# Patient Record
Sex: FEMALE | Race: WHITE | NOT HISPANIC OR LATINO | Employment: OTHER | URBAN - METROPOLITAN AREA
[De-identification: names, ages, dates, MRNs, and addresses within clinical notes are randomized per-mention and may not be internally consistent; named-entity substitution may affect disease eponyms.]

---

## 2018-03-05 DIAGNOSIS — Z12.39 BREAST CANCER SCREENING: Primary | ICD-10-CM

## 2018-03-09 ENCOUNTER — HOSPITAL ENCOUNTER (OUTPATIENT)
Dept: RADIOLOGY | Facility: HOSPITAL | Age: 68
Discharge: HOME | End: 2018-03-09
Attending: FAMILY MEDICINE
Payer: COMMERCIAL

## 2018-03-09 DIAGNOSIS — Z12.39 BREAST CANCER SCREENING: ICD-10-CM

## 2018-03-09 PROCEDURE — 77067 SCR MAMMO BI INCL CAD: CPT | Mod: 26,,, | Performed by: RADIOLOGY

## 2018-03-09 PROCEDURE — 77067 SCR MAMMO BI INCL CAD: CPT | Mod: TC

## 2018-03-09 PROCEDURE — 77063 BREAST TOMOSYNTHESIS BI: CPT | Mod: 26,,, | Performed by: RADIOLOGY

## 2019-04-25 ENCOUNTER — OFFICE VISIT (OUTPATIENT)
Dept: ORTHOPEDICS | Facility: CLINIC | Age: 69
End: 2019-04-25
Payer: MEDICARE

## 2019-04-25 VITALS
SYSTOLIC BLOOD PRESSURE: 132 MMHG | BODY MASS INDEX: 33.34 KG/M2 | WEIGHT: 220 LBS | DIASTOLIC BLOOD PRESSURE: 74 MMHG | HEART RATE: 87 BPM | HEIGHT: 68 IN

## 2019-04-25 DIAGNOSIS — S39.012A LUMBAR STRAIN, INITIAL ENCOUNTER: ICD-10-CM

## 2019-04-25 DIAGNOSIS — M25.562 PATELLOFEMORAL ARTHRALGIA OF LEFT KNEE: Primary | ICD-10-CM

## 2019-04-25 PROCEDURE — 73562 X-RAY EXAM OF KNEE 3: CPT | Mod: LT,,, | Performed by: ORTHOPAEDIC SURGERY

## 2019-04-25 PROCEDURE — 73562 PR  X-RAY KNEE 3 VIEW: ICD-10-PCS | Mod: LT,,, | Performed by: ORTHOPAEDIC SURGERY

## 2019-04-25 PROCEDURE — 99203 OFFICE O/P NEW LOW 30 MIN: CPT | Mod: 25,,, | Performed by: ORTHOPAEDIC SURGERY

## 2019-04-25 PROCEDURE — 20610 LARGE JOINT ASPIRATION/INJECTION: L KNEE: ICD-10-PCS | Mod: LT,,, | Performed by: ORTHOPAEDIC SURGERY

## 2019-04-25 PROCEDURE — 20610 DRAIN/INJ JOINT/BURSA W/O US: CPT | Mod: LT,,, | Performed by: ORTHOPAEDIC SURGERY

## 2019-04-25 PROCEDURE — 99203 PR OFFICE/OUTPT VISIT, NEW, LEVL III, 30-44 MIN: ICD-10-PCS | Mod: 25,,, | Performed by: ORTHOPAEDIC SURGERY

## 2019-04-25 RX ORDER — GLIMEPIRIDE 4 MG/1
4 TABLET ORAL
COMMUNITY
Start: 2019-04-04 | End: 2020-04-03

## 2019-04-25 RX ORDER — METHYLPREDNISOLONE ACETATE 40 MG/ML
40 INJECTION, SUSPENSION INTRA-ARTICULAR; INTRALESIONAL; INTRAMUSCULAR; SOFT TISSUE
Status: DISCONTINUED | OUTPATIENT
Start: 2019-04-25 | End: 2019-04-25 | Stop reason: HOSPADM

## 2019-04-25 RX ORDER — HYDROCHLOROTHIAZIDE 25 MG/1
25 TABLET ORAL EVERY OTHER DAY
COMMUNITY
Start: 2019-04-23 | End: 2023-12-18 | Stop reason: CLARIF

## 2019-04-25 RX ADMIN — METHYLPREDNISOLONE ACETATE 40 MG: 40 INJECTION, SUSPENSION INTRA-ARTICULAR; INTRALESIONAL; INTRAMUSCULAR; SOFT TISSUE at 12:04

## 2019-04-25 NOTE — PROGRESS NOTES
[facility]    Subjective:     Chief Complaint:   Chief Complaint   Patient presents with    Left Knee - Pain     Left knee pain x 3 months. States that he leg has been swollen  For about 3 months and states that is when the pain started. States that the her pain is constant and popping and states that her knee is weak as well.        Past Medical History:   Diagnosis Date    Abnormal Pap smear     s/p cryo at 22 y/o    GERD (gastroesophageal reflux disease)     Hyperlipidemia     Migraine     Migraines        Past Surgical History:   Procedure Laterality Date    CHOLECYSTECTOMY  10/2014    CHOLECYSTECTOMY-LAPAROSCOPIC N/A 10/14/2014    Performed by Yuriy Moreland MD at [facility] OR       Current Outpatient Medications   Medication Sig    glimepiride (AMARYL) 4 MG tablet Take 4 mg by mouth.    hydroCHLOROthiazide (HYDRODIURIL) 25 MG tablet Take 25 mg by mouth.     No current facility-administered medications for this visit.        Review of patient's allergies indicates:   Allergen Reactions    Statins-hmg-coa reductase inhibitors Swelling    Metformin Other (See Comments)     metallic taste , burn of the esophagus       Family History   Problem Relation Age of Onset    Breast cancer Paternal Grandmother 70    Colon cancer Mother             Cancer Daughter         crohns    Ovarian cancer Daughter     Cancer Daughter 20        uterine cancer with mets s/p chemo/rad/surgery; ?genetic mutation; now with hip tumors    Breast cancer Maternal Grandmother        Social History     Socioeconomic History    Marital status:      Spouse name: Not on file    Number of children: Not on file    Years of education: Not on file    Highest education level: Not on file   Occupational History    Not on file   Social Needs    Financial resource strain: Not on file    Food insecurity:     Worry: Not on file     Inability: Not on file    Transportation needs:     Medical: Not on file      Non-medical: Not on file   Tobacco Use    Smoking status: Former Smoker    Smokeless tobacco: Never Used   Substance and Sexual Activity    Alcohol use: No    Drug use: No    Sexual activity: Never   Lifestyle    Physical activity:     Days per week: Not on file     Minutes per session: Not on file    Stress: Not on file   Relationships    Social connections:     Talks on phone: Not on file     Gets together: Not on file     Attends Anabaptism service: Not on file     Active member of club or organization: Not on file     Attends meetings of clubs or organizations: Not on file     Relationship status: Not on file   Other Topics Concern    Not on file   Social History Narrative    Not on file       History of present illness: Patient comes in today for the left knee. She's having achy pain in the left knee. She's also having swelling in the left knee. This is been going on for several months. She denies any trauma.      Review of Systems:    Constitution: Negative for chills, fever, and sweats.  Negative for unexplained weight loss.    HENT:  Negative for headaches and blurry vision.    Cardiovascular:Negative for chest pain or irregular heart beat. Negative for hypertension.    Respiratory:  Negative for cough and shortness of breath.    Gastrointestinal: Negative for abdominal pain, heartburn, melena, nausea, and vomitting.    Genitourinary:  Negative bladder incontinence and dysuria.    Musculoskeletal:  See HPI for details.     Neurological: Negative for numbness.    Psychiatric/Behavioral: Negative for depression.  The patient is not nervous/anxious.      Endocrine: Negative for polyuria    Hematologic/Lymphatic: Negative for bleeding problem.  Does not bruise/bleed easily.    Skin: Negative for poor would healing and rash    Objective:      Physical Examination:    Vital Signs:    Vitals:    04/25/19 1146   BP: 132/74   Pulse: 87       Body mass index is 33.45 kg/m².    This a well-developed, well  nourished patient in no acute distress.  They are alert and oriented and cooperative to examination.        Patient has full range of motion of the left knee. Full range of motion of the right knee. She has anterior crepitus bilaterally. She has a 1+ effusion on the left side. She has medial joint line tenderness.  Pertinent New Results:    XRAY Report / Interpretation:   AP lateral sunrise views of the left knee demonstrate patellofemoral arthrosis no fractures or subluxations    Assessment/Plan:      Patellofemoral arthrosis. I injected the left knee with Depo-Medrol and lidocaine. I started her on physical therapy. Follow-up in one month      This note was created using Dragon voice recognition software that occasionally misinterpreted phrases or words.

## 2019-04-25 NOTE — PROCEDURES
Large Joint Aspiration/Injection: L knee  Date/Time: 4/25/2019 12:56 PM  Performed by: Manjit Pierre MD  Authorized by: Manjit Pierre MD     Consent Done?:  Yes (Verbal)  Procedure site marked: Yes    Timeout: Prior to procedure the correct patient, procedure, and site was verified      Location:  Knee  Site:  L knee  Prep: Patient was prepped and draped in usual sterile fashion    Needle size:  25 G  Medications:  40 mg methylPREDNISolone acetate 40 mg/mL; 40 mg methylPREDNISolone acetate 40 mg/mL  Patient tolerance:  Patient tolerated the procedure well with no immediate complications

## 2019-09-24 DIAGNOSIS — R06.89 HYPOVENTILATION, IDIOPATHIC: Primary | ICD-10-CM

## 2019-09-24 DIAGNOSIS — I10 ESSENTIAL HYPERTENSION, MALIGNANT: ICD-10-CM

## 2019-09-24 DIAGNOSIS — R07.9 CHEST PAIN, UNSPECIFIED: ICD-10-CM

## 2019-09-24 DIAGNOSIS — E11.9 DIABETES MELLITUS WITHOUT COMPLICATION: ICD-10-CM

## 2019-10-10 ENCOUNTER — HOSPITAL ENCOUNTER (OUTPATIENT)
Dept: PULMONOLOGY | Facility: HOSPITAL | Age: 69
Discharge: HOME | End: 2019-10-10
Attending: SPECIALIST
Payer: MEDICARE

## 2019-10-10 ENCOUNTER — HOSPITAL ENCOUNTER (OUTPATIENT)
Dept: RADIOLOGY | Facility: HOSPITAL | Age: 69
Discharge: HOME | End: 2019-10-10
Attending: SPECIALIST
Payer: MEDICARE

## 2019-10-10 DIAGNOSIS — R06.89 HYPOVENTILATION, IDIOPATHIC: ICD-10-CM

## 2019-10-10 DIAGNOSIS — E11.9 DIABETES MELLITUS WITHOUT COMPLICATION: ICD-10-CM

## 2019-10-10 DIAGNOSIS — R07.9 CHEST PAIN, UNSPECIFIED: Primary | ICD-10-CM

## 2019-10-10 DIAGNOSIS — I10 ESSENTIAL HYPERTENSION, MALIGNANT: ICD-10-CM

## 2019-10-10 DIAGNOSIS — R07.9 CHEST PAIN, UNSPECIFIED: ICD-10-CM

## 2019-10-10 PROCEDURE — 94010 BREATHING CAPACITY TEST: CPT

## 2019-10-10 PROCEDURE — 94060 EVALUATION OF WHEEZING: CPT

## 2019-10-10 PROCEDURE — 94375 RESPIRATORY FLOW VOLUME LOOP: CPT

## 2019-10-10 PROCEDURE — 71046 X-RAY EXAM CHEST 2 VIEWS: CPT | Mod: TC

## 2021-05-20 ENCOUNTER — OFFICE VISIT (OUTPATIENT)
Dept: ORTHOPEDICS | Facility: CLINIC | Age: 71
End: 2021-05-20
Payer: MEDICARE

## 2021-05-20 VITALS
DIASTOLIC BLOOD PRESSURE: 76 MMHG | OXYGEN SATURATION: 98 % | WEIGHT: 220 LBS | SYSTOLIC BLOOD PRESSURE: 114 MMHG | HEART RATE: 71 BPM | BODY MASS INDEX: 33.34 KG/M2 | HEIGHT: 68 IN

## 2021-05-20 DIAGNOSIS — M17.12 PRIMARY OSTEOARTHRITIS OF LEFT KNEE: Primary | ICD-10-CM

## 2021-05-20 DIAGNOSIS — M62.830 LUMBAR PARASPINAL MUSCLE SPASM: ICD-10-CM

## 2021-05-20 PROCEDURE — 99213 OFFICE O/P EST LOW 20 MIN: CPT | Mod: S$GLB,,, | Performed by: ORTHOPAEDIC SURGERY

## 2021-05-20 PROCEDURE — 99213 PR OFFICE/OUTPT VISIT, EST, LEVL III, 20-29 MIN: ICD-10-PCS | Mod: S$GLB,,, | Performed by: ORTHOPAEDIC SURGERY

## 2021-05-20 RX ORDER — METHYLPREDNISOLONE 4 MG/1
TABLET ORAL
Qty: 1 PACKAGE | Refills: 0 | Status: SHIPPED | OUTPATIENT
Start: 2021-05-20 | End: 2023-11-16

## 2021-05-20 RX ORDER — NATEGLINIDE 120 MG/1
120 TABLET ORAL
COMMUNITY
Start: 2020-11-09 | End: 2022-12-19

## 2021-05-20 RX ORDER — DULAGLUTIDE 0.75 MG/.5ML
1.5 INJECTION, SOLUTION SUBCUTANEOUS
COMMUNITY
Start: 2020-09-09

## 2021-09-14 ENCOUNTER — HOSPITAL ENCOUNTER (OUTPATIENT)
Dept: RADIOLOGY | Facility: HOSPITAL | Age: 71
Discharge: HOME | End: 2021-09-14
Attending: OBSTETRICS & GYNECOLOGY
Payer: MEDICARE

## 2021-09-14 DIAGNOSIS — R92.1 CALCIFICATION OF LEFT BREAST ON MAMMOGRAPHY: ICD-10-CM

## 2021-09-14 DIAGNOSIS — R92.8 ABNORMAL MAMMOGRAM OF LEFT BREAST: ICD-10-CM

## 2021-09-14 PROCEDURE — 19081 BX BREAST 1ST LESION STRTCTC: CPT | Mod: LT

## 2021-09-14 PROCEDURE — 88305 TISSUE EXAM BY PATHOLOGIST: CPT | Performed by: PATHOLOGY

## 2021-09-14 PROCEDURE — 88305 TISSUE EXAM BY PATHOLOGIST: CPT | Mod: 26,,, | Performed by: PATHOLOGY

## 2021-09-14 PROCEDURE — 19081 BX BREAST 1ST LESION STRTCTC: CPT | Mod: LT,,, | Performed by: RADIOLOGY

## 2021-09-14 PROCEDURE — 25000003 PHARM REV CODE 250: Performed by: RADIOLOGY

## 2021-09-14 PROCEDURE — 76098 X-RAY EXAM SURGICAL SPECIMEN: CPT | Mod: TC

## 2021-09-14 PROCEDURE — 76098 MAMMO BREAST SPECIMEN: ICD-10-PCS | Mod: 26,59,, | Performed by: RADIOLOGY

## 2021-09-14 PROCEDURE — 88305 TISSUE EXAM BY PATHOLOGIST: ICD-10-PCS | Mod: 26,,, | Performed by: PATHOLOGY

## 2021-09-14 PROCEDURE — 76098 X-RAY EXAM SURGICAL SPECIMEN: CPT | Mod: 26,59,, | Performed by: RADIOLOGY

## 2021-09-14 PROCEDURE — 19081 MAMMO BREAST STEREOTACTIC BREAST BIOPSY LEFT: ICD-10-PCS | Mod: LT,,, | Performed by: RADIOLOGY

## 2021-09-14 RX ADMIN — LIDOCAINE HYDROCHLORIDE 20 ML: 10; .005 INJECTION, SOLUTION EPIDURAL; INFILTRATION; INTRACAUDAL; PERINEURAL at 11:09

## 2021-09-15 ENCOUNTER — TELEPHONE (OUTPATIENT)
Dept: RADIOLOGY | Facility: HOSPITAL | Age: 71
End: 2021-09-15

## 2021-09-15 LAB
FINAL PATHOLOGIC DIAGNOSIS: NORMAL
GROSS: NORMAL
Lab: NORMAL

## 2021-09-16 ENCOUNTER — TELEPHONE (OUTPATIENT)
Dept: RADIOLOGY | Facility: HOSPITAL | Age: 71
End: 2021-09-16

## 2022-02-28 ENCOUNTER — TELEPHONE (OUTPATIENT)
Dept: RADIOLOGY | Facility: HOSPITAL | Age: 72
End: 2022-02-28
Payer: COMMERCIAL

## 2022-02-28 NOTE — TELEPHONE ENCOUNTER
Called patient to schedule 6 month follow up of breast. No answer, left voicemail to call back at [phone].

## 2022-03-03 ENCOUNTER — HOSPITAL ENCOUNTER (OUTPATIENT)
Dept: RADIOLOGY | Facility: HOSPITAL | Age: 72
Discharge: HOME | End: 2022-03-03
Attending: OBSTETRICS & GYNECOLOGY
Payer: MEDICARE

## 2022-03-03 DIAGNOSIS — R92.8 ABNORMAL MAMMOGRAM OF LEFT BREAST: ICD-10-CM

## 2022-03-03 PROCEDURE — 77061 BREAST TOMOSYNTHESIS UNI: CPT | Mod: 26,LT,, | Performed by: RADIOLOGY

## 2022-03-03 PROCEDURE — 77065 MAMMO DIGITAL DIAGNOSTIC LEFT WITH TOMO: ICD-10-PCS | Mod: 26,LT,, | Performed by: RADIOLOGY

## 2022-03-03 PROCEDURE — 77061 MAMMO DIGITAL DIAGNOSTIC LEFT WITH TOMO: ICD-10-PCS | Mod: 26,LT,, | Performed by: RADIOLOGY

## 2022-03-03 PROCEDURE — 77065 DX MAMMO INCL CAD UNI: CPT | Mod: 26,LT,, | Performed by: RADIOLOGY

## 2022-03-03 PROCEDURE — 76642 ULTRASOUND BREAST LIMITED: CPT | Mod: 26,LT,, | Performed by: RADIOLOGY

## 2022-03-03 PROCEDURE — 76642 ULTRASOUND BREAST LIMITED: CPT | Mod: TC,LT

## 2022-03-03 PROCEDURE — 77065 DX MAMMO INCL CAD UNI: CPT | Mod: TC,LT

## 2022-03-03 PROCEDURE — 76642 US BREAST LEFT LIMITED: ICD-10-PCS | Mod: 26,LT,, | Performed by: RADIOLOGY

## 2022-06-13 DIAGNOSIS — R92.8 ABNORMAL MAMMOGRAM: Primary | ICD-10-CM

## 2022-07-29 ENCOUNTER — OFFICE VISIT (OUTPATIENT)
Dept: HEMATOLOGY/ONCOLOGY | Facility: CLINIC | Age: 72
End: 2022-07-29
Payer: MEDICARE

## 2022-07-29 VITALS
TEMPERATURE: 98 F | RESPIRATION RATE: 18 BRPM | WEIGHT: 213.19 LBS | BODY MASS INDEX: 32.42 KG/M2 | DIASTOLIC BLOOD PRESSURE: 81 MMHG | SYSTOLIC BLOOD PRESSURE: 131 MMHG | HEART RATE: 87 BPM

## 2022-07-29 DIAGNOSIS — D69.6 THROMBOCYTOPENIA, UNSPECIFIED: ICD-10-CM

## 2022-07-29 DIAGNOSIS — I82.91 RECURRENT THROMBUS: ICD-10-CM

## 2022-07-29 DIAGNOSIS — I80.9 RECURRENT PHLEBITIS OF SUPERFICIAL VEIN: ICD-10-CM

## 2022-07-29 DIAGNOSIS — D68.59 HYPERCOAGULABLE STATE: Primary | ICD-10-CM

## 2022-07-29 DIAGNOSIS — Z82.49 FAMILY HISTORY OF BLOOD CLOTS: ICD-10-CM

## 2022-07-29 PROCEDURE — 99204 PR OFFICE/OUTPT VISIT, NEW, LEVL IV, 45-59 MIN: ICD-10-PCS | Mod: S$GLB,,, | Performed by: INTERNAL MEDICINE

## 2022-07-29 PROCEDURE — 99204 OFFICE O/P NEW MOD 45 MIN: CPT | Mod: S$GLB,,, | Performed by: INTERNAL MEDICINE

## 2022-07-31 ENCOUNTER — TELEPHONE (OUTPATIENT)
Dept: HEMATOLOGY/ONCOLOGY | Facility: CLINIC | Age: 72
End: 2022-07-31

## 2022-07-31 DIAGNOSIS — I82.91 RECURRENT THROMBUS: Primary | ICD-10-CM

## 2022-07-31 DIAGNOSIS — Z82.49 FAMILY HISTORY OF BLOOD CLOTS: ICD-10-CM
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
## 2022-07-31 NOTE — TELEPHONE ENCOUNTER
Can she have lab done at .?    I placed orders for hypercoag eval in [facility] to be done at .    Get orders to , and notify pt of plan.    RTC 1 month see me.
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
## 2022-08-10 LAB
APCR PPP: 4.2 RATIO
APTT PPP: 29 SEC (ref 23–32)
AT III ACT/NOR PPP CHRO: 88 % NORMAL (ref 80–135)
AT III AG PPP IA-MCNC: NORMAL MG/DL
B2 GLYCOPROT1 IGA SER-ACNC: 3.2 U/ML
B2 GLYCOPROT1 IGG SER-ACNC: 19.7 U/ML
B2 GLYCOPROT1 IGM SER-ACNC: <2 U/ML
BASOPHILS # BLD AUTO: 53 CELLS/UL (ref 0–200)
BASOPHILS NFR BLD AUTO: 0.6 %
CARDIOLIPIN IGA SER IA-ACNC: 2.1 APL-U/ML
CARDIOLIPIN IGG SER IA-ACNC: 25.1 GPL-U/ML
CARDIOLIPIN IGM SER IA-ACNC: 2.1 MPL-U/ML
EOSINOPHIL # BLD AUTO: 169 CELLS/UL (ref 15–500)
EOSINOPHIL NFR BLD AUTO: 1.9 %
ERYTHROCYTE [DISTWIDTH] IN BLOOD BY AUTOMATED COUNT: 12.6 % (ref 11–15)
F2 C.20210G>A GENO BLD/T: NEGATIVE
F2 GENE MUT ANL BLD/T: NORMAL
F5 GENE MUT ANL BLD/T: NORMAL
F5 P.R506Q BLD/T QL: NEGATIVE
FACT VIII ACT/NOR PPP: 221 % NORMAL (ref 50–180)
FACT XII ACT/NOR PPP: 124 % NORMAL (ref 50–150)
FIBRINOGEN PPP-MCNC: 426 MG/DL (ref 175–425)
HCT VFR BLD AUTO: 39.5 % (ref 35–45)
HCYS SERPL-SCNC: 12.3 UMOL/L
HGB BLD-MCNC: 13.5 G/DL (ref 11.7–15.5)
INR PPP: 1
INTERPRETATION: ABNORMAL
LA 2 SCREEN W REFLEX-IMP: NORMAL
LYMPHOCYTES # BLD AUTO: 2919 CELLS/UL (ref 850–3900)
LYMPHOCYTES NFR BLD AUTO: 32.8 %
MCH RBC QN AUTO: 31.3 PG (ref 27–33)
MCHC RBC AUTO-ENTMCNC: 34.2 G/DL (ref 32–36)
MCV RBC AUTO: 91.6 FL (ref 80–100)
MONOCYTES # BLD AUTO: 596 CELLS/UL (ref 200–950)
MONOCYTES NFR BLD AUTO: 6.7 %
MTHFR GENE MUT ANL BLD/T: NORMAL
MTHFR GENE MUT ANL BLD/T: POSITIVE
NEUTROPHILS # BLD AUTO: 5162 CELLS/UL (ref 1500–7800)
NEUTROPHILS NFR BLD AUTO: 58 %
PLATELET # BLD AUTO: 292 THOUSAND/UL (ref 140–400)
PMV BLD REES-ECKER: 11.4 FL (ref 7.5–12.5)
PROT C ACT/NOR PPP: >200 % NORMAL (ref 70–180)
PROT C AG ACT/NOR PPP IA: ABNORMAL % NORMAL
PROT S ACT/NOR PPP: 95 % NORMAL (ref 60–140)
PROT S AG ACT/NOR PPP IA: ABNORMAL % NORMAL
PROTHROMBIN TIME: 10.3 SEC (ref 9–11.5)
PS IGA SER-ACNC: <20 U/ML
PS IGG SER-ACNC: <10 U/ML
PS IGM SER-ACNC: <25 U/ML
RBC # BLD AUTO: 4.31 MILLION/UL (ref 3.8–5.1)
SCREEN APTT: 35 SECONDS
SCREEN DRVVT: 38 SECONDS
SERVICE CMNT-IMP: NORMAL
WBC # BLD AUTO: 8.9 THOUSAND/UL (ref 3.8–10.8)

## 2022-08-16 ENCOUNTER — TELEPHONE (OUTPATIENT)
Dept: HEMATOLOGY/ONCOLOGY | Facility: CLINIC | Age: 72
End: 2022-08-16

## 2022-08-16 NOTE — TELEPHONE ENCOUNTER
Spoke to pt that Dr houston is evaluating her for bleeding and clotting and did not know about the surgery. Advised not to get surgery until after appt with Dr Houston to see if he cleared her for surgery. Verbalized understanding.

## 2022-08-16 NOTE — TELEPHONE ENCOUNTER
----- Message from Sayra Yee sent at 8/16/2022 10:13 AM CDT -----  PLEASE CALL PT. HAS SURGERY ON 8/29/22 NO HAS CONTACTED HER ABOUT HER ULTRASOUND CONCERNED ABOUT THE BLOOD CLOT BEFORE SURGERY.    CB#[phone]

## 2022-08-25 ENCOUNTER — HOSPITAL ENCOUNTER (OUTPATIENT)
Dept: RADIOLOGY | Facility: HOSPITAL | Age: 72
Discharge: HOME | End: 2022-08-25
Attending: OBSTETRICS & GYNECOLOGY
Payer: MEDICARE

## 2022-08-25 DIAGNOSIS — R92.8 ABNORMAL MAMMOGRAM: ICD-10-CM

## 2022-08-25 PROCEDURE — 77061 BREAST TOMOSYNTHESIS UNI: CPT | Mod: 26,LT,, | Performed by: RADIOLOGY

## 2022-08-25 PROCEDURE — 77065 DX MAMMO INCL CAD UNI: CPT | Mod: TC,LT

## 2022-08-25 PROCEDURE — 77065 MAMMO DIGITAL DIAGNOSTIC LEFT WITH TOMO: ICD-10-PCS | Mod: 26,LT,, | Performed by: RADIOLOGY

## 2022-08-25 PROCEDURE — 77061 MAMMO DIGITAL DIAGNOSTIC LEFT WITH TOMO: ICD-10-PCS | Mod: 26,LT,, | Performed by: RADIOLOGY

## 2022-08-25 PROCEDURE — 77065 DX MAMMO INCL CAD UNI: CPT | Mod: 26,LT,, | Performed by: RADIOLOGY

## 2022-09-01 ENCOUNTER — OFFICE VISIT (OUTPATIENT)
Dept: HEMATOLOGY/ONCOLOGY | Facility: CLINIC | Age: 72
End: 2022-09-01
Payer: MEDICARE

## 2022-09-01 VITALS
HEIGHT: 68 IN | SYSTOLIC BLOOD PRESSURE: 128 MMHG | HEART RATE: 76 BPM | RESPIRATION RATE: 18 BRPM | TEMPERATURE: 98 F | WEIGHT: 214.5 LBS | BODY MASS INDEX: 32.51 KG/M2 | DIASTOLIC BLOOD PRESSURE: 82 MMHG

## 2022-09-01 DIAGNOSIS — R79.89 HIGH PLASMA HOMOCYSTINE: Primary | ICD-10-CM

## 2022-09-01 DIAGNOSIS — Z15.89 HETEROZYGOUS MTHFR MUTATION A1298C: ICD-10-CM

## 2022-09-01 PROCEDURE — 99213 PR OFFICE/OUTPT VISIT, EST, LEVL III, 20-29 MIN: ICD-10-PCS | Mod: S$GLB,,, | Performed by: INTERNAL MEDICINE

## 2022-09-01 PROCEDURE — 99213 OFFICE O/P EST LOW 20 MIN: CPT | Mod: S$GLB,,, | Performed by: INTERNAL MEDICINE

## 2022-09-01 RX ORDER — PANTOPRAZOLE SODIUM 40 MG/1
40 TABLET, DELAYED RELEASE ORAL EVERY MORNING
COMMUNITY
Start: 2022-08-24

## 2022-09-01 NOTE — LETTER
September 4, 2022        Sam Garcia IV, MD  [address]   [place] [address]             [facility] - Hematology Oncology  [address] [address]  Phone: [phone]  Fax: [phone]   Patient: Jen Torres   MR Number: [id number]   YOB: 1950   Date of Visit: 9/1/2022       Dear Dr. Garcia:    Thank you for referring Jen Melissa to me for evaluation. Below are the relevant portions of my assessment and plan of care.            If you have questions, please do not hesitate to call me. I look forward to following Jen along with you.    Sincerely,      PABLO Moreland MD           CC    No Recipients
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
## 2022-09-06 ENCOUNTER — TELEPHONE (OUTPATIENT)
Dept: HEMATOLOGY/ONCOLOGY | Facility: CLINIC | Age: 72
End: 2022-09-06

## 2022-09-06 ENCOUNTER — DOCUMENTATION ONLY (OUTPATIENT)
Dept: HEMATOLOGY/ONCOLOGY | Facility: CLINIC | Age: 72
End: 2022-09-06

## 2022-09-06 DIAGNOSIS — I80.02 THROMBOPHLEBITIS OF SUPERFICIAL VEINS OF LEFT LOWER EXTREMITY: Primary | ICD-10-CM

## 2022-09-07 DIAGNOSIS — I80.02 THROMBOPHLEBITIS OF SUPERFICIAL VEINS OF LEFT LOWER EXTREMITY: Primary | ICD-10-CM

## 2022-09-07 DIAGNOSIS — Z15.89 HETEROZYGOUS MTHFR MUTATION A1298C: ICD-10-CM

## 2022-09-07 DIAGNOSIS — R79.89 HIGH PLASMA HOMOCYSTINE: ICD-10-CM

## 2022-09-07 NOTE — TELEPHONE ENCOUNTER
Denial for clearance faxed to Karli, notified her that per Dr Moreland, not cleared to move forward with surgery. Verbalized understanding.

## 2022-09-07 NOTE — TELEPHONE ENCOUNTER
----- Message from Ranjana Mendoza sent at 9/6/2022  3:18 PM CDT -----  Karli Villarreal from [facility] in [place] Called and said the patient is having a meniscus repair done and she need to speak with the nurse today about the patient's blood thinners. # [phone]

## 2022-09-07 NOTE — TELEPHONE ENCOUNTER
Surgery put on hold.    I placed order for U/S venous of L leg HH.    Try to get this scheduled with in 1 week.    RTC see me 1-2 days after the U/S is done.

## 2022-09-07 NOTE — PROGRESS NOTES
She has torn maniscus of L knee.  Hx of superficial thrombophlebitis of L leg.    She has multiple + findings on Hypercoag. Eval.    I received today a request for clearance for surgery tomorrow on L knee.  I have not cleared her for surgery.    I have requested Venous Dopplar study of L leg., to check status of clot?    RTC 1 week to review status and recommendations if surgery is done.

## 2022-09-19 ENCOUNTER — OFFICE VISIT (OUTPATIENT)
Dept: HEMATOLOGY/ONCOLOGY | Facility: CLINIC | Age: 72
End: 2022-09-19
Payer: MEDICARE

## 2022-09-19 VITALS
BODY MASS INDEX: 32.64 KG/M2 | HEART RATE: 78 BPM | RESPIRATION RATE: 16 BRPM | HEIGHT: 68 IN | DIASTOLIC BLOOD PRESSURE: 87 MMHG | TEMPERATURE: 98 F | SYSTOLIC BLOOD PRESSURE: 163 MMHG | WEIGHT: 215.38 LBS

## 2022-09-19 DIAGNOSIS — I80.02 THROMBOPHLEBITIS OF SUPERFICIAL VEINS OF LEFT LOWER EXTREMITY: Primary | ICD-10-CM

## 2022-09-19 DIAGNOSIS — I82.91 RECURRENT THROMBUS: ICD-10-CM

## 2022-09-19 DIAGNOSIS — R79.89 HIGH PLASMA HOMOCYSTINE: ICD-10-CM

## 2022-09-19 DIAGNOSIS — D68.59 HYPERCOAGULABLE STATE: ICD-10-CM

## 2022-09-19 DIAGNOSIS — Z82.49 FAMILY HISTORY OF BLOOD CLOTS: ICD-10-CM

## 2022-09-19 DIAGNOSIS — Z15.89 HETEROZYGOUS MTHFR MUTATION A1298C: ICD-10-CM

## 2022-09-19 PROCEDURE — 99213 PR OFFICE/OUTPT VISIT, EST, LEVL III, 20-29 MIN: ICD-10-PCS | Mod: S$GLB,,, | Performed by: INTERNAL MEDICINE

## 2022-09-19 PROCEDURE — 99213 OFFICE O/P EST LOW 20 MIN: CPT | Mod: S$GLB,,, | Performed by: INTERNAL MEDICINE

## 2022-09-19 RX ORDER — GLIMEPIRIDE 2 MG/1
2 TABLET ORAL EVERY MORNING
COMMUNITY
Start: 2022-06-27

## 2022-09-19 RX ORDER — DULAGLUTIDE 0.75 MG/.5ML
0.75 INJECTION, SOLUTION SUBCUTANEOUS
COMMUNITY
Start: 2022-08-15 | End: 2022-12-19 | Stop reason: SDUPTHER

## 2022-09-19 RX ORDER — EZETIMIBE 10 MG/1
10 TABLET ORAL DAILY
COMMUNITY
Start: 2022-08-29
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
## 2022-09-19 NOTE — LETTER
September 19, 2022        Sam Garcia IV, MD  [address]   [place] [address]             [facility] - Hematology Oncology  [address] [facility] [address]  Phone: [phone]  Fax: [phone]   Patient: Jen Torres   MR Number: [id number]   YOB: 1950   Date of Visit: 9/19/2022       Dear Dr. Garcia:    Thank you for referring Jen Melissa to me for evaluation. Below are the relevant portions of my assessment and plan of care.            If you have questions, please do not hesitate to call me. I look forward to following Jen along with you.    Sincerely,      PABLO Moreland MD           CC    No Recipients

## 2022-11-01 ENCOUNTER — TELEPHONE (OUTPATIENT)
Dept: HEMATOLOGY/ONCOLOGY | Facility: CLINIC | Age: 72
End: 2022-11-01

## 2022-11-01 ENCOUNTER — OFFICE VISIT (OUTPATIENT)
Dept: HEMATOLOGY/ONCOLOGY | Facility: CLINIC | Age: 72
End: 2022-11-01
Payer: MEDICARE

## 2022-11-01 VITALS
HEIGHT: 68 IN | TEMPERATURE: 98 F | RESPIRATION RATE: 18 BRPM | BODY MASS INDEX: 33.01 KG/M2 | HEART RATE: 84 BPM | DIASTOLIC BLOOD PRESSURE: 80 MMHG | WEIGHT: 217.81 LBS | SYSTOLIC BLOOD PRESSURE: 119 MMHG

## 2022-11-01 DIAGNOSIS — D68.59 HYPERCOAGULABLE STATE: Primary | ICD-10-CM

## 2022-11-01 PROCEDURE — 99213 OFFICE O/P EST LOW 20 MIN: CPT | Mod: S$GLB,,, | Performed by: INTERNAL MEDICINE

## 2022-11-01 PROCEDURE — 99213 PR OFFICE/OUTPT VISIT, EST, LEVL III, 20-29 MIN: ICD-10-PCS | Mod: S$GLB,,, | Performed by: INTERNAL MEDICINE
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
## 2022-11-01 NOTE — LETTER
November 1, 2022        Sam Garcia IV, MD  [address]   [place] [address]             [facility] - Hematology Oncology  [address] [facility] [address]  Phone: [phone]  Fax: [phone]   Patient: Jen Torres   MR Number: [id number]   YOB: 1950   Date of Visit: 11/1/2022       Dear Dr. Garcia:    Thank you for referring Jen Melissa to me for evaluation. Below are the relevant portions of my assessment and plan of care.            If you have questions, please do not hesitate to call me. I look forward to following Jen along with you.    Sincerely,      PABLO Moreland MD           CC    No Recipients

## 2022-11-05 LAB
BASOPHILS # BLD AUTO: 49 CELLS/UL (ref 0–200)
BASOPHILS NFR BLD AUTO: 0.5 %
EOSINOPHIL # BLD AUTO: 223 CELLS/UL (ref 15–500)
EOSINOPHIL NFR BLD AUTO: 2.3 %
ERYTHROCYTE [DISTWIDTH] IN BLOOD BY AUTOMATED COUNT: 12.3 % (ref 11–15)
FACT VIII ACT/NOR PPP: 192 % NORMAL (ref 50–180)
FIBRINOGEN PPP-MCNC: 432 MG/DL (ref 175–425)
HCT VFR BLD AUTO: 43.5 % (ref 35–45)
HCYS SERPL-SCNC: 9.5 UMOL/L
HGB BLD-MCNC: 14.1 G/DL (ref 11.7–15.5)
LYMPHOCYTES # BLD AUTO: 3104 CELLS/UL (ref 850–3900)
LYMPHOCYTES NFR BLD AUTO: 32 %
MCH RBC QN AUTO: 30.5 PG (ref 27–33)
MCHC RBC AUTO-ENTMCNC: 32.4 G/DL (ref 32–36)
MCV RBC AUTO: 94.2 FL (ref 80–100)
MONOCYTES # BLD AUTO: 611 CELLS/UL (ref 200–950)
MONOCYTES NFR BLD AUTO: 6.3 %
NEUTROPHILS # BLD AUTO: 5713 CELLS/UL (ref 1500–7800)
NEUTROPHILS NFR BLD AUTO: 58.9 %
PLATELET # BLD AUTO: 330 THOUSAND/UL (ref 140–400)
PMV BLD REES-ECKER: 11 FL (ref 7.5–12.5)
RBC # BLD AUTO: 4.62 MILLION/UL (ref 3.8–5.1)
WBC # BLD AUTO: 9.7 THOUSAND/UL (ref 3.8–10.8)

## 2022-11-28 ENCOUNTER — OFFICE VISIT (OUTPATIENT)
Dept: HEMATOLOGY/ONCOLOGY | Facility: CLINIC | Age: 72
End: 2022-11-28
Payer: MEDICARE

## 2022-11-28 VITALS
HEIGHT: 68 IN | SYSTOLIC BLOOD PRESSURE: 145 MMHG | BODY MASS INDEX: 33.34 KG/M2 | HEART RATE: 71 BPM | TEMPERATURE: 98 F | DIASTOLIC BLOOD PRESSURE: 82 MMHG | WEIGHT: 220 LBS | RESPIRATION RATE: 16 BRPM

## 2022-11-28 DIAGNOSIS — I82.592 CHRONIC EMBOLISM AND THROMBOSIS OF OTHER SPECIFIED DEEP VEIN OF LEFT LOWER EXTREMITY: ICD-10-CM

## 2022-11-28 DIAGNOSIS — I82.402 ACUTE DEEP VEIN THROMBOSIS (DVT) OF LEFT LOWER EXTREMITY, UNSPECIFIED VEIN: Primary | ICD-10-CM

## 2022-11-28 PROCEDURE — 99214 OFFICE O/P EST MOD 30 MIN: CPT | Mod: S$GLB,,, | Performed by: INTERNAL MEDICINE

## 2022-11-28 PROCEDURE — 99214 PR OFFICE/OUTPT VISIT, EST, LEVL IV, 30-39 MIN: ICD-10-PCS | Mod: S$GLB,,, | Performed by: INTERNAL MEDICINE

## 2022-11-28 RX ORDER — ENOXAPARIN SODIUM 100 MG/ML
40 INJECTION SUBCUTANEOUS DAILY
Qty: 2 EACH | Refills: 0 | Status: SHIPPED | OUTPATIENT
Start: 2022-11-28 | End: 2022-11-30

## 2022-12-05 ENCOUNTER — HOSPITAL ENCOUNTER (OUTPATIENT)
Dept: RADIOLOGY | Facility: HOSPITAL | Age: 72
Discharge: HOME | End: 2022-12-05
Attending: INTERNAL MEDICINE
Payer: MEDICARE

## 2022-12-05 DIAGNOSIS — I82.592 CHRONIC EMBOLISM AND THROMBOSIS OF OTHER SPECIFIED DEEP VEIN OF LEFT LOWER EXTREMITY: ICD-10-CM

## 2022-12-05 DIAGNOSIS — I82.402 ACUTE DEEP VEIN THROMBOSIS (DVT) OF LEFT LOWER EXTREMITY, UNSPECIFIED VEIN: ICD-10-CM

## 2022-12-05 PROCEDURE — 93971 EXTREMITY STUDY: CPT | Mod: TC,LT

## 2022-12-19 ENCOUNTER — OFFICE VISIT (OUTPATIENT)
Dept: CARDIOLOGY | Facility: CLINIC | Age: 72
End: 2022-12-19
Payer: MEDICARE

## 2022-12-19 VITALS
DIASTOLIC BLOOD PRESSURE: 72 MMHG | HEIGHT: 68 IN | OXYGEN SATURATION: 98 % | SYSTOLIC BLOOD PRESSURE: 122 MMHG | BODY MASS INDEX: 32.74 KG/M2 | HEART RATE: 83 BPM | WEIGHT: 216.06 LBS

## 2022-12-19 DIAGNOSIS — E78.00 HYPERCHOLESTEREMIA: ICD-10-CM

## 2022-12-19 DIAGNOSIS — E08.59 DIABETES MELLITUS DUE TO UNDERLYING CONDITION WITH OTHER CIRCULATORY COMPLICATION, WITHOUT LONG-TERM CURRENT USE OF INSULIN: ICD-10-CM

## 2022-12-19 DIAGNOSIS — Z00.00 ROUTINE CHECK-UP: Primary | ICD-10-CM

## 2022-12-19 DIAGNOSIS — D68.59 HYPERCOAGULOPATHY: ICD-10-CM

## 2022-12-19 DIAGNOSIS — K29.70 GASTRITIS, PRESENCE OF BLEEDING UNSPECIFIED, UNSPECIFIED CHRONICITY, UNSPECIFIED GASTRITIS TYPE: ICD-10-CM
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
## 2022-12-19 PROCEDURE — 99214 OFFICE O/P EST MOD 30 MIN: CPT | Mod: S$PBB,,, | Performed by: SPECIALIST

## 2022-12-19 PROCEDURE — 99999 PR PBB SHADOW E&M-EST. PATIENT-LVL III: CPT | Mod: PBBFAC,,, | Performed by: SPECIALIST

## 2022-12-19 PROCEDURE — 99214 PR OFFICE/OUTPT VISIT, EST, LEVL IV, 30-39 MIN: ICD-10-PCS | Mod: S$PBB,,, | Performed by: SPECIALIST

## 2022-12-19 PROCEDURE — 93010 EKG 12-LEAD: ICD-10-PCS | Mod: S$PBB,,, | Performed by: SPECIALIST

## 2022-12-19 PROCEDURE — 93010 ELECTROCARDIOGRAM REPORT: CPT | Mod: S$PBB,,, | Performed by: SPECIALIST

## 2022-12-19 PROCEDURE — 99213 OFFICE O/P EST LOW 20 MIN: CPT | Mod: PBBFAC,PN | Performed by: SPECIALIST

## 2022-12-19 PROCEDURE — 99999 PR PBB SHADOW E&M-EST. PATIENT-LVL III: ICD-10-PCS | Mod: PBBFAC,,, | Performed by: SPECIALIST

## 2022-12-19 PROCEDURE — 93005 ELECTROCARDIOGRAM TRACING: CPT | Mod: PBBFAC,PN | Performed by: SPECIALIST

## 2022-12-19 RX ORDER — CYANOCOBALAMIN/FOLIC AC/VIT B6 2-2.5-25MG
1 TABLET ORAL DAILY
COMMUNITY
Start: 2022-09-08 | End: 2023-10-23

## 2022-12-19 NOTE — PROGRESS NOTES
Subjective:    Patient ID:  Jen Torres is a 72 y.o. female who presents for   Hyperlipidemia and Deep Vein Thrombosis    HPI  l knee  torn meniscus 2019  -  on eliquis  foer  hypercoag  - sees orthopeics 12/20      + dm  + hi bp    2)   fell last month-  lo back and neck pain  injury - back deck needs to be done     Review of patient's allergies indicates:   Allergen Reactions    Statins-hmg-coa reductase inhibitors Swelling    Metformin Other (See Comments)     metallic taste , burn of the esophagus       Past Medical History:   Diagnosis Date    Abnormal Pap smear     s/p cryo at 20 y/o    GERD (gastroesophageal reflux disease)     Hyperlipidemia     Migraine     Migraines      Past Surgical History:   Procedure Laterality Date    CHOLECYSTECTOMY  10/2014     Social History     Tobacco Use    Smoking status: Former    Smokeless tobacco: Never   Substance Use Topics    Alcohol use: No    Drug use: No        Review of Systems     ROS        Objective:        Vitals:    12/19/22 1645   BP: 122/72   Pulse: 83       Lab Results   Component Value Date    WBC 9.7 11/01/2022    HGB 14.1 11/01/2022    HCT 43.5 11/01/2022     11/01/2022    CHOL 234 (H) 10/10/2019    TRIG 136 10/10/2019    HDL 43 10/10/2019    TSH 1.640 10/10/2019    INR 1.0 08/01/2022        ECHOCARDIOGRAM RESULTS  No results found for this or any previous visit.      CURRENT/PREVIOUS VISIT EKG    No results found for this or any previous visit.    No results found for this or any previous visit.      PHYSICAL EXAM    Physical Exam 120/80   Tender right supraclavicular area  Lungs are clear  Cardiac regular  Abdomen no swelling    Medication List with Changes/Refills   Current Medications    APIXABAN (ELIQUIS) 5 MG TAB    Take 1 tablet (5 mg total) by mouth 2 (two) times daily.    DULAGLUTIDE (TRULICITY) 0.75 MG/0.5 ML PEN INJECTOR    Inject 1 pen into the skin every 7 days.    EZETIMIBE (ZETIA) 10 MG TABLET    Take 10 mg by mouth once daily.     FOLIC ACID-VIT B6-VIT B12 2.5-25-2 MG (WESTAB MAX) 2.5-25-2 MG TAB    Take 1 tablet by mouth once daily.    GLIMEPIRIDE (AMARYL) 2 MG TABLET    Take 2 mg by mouth every morning.    HYDROCHLOROTHIAZIDE (HYDRODIURIL) 25 MG TABLET    Take 25 mg by mouth every other day.     METHYLPREDNISOLONE (MEDROL DOSEPACK) 4 MG TABLET    use as directed    PANTOPRAZOLE (PROTONIX) 40 MG TABLET    Take 40 mg by mouth every morning.    POTASSIUM CHLORIDE (MICRO-K) 10 MEQ CPSR    TAKE 1 CAPSULE BY MOUTH EVERY DAY   Discontinued Medications    DULAGLUTIDE (TRULICITY) 0.75 MG/0.5 ML PEN INJECTOR    Inject 0.75 mg into the skin.    FOLIC ACID-VIT B6-VIT B12 2.5-25-2 MG (FOLBIC OR EQUIV) 2.5-25-2 MG TAB    Take 1 tablet by mouth once daily.    NATEGLINIDE (STARLIX) 120 MG TABLET    Take 120 mg by mouth 4 (four) times daily before meals and nightly.           Assessment:     Oncholysis R little finger      Orthopedic issues     Hi bp   Status post multiple falls due to accident-  EKG today is normal  1. Routine check-up    120/80   Try Lamisil for the nail fungal infection  Stay on her current medical regimen     Plan:       Problem List Items Addressed This Visit    None  Visit Diagnoses       Routine check-up    -  Primary    Relevant Orders    IN OFFICE EKG 12-LEAD (to Dane)             No follow-ups on file.

## 2023-01-30 ENCOUNTER — OFFICE VISIT (OUTPATIENT)
Dept: HEMATOLOGY/ONCOLOGY | Facility: CLINIC | Age: 73
End: 2023-01-30
Payer: MEDICARE

## 2023-01-30 VITALS
BODY MASS INDEX: 32.43 KG/M2 | DIASTOLIC BLOOD PRESSURE: 81 MMHG | SYSTOLIC BLOOD PRESSURE: 135 MMHG | WEIGHT: 214 LBS | RESPIRATION RATE: 18 BRPM | TEMPERATURE: 98 F | HEART RATE: 86 BPM | HEIGHT: 68 IN

## 2023-01-30 DIAGNOSIS — D68.59 HYPERCOAGULABLE STATE: ICD-10-CM

## 2023-01-30 DIAGNOSIS — I82.592 CHRONIC EMBOLISM AND THROMBOSIS OF OTHER SPECIFIED DEEP VEIN OF LEFT LOWER EXTREMITY: ICD-10-CM

## 2023-01-30 DIAGNOSIS — I82.402 ACUTE DEEP VEIN THROMBOSIS (DVT) OF LEFT LOWER EXTREMITY, UNSPECIFIED VEIN: Primary | ICD-10-CM

## 2023-01-30 PROCEDURE — 99214 OFFICE O/P EST MOD 30 MIN: CPT | Mod: S$GLB,,, | Performed by: INTERNAL MEDICINE

## 2023-01-30 PROCEDURE — 99214 PR OFFICE/OUTPT VISIT, EST, LEVL IV, 30-39 MIN: ICD-10-PCS | Mod: S$GLB,,, | Performed by: INTERNAL MEDICINE

## 2023-02-05 NOTE — PROGRESS NOTES
[facility] hematology Oncology in office Subsequent encounter note    1/30/23    Subjective:      Patient ID:   Jen Torres  72 y.o. female  1950  Sam Guerrero MD      Chief Complaint:   Recurrent leg clots    HPI:  72 y.o. female with 1 or 2 episodes of superficial venous clots involving the left leg.    Patient has history of falling off a porch, she has lumbar spine disease and low back pain symptoms with ski attic a. She is to see a chiropractor.  Other history includes a twisted left knee and torn meniscus.  She hit L leg, it was black and blue.  Check U/S of L leg now 12/2022.  Seeing Dr. Raz Jimenes for L knee pain.  Sample orders for lovenox bridge written and reviewed with her.  Await a definite surgical date, to decide on lovenox bridge dates.    She has history of cancer of the cervix, a cyst on her ovary, and calcium deposits in the left breast.    She has history of asthma, type 2 diabetes, high cholesterol, hypertension, sleep apnea syndrome, GERD and dysphagia symptoms history of ulcer per Dr. Ritchie, she has a small bladder and arthritic symptoms in the fingers and knee.    Significantly around March of 2021 her medial lower knee area was swollen at the left leg and she was found to have a superficial clot.  Recently she had left leg fullness with increased edema and ultrasound showed superficial vein clot.  Will discuss with radiologist if the event of 2021 and 2022 likely represent the same clot or different clot events.    She has seen Dr. Guerrero of vascular surgery for venous evaluation.  I do not believe she has had any specific treatment or intervention as of yet.    She does not smoke, she does not drink alcohol, she is allergic to statins with swelling and metformin with metallic taste and burning of the esophagus.    She has history of abnormal Pap smear with cryo procedure at age 21.  Other history includes GERD, hyperlipidemia, migraine headaches and  [address] [place] [address] for September patient declined biliary colic.    Medications include Trulicity, Amaryl, hydrochlorothiazide, Medrol Dosepak, Starlix, and Micro-K.    Her mother had colon cancer, and had been hit in the ankle area of the left leg and later developed a DVT and pulmonary embolus.  She had venous disease and venous stripping procedure.  Her dad had Alzheimer's disease.  Her aunt had colon cancer.  A paternal grandmother had breast cancer.  Another aunt had colitis, colon cancer irritable bowel syndrome.  A brother  of suicide, a brother  in MVA and another brother is alive and well.    Her daughter had Crohn's disease and ovarian cancer.  Another daughter had uterine cancer at age 20.    She has hx of L knee injury, needed elective arthroscopy.  On Elliquis 5 mg 1 BID.  On folbic.  Re check F8, fibrinogen, homocystine at [facility].  Recent U/S residual superficial clot seen in L leg veins.  She had surgery 23, with lovenox bridge.  Dr. Hatfield.    Repeat U/S of leg no DVT seen.  22.    ROS:   GEN: normal without any fever, night sweats or weight loss  HEENT: normal with no HA's, sore throat, stiff neck, changes in vision  CV: See HPI  PULM: See HPI  GI: See HPI  : normal with no hematuria, dysuria  BREAST: normal with no mass, discharge, pain  SKIN: See HPI     Past Medical History:   Diagnosis Date    Abnormal Pap smear     s/p cryo at 20 y/o    GERD (gastroesophageal reflux disease)     Hyperlipidemia     Migraine     Migraines      Past Surgical History:   Procedure Laterality Date    CHOLECYSTECTOMY  10/2014       Review of patient's allergies indicates:   Allergen Reactions    Statins-hmg-coa reductase inhibitors Swelling    Metformin Other (See Comments)     metallic taste , burn of the esophagus     Social History     Socioeconomic History    Marital status:    Tobacco Use    Smoking status: Former    Smokeless tobacco: Never   Substance and Sexual Activity    Alcohol use: No    Drug use: No    Sexual activity: Never  "        Current Outpatient Medications:     apixaban (ELIQUIS) 5 mg Tab, Take 1 tablet (5 mg total) by mouth 2 (two) times daily., Disp: 60 tablet, Rfl: 4    dulaglutide (TRULICITY) 0.75 mg/0.5 mL pen injector, Inject 1 pen into the skin every 7 days., Disp: , Rfl:     ezetimibe (ZETIA) 10 mg tablet, Take 10 mg by mouth once daily., Disp: , Rfl:     folic acid-vit B6-vit B12 2.5-25-2 mg (WESTAB MAX) 2.5-25-2 mg Tab, Take 1 tablet by mouth once daily., Disp: , Rfl:     glimepiride (AMARYL) 2 MG tablet, Take 2 mg by mouth every morning., Disp: , Rfl:     methylPREDNISolone (MEDROL DOSEPACK) 4 mg tablet, use as directed, Disp: 1 Package, Rfl: 0    pantoprazole (PROTONIX) 40 MG tablet, Take 40 mg by mouth every morning., Disp: , Rfl:     potassium chloride (MICRO-K) 10 MEQ CpSR, TAKE 1 CAPSULE BY MOUTH EVERY DAY, Disp: 90 capsule, Rfl: 3    hydroCHLOROthiazide (HYDRODIURIL) 25 MG tablet, Take 25 mg by mouth every other day. , Disp: , Rfl:           Objective:   Vitals:  Blood pressure 135/81, pulse 86, temperature 97.5 °F (36.4 °C), resp. rate 18, height 5' 7.5" (1.715 m), weight 97.1 kg (214 lb).    Physical Examination:   GEN: no apparent distress, comfortable  HEAD: atraumatic and normocephalic  EYES: no pallor, no icterus  ENT:  no pharyngeal erythema, external ears WNL; no nasal discharge  NECK: no masses, thyroid normal, trachea midline, no LAD/LN's, supple  CV: RRR with no murmur; normal pulse; normal S1 and S2  CHEST: Normal respiratory effort; CTAB; normal breath sounds; no wheeze or crackles  ABDOM: nontender and nondistended; soft; L/S NP, no rebound/guarding  MUSC/Skeletal: ROM normal; no crepitus; joints normal  EXTREM: no clubbing, cyanosis, inflammation   SKIN: no rashes, lesions, ulcers, petechiae.  The left leg has chronic edema changes with prominent varicose veins, without palpable venous cord and is nontender.  The right leg is without edema, without prominent varicose veins and without palpable " venous cord and is nontender.  She has qusdricepts weakness at L leg, Calf NT at L or R leg.  : no CVAT  NEURO: grossly intact; motor/sensory WNL;  no tremors  PSYCH: normal mood, affect and behavior  LYMPH: normal cervical, supraclavicular, axillary and groin LN's    CBC, CMP, TSH Negative    Ultrasound July 11, 2022 shows superficial venous thrombus in the mid and proximal greater saphenous vein, DVT is not seen.  Left leg.  Ultrasound Sept. 15, 2022 shows persistent superficial venous thrombus at calf of L leg.    Ultrasound report from March 4, 2020 shows no evidence of DVT.  Thrombosed superficial lower extremity varices are noted, there is reflux from the origin of the greater saphenous vein to the knee.    Hypercoag. Eval. MTHFR mutation + -  -  -, [id number]  Homocystine 12.3.  Homocystine Now Nl. At 9.5.  Factor 8 increased 221.  Anticardiolipid AB increased IgG 25.    Mamm shows microcalcifications.  Stable since 2021.  Re check 1 year.    Assessment:    1. This individual has 1 or 2 episodes of thrombosis of the superficial veins of the left leg.      2. Hypercoag. Eval. Is with Positive results as above.    3. Will discuss with Dr. Guerrero to see if impedance plethismography  studies are available to evaluate her for venous insufficiency and to see if any procedure can be done to reduce the of varicosities at the L leg.?    4.On a trial of Folbic daily.  Continue Eliquis 5 mg BID.    RTC 3 weeks, she relates joint pain sx to the Eliquis, she does not want to change over to Xarelto.  Recheck homocystine after a trial of folbic.  Defer L knee surgery till the homocystine level is NL.    Homocystine level has now normalized.  She proceeded with knee surgery  with lovenox bridge and Eliquis management. 1/18/23.  RTC 3 months.

## 2023-05-04 ENCOUNTER — OFFICE VISIT (OUTPATIENT)
Dept: HEMATOLOGY/ONCOLOGY | Facility: CLINIC | Age: 73
End: 2023-05-04
Payer: MEDICARE

## 2023-05-04 VITALS
RESPIRATION RATE: 18 BRPM | WEIGHT: 212.19 LBS | SYSTOLIC BLOOD PRESSURE: 123 MMHG | HEIGHT: 68 IN | HEART RATE: 103 BPM | DIASTOLIC BLOOD PRESSURE: 84 MMHG | TEMPERATURE: 97 F | BODY MASS INDEX: 32.16 KG/M2

## 2023-05-04 DIAGNOSIS — R92.8 ABNORMAL MAMMOGRAPHY: ICD-10-CM

## 2023-05-04 DIAGNOSIS — D68.59 HYPERCOAGULABLE STATE: Primary | ICD-10-CM

## 2023-05-04 PROCEDURE — 99214 PR OFFICE/OUTPT VISIT, EST, LEVL IV, 30-39 MIN: ICD-10-PCS | Mod: S$GLB,,, | Performed by: INTERNAL MEDICINE

## 2023-05-04 PROCEDURE — 99214 OFFICE O/P EST MOD 30 MIN: CPT | Mod: S$GLB,,, | Performed by: INTERNAL MEDICINE

## 2023-05-04 RX ORDER — NITROFURANTOIN 25; 75 MG/1; MG/1
100 CAPSULE ORAL 2 TIMES DAILY
COMMUNITY
Start: 2023-04-20 | End: 2023-12-18 | Stop reason: CLARIF

## 2023-05-04 RX ORDER — ERGOCALCIFEROL 1.25 MG/1
50000 CAPSULE ORAL
COMMUNITY
Start: 2023-02-27

## 2023-05-04 RX ORDER — ONDANSETRON 4 MG/1
4-8 TABLET, FILM COATED ORAL EVERY 8 HOURS PRN
COMMUNITY
Start: 2023-01-18 | End: 2024-03-07

## 2023-05-04 RX ORDER — CEFUROXIME AXETIL 250 MG/1
250 TABLET ORAL 2 TIMES DAILY
COMMUNITY
Start: 2023-03-01 | End: 2023-12-18 | Stop reason: CLARIF

## 2023-05-04 RX ORDER — TIZANIDINE 4 MG/1
TABLET ORAL
COMMUNITY
Start: 2022-12-21 | End: 2023-12-18 | Stop reason: CLARIF

## 2023-05-04 RX ORDER — ASPIRIN 81 MG/1
81 TABLET ORAL
COMMUNITY
End: 2023-12-18 | Stop reason: CLARIF
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
## 2023-08-14 ENCOUNTER — HOSPITAL ENCOUNTER (OUTPATIENT)
Dept: RADIOLOGY | Facility: HOSPITAL | Age: 73
Discharge: HOME | End: 2023-08-14
Attending: INTERNAL MEDICINE
Payer: MEDICARE

## 2023-08-14 DIAGNOSIS — R92.8 ABNORMAL MAMMOGRAPHY: ICD-10-CM

## 2023-08-14 PROCEDURE — 77062 MAMMO DIGITAL DIAGNOSTIC BILAT WITH TOMO: ICD-10-PCS | Mod: 26,,, | Performed by: RADIOLOGY

## 2023-08-14 PROCEDURE — 77062 BREAST TOMOSYNTHESIS BI: CPT | Mod: 26,,, | Performed by: RADIOLOGY

## 2023-08-14 PROCEDURE — 76642 ULTRASOUND BREAST LIMITED: CPT | Mod: TC,RT

## 2023-08-14 PROCEDURE — 76642 ULTRASOUND BREAST LIMITED: CPT | Mod: 26,RT,, | Performed by: RADIOLOGY

## 2023-08-14 PROCEDURE — 77066 MAMMO DIGITAL DIAGNOSTIC BILAT WITH TOMO: ICD-10-PCS | Mod: 26,,, | Performed by: RADIOLOGY

## 2023-08-14 PROCEDURE — 76642 US BREAST RIGHT LIMITED: ICD-10-PCS | Mod: 26,RT,, | Performed by: RADIOLOGY

## 2023-08-14 PROCEDURE — 77062 BREAST TOMOSYNTHESIS BI: CPT | Mod: TC

## 2023-08-14 PROCEDURE — 77066 DX MAMMO INCL CAD BI: CPT | Mod: 26,,, | Performed by: RADIOLOGY

## 2023-08-29 ENCOUNTER — TELEPHONE (OUTPATIENT)
Dept: HEMATOLOGY/ONCOLOGY | Facility: CLINIC | Age: 73
End: 2023-08-29

## 2023-08-29 DIAGNOSIS — Z15.89 HETEROZYGOUS MTHFR MUTATION A1298C: ICD-10-CM

## 2023-08-29 DIAGNOSIS — I82.402 ACUTE DEEP VEIN THROMBOSIS (DVT) OF LEFT LOWER EXTREMITY, UNSPECIFIED VEIN: Primary | ICD-10-CM

## 2023-09-05 ENCOUNTER — TELEPHONE (OUTPATIENT)
Dept: HEMATOLOGY/ONCOLOGY | Facility: CLINIC | Age: 73
End: 2023-09-05

## 2023-09-05 NOTE — TELEPHONE ENCOUNTER
I spoke with pt and reminded her to have labs done prior to appt here on 9/11/23 pt verbalized understanding.

## 2023-09-08 ENCOUNTER — TELEPHONE (OUTPATIENT)
Dept: HEMATOLOGY/ONCOLOGY | Facility: CLINIC | Age: 73
End: 2023-09-08

## 2023-09-08 NOTE — TELEPHONE ENCOUNTER
I spoke with  lab they stated that the factor 8 was drawn for pt but still in process, it was drawn 9/6 and is at least a 3 day turn around. Pt notified.

## 2023-09-11 ENCOUNTER — OFFICE VISIT (OUTPATIENT)
Dept: HEMATOLOGY/ONCOLOGY | Facility: CLINIC | Age: 73
End: 2023-09-11
Payer: MEDICARE

## 2023-09-11 VITALS
RESPIRATION RATE: 16 BRPM | SYSTOLIC BLOOD PRESSURE: 147 MMHG | BODY MASS INDEX: 32.86 KG/M2 | HEIGHT: 68 IN | DIASTOLIC BLOOD PRESSURE: 80 MMHG | WEIGHT: 216.81 LBS | HEART RATE: 77 BPM | TEMPERATURE: 98 F

## 2023-09-11 DIAGNOSIS — D68.59 HYPERCOAGULATION SYNDROME: Primary | ICD-10-CM

## 2023-09-11 PROCEDURE — 99214 OFFICE O/P EST MOD 30 MIN: CPT | Mod: S$GLB,,, | Performed by: INTERNAL MEDICINE

## 2023-09-11 PROCEDURE — 99214 PR OFFICE/OUTPT VISIT, EST, LEVL IV, 30-39 MIN: ICD-10-PCS | Mod: S$GLB,,, | Performed by: INTERNAL MEDICINE

## 2023-09-13 NOTE — PROGRESS NOTES
[facility] hematology Oncology in office Subsequent encounter note    9/11/23    Subjective:      Patient ID:   Jen Torres  73 y.o. female  1950  Sam Guerrero MD      Chief Complaint:   Recurrent leg clots    HPI:  73 y.o. female with 1 or 2 episodes of superficial venous clots involving the left leg.    Patient has history of falling off a porch, she has lumbar spine disease and low back pain symptoms with ski attic a. She is to see a chiropractor.  Other history includes a twisted left knee and torn meniscus.  She hit L leg, it was black and blue.  Check U/S of L leg now 12/2022.  Seeing Dr. Raz Jimenes for L knee pain.  Sample orders for lovenox bridge written and reviewed with her.  Await a definite surgical date, to decide on lovenox bridge dates.  She had L knee surgery 2/2023, now on eliquis 5 mg BID.    She has history of cancer of the cervix, a cyst on her ovary, and calcium deposits in the left breast.    She has history of asthma, type 2 diabetes, high cholesterol, hypertension, sleep apnea syndrome, GERD and dysphagia symptoms history of ulcer per Dr. Ritchie, she has a small bladder and arthritic symptoms in the fingers and knee.    Significantly around March of 2021 her medial lower knee area was swollen at the left leg and she was found to have a superficial clot.  Recently she had left leg fullness with increased edema and ultrasound showed superficial vein clot.  Will discuss with radiologist if the event of 2021 and 2022 likely represent the same clot or different clot events.    She has seen Dr. Guerrero of vascular surgery for venous evaluation.  I do not believe she has had any specific treatment or intervention as of yet.    She does not smoke, she does not drink alcohol, she is allergic to statins with swelling and metformin with metallic taste and burning of the esophagus.    She has history of abnormal Pap smear with cryo procedure at age 21.  Other history  includes GERD, hyperlipidemia, migraine headaches and biliary colic.    Medications include Trulicity, Amaryl, hydrochlorothiazide, Medrol Dosepak, Starlix, and Micro-K.    Her mother had colon cancer, and had been hit in the ankle area of the left leg and later developed a DVT and pulmonary embolus.  She had venous disease and venous stripping procedure.  Her dad had Alzheimer's disease.  Her aunt had colon cancer.  A paternal grandmother had breast cancer.  Another aunt had colitis, colon cancer irritable bowel syndrome.  A brother  of suicide, a brother  in MVA and another brother is alive and well.    Her daughter had Crohn's disease and ovarian cancer.  Another daughter had uterine cancer at age 20.    She has hx of L knee injury, needed elective arthroscopy.  On Elliquis 5 mg 1 BID.  On folbic.  Re check F8, fibrinogen, homocystine at [facility].  Recent U/S residual superficial clot seen in L leg veins.  She had surgery 25, with lovenox bridge.  Dr. Hatfield.    Repeat U/S of leg no DVT seen.  22.    She had UTI, now on nitrofurantoin antibiotic.    ROS:   GEN: normal without any fever, night sweats or weight loss  HEENT: normal with no HA's, sore throat, stiff neck, changes in vision  CV: See HPI  PULM: See HPI  GI: See HPI  : normal with no hematuria, dysuria  BREAST: normal with no mass, discharge, pain  SKIN: See HPI     Past Medical History:   Diagnosis Date    Abnormal Pap smear     s/p cryo at 20 y/o    GERD (gastroesophageal reflux disease)     Hyperlipidemia     Migraine     Migraines      Past Surgical History:   Procedure Laterality Date    CHOLECYSTECTOMY  10/2014       Review of patient's allergies indicates:   Allergen Reactions    Statins-hmg-coa reductase inhibitors Swelling    Metformin Other (See Comments)     metallic taste , burn of the esophagus     Social History     Socioeconomic History    Marital status:    Tobacco Use    Smoking status: Former    Smokeless  "tobacco: Never   Substance and Sexual Activity    Alcohol use: No    Drug use: No    Sexual activity: Never         Current Outpatient Medications:     dulaglutide (TRULICITY) 0.75 mg/0.5 mL pen injector, Inject 1 pen into the skin every 7 days., Disp: , Rfl:     ELIQUIS 5 mg Tab, TAKE 1 TABLET BY MOUTH TWICE A DAY, Disp: 60 tablet, Rfl: 4    ezetimibe (ZETIA) 10 mg tablet, Take 10 mg by mouth once daily., Disp: , Rfl:     folic acid-vit B6-vit B12 2.5-25-2 mg (WESTAB MAX) 2.5-25-2 mg Tab, Take 1 tablet by mouth once daily., Disp: , Rfl:     glimepiride (AMARYL) 2 MG tablet, Take 2 mg by mouth every morning., Disp: , Rfl:     pantoprazole (PROTONIX) 40 MG tablet, Take 40 mg by mouth every morning., Disp: , Rfl:     potassium chloride (MICRO-K) 10 MEQ CpSR, TAKE 1 CAPSULE BY MOUTH EVERY DAY, Disp: 90 capsule, Rfl: 3    aspirin (ECOTRIN) 81 MG EC tablet, Take 81 mg by mouth., Disp: , Rfl:     cefUROXime (CEFTIN) 250 MG tablet, Take 250 mg by mouth 2 (two) times daily., Disp: , Rfl:     ergocalciferol (ERGOCALCIFEROL) 50,000 unit Cap, Take 50,000 Units by mouth every 7 days., Disp: , Rfl:     hydroCHLOROthiazide (HYDRODIURIL) 25 MG tablet, Take 25 mg by mouth every other day. , Disp: , Rfl:     methylPREDNISolone (MEDROL DOSEPACK) 4 mg tablet, use as directed (Patient not taking: Reported on 9/11/2023), Disp: 1 Package, Rfl: 0    nitrofurantoin, macrocrystal-monohydrate, (MACROBID) 100 MG capsule, Take 100 mg by mouth 2 (two) times daily., Disp: , Rfl:     ondansetron (ZOFRAN) 4 MG tablet, Take 4-8 mg by mouth every 8 (eight) hours as needed., Disp: , Rfl:     tiZANidine (ZANAFLEX) 4 MG tablet, , Disp: , Rfl:           Objective:   Vitals:  Blood pressure (!) 147/80, pulse 77, temperature 97.5 °F (36.4 °C), resp. rate 16, height 5' 8" (1.727 m), weight 98.3 kg (216 lb 12.8 oz).    Physical Examination:   GEN: no apparent distress, comfortable  HEAD: atraumatic and normocephalic  EYES: no pallor, no icterus  ENT:  no " pharyngeal erythema, external ears WNL; no nasal discharge  NECK: no masses, thyroid normal, trachea midline, no LAD/LN's, supple  CV: RRR with no murmur; normal pulse; normal S1 and S2  CHEST: Normal respiratory effort; CTAB; normal breath sounds; no wheeze or crackles  ABDOM: nontender and nondistended; soft; L/S NP, no rebound/guarding  MUSC/Skeletal: ROM normal; no crepitus; joints normal  EXTREM: no clubbing, cyanosis, inflammation   SKIN: no rashes, lesions, ulcers, petechiae.  The left leg has chronic edema changes with prominent varicose veins, without palpable venous cord and is nontender.  The right leg is without edema, without prominent varicose veins and without palpable venous cord and is nontender.  She has qusdricepts weakness at L leg, Calf NT at L or R leg.  : no CVAT  NEURO: grossly intact; motor/sensory WNL;  no tremors  PSYCH: normal mood, affect and behavior  LYMPH: normal cervical, supraclavicular, axillary and groin LN's    CBC, CMP, TSH Negative    Ultrasound July 11, 2022 shows superficial venous thrombus in the mid and proximal greater saphenous vein, DVT is not seen.  Left leg.  Ultrasound Sept. 15, 2022 shows persistent superficial venous thrombus at calf of L leg.    Ultrasound report from March 4, 2020 shows no evidence of DVT.  Thrombosed superficial lower extremity varices are noted, there is reflux from the origin of the greater saphenous vein to the knee.    Hypercoag. Eval. MTHFR mutation + -  -  -, [id number]  Homocystine 12.3.  Homocystine Now Nl. At 6.81.  Factor 8 increased 221.  Anticardiolipid AB increased IgG 25.    Mamm shows microcalcifications.  Stable since 2021.  Re check 1 year.    Assessment:    1. This individual has 1 or 2 episodes of thrombosis of the superficial veins of the left leg.      2. Hypercoag. Eval. Is with Positive results as above.    3. Will discuss with Dr. Guerrero to see if impedance plethismography  studies are available to evaluate her for venous  insufficiency and to see if any procedure can be done to reduce the of varicosities at the L leg.?    4.On a trial of Folbic daily.  Continue Eliquis 5 mg BID.    Homocystine level has now normalized.  She proceeded with knee surgery  with lovenox bridge and Eliquis management. 1/18/23.    CBC, homocystine, fibrinogen and RTC 6 months.

## 2023-10-23 DIAGNOSIS — R79.89 OTHER SPECIFIED ABNORMAL FINDINGS OF BLOOD CHEMISTRY: ICD-10-CM

## 2023-10-23 DIAGNOSIS — I80.02 PHLEBITIS AND THROMBOPHLEBITIS OF SUPERFICIAL VESSELS OF LEFT LOWER EXTREMITY: ICD-10-CM

## 2023-10-23 RX ORDER — CYANOCOBALAMIN/FOLIC AC/VIT B6 2-2.5-25MG
1 TABLET ORAL
Qty: 90 TABLET | Refills: 4 | Status: SHIPPED | OUTPATIENT
Start: 2023-10-23

## 2023-10-25 ENCOUNTER — TELEPHONE (OUTPATIENT)
Dept: HEMATOLOGY/ONCOLOGY | Facility: CLINIC | Age: 73
End: 2023-10-25

## 2023-11-09 DIAGNOSIS — M25.512 LEFT SHOULDER PAIN, UNSPECIFIED CHRONICITY: Primary | ICD-10-CM

## 2023-11-10 ENCOUNTER — TELEPHONE (OUTPATIENT)
Dept: ORTHOPEDICS | Facility: CLINIC | Age: 73
End: 2023-11-10
Payer: MEDICARE

## 2023-11-10 NOTE — TELEPHONE ENCOUNTER
----- Message from Micki Back sent at 11/10/2023  8:19 AM CST -----  Regarding: Appt Scheduled  Contact: Patient  Patient is scheduled to see physician on 11/16/2023 with an xray scheduled prior to appt   Patient stated she already has MRI and Xray on a disc to bring to appt wit her and does not need xray appt   Please Assist     Patient can be reached at [phone]

## 2023-11-16 ENCOUNTER — OFFICE VISIT (OUTPATIENT)
Dept: ORTHOPEDICS | Facility: CLINIC | Age: 73
End: 2023-11-16
Payer: MEDICARE

## 2023-11-16 VITALS — WEIGHT: 216 LBS | BODY MASS INDEX: 32.74 KG/M2 | RESPIRATION RATE: 18 BRPM | HEIGHT: 68 IN

## 2023-11-16 DIAGNOSIS — M75.122 COMPLETE TEAR OF LEFT ROTATOR CUFF, UNSPECIFIED WHETHER TRAUMATIC: Primary | ICD-10-CM

## 2023-11-16 PROCEDURE — 99999 PR PBB SHADOW E&M-EST. PATIENT-LVL III: ICD-10-PCS | Mod: PBBFAC,,, | Performed by: ORTHOPAEDIC SURGERY

## 2023-11-16 PROCEDURE — 99999 PR PBB SHADOW E&M-EST. PATIENT-LVL III: CPT | Mod: PBBFAC,,, | Performed by: ORTHOPAEDIC SURGERY

## 2023-11-16 PROCEDURE — 99204 OFFICE O/P NEW MOD 45 MIN: CPT | Mod: 57,S$PBB,, | Performed by: ORTHOPAEDIC SURGERY

## 2023-11-16 PROCEDURE — 99204 PR OFFICE/OUTPT VISIT, NEW, LEVL IV, 45-59 MIN: ICD-10-PCS | Mod: 57,S$PBB,, | Performed by: ORTHOPAEDIC SURGERY

## 2023-11-16 PROCEDURE — 99213 OFFICE O/P EST LOW 20 MIN: CPT | Mod: PBBFAC,PO | Performed by: ORTHOPAEDIC SURGERY

## 2023-11-16 NOTE — PROGRESS NOTES
Past Medical History:   Diagnosis Date    Abnormal Pap smear     s/p cryo at 20 y/o    GERD (gastroesophageal reflux disease)     Hyperlipidemia     Migraine     Migraines        Past Surgical History:   Procedure Laterality Date    CHOLECYSTECTOMY  10/2014       Current Outpatient Medications   Medication Sig    aspirin (ECOTRIN) 81 MG EC tablet Take 81 mg by mouth.    cefUROXime (CEFTIN) 250 MG tablet Take 250 mg by mouth 2 (two) times daily.    dulaglutide (TRULICITY) 0.75 mg/0.5 mL pen injector Inject 1 pen into the skin every 7 days.    ELIQUIS 5 mg Tab TAKE 1 TABLET BY MOUTH TWICE A DAY    ergocalciferol (ERGOCALCIFEROL) 50,000 unit Cap Take 50,000 Units by mouth every 7 days.    ezetimibe (ZETIA) 10 mg tablet Take 10 mg by mouth once daily.    glimepiride (AMARYL) 2 MG tablet Take 2 mg by mouth every morning.    hydroCHLOROthiazide (HYDRODIURIL) 25 MG tablet Take 25 mg by mouth every other day.     nitrofurantoin, macrocrystal-monohydrate, (MACROBID) 100 MG capsule Take 100 mg by mouth 2 (two) times daily.    ondansetron (ZOFRAN) 4 MG tablet Take 4-8 mg by mouth every 8 (eight) hours as needed.    pantoprazole (PROTONIX) 40 MG tablet Take 40 mg by mouth every morning.    potassium chloride (MICRO-K) 10 MEQ CpSR TAKE 1 CAPSULE BY MOUTH EVERY DAY    tiZANidine (ZANAFLEX) 4 MG tablet     WESTAB MAX 2.5-25-2 mg Tab TAKE 1 TABLET BY MOUTH EVERY DAY     No current facility-administered medications for this visit.       Review of patient's allergies indicates:   Allergen Reactions    Statins-hmg-coa reductase inhibitors Swelling    Metformin Other (See Comments)     metallic taste , burn of the esophagus       Family History   Problem Relation Age of Onset    Breast cancer Paternal Grandmother 70    Colon cancer Mother             Cancer Daughter         crohns    Ovarian cancer Daughter     Cancer Daughter 20        uterine cancer with mets s/p chemo/rad/surgery; ?genetic mutation; now with hip tumors    Breast  cancer Maternal Grandmother        Social History     Socioeconomic History    Marital status:    Tobacco Use    Smoking status: Former    Smokeless tobacco: Never   Substance and Sexual Activity    Alcohol use: No    Drug use: No    Sexual activity: Never       Chief Complaint: No chief complaint on file.      History of present illness:  73-year-old female seen for left shoulder pain.  Patient is right-hand dominant.  Patient has had left shoulder pain since February.  Remembers waking up from a knee arthroscopy with increased shoulder pain and increased difficulty raising her arm up above her head.  Patient has done a couple rounds of physical therapy as well as 3 previous injections.  Patient has an MRI which shows a rotator cuff tear.  Patient still has weakness reaching up above her head and pain that she rates an 8/10.      Review of Systems:    Constitution: Negative for chills, fever, and sweats.  Negative for unexplained weight loss.    HENT:  Negative for headaches and blurry vision.    Cardiovascular:Negative for chest pain or irregular heart beat. Negative for hypertension.    Respiratory:  Negative for cough and shortness of breath.    Gastrointestinal: Negative for abdominal pain, heartburn, melena, nausea, and vomitting.    Genitourinary:  Negative bladder incontinence and dysuria.    Musculoskeletal:  See HPI    Neurological: Negative for numbness.    Psychiatric/Behavioral: Negative for depression.  The patient is not nervous/anxious.      Endocrine: Negative for polyuria    Hematologic/Lymphatic: Negative for bleeding problem.  Does not bruise/bleed easily.    Skin: Negative for poor would healing and rash      Physical Examination:    Vital Signs:  There were no vitals filed for this visit.    There is no height or weight on file to calculate BMI.    This a well-developed, well nourished patient in no acute distress.  They are alert and oriented and cooperative to examination.  Pt. walks  without an antalgic gait.      Examination of theLeft shoulder shows no rashes or erythema. There are no masses, ecchymosis, or atrophy. The patient has full range of motion in forward flexion, external rotation, and internal rotation to the mid T-spine. The patient has positive Mancini and Neer test. - Savoy's test. - Speeds test. Nontender to palpation over a.c. joint. Normal stability anteriorly, posteriorly, and negative sulcus sign. Passive range of motion: Forward flexion of 180°, external rotation at 90° of 90°, internal rotation of 50°, and external rotation at 0° of 50°. 2+ radial pulse. Intact axillary, radial, median and ulnar sensation. 4 out of 5 resisted forward flexion, external rotation, and negative lift off test.    Heart is regular rate without obvious murmurs   Normal respiratory effort without audible wheezing  Abdomen is soft and nontender     MRI of the left shoulder is reviewed and interpreted from an outside facility:1. Focal full-thickness tear at the junction of the posterior margin of the supraspinatus and anterior margin of the infraspinatus.   2. Small tear posterior mid labrum.   3. Moderate degenerative change of the AC joint and no significant osteoarthritis of the glenohumeral joint.      Assessment::  Left rotator cuff tear    Plan:  I reviewed the findings with her today.  We talked about treatment options for the rotator cuff tear.  Patient is already tried injections and formal physical therapy.  We talked about arthroscopic rotator cuff repair.  Patient last had a steroid injection 2 weeks ago so we will need to wait at least a month before scheduling.  Risks, benefits, and alternatives to the procedure were explained to the patient including but not limited to damage to nerves, arteries, blood vessels, bones, tendons, ligaments, stiffness, instability, infection, permanent limb dysfunction, DVT, PE, as well as general anesthetic complications including seizure, stroke, heart  attack and even death. The patient understood these risks and wished to proceed and signed the informed consent.       All previous pertinent notes including ER visits, physical therapy visits, other orthopedic visits as well as other care for the same musculoskeletal problem were reviewed.  All pertinent lab values and previous imaging was reviewed pertinent to the current visit.    This note was created using M Modal voice recognition software that occasionally misinterpreted phrases or words.    Consult note is delivered via Epic messaging service.

## 2023-11-20 DIAGNOSIS — M75.122 COMPLETE ROTATOR CUFF TEAR OR RUPTURE OF LEFT SHOULDER, NOT SPECIFIED AS TRAUMATIC: ICD-10-CM

## 2023-11-20 DIAGNOSIS — M75.122 COMPLETE TEAR OF LEFT ROTATOR CUFF, UNSPECIFIED WHETHER TRAUMATIC: Primary | ICD-10-CM

## 2023-11-20 DIAGNOSIS — Z01.818 PRE-OP TESTING: ICD-10-CM

## 2023-11-20 RX ORDER — MUPIROCIN 20 MG/G
OINTMENT TOPICAL
Status: CANCELLED | OUTPATIENT
Start: 2023-11-20

## 2023-12-01 ENCOUNTER — TELEPHONE (OUTPATIENT)
Dept: HEMATOLOGY/ONCOLOGY | Facility: CLINIC | Age: 73
End: 2023-12-01

## 2023-12-01 NOTE — TELEPHONE ENCOUNTER
Called and left vmail on pts phone. Called and spoke with pts spouse and advised him that I was calling to go over bridging (Lovenox) instructions prior to pts surgery with Dr. Tony on 12/29/23. Advised I would call back.    Pts surgery is 12/29/23 12/26/23 @ 9am take last dose of Eliquis 5mg  12/27/23 @ 9am inject 40mg subq Lovenox  12/28/23 @ 9am inject 40mg subq Lovenox  12/29/23 HOLD EVERYTHING  12/30/23 Can resume Eliquis 5mg BID if okay with surgeon.    Will try to call back and go over instructions.

## 2023-12-05 ENCOUNTER — TELEPHONE (OUTPATIENT)
Dept: HEMATOLOGY/ONCOLOGY | Facility: CLINIC | Age: 73
End: 2023-12-05

## 2023-12-05 NOTE — TELEPHONE ENCOUNTER
Called patient and went over bridging instructions with patient for her surgery on 12/29/23. Called in Lovenox at [facility] in [place]    Pts surgery is 12/29/23 12/26/23 @ 9am take last dose of Eliquis 5mg  12/27/23 @ 9am inject 40mg subq Lovenox  12/28/23 @ 9am inject 40mg subq Lovenox  12/29/23 HOLD EVERYTHING  12/30/23 Can resume Eliquis 5mg BID if okay with surgeon.     Pt verbalized understanding.   Pt presents to ED with c/o right wrist pain since Wednesday.  Pt denies injury.  Pt woke up this morning with increased swelling and pain.  Pt denies numbness and tingling, radial pulse intact, cap refill <3 sec, pt is unable to make a fist or bend his wrist with full ROM.

## 2023-12-05 NOTE — TELEPHONE ENCOUNTER
Called [facility] in [place] [phone] and spoke with TIFFANY Ramsay. Ordered 2 Lovenox injections 40mg subq. Pt is to inject Lovenox 12/27 and 12/28. See previous notes for instructions. Contacted Dr. Tony's office to let them know that pt was instructed on Lovenox bridge. Sent message to Leon Camacho.

## 2023-12-18 ENCOUNTER — HOSPITAL ENCOUNTER (OUTPATIENT)
Dept: PREADMISSION TESTING | Facility: HOSPITAL | Age: 73
Discharge: HOME | End: 2023-12-18
Attending: ORTHOPAEDIC SURGERY
Payer: MEDICARE

## 2023-12-18 VITALS — BODY MASS INDEX: 32.74 KG/M2 | HEIGHT: 68 IN | WEIGHT: 216 LBS

## 2023-12-18 DIAGNOSIS — Z01.818 PRE-OP TESTING: ICD-10-CM

## 2023-12-18 DIAGNOSIS — Z01.818 PREOP TESTING: Primary | ICD-10-CM

## 2023-12-18 DIAGNOSIS — M75.122 COMPLETE TEAR OF LEFT ROTATOR CUFF, UNSPECIFIED WHETHER TRAUMATIC: ICD-10-CM

## 2023-12-18 RX ORDER — HYDROCHLOROTHIAZIDE 25 MG/1
25 TABLET ORAL
COMMUNITY

## 2023-12-18 RX ORDER — ENOXAPARIN SODIUM 150 MG/ML
INJECTION SUBCUTANEOUS
COMMUNITY
End: 2024-03-21

## 2023-12-18 NOTE — DISCHARGE INSTRUCTIONS
To confirm, Your doctor has instructed you that surgery is scheduled for: 12/29/23    Please report to [facility], Registration the morning of surgery. You must check-in and receive a wristband before going to your procedure.  [address] [place] [address] [place] [address]    Pre-Op will call the afternoon prior to surgery between 1:00 and 6:00 PM with the final arrival time.  Phone number: [phone]    PLEASE NOTE:  The surgery schedule has many variables which may affect the time of your surgery case.  Family members should be available if your surgery time changes.  Plan to be here the day of your procedure between 4-6 hours.    MEDICATIONS:  TAKE ONLY THESE MEDICATIONS WITH A SMALL SIP OF WATER THE MORNING OF YOUR PROCEDURE:     See List    DO NOT TAKE THESE MEDICATIONS 5-7 DAYS PRIOR to your procedure or per your surgeon's request:   ASPIRIN, ALEVE, ADVIL, IBUPROFEN, FISH OIL VITAMIN E, HERBALS    (May take Tylenol)                                              ELIQUIS- Last dose 12/25                                              Bridge with Lovenox    ONLY if you are prescribed any types of blood thinners such as:  Aspirin, Coumadin, Plavix, Pradaxa, Xarelto, Aggrenox, Effient, Eliquis, Savasya, Brilinta, or any other, ask your surgeon whether you should stop taking them and how long before surgery you should stop.  You may also need to verify with the prescribing physician if it is ok to stop your medication.      INSTRUCTIONS IMPORTANT!!  Do not eat or drink anything between midnight and the time of your procedure- this includes gum, mints, and candy.  Do not smoke or drink alcoholic beverages 24 hours prior to your procedure.  Shower the night before AND the morning of your procedure with a Chlorhexidine wash such as Hibiclens or Dial antibacterial soap from the neck down.  Do not get it on your face or in your eyes.  You may use your own shampoo and face wash. This helps your skin to be as  bacteria free as possible.    If you wear contact lenses, dentures, hearing aids or glasses, bring a container to put them in during surgery and give to a family member for safe keeping.  Please leave all jewelry, piercing's and valuables at home. You must remove your false eyelashes prior to surgery.    DO NOT remove hair from the surgery site.  Do not shave the incision site unless you are given specific instructions to do so.    ONLY if you have been diagnosed with sleep apnea please bring your C-PAP machine.  ONLY if you wear home oxygen please bring your portable oxygen tank the day of your procedure.  ONLY if you have a history of OPEN HEART SURGERY you will need a clearance from your Cardiologist per Anesthesia.      ONLY for patients requiring bowel prep, written instructions will be given by your doctor's office.  ONLY if you have a neuro stimulator, please bring the controller with you the morning of surgery  ONLY if a type and screen test is needed before surgery, please return:  If your doctor has scheduled you for an overnight stay, bring a small overnight bag with any personal items you need.  Make arrangements in advance for transportation home by a responsible adult.  It is not safe to drive a vehicle during the 24 hours after anesthesia.          All  facilities and properties are tobacco free.  Smoking is NOT allowed.   If you have any questions about these instructions, call Pre-Op Admit  Nursing at [phone] or the Pre-Op Day Surgery Unit at [phone].

## 2023-12-28 ENCOUNTER — ANESTHESIA EVENT (OUTPATIENT)
Dept: SURGERY | Facility: HOSPITAL | Age: 73
End: 2023-12-28
Payer: MEDICARE

## 2023-12-28 RX ORDER — OXYCODONE AND ACETAMINOPHEN 5; 325 MG/1; MG/1
1 TABLET ORAL EVERY 6 HOURS PRN
Qty: 14 TABLET | Refills: 0 | Status: ON HOLD | OUTPATIENT
Start: 2023-12-28 | End: 2024-03-08 | Stop reason: HOSPADM

## 2023-12-28 RX ORDER — CYCLOBENZAPRINE HCL 10 MG
10 TABLET ORAL 3 TIMES DAILY PRN
Qty: 30 TABLET | Refills: 0 | Status: ON HOLD | OUTPATIENT
Start: 2023-12-28 | End: 2023-12-29 | Stop reason: SDUPTHER

## 2023-12-28 RX ORDER — ACETAMINOPHEN 500 MG
1000 TABLET ORAL EVERY 8 HOURS PRN
Qty: 30 TABLET | Refills: 0 | Status: SHIPPED | OUTPATIENT
Start: 2023-12-28

## 2023-12-28 RX ORDER — ONDANSETRON 4 MG/1
4 TABLET, FILM COATED ORAL EVERY 6 HOURS PRN
Qty: 30 TABLET | Refills: 0 | Status: SHIPPED | OUTPATIENT
Start: 2023-12-28 | End: 2024-03-07

## 2023-12-29 ENCOUNTER — HOSPITAL ENCOUNTER (OUTPATIENT)
Facility: HOSPITAL | Age: 73
Discharge: HOME | End: 2023-12-29
Attending: ORTHOPAEDIC SURGERY | Admitting: ORTHOPAEDIC SURGERY
Payer: MEDICARE

## 2023-12-29 ENCOUNTER — ANESTHESIA (OUTPATIENT)
Dept: SURGERY | Facility: HOSPITAL | Age: 73
End: 2023-12-29
Payer: MEDICARE

## 2023-12-29 VITALS
RESPIRATION RATE: 17 BRPM | WEIGHT: 213 LBS | TEMPERATURE: 98 F | HEIGHT: 68 IN | DIASTOLIC BLOOD PRESSURE: 69 MMHG | BODY MASS INDEX: 32.28 KG/M2 | OXYGEN SATURATION: 95 % | HEART RATE: 72 BPM | SYSTOLIC BLOOD PRESSURE: 155 MMHG

## 2023-12-29 DIAGNOSIS — Z01.818 PRE-OP TESTING: ICD-10-CM

## 2023-12-29 DIAGNOSIS — M75.122 COMPLETE TEAR OF LEFT ROTATOR CUFF, UNSPECIFIED WHETHER TRAUMATIC: ICD-10-CM

## 2023-12-29 DIAGNOSIS — M75.122 COMPLETE ROTATOR CUFF TEAR OR RUPTURE OF LEFT SHOULDER, NOT SPECIFIED AS TRAUMATIC: ICD-10-CM

## 2023-12-29 PROCEDURE — D9220A PRA ANESTHESIA: Mod: CRNA,,, | Performed by: NURSE ANESTHETIST, CERTIFIED REGISTERED

## 2023-12-29 PROCEDURE — C1713 ANCHOR/SCREW BN/BN,TIS/BN: HCPCS | Performed by: ORTHOPAEDIC SURGERY

## 2023-12-29 PROCEDURE — 63600175 PHARM REV CODE 636 W HCPCS: Performed by: ANESTHESIOLOGY

## 2023-12-29 PROCEDURE — 71000015 HC POSTOP RECOV 1ST HR: Performed by: ORTHOPAEDIC SURGERY

## 2023-12-29 PROCEDURE — 29827 SHO ARTHRS SRG RT8TR CUF RPR: CPT | Mod: LT,,, | Performed by: ORTHOPAEDIC SURGERY

## 2023-12-29 PROCEDURE — 64415 NJX AA&/STRD BRCH PLXS IMG: CPT | Mod: 59 | Performed by: ANESTHESIOLOGY

## 2023-12-29 PROCEDURE — 25000003 PHARM REV CODE 250: Performed by: ORTHOPAEDIC SURGERY

## 2023-12-29 PROCEDURE — D9220A PRA ANESTHESIA: ICD-10-PCS | Mod: CRNA,,, | Performed by: NURSE ANESTHETIST, CERTIFIED REGISTERED

## 2023-12-29 PROCEDURE — 71000033 HC RECOVERY, INTIAL HOUR: Performed by: ORTHOPAEDIC SURGERY

## 2023-12-29 PROCEDURE — 25000003 PHARM REV CODE 250: Performed by: NURSE ANESTHETIST, CERTIFIED REGISTERED

## 2023-12-29 PROCEDURE — 71000039 HC RECOVERY, EACH ADD'L HOUR: Performed by: ORTHOPAEDIC SURGERY

## 2023-12-29 PROCEDURE — 37000009 HC ANESTHESIA EA ADD 15 MINS: Performed by: ORTHOPAEDIC SURGERY

## 2023-12-29 PROCEDURE — C9290 INJ, BUPIVACAINE LIPOSOME: HCPCS | Performed by: ANESTHESIOLOGY

## 2023-12-29 PROCEDURE — 29826 SHO ARTHRS SRG DECOMPRESSION: CPT | Mod: LT,,, | Performed by: ORTHOPAEDIC SURGERY

## 2023-12-29 PROCEDURE — 63600175 PHARM REV CODE 636 W HCPCS: Performed by: NURSE ANESTHETIST, CERTIFIED REGISTERED

## 2023-12-29 PROCEDURE — 63600175 PHARM REV CODE 636 W HCPCS: Performed by: ORTHOPAEDIC SURGERY

## 2023-12-29 PROCEDURE — 25000003 PHARM REV CODE 250: Performed by: ANESTHESIOLOGY

## 2023-12-29 PROCEDURE — 71000016 HC POSTOP RECOV ADDL HR: Performed by: ORTHOPAEDIC SURGERY

## 2023-12-29 PROCEDURE — D9220A PRA ANESTHESIA: Mod: ANES,,, | Performed by: ANESTHESIOLOGY

## 2023-12-29 PROCEDURE — 94799 UNLISTED PULMONARY SVC/PX: CPT | Mod: XB

## 2023-12-29 PROCEDURE — 37000008 HC ANESTHESIA 1ST 15 MINUTES: Performed by: ORTHOPAEDIC SURGERY

## 2023-12-29 PROCEDURE — 36000711: Performed by: ORTHOPAEDIC SURGERY

## 2023-12-29 PROCEDURE — 27201423 OPTIME MED/SURG SUP & DEVICES STERILE SUPPLY: Performed by: ORTHOPAEDIC SURGERY

## 2023-12-29 PROCEDURE — D9220A PRA ANESTHESIA: ICD-10-PCS | Mod: ANES,,, | Performed by: ANESTHESIOLOGY

## 2023-12-29 PROCEDURE — 36000710: Performed by: ORTHOPAEDIC SURGERY

## 2023-12-29 DEVICE — KIT SPEEDBRIDGE SP 4.75X5.5MM: Type: IMPLANTABLE DEVICE | Site: SHOULDER | Status: FUNCTIONAL

## 2023-12-29 RX ORDER — CYCLOBENZAPRINE HCL 10 MG
10 TABLET ORAL 3 TIMES DAILY PRN
Qty: 30 TABLET | Refills: 0 | Status: SHIPPED | OUTPATIENT
Start: 2023-12-29

## 2023-12-29 RX ORDER — BUPIVACAINE HYDROCHLORIDE 5 MG/ML
INJECTION, SOLUTION EPIDURAL; INTRACAUDAL
Status: SHIPPED | OUTPATIENT
Start: 2023-12-29 | End: 2023-12-29

## 2023-12-29 RX ORDER — HYDROCODONE BITARTRATE AND ACETAMINOPHEN 5; 325 MG/1; MG/1
1 TABLET ORAL EVERY 4 HOURS PRN
Status: DISCONTINUED | OUTPATIENT
Start: 2023-12-29 | End: 2023-12-29 | Stop reason: HOSPADM

## 2023-12-29 RX ORDER — MUPIROCIN 20 MG/G
OINTMENT TOPICAL
Status: DISCONTINUED | OUTPATIENT
Start: 2023-12-29 | End: 2023-12-29 | Stop reason: HOSPADM

## 2023-12-29 RX ORDER — ONDANSETRON 2 MG/ML
INJECTION INTRAMUSCULAR; INTRAVENOUS
Status: DISCONTINUED | OUTPATIENT
Start: 2023-12-29 | End: 2023-12-29

## 2023-12-29 RX ORDER — PROPOFOL 10 MG/ML
VIAL (ML) INTRAVENOUS
Status: DISCONTINUED | OUTPATIENT
Start: 2023-12-29 | End: 2023-12-29

## 2023-12-29 RX ORDER — PROMETHAZINE HYDROCHLORIDE 25 MG/ML
INJECTION, SOLUTION INTRAMUSCULAR; INTRAVENOUS
Status: DISCONTINUED | OUTPATIENT
Start: 2023-12-29 | End: 2023-12-29

## 2023-12-29 RX ORDER — FENTANYL CITRATE 50 UG/ML
25-200 INJECTION, SOLUTION INTRAMUSCULAR; INTRAVENOUS
Status: ACTIVE | OUTPATIENT
Start: 2023-12-29

## 2023-12-29 RX ORDER — SUCCINYLCHOLINE CHLORIDE 20 MG/ML
INJECTION INTRAMUSCULAR; INTRAVENOUS
Status: DISCONTINUED | OUTPATIENT
Start: 2023-12-29 | End: 2023-12-29

## 2023-12-29 RX ORDER — OXYCODONE HYDROCHLORIDE 5 MG/1
5 TABLET ORAL ONCE AS NEEDED
Status: ACTIVE | OUTPATIENT
Start: 2023-12-29 | End: 2035-05-27

## 2023-12-29 RX ORDER — CEFAZOLIN SODIUM 2 G/50ML
2 SOLUTION INTRAVENOUS
Status: COMPLETED | OUTPATIENT
Start: 2023-12-29 | End: 2023-12-29

## 2023-12-29 RX ORDER — DEXAMETHASONE SODIUM PHOSPHATE 4 MG/ML
INJECTION, SOLUTION INTRA-ARTICULAR; INTRALESIONAL; INTRAMUSCULAR; INTRAVENOUS; SOFT TISSUE
Status: DISCONTINUED | OUTPATIENT
Start: 2023-12-29 | End: 2023-12-29

## 2023-12-29 RX ORDER — FENTANYL CITRATE 50 UG/ML
25 INJECTION, SOLUTION INTRAMUSCULAR; INTRAVENOUS EVERY 5 MIN PRN
Status: ACTIVE | OUTPATIENT
Start: 2023-12-29

## 2023-12-29 RX ORDER — ROCURONIUM BROMIDE 10 MG/ML
INJECTION, SOLUTION INTRAVENOUS
Status: DISCONTINUED | OUTPATIENT
Start: 2023-12-29 | End: 2023-12-29

## 2023-12-29 RX ORDER — MIDAZOLAM HYDROCHLORIDE 1 MG/ML
2 INJECTION INTRAMUSCULAR; INTRAVENOUS
Status: ACTIVE | OUTPATIENT
Start: 2023-12-29

## 2023-12-29 RX ORDER — PHENYLEPHRINE HYDROCHLORIDE 10 MG/ML
INJECTION INTRAVENOUS
Status: DISCONTINUED | OUTPATIENT
Start: 2023-12-29 | End: 2023-12-29

## 2023-12-29 RX ORDER — ACETAMINOPHEN 10 MG/ML
INJECTION, SOLUTION INTRAVENOUS
Status: DISCONTINUED | OUTPATIENT
Start: 2023-12-29 | End: 2023-12-29

## 2023-12-29 RX ORDER — ONDANSETRON 2 MG/ML
4 INJECTION INTRAMUSCULAR; INTRAVENOUS ONCE AS NEEDED
Status: ACTIVE | OUTPATIENT
Start: 2023-12-29 | End: 2035-05-27

## 2023-12-29 RX ORDER — LIDOCAINE HYDROCHLORIDE 10 MG/ML
1 INJECTION, SOLUTION EPIDURAL; INFILTRATION; INTRACAUDAL; PERINEURAL ONCE
Status: ACTIVE | OUTPATIENT
Start: 2023-12-29

## 2023-12-29 RX ORDER — EPINEPHRINE 1 MG/ML
INJECTION, SOLUTION, CONCENTRATE INTRAVENOUS
Status: DISCONTINUED | OUTPATIENT
Start: 2023-12-29 | End: 2023-12-29 | Stop reason: HOSPADM

## 2023-12-29 RX ORDER — LIDOCAINE HYDROCHLORIDE 20 MG/ML
INJECTION INTRAVENOUS
Status: DISCONTINUED | OUTPATIENT
Start: 2023-12-29 | End: 2023-12-29

## 2023-12-29 RX ADMIN — PROPOFOL 170 MG: 10 INJECTION, EMULSION INTRAVENOUS at 10:12

## 2023-12-29 RX ADMIN — BUPIVACAINE HYDROCHLORIDE 5 ML: 5 INJECTION, SOLUTION EPIDURAL; INTRACAUDAL; PERINEURAL at 09:12

## 2023-12-29 RX ADMIN — DEXAMETHASONE SODIUM PHOSPHATE 8 MG: 4 INJECTION, SOLUTION INTRA-ARTICULAR; INTRALESIONAL; INTRAMUSCULAR; INTRAVENOUS; SOFT TISSUE at 10:12

## 2023-12-29 RX ADMIN — PHENYLEPHRINE HYDROCHLORIDE 100 MCG: 10 INJECTION INTRAVENOUS at 11:12

## 2023-12-29 RX ADMIN — ACETAMINOPHEN 1000 MG: 10 INJECTION, SOLUTION INTRAVENOUS at 11:12

## 2023-12-29 RX ADMIN — ROCURONIUM BROMIDE 10 MG: 10 INJECTION, SOLUTION INTRAVENOUS at 10:12

## 2023-12-29 RX ADMIN — CEFAZOLIN SODIUM 2 G: 2 SOLUTION INTRAVENOUS at 10:12

## 2023-12-29 RX ADMIN — SUCCINYLCHOLINE CHLORIDE 160 MG: 20 INJECTION, SOLUTION INTRAMUSCULAR; INTRAVENOUS at 10:12

## 2023-12-29 RX ADMIN — PHENYLEPHRINE HYDROCHLORIDE 200 MCG: 10 INJECTION INTRAVENOUS at 11:12

## 2023-12-29 RX ADMIN — ONDANSETRON 8 MG: 2 INJECTION INTRAMUSCULAR; INTRAVENOUS at 10:12

## 2023-12-29 RX ADMIN — MIDAZOLAM HYDROCHLORIDE 2 MG: 1 INJECTION INTRAMUSCULAR; INTRAVENOUS at 09:12

## 2023-12-29 RX ADMIN — FENTANYL CITRATE 50 MCG: 50 INJECTION, SOLUTION INTRAMUSCULAR; INTRAVENOUS at 09:12

## 2023-12-29 RX ADMIN — PROMETHAZINE HYDROCHLORIDE 6.25 MG: 25 INJECTION INTRAMUSCULAR; INTRAVENOUS at 10:12

## 2023-12-29 RX ADMIN — BUPIVACAINE 10 ML: 13.3 INJECTION, SUSPENSION, LIPOSOMAL INFILTRATION at 09:12

## 2023-12-29 RX ADMIN — MUPIROCIN: 20 OINTMENT TOPICAL at 09:12

## 2023-12-29 RX ADMIN — LIDOCAINE HYDROCHLORIDE 100 MG: 20 INJECTION, SOLUTION INTRAVENOUS at 10:12

## 2023-12-29 RX ADMIN — FENTANYL CITRATE 50 MCG: 50 INJECTION, SOLUTION INTRAMUSCULAR; INTRAVENOUS at 10:12

## 2023-12-29 RX ADMIN — SODIUM CHLORIDE, SODIUM GLUCONATE, SODIUM ACETATE, POTASSIUM CHLORIDE AND MAGNESIUM CHLORIDE: 526; 502; 368; 37; 30 INJECTION, SOLUTION INTRAVENOUS at 09:12

## 2023-12-29 NOTE — ANESTHESIA PREPROCEDURE EVALUATION
12/29/2023  Jen Torres is a 73 y.o., female.      Pre-op Assessment    I have reviewed the Patient Summary Reports.     I have reviewed the Nursing Notes. I have reviewed the NPO Status.   I have reviewed the Medications.     Review of Systems  Anesthesia Hx:             Denies Family Hx of Anesthesia complications.    Denies Personal Hx of Anesthesia complications.                    Cardiovascular:                hyperlipidemia   ECG has been reviewed.                          Pulmonary:        Sleep Apnea, CPAP           Education provided regarding risk of obstructive sleep apnea            Hepatic/GI:     GERD   GLP-1 injected 7 days ago          Neurological:      Headaches                                 Endocrine:        Obesity / BMI > 30      Physical Exam  General: Cooperative, Alert and Oriented    Airway:  Mallampati: III / II  Mouth Opening: Normal  TM Distance: Normal  Tongue: Normal  Neck ROM: Normal ROM    Dental:  Intact        Anesthesia Plan  Type of Anesthesia, risks & benefits discussed:    Anesthesia Type: Gen ETT  Intra-op Monitoring Plan: Standard ASA Monitors  Post Op Pain Control Plan: multimodal analgesia and peripheral nerve block  Induction:  IV  Airway Plan: Video  Informed Consent: Informed consent signed with the Patient and all parties understand the risks and agree with anesthesia plan.  All questions answered.   ASA Score: 2    Ready For Surgery From Anesthesia Perspective.     .

## 2023-12-29 NOTE — OP NOTE
[facility]  Orthopedic Surgery  Operative Note    SUMMARY     Date of Procedure: 12/29/2023     Procedure: Procedure(s) (LRB):  REPAIR, ROTATOR CUFF, ARTHROSCOPIC (Left)  ARTHROSCOPY, SHOULDER, WITH SUBACROMIAL SPACE DECOMPRESSION (Left)       Surgeon(s) and Role:     * Gary Tony MD - Primary    Assistant: Leon ODONNELL    Pre-Operative Diagnosis: Complete tear of left rotator cuff, unspecified whether traumatic [M75.122]  Pre-op testing [Z01.818]    Post-Operative Diagnosis: Post-Op Diagnosis Codes:     * Complete tear of left rotator cuff, unspecified whether traumatic [M75.122]     * Pre-op testing [Z01.818]    Anesthesia: Choice      Description of the Findings of the Procedure: Diagnostic arthroscopy findings on the patient's Left shoulder showed some degenerative superior labrum, intact biceps tendon and subscapularis. The patient had normal  articular appearance of the rotator cuff. Articular surface was normal. In the subacromial space, the patient had bursitis in the subacromial space with a type 2 acromion. Ac joint showed minimal arthritic changes. Bursal surface of the rotator cuff did show a near full-thickness tear of the posterior portion of the supraspinatus that measured about 2 centimeters in width with minimal retraction.      Complications: No    Estimated Blood Loss (EBL): * No values recorded between 12/29/2023 11:03 AM and 12/29/2023 11:53 AM *           Implants:   Implant Name Type Inv. Item Serial No.  Lot No. LRB No. Used Action   KIT SPEEDBRIDGE SP [id number] - [id number]  KIT SPEEDBRIDGE SP 4.75X5.5MM  [employer] [id number] Left 1 Implanted       Specimens:   Specimen (24h ago, onward)      None                    Condition: Good    Disposition: PACU - hemodynamically stable.    Attestation: I was present and scrubbed for the entire procedure.      Indications for the procedure:A 73 year old female with a history ofLeft shoulder  pain that failed to resolve with physical therapy, activity modification, injections, and rest.  Patient desired the procedure listed above after MRI was obtained.    PROCEDURE IN DETAIL: Risks, benefits and alternatives of the procedure were   explained to the patient including, but not limited to damage to nerves,   arteries and blood vessels. Also explained risk of re-rupture, nonhealing, infection, DVT,   PE as well as anesthetic complications including seizure, stroke, heart attack   and death. They understood this, signed informed consent. The patient's Left  shoulder was marked prior to coming to the Operating Room. Once there a formal   timeout was done in which correct patient, procedure and operative site were all   correctly identified and confirmed by the entire operating team.  Appropriate preoperative antibiotics was   given prior to surgical incision. General anesthesia was induced. The   patient was placed in lateral decubitus position on a bean bag with his Left upper extremity   hanging in balanced suspension.The arm was suspended with 10 lbs of weight for balanced traction. The extremity was prepped and draped in normal   sterile fashion.A standard posterior portal was then made. A spinal   needle was used to localize an anterior portal within the rotator interval and   this was made as well. We then performed a diagnostic arthroscopy of the   glenohumeral joint through both the anterior and posterior viewing portals,with the findings listed above.  Shaver was used to perform debridement within the joint some of the frayed labrum    After this was done, we then proceeded up in the subacromial space   where a spinal needle was used to localize a lateral portal and then this was   made as well. A 4.0 shaver was used to perform a subtotal bursectomy. We then   used the ablator to remove the soft tissue off the undersurface of the acromion.We then thoroughly inspected the cuff on the rotator cuff  side. We shaved away any additional adhesions in the anterior, lateral on posterior gutters.  A near Full-thickness tear was identified.  We further debrided the tendon.  Took a 4-0 bur and performed an acromioplasty to prevent any external impingement.  We turned a type 2 acromion into a type 1 as well as resected some of the undersurface.Passport cannulas were placed in the lateral portal and an accessory lateral portal was created right off the acromial edge. Cannula placed here also. Plastic shuttling cannula placed anteriorly. Footprint was prepared using the shaver and shira. 2 x 4.75 Biocomposite Swivellock anchors loaded with Fibertape were punched and placed for the medial row right off the articular margin. The tapes were passed through the cuff tissue using a Fast Pass Scorpion. The tapes were then secured in the lateral row to 2 x 4.75 Biocomposite Swivellock anchors in a SpeedBridge pattern with one blue and one white in each anchor.      All excess fluid was removed from the shoulder. The portals were closed using nylon. Sterile dressing applied.  They were then placed in a cryotherapy cuff with   UltraSling, extubated, awakened and transferred from the Operating Room to   Recovery Room in stable condition.     Postoperative rehab course will be for speed bridge rotator cuff repair

## 2023-12-29 NOTE — OR NURSING
Post op medications delivered to patient. Notified granddaughter john to please come upstairs to be given the medications

## 2023-12-29 NOTE — DISCHARGE SUMMARY
[facility]  Discharge Note  Short Stay    Procedure(s) (LRB):  REPAIR, ROTATOR CUFF, ARTHROSCOPIC (Left)  ARTHROSCOPY, SHOULDER, WITH SUBACROMIAL SPACE DECOMPRESSION (Left)      OUTCOME: Patient tolerated treatment/procedure well without complication and is now ready for discharge.    DISPOSITION: Home or Self Care    FINAL DIAGNOSIS:  <principal problem not specified>    FOLLOWUP: In clinic    DISCHARGE INSTRUCTIONS:    Discharge Procedure Orders   Diet general     Ice to affected area     Lifting restrictions     No driving, operating heavy equipment or signing legal documents while taking pain medication     Remove dressing in 48 hours     Change dressing (specify)   Order Comments: Dressing change: 1 times per day using waterproof bandaids.     Call MD for:  temperature >100.4     Call MD for:  persistent nausea and vomiting     Call MD for:  severe uncontrolled pain     Call MD for:  difficulty breathing, headache or visual disturbances     Call MD for:  redness, tenderness, or signs of infection (pain, swelling, redness, odor or green/yellow discharge around incision site)     Call MD for:  hives     Call MD for:  persistent dizziness or light-headedness     Call MD for:  extreme fatigue        TIME SPENT ON DISCHARGE: 5 minutes

## 2023-12-29 NOTE — ANESTHESIA POSTPROCEDURE EVALUATION
Anesthesia Post Evaluation    Patient: Jen Torres    Procedure(s) Performed: Procedure(s) (LRB):  REPAIR, ROTATOR CUFF, ARTHROSCOPIC (Left)  ARTHROSCOPY, SHOULDER, WITH SUBACROMIAL SPACE DECOMPRESSION (Left)    Final Anesthesia Type: general      Patient location during evaluation: PACU  Patient participation: Yes- Able to Participate  Level of consciousness: awake and alert  Post-procedure vital signs: reviewed and stable  Pain management: adequate  Airway patency: patent    PONV status at discharge: No PONV  Anesthetic complications: no      Cardiovascular status: blood pressure returned to baseline  Respiratory status: unassisted  Hydration status: euvolemic  Follow-up not needed.              Vitals Value Taken Time   /69 12/29/23 1314   Temp 36.5 °C (97.7 °F) 12/29/23 1255   Pulse 72 12/29/23 1314   Resp 17 12/29/23 1314   SpO2 95 % 12/29/23 1314         Event Time   Out of Recovery 13:08:00         Pain/Judith Score: Judith Score: 10 (12/29/2023 12:45 PM)  Modified Judith Score: 20 (12/29/2023  1:09 PM)

## 2023-12-29 NOTE — DISCHARGE INSTRUCTIONS
1.Diet: Ice chips, clear liquids, and then diet as tolerated. Drink plenty of liquids.  2.Ice the area at least three times a day (20 minutes per session).  3.Elevate the extremity above the level of the heart to help reduce swelling.  4.Pain medication can be taken every four to six hours as needed. It is helpful to take pain medication prior to physical therapy.  Use Tylenol or anti-inflammatories for pain as much as possible.  Pain medication is for pain not responding to over-the-counter medications only.  5.Any activity that requires precise thinking or accuracy should be avoided for a minimum of 72 hours after surgery and while on narcotic pain medication. This includes operating machinery and/or driving a vehicle.  6.All sutures/staples will be removed approximately 14 days from the time of surgery. Leave steri-strips (skin tapes) in place until sutures are removed.  7. If skin glue is used instead of stitches, do not apply ointments or solutions to the incision. Keep the incision dry. The skin glue will peel off in 3-4 weeks.  8. Change dressing on the 2nd post-op day. Use gauze for the first 3 days, then start using Band-Aids over the incision sites.   9. All casts, splints, braces, slings, crutches, abduction pillows, etc... Are to be worn as instructed. Use sling at all times except for showering and exercises.  10. Keep the incision dry for 10-14 days. A waterproof dressing (purchase at [employer], [employer], etc) can be used to shower. No bath, pool, hot tub until instructed.  11. Start PT in 14 days. Call office to help with scheduling.  12. Call [phone] with any questions or concerns.      **************    Discharge Instructions: After Your Surgery/Procedure  Youve just had surgery. During surgery you were given medicine called anesthesia to keep you relaxed and free of pain. After surgery you may have some pain or nausea. This is common. Here are some tips for feeling better and getting well after  "surgery.     Stay on schedule with your medication.   Going home  Your doctor or nurse will show you how to take care of yourself when you go home. He or she will also answer your questions. Have an adult family member or friend drive you home.      For your safety we recommend these precaution for the first 24 hours after your procedure:  Do not drive or use heavy equipment.  Do not make important decisions or sign legal papers.  Do not drink alcohol.  Have someone stay with you, if needed. He or she can watch for problems and help keep you safe.  Your concentration, balance, coordination, and judgement may be impaired for many hours after anesthesia.  Use caution when ambulating or standing up.     You may feel weak and "washed out" after anesthesia and surgery.      Subtle residual effects of general anesthesia or sedation with regional / local anesthesia can last more than 24 hours.  Rest for the remainder of the day or longer if your Doctor/Surgeon has advised you to do so.  Although you may feel normal within the first 24 hours, your reflexes and mental ability may be impaired without you realizing it.  You may feel dizzy, lightheaded or sleepy for 24 hours or longer.      Be sure to go to all follow-up visits with your doctor. And rest after your surgery for as long as your doctor tells you to.  Coping with pain  If you have pain after surgery, pain medicine will help you feel better. Take it as told, before pain becomes severe. Also, ask your doctor or pharmacist about other ways to control pain. This might be with heat, ice, or relaxation. And follow any other instructions your surgeon or nurse gives you.  Tips for taking pain medicine  To get the best relief possible, remember these points:  Pain medicines can upset your stomach. Taking them with a little food may help.  Most pain relievers taken by mouth need at least 20 to 30 minutes to start to work.  Taking medicine on a schedule can help you remember " to take it. Try to time your medicine so that you can take it before starting an activity. This might be before you get dressed, go for a walk, or sit down for dinner.  Constipation is a common side effect of pain medicines. Call your doctor before taking any medicines such as laxatives or stool softeners to help ease constipation. Also ask if you should skip any foods. Drinking lots of fluids and eating foods such as fruits and vegetables that are high in fiber can also help. Remember, do not take laxatives unless your surgeon has prescribed them.  Drinking alcohol and taking pain medicine can cause dizziness and slow your breathing. It can even be deadly. Do not drink alcohol while taking pain medicine.  Pain medicine can make you react more slowly to things. Do not drive or run machinery while taking pain medicine.  Your health care provider may tell you to take acetaminophen to help ease your pain. Ask him or her how much you are supposed to take each day. Acetaminophen or other pain relievers may interact with your prescription medicines or other over-the-counter (OTC) drugs. Some prescription medicines have acetaminophen and other ingredients. Using both prescription and OTC acetaminophen for pain can cause you to overdose. Read the labels on your OTC medicines with care. This will help you to clearly know the list of ingredients, how much to take, and any warnings. It may also help you not take too much acetaminophen. If you have questions or do not understand the information, ask your pharmacist or health care provider to explain it to you before you take the OTC medicine.  Managing nausea  Some people have an upset stomach after surgery. This is often because of anesthesia, pain, or pain medicine, or the stress of surgery. These tips will help you handle nausea and eat healthy foods as you get better. If you were on a special food plan before surgery, ask your doctor if you should follow it while you get  better. These tips may help:  Do not push yourself to eat. Your body will tell you when to eat and how much.  Start off with clear liquids and soup. They are easier to digest.  Next try semi-solid foods, such as mashed potatoes, applesauce, and gelatin, as you feel ready.  Slowly move to solid foods. Dont eat fatty, rich, or spicy foods at first.  Do not force yourself to have 3 large meals a day. Instead eat smaller amounts more often.  Take pain medicines with a small amount of solid food, such as crackers or toast, to avoid nausea.     Call your surgeon if  You still have pain an hour after taking medicine. The medicine may not be strong enough.  You feel too sleepy, dizzy, or groggy. The medicine may be too strong.  You have side effects like nausea, vomiting, or skin changes, such as rash, itching, or hives.       If you have obstructive sleep apnea  You were given anesthesia medicine during surgery to keep you comfortable and free of pain. After surgery, you may have more apnea spells because of this medicine and other medicines you were given. The spells may last longer than usual.   At home:  Keep using the continuous positive airway pressure (CPAP) device when you sleep. Unless your health care provider tells you not to, use it when you sleep, day or night. CPAP is a common device used to treat obstructive sleep apnea.  Talk with your provider before taking any pain medicine, muscle relaxants, or sedatives. Your provider will tell you about the possible dangers of taking these medicines.  © 0394-0626 [employer], [employer]. [address] [place] [address], [place] [address]. All rights reserved. This information is not intended as a substitute for professional medical care. Always follow your healthcare professional's instructions.      Post op instructions for prevention of DVT  What is deep vein thrombosis?  Deep vein thrombosis (DVT) is the medical term for blood clots in the deep veins of the leg.  These  blood clots can be dangerous.  A DVT can block a blood vessel and keep blood from getting where it needs to go.  Another problem is that the clot can travel to other parts of the body such as the lungs.  A clot that travels to the lungs is called a pulmonary embolus (PE) and can cause serious problems with breathing which can lead to death.  Am I at risk for DVT/PE?  If you are not very active, you are at risk of DVT.  Anyone confined to bed, sitting for long periods of time, recovering from surgery, etc. increases the risk of DVT.  Other risk factors are cancer diagnosis, certain medications, estrogen replacement in any form,older age, obesity, pregnancy, smoking, history of clotting disorders, and dehydration.  How will I know if I have a DVT?  Swelling in the lower leg  Pain  Warmth, redness, hardness or bulging of the vein  If you have any of these symptoms, call your doctors office right away.  Some people will not have any symptoms until the clot moves to the lungs.  What are the symptoms of a PE?  Panting, shortness of breath, or trouble breathing  Sharp, knife-like chest pain when you breathe  Coughing or coughing up blood  Rapid heartbeat  If you have any of these symptoms or get worse quickly, call 911 for emergency treatment.  How can I prevent a DVT?  Avoid long periods of inactivity and dont cross your legs--get up and walk around every hour or so.  Stay active--walking after surgery is highly encouraged.  This means you should get out of the house and walk in the neighborhood.  Going up and down stairs will not impair healing (unless advised against such activity by your doctor).    Drink plenty of noncaffeinated, nonalcoholic fluids each day to prevent dehydration.  Wear special support stockings, if they have been advised by your doctor.  If you travel, stop at least once an hour and walk around.  Avoid smoking (assistance with stopping is available through your healthcare provider)  Always notify  your doctor if you are not able to follow the post operative instructions that are given to you at the time of discharge.  It may be necessary to prescribe one of the medications available to prevent DVT.    Using an Incentive Spirometer    An incentive spirometer is a device that helps you do deep breathing exercises. These exercises expand your lungs, aid in circulation, and help prevent pneumonia. Deep breathing exercises also help you breathe better and improve the function of your lungs by:  Keeping your lungs clear  Strengthening your breathing muscles  Helping prevent respiratory complications or problems  The incentive spirometer gives you a way to take an active part in recover. A nurse or therapist will teach you breathing exercises. To do these exercises, you will breathe in through your mouth and not your nose. The incentive spirometer only works correctly if you breathe in through your mouth.  Steps to clear lungs  Step 1. Exhale normally. Then, inhale normally.  Relax and breathe out.  Step 2. Place your lips tightly around the mouthpiece.  Make sure the device is upright and not tilted.  Step 3. Inhale as much air as you can through the mouthpiece (don't breath through your nose).  Inhale slowly and deeply.  Hold your breath long enough to keep the balls or disk raised for at least 3 to 5 seconds, or as instructed by your healthcare provider.  Some spirometers have an indicator to let you know that you are breathing in too fast. If the indicator goes off, breathe in more slowly.  Step 4. Repeat the exercise regularly.  Do this exercise every hour while you're awake, or as instructed by your healthcare provider.  If you were taught deep breathing and coughing exercises, do them regularly as instructed by your healthcare provider.     We hope your stay was comfortable as you heal now, mend and rest.    For we have enjoyed taking care of you by giving your our best.    And as you get better, by regaining  your health and strength;   We count it as a privilege to have served you and hope your time at [facility] was well spent.      Thank  You!!!

## 2023-12-29 NOTE — ANESTHESIA PROCEDURE NOTES
Peripheral Block    Patient location during procedure: pre-op   Block not for primary anesthetic.  Reason for block: at surgeon's request and post-op pain management   Post-op Pain Location: shoulder left   Start time: 12/29/2023 9:55 AM  Timeout: 12/29/2023 9:55 AM   End time: 12/29/2023 9:59 AM    Staffing  Authorizing Provider: Yuriy Stark MD  Performing Provider: Yuriy Stark MD    Staffing  Performed by: Yuriy Stark MD  Authorized by: Yuriy Stark MD    Preanesthetic Checklist  Completed: patient identified, IV checked, site marked, risks and benefits discussed, surgical consent, monitors and equipment checked, pre-op evaluation and timeout performed  Peripheral Block  Patient position: sitting  Prep: ChloraPrep  Patient monitoring: heart rate, cardiac monitor, continuous pulse ox, continuous capnometry and frequent blood pressure checks  Block type: interscalene  Laterality: left  Injection technique: single shot  Needle  Needle type: Stimuplex   Needle gauge: 22 G  Needle length: 2 in  Needle localization: anatomical landmarks and ultrasound guidance   -ultrasound image captured on disc.  Assessment  Injection assessment: negative aspiration, negative parasthesia and local visualized surrounding nerve  Paresthesia pain: immediately resolved  Heart rate change: no  Slow fractionated injection: yes  Pain Tolerance: comfortable throughout block and no complaints  Medications:    Medications: bupivacaine (pf) (MARCAINE) injection 0.5% - Perineural   5 mL - 12/29/2023 9:58:00 AM  BUPivacaine liposome (PF) 1.3 % (13.3 mg/mL) suspension - Injection   10 mL - 12/29/2023 9:58:00 AM    Additional Notes  VSS.  [facility] RN monitoring vitals throughout procedure.  Patient tolerated procedure well.

## 2023-12-29 NOTE — ANESTHESIA PROCEDURE NOTES
Intubation    Date/Time: 12/29/2023 10:45 AM    Performed by: Criselda Leija CRNA  Authorized by: Yuriy Stark MD    Intubation:     Induction:  Intravenous    Intubated:  Postinduction    Mask Ventilation:  Easy mask    Attempts:  1    Attempted By:  CRNA    Method of Intubation:  Video laryngoscopy    Blade:  Maurice 3    Laryngeal View Grade: Grade I - full view of cords      Difficult Airway Encountered?: No      Complications:  None    Airway Device:  Oral endotracheal tube    Airway Device Size:  7.5    Style/Cuff Inflation:  Cuffed (inflated to minimal occlusive pressure)    Inflation Amount (mL):  5    Tube secured:  21    Secured at:  The lips    Placement Verified By:  Capnometry and Revisualization with laryngoscopy    Complicating Factors:  Obesity    Findings Post-Intubation:  BS equal bilateral and atraumatic/condition of teeth unchanged

## 2023-12-29 NOTE — H&P
Past Medical History:   Diagnosis Date    Abnormal Pap smear       s/p cryo at 20 y/o    GERD (gastroesophageal reflux disease)      Hyperlipidemia      Migraine      Migraines                 Past Surgical History:   Procedure Laterality Date    CHOLECYSTECTOMY   10/2014              Current Outpatient Medications   Medication Sig    aspirin (ECOTRIN) 81 MG EC tablet Take 81 mg by mouth.    cefUROXime (CEFTIN) 250 MG tablet Take 250 mg by mouth 2 (two) times daily.    dulaglutide (TRULICITY) 0.75 mg/0.5 mL pen injector Inject 1 pen into the skin every 7 days.    ELIQUIS 5 mg Tab TAKE 1 TABLET BY MOUTH TWICE A DAY    ergocalciferol (ERGOCALCIFEROL) 50,000 unit Cap Take 50,000 Units by mouth every 7 days.    ezetimibe (ZETIA) 10 mg tablet Take 10 mg by mouth once daily.    glimepiride (AMARYL) 2 MG tablet Take 2 mg by mouth every morning.    hydroCHLOROthiazide (HYDRODIURIL) 25 MG tablet Take 25 mg by mouth every other day.     nitrofurantoin, macrocrystal-monohydrate, (MACROBID) 100 MG capsule Take 100 mg by mouth 2 (two) times daily.    ondansetron (ZOFRAN) 4 MG tablet Take 4-8 mg by mouth every 8 (eight) hours as needed.    pantoprazole (PROTONIX) 40 MG tablet Take 40 mg by mouth every morning.    potassium chloride (MICRO-K) 10 MEQ CpSR TAKE 1 CAPSULE BY MOUTH EVERY DAY    tiZANidine (ZANAFLEX) 4 MG tablet      WESTAB MAX 2.5-25-2 mg Tab TAKE 1 TABLET BY MOUTH EVERY DAY      No current facility-administered medications for this visit.               Review of patient's allergies indicates:   Allergen Reactions    Statins-hmg-coa reductase inhibitors Swelling    Metformin Other (See Comments)       metallic taste , burn of the esophagus               Family History   Problem Relation Age of Onset    Breast cancer Paternal Grandmother 70    Colon cancer Mother               Cancer Daughter           crohns    Ovarian cancer Daughter      Cancer Daughter 20         uterine cancer with mets s/p chemo/rad/surgery;  ?genetic mutation; now with hip tumors    Breast cancer Maternal Grandmother           Social History              Socioeconomic History    Marital status:    Tobacco Use    Smoking status: Former    Smokeless tobacco: Never   Substance and Sexual Activity    Alcohol use: No    Drug use: No    Sexual activity: Never            Chief Complaint: No chief complaint on file.        History of present illness:  73-year-old female seen for left shoulder pain.  Patient is right-hand dominant.  Patient has had left shoulder pain since February.  Remembers waking up from a knee arthroscopy with increased shoulder pain and increased difficulty raising her arm up above her head.  Patient has done a couple rounds of physical therapy as well as 3 previous injections.  Patient has an MRI which shows a rotator cuff tear.  Patient still has weakness reaching up above her head and pain that she rates an 8/10.        Review of Systems:     Constitution: Negative for chills, fever, and sweats.  Negative for unexplained weight loss.     HENT:  Negative for headaches and blurry vision.     Cardiovascular:Negative for chest pain or irregular heart beat. Negative for hypertension.     Respiratory:  Negative for cough and shortness of breath.     Gastrointestinal: Negative for abdominal pain, heartburn, melena, nausea, and vomitting.     Genitourinary:  Negative bladder incontinence and dysuria.     Musculoskeletal:  See HPI     Neurological: Negative for numbness.     Psychiatric/Behavioral: Negative for depression.  The patient is not nervous/anxious.       Endocrine: Negative for polyuria     Hematologic/Lymphatic: Negative for bleeding problem.  Does not bruise/bleed easily.     Skin: Negative for poor would healing and rash        Physical Examination:     Vital Signs:  There were no vitals filed for this visit.     There is no height or weight on file to calculate BMI.     This a well-developed, well nourished patient in no  acute distress.  They are alert and oriented and cooperative to examination.  Pt. walks without an antalgic gait.       Examination of theLeft shoulder shows no rashes or erythema. There are no masses, ecchymosis, or atrophy. The patient has full range of motion in forward flexion, external rotation, and internal rotation to the mid T-spine. The patient has positive Mancini and Neer test. - Ware's test. - Speeds test. Nontender to palpation over a.c. joint. Normal stability anteriorly, posteriorly, and negative sulcus sign. Passive range of motion: Forward flexion of 180°, external rotation at 90° of 90°, internal rotation of 50°, and external rotation at 0° of 50°. 2+ radial pulse. Intact axillary, radial, median and ulnar sensation. 4 out of 5 resisted forward flexion, external rotation, and negative lift off test.     Heart is regular rate without obvious murmurs   Normal respiratory effort without audible wheezing  Abdomen is soft and nontender      MRI of the left shoulder is reviewed and interpreted from an outside facility:1. Focal full-thickness tear at the junction of the posterior margin of the supraspinatus and anterior margin of the infraspinatus.   2. Small tear posterior mid labrum.   3. Moderate degenerative change of the AC joint and no significant osteoarthritis of the glenohumeral joint.      Assessment::  Left rotator cuff tear     Plan:  I reviewed the findings with her today.  We talked about treatment options for the rotator cuff tear.  Patient is already tried injections and formal physical therapy.  We talked about arthroscopic rotator cuff repair.  Patient last had a steroid injection 2 weeks ago so we will need to wait at least a month before scheduling.  Risks, benefits, and alternatives to the procedure were explained to the patient including but not limited to damage to nerves, arteries, blood vessels, bones, tendons, ligaments, stiffness, instability, infection, permanent limb  dysfunction, DVT, PE, as well as general anesthetic complications including seizure, stroke, heart attack and even death. The patient understood these risks and wished to proceed and signed the informed consent.         All previous pertinent notes including ER visits, physical therapy visits, other orthopedic visits as well as other care for the same musculoskeletal problem were reviewed.  All pertinent lab values and previous imaging was reviewed pertinent to the current visit.     This note was created using M Modal voice recognition software that occasionally misinterpreted phrases or words.     Consult note is delivered via Epic messaging service.

## 2023-12-29 NOTE — PLAN OF CARE
Pt POC explained to pt and grand daughter, v/u. Discharge instructions reviewed, v/u. All questions answered. 3 prescriptions given via MTB, 1 via e-script. Pt discharged via wheelchair with PCT to private vehicle with family member in stable condition.

## 2023-12-29 NOTE — TRANSFER OF CARE
"Anesthesia Transfer of Care Note    Patient: Jen Torres    Procedure(s) Performed: Procedure(s) (LRB):  REPAIR, ROTATOR CUFF, ARTHROSCOPIC (Left)  ARTHROSCOPY, SHOULDER, WITH SUBACROMIAL SPACE DECOMPRESSION (Left)    Patient location: PACU    Anesthesia Type: general    Transport from OR: Transported from OR on 2-3 L/min O2 by NC with adequate spontaneous ventilation    Post pain: adequate analgesia    Post assessment: no apparent anesthetic complications    Post vital signs: stable    Level of consciousness: sedated    Nausea/Vomiting: no nausea/vomiting    Complications: none    Transfer of care protocol was followed      Last vitals: Visit Vitals  /70 (BP Location: Right arm, Patient Position: Lying)   Pulse 74   Temp 36.3 °C (97.3 °F) (Temporal)   Resp 18   Ht 5' 8" (1.727 m)   Wt 96.6 kg (213 lb)   SpO2 97%   BMI 32.39 kg/m²     "

## 2024-01-11 ENCOUNTER — OFFICE VISIT (OUTPATIENT)
Dept: ORTHOPEDICS | Facility: CLINIC | Age: 74
End: 2024-01-11
Payer: MEDICARE

## 2024-01-11 DIAGNOSIS — M75.122 COMPLETE TEAR OF LEFT ROTATOR CUFF, UNSPECIFIED WHETHER TRAUMATIC: Primary | ICD-10-CM

## 2024-01-11 PROCEDURE — 99999 PR PBB SHADOW E&M-EST. PATIENT-LVL I: CPT | Mod: PBBFAC,,, | Performed by: ORTHOPAEDIC SURGERY

## 2024-01-11 PROCEDURE — 99211 OFF/OP EST MAY X REQ PHY/QHP: CPT | Mod: PBBFAC,PO | Performed by: ORTHOPAEDIC SURGERY

## 2024-01-11 PROCEDURE — 99024 POSTOP FOLLOW-UP VISIT: CPT | Mod: POP,,, | Performed by: ORTHOPAEDIC SURGERY

## 2024-01-11 NOTE — PROGRESS NOTES
Past Medical History:   Diagnosis Date    Abnormal Pap smear     s/p cryo at 20 y/o    Anticoagulant long-term use     GERD (gastroesophageal reflux disease)     Hyperlipidemia     Migraine     Migraines     Sleep apnea     Can not use c-pap       Past Surgical History:   Procedure Laterality Date    ARTHROSCOPIC REPAIR OF ROTATOR CUFF OF SHOULDER Left 12/29/2023    Procedure: REPAIR, ROTATOR CUFF, ARTHROSCOPIC;  Surgeon: Gary Tony MD;  Location: [facility];  Service: Orthopedics;  Laterality: Left;  arthrex    ARTHROSCOPY OF SHOULDER WITH DECOMPRESSION OF SUBACROMIAL SPACE Left 12/29/2023    Procedure: ARTHROSCOPY, SHOULDER, WITH SUBACROMIAL SPACE DECOMPRESSION;  Surgeon: Gary Tony MD;  Location: [facility];  Service: Orthopedics;  Laterality: Left;    CATARACT EXTRACTION, BILATERAL      CHOLECYSTECTOMY  10/01/2014    EYE SURGERY      KNEE ARTHROSCOPY Left     TONSILLECTOMY         Current Outpatient Medications   Medication Sig    acetaminophen (TYLENOL) 500 MG tablet Take 2 tablets (1,000 mg total) by mouth every 8 (eight) hours as needed for Pain.    cyclobenzaprine (FLEXERIL) 10 MG tablet Take 1 tablet (10 mg total) by mouth 3 (three) times daily as needed for Muscle spasms.    dulaglutide (TRULICITY) 0.75 mg/0.5 mL pen injector Inject 1 pen into the skin every 7 days.    ELIQUIS 5 mg Tab TAKE 1 TABLET BY MOUTH TWICE A DAY    enoxaparin (LOVENOX) 150 mg/mL Syrg Inject into the skin. Prior to surgery    ergocalciferol (ERGOCALCIFEROL) 50,000 unit Cap Take 50,000 Units by mouth every 7 days.    ezetimibe (ZETIA) 10 mg tablet Take 10 mg by mouth once daily.    glimepiride (AMARYL) 2 MG tablet Take 2 mg by mouth every morning.    hydroCHLOROthiazide (HYDRODIURIL) 25 MG tablet Take 25 mg by mouth once daily.    ondansetron (ZOFRAN) 4 MG tablet Take 4-8 mg by mouth every 8 (eight) hours as needed.    ondansetron (ZOFRAN) 4 MG tablet Take 1 tablet (4 mg total) by mouth every 6 (six) hours as  needed for Nausea.    oxyCODONE-acetaminophen (PERCOCET) 5-325 mg per tablet Take 1 tablet by mouth every 6 (six) hours as needed for Pain.    pantoprazole (PROTONIX) 40 MG tablet Take 40 mg by mouth every morning.    potassium chloride (MICRO-K) 10 MEQ CpSR TAKE 1 CAPSULE BY MOUTH EVERY DAY    WESTAB MAX 2.5-25-2 mg Tab TAKE 1 TABLET BY MOUTH EVERY DAY     No current facility-administered medications for this visit.     Facility-Administered Medications Ordered in Other Visits   Medication    electrolyte-S (ISOLYTE)    electrolyte-S (ISOLYTE)    fentaNYL 50 mcg/mL injection 25 mcg    fentaNYL 50 mcg/mL injection  mcg    LIDOcaine (PF) 10 mg/ml (1%) injection 10 mg    midazolam (VERSED) 1 mg/mL injection 2 mg    ondansetron injection 4 mg    oxyCODONE immediate release tablet 5 mg       Review of patient's allergies indicates:   Allergen Reactions    Statins-hmg-coa reductase inhibitors Swelling    Metformin Other (See Comments)     metallic taste , burn of the esophagus       Family History   Problem Relation Age of Onset    Breast cancer Paternal Grandmother 70    Colon cancer Mother             Cancer Daughter         crohns    Ovarian cancer Daughter     Cancer Daughter 20        uterine cancer with mets s/p chemo/rad/surgery; ?genetic mutation; now with hip tumors    Breast cancer Maternal Grandmother        Social History     Socioeconomic History    Marital status:    Tobacco Use    Smoking status: Former    Smokeless tobacco: Never   Substance and Sexual Activity    Alcohol use: No    Drug use: No    Sexual activity: Never       Chief Complaint: No chief complaint on file.      Date of surgery:  2023    History of present illness:  73-year-old female underwent left arthroscopic speed bridge rotator cuff repair for a high-grade bursal tear.  Patient also underwent a subacromial decompression with acromioplasty.  Patient is doing well.  She is compliant with the sling.  Pain is a  3/10.  No wound issues.      Review of Systems:    Musculoskeletal:  See HPI        Physical Examination:    Vital Signs:  There were no vitals filed for this visit.    There is no height or weight on file to calculate BMI.    This a well-developed, well nourished patient in no acute distress.  They are alert and oriented and cooperative to examination.  Pt. walks without an antalgic gait.      Examination left shoulder shows well-healing surgical incisions.  No erythema drainage.  Patient is neurovascularly intact.  Compliant with the sling.    X-rays:  None     Assessment::  Status post SpeedBridge rotator cuff repair and subacromial decompression for high-grade bursal surface tear    Plan:  I reviewed the arthroscopic photos with her today.  We took out the stitches.  Gave her a pendulum and table slide handout to start.  Want her to continue the sling.  Follow-up in a month.    This note was created using M Modal voice recognition software that occasionally misinterpreted phrases or words.

## 2024-01-18 ENCOUNTER — OFFICE VISIT (OUTPATIENT)
Dept: CARDIOLOGY | Facility: CLINIC | Age: 74
End: 2024-01-18
Payer: MEDICARE

## 2024-01-18 VITALS
HEIGHT: 68 IN | WEIGHT: 214 LBS | HEART RATE: 83 BPM | BODY MASS INDEX: 32.43 KG/M2 | SYSTOLIC BLOOD PRESSURE: 122 MMHG | OXYGEN SATURATION: 100 % | RESPIRATION RATE: 16 BRPM | DIASTOLIC BLOOD PRESSURE: 68 MMHG

## 2024-01-18 DIAGNOSIS — I82.402 DEEP VEIN THROMBOSIS (DVT) OF LEFT LOWER EXTREMITY, UNSPECIFIED CHRONICITY, UNSPECIFIED VEIN: ICD-10-CM

## 2024-01-18 DIAGNOSIS — Z78.9 STATIN INTOLERANCE: ICD-10-CM

## 2024-01-18 DIAGNOSIS — I10 ESSENTIAL HYPERTENSION: ICD-10-CM

## 2024-01-18 DIAGNOSIS — E11.9 TYPE 2 DIABETES MELLITUS WITHOUT COMPLICATION, WITHOUT LONG-TERM CURRENT USE OF INSULIN: ICD-10-CM

## 2024-01-18 DIAGNOSIS — E78.5 DYSLIPIDEMIA: ICD-10-CM

## 2024-01-18 DIAGNOSIS — I82.592 CHRONIC EMBOLISM AND THROMBOSIS OF OTHER SPECIFIED DEEP VEIN OF LEFT LOWER EXTREMITY: Primary | ICD-10-CM

## 2024-01-18 DIAGNOSIS — Z13.6 ENCOUNTER FOR SCREENING FOR CARDIOVASCULAR DISORDERS: ICD-10-CM

## 2024-01-18 PROCEDURE — 99999 PR PBB SHADOW E&M-EST. PATIENT-LVL IV: CPT | Mod: PBBFAC,,, | Performed by: INTERNAL MEDICINE

## 2024-01-18 PROCEDURE — 99214 OFFICE O/P EST MOD 30 MIN: CPT | Mod: PBBFAC,PN | Performed by: INTERNAL MEDICINE

## 2024-01-18 PROCEDURE — 99215 OFFICE O/P EST HI 40 MIN: CPT | Mod: S$PBB,,, | Performed by: INTERNAL MEDICINE

## 2024-01-18 NOTE — PROGRESS NOTES
Subjective:    Patient ID:  Jen Torres is a 73 y.o. female patient here for evaluation Follow-up      History of Present Illness:     Patient is a 73-year-old with history of sleep apnea dyslipidemia had rotator cuff tear and arthroscopic surgery done on December 29, 2023.  She would follow-up evaluation and removal of the sutures.  She would left arm is in a brace at this time.  She has some cough and congestion because since surgery she and bruit she is using she has some muscle soreness in her chest wall.  No nausea dyspepsia noted.  Persistent cough with scant sputum production no fevers or chills noted no nausea vomiting and no swelling in the lower extremities.    She is on chronic oral anticoagulation therapy because of venous thrombosis she has some chronic swelling in the left lower extremity        Review of patient's allergies indicates:   Allergen Reactions    Statins-hmg-coa reductase inhibitors Swelling    Metformin Other (See Comments)     metallic taste , burn of the esophagus       Past Medical History:   Diagnosis Date    Abnormal Pap smear     s/p cryo at 20 y/o    Anticoagulant long-term use     GERD (gastroesophageal reflux disease)     Hyperlipidemia     Migraine     Migraines     Sleep apnea     Can not use c-pap     Past Surgical History:   Procedure Laterality Date    ARTHROSCOPIC REPAIR OF ROTATOR CUFF OF SHOULDER Left 12/29/2023    Procedure: REPAIR, ROTATOR CUFF, ARTHROSCOPIC;  Surgeon: Gary Tony MD;  Location: [facility] OR;  Service: Orthopedics;  Laterality: Left;  arthrex    ARTHROSCOPY OF SHOULDER WITH DECOMPRESSION OF SUBACROMIAL SPACE Left 12/29/2023    Procedure: ARTHROSCOPY, SHOULDER, WITH SUBACROMIAL SPACE DECOMPRESSION;  Surgeon: Gary Tony MD;  Location: [facility] OR;  Service: Orthopedics;  Laterality: Left;    CATARACT EXTRACTION, BILATERAL      CHOLECYSTECTOMY  10/01/2014    EYE SURGERY      KNEE ARTHROSCOPY Left     TONSILLECTOMY       Social History      Tobacco Use    Smoking status: Former    Smokeless tobacco: Never   Substance Use Topics    Alcohol use: No    Drug use: No      Review of Systems     Constitutional: Negative for chills, fatigue and fever.   Eyes: No double vision, No blurred vision  Neuro: No headaches, No dizziness  Respiratory:  Persistent cough and congestion for the past few weeks.  He followed by primary    Cardiovascular: Negative for chest pain. Negative for palpitations and leg swelling.   Gastrointestinal: Negative for abdominal pain, history of peptic ulcer disease history of melanotic stools periodically   Genitourinary: Negative for dysuria and frequency, Negative for hematuria  Skin: Negative for bruising, Negative for edema or discoloration noted.   Musculoskeletal she does have left shoulder pain and post surgical intervention she had also left knee surgery and some residual swelling in the left lower extremity  Patient has history of severe statin intolerance to multiple drugs       Objective        Vitals:    01/18/24 0850   BP: 122/68   Pulse: 83   Resp: 16       LIPIDS - LAST 2   Lab Results   Component Value Date    CHOL 234 (H) 10/10/2019    HDL 43 10/10/2019    LDLCALC 163.8 (H) 10/10/2019    TRIG 136 10/10/2019    CHOLHDL 18.4 (L) 10/10/2019       CBC - LAST 2  Lab Results   Component Value Date    WBC 9.21 12/18/2023    WBC 9.7 11/01/2022    RBC 3.95 (L) 12/18/2023    RBC 4.62 11/01/2022    HGB 11.5 (L) 12/18/2023    HGB 14.1 11/01/2022    HCT 35.5 (L) 12/18/2023    HCT 43.5 11/01/2022    MCV 90 12/18/2023    MCV 94.2 11/01/2022    MCH 29.1 12/18/2023    MCH 30.5 11/01/2022    MCHC 32.4 12/18/2023    MCHC 32.4 11/01/2022    RDW 12.5 12/18/2023    RDW 12.3 11/01/2022     12/18/2023     11/01/2022    MPV 10.2 12/18/2023    MPV 11.0 11/01/2022    GRAN 5.5 12/18/2023    GRAN 59.3 12/18/2023    LYMPH 3.1 12/18/2023    LYMPH 33.1 12/18/2023    MONO 0.5 12/18/2023    MONO 5.6 12/18/2023    BASO 0.03 12/18/2023     BASO 49 11/01/2022    NRBC 0 12/18/2023       CHEMISTRY & LIVER FUNCTION - LAST 2  Lab Results   Component Value Date     12/18/2023    K 3.7 12/18/2023     12/18/2023    CO2 23 12/18/2023    ANIONGAP 11 12/18/2023    BUN 12 12/18/2023    CREATININE 1.1 12/18/2023     (H) 12/18/2023    CALCIUM 9.3 12/18/2023        CARDIAC PROFILE - LAST 2  Lab Results   Component Value Date    BNP 18 10/10/2019        COAGULATION - LAST 2  Lab Results   Component Value Date    INR 1.0 12/18/2023    INR 1.0 08/01/2022    APTT 31.6 12/18/2023    APTT 29 08/01/2022       ENDOCRINE & PSA - LAST 2  Lab Results   Component Value Date    TSH 1.640 10/10/2019        ECHOCARDIOGRAM RESULTS  No results found for this or any previous visit.      CURRENT/PREVIOUS VISIT EKG  Results for orders placed or performed in visit on 12/18/23   EKG 12-lead    Collection Time: 12/18/23  9:49 AM    Narrative    Test Reason : Z01.101    Vent. Rate : 077 BPM     Atrial Rate : 077 BPM     P-R Int : 124 ms          QRS Dur : 086 ms      QT Int : 388 ms       P-R-T Axes : 063 057 078 degrees     QTc Int : 439 ms    Normal sinus rhythm  Nonspecific T wave abnormality  Abnormal ECG  When compared with ECG of 19-DEC-2022 16:55,  Nonspecific T wave abnormality, worse in Anterior-lateral leads  Confirmed by Wes RAYMOND, Shakeel SWENSON ([id number]) on 12/23/2023 2:08:53 PM    Referred By:  DR. MCBRIDE           Confirmed By:Shakeel Harvey MD     No valid procedures specified.   No results found for this or any previous visit.    No valid procedures specified.          PREVIOUS STRESS TEST              PREVIOUS ANGIOGRAM      Physical examination:      Constitutional:  Well-built well-nourished in no apparent distress, alert and oriented    Neck:  Left carotid bruit no JVD, no masses    Lungs: diminished breath sounds bibasilar, rhonchi bilaterally  Chest Wall: no tenderness  CARDIAC:  regular rate and rhythm, S1, S2 normal, precordial murmur noted.     Abdomen: soft, non-tender; bowel sounds normal; no masses,  no organomegaly, no guarding or rebound noted  Extremities: Extremities normal, atraumatic, no cyanosis, clubbing, mild edema in left lower extremity nonpitting noted.  Left shoulder is in a brace   Skin: Skin color, texture, turgor normal. No rashes or lesions  Neuro: Alert and responsive.  Moving all extremities      I HAVE REVIEWED :    The vital signs, nurses notes, and all the pertinent radiology and labs.        Current Outpatient Medications   Medication Instructions    cyclobenzaprine (FLEXERIL) 10 mg, Oral, 3 times daily PRN    dulaglutide (TRULICITY) 0.75 mg/0.5 mL pen injector 1 pen , Subcutaneous, Every 7 days    ELIQUIS 5 mg Tab TAKE 1 TABLET BY MOUTH TWICE A DAY    enoxaparin (LOVENOX) 150 mg/mL Syrg Subcutaneous, Prior to surgery    ergocalciferol (ERGOCALCIFEROL) 50,000 Units, Oral, Every 7 days    ezetimibe (ZETIA) 10 mg, Oral, Daily    glimepiride (AMARYL) 2 mg, Oral, Every morning    hydroCHLOROthiazide (HYDRODIURIL) 25 mg, Oral, Daily    ondansetron (ZOFRAN) 4-8 mg, Oral, Every 8 hours PRN    ondansetron (ZOFRAN) 4 mg, Oral, Every 6 hours PRN    oxyCODONE-acetaminophen (PERCOCET) 5-325 mg per tablet 1 tablet, Oral, Every 6 hours PRN    PAIN RELIEVER ES(ACETAMINOPHN) 1,000 mg, Oral, Every 8 hours PRN    pantoprazole (PROTONIX) 40 mg, Oral, Every morning    potassium chloride (MICRO-K) 10 MEQ CpSR TAKE 1 CAPSULE BY MOUTH EVERY DAY    WESTAB MAX 2.5-25-2 mg Tab 1 tablet, Oral          Assessment & Plan     Deep vein thrombosis (DVT) of left lower extremity, unspecified chronicity, unspecified vein  History of chronic deep vein thrombosis.  She is currently on his 5 mg p.o. b.i.d. and she has he would hematologist.  Continue the same she does have some periodic episodes of melanotic stools.  Her last hemoglobin however stable last hemoglobin is 11.5.    Essential hypertension  Blood pressure has been reasonably well controlled currently on  hydrochlorothiazide 25 mg daily along with potassium supplements.  Maintain the same pressure today is 122/68 mm Hg    Dyslipidemia  Severe dyslipidemia with LDL cholesterol of 168 however patient has severe statin intolerance.  She is presently on Zetia barely modify in the lipid profile at this time.  Encouraged her to maintain low-fat low-cholesterol diet    Statin intolerance  She is severe statin intolerance with several drugs on Crestor Lipitor and other drugs she was tried.  Because of diabetes aggressive lipid management need to be entertained she may need an injectable lipid-lowering agents.    Type 2 diabetes mellitus without complication, without long-term current use of insulin  Currently being managed by Dr. Smalls encouraged her to continue on same including Amaryl 2 mg daily and Trulicity every 7 days.  Maintain on calorie restricted diet          Follow up in about 6 months (around 7/18/2024).

## 2024-01-18 NOTE — ASSESSMENT & PLAN NOTE
She is severe statin intolerance with several drugs on Crestor Lipitor and other drugs she was tried.  Because of diabetes aggressive lipid management need to be entertained she may need an injectable lipid-lowering agents.

## 2024-01-18 NOTE — ASSESSMENT & PLAN NOTE
Severe dyslipidemia with LDL cholesterol of 168 however patient has severe statin intolerance.  She is presently on Zetia barely modify in the lipid profile at this time.  Encouraged her to maintain low-fat low-cholesterol diet

## 2024-01-18 NOTE — ASSESSMENT & PLAN NOTE
Currently being managed by Dr. Smalls encouraged her to continue on same including Amaryl 2 mg daily and Trulicity every 7 days.  Maintain on calorie restricted diet

## 2024-01-18 NOTE — ASSESSMENT & PLAN NOTE
Blood pressure has been reasonably well controlled currently on hydrochlorothiazide 25 mg daily along with potassium supplements.  Maintain the same pressure today is 122/68 mm Hg

## 2024-01-18 NOTE — ASSESSMENT & PLAN NOTE
History of chronic deep vein thrombosis.  She is currently on his 5 mg p.o. b.i.d. and she has he would hematologist.  Continue the same she does have some periodic episodes of melanotic stools.  Her last hemoglobin however stable last hemoglobin is 11.5.

## 2024-01-22 ENCOUNTER — HOSPITAL ENCOUNTER (OUTPATIENT)
Dept: RADIOLOGY | Facility: HOSPITAL | Age: 74
Discharge: HOME | End: 2024-01-22
Attending: INTERNAL MEDICINE
Payer: MEDICARE

## 2024-01-22 DIAGNOSIS — Z13.6 ENCOUNTER FOR SCREENING FOR CARDIOVASCULAR DISORDERS: ICD-10-CM

## 2024-02-08 ENCOUNTER — OFFICE VISIT (OUTPATIENT)
Dept: ORTHOPEDICS | Facility: CLINIC | Age: 74
End: 2024-02-08
Payer: MEDICARE

## 2024-02-08 VITALS — RESPIRATION RATE: 18 BRPM | WEIGHT: 214 LBS | BODY MASS INDEX: 32.43 KG/M2 | HEIGHT: 68 IN

## 2024-02-08 DIAGNOSIS — M75.122 COMPLETE TEAR OF LEFT ROTATOR CUFF, UNSPECIFIED WHETHER TRAUMATIC: Primary | ICD-10-CM

## 2024-02-08 PROCEDURE — 99024 POSTOP FOLLOW-UP VISIT: CPT | Mod: S$PBB,,, | Performed by: ORTHOPAEDIC SURGERY

## 2024-02-08 PROCEDURE — 99213 OFFICE O/P EST LOW 20 MIN: CPT | Mod: PBBFAC,PO | Performed by: ORTHOPAEDIC SURGERY

## 2024-02-08 PROCEDURE — 99999 PR PBB SHADOW E&M-EST. PATIENT-LVL III: CPT | Mod: PBBFAC,,, | Performed by: ORTHOPAEDIC SURGERY

## 2024-02-08 NOTE — PROGRESS NOTES
Past Medical History:   Diagnosis Date    Abnormal Pap smear     s/p cryo at 20 y/o    Anticoagulant long-term use     GERD (gastroesophageal reflux disease)     Hyperlipidemia     Migraine     Migraines     Sleep apnea     Can not use c-pap       Past Surgical History:   Procedure Laterality Date    ARTHROSCOPIC REPAIR OF ROTATOR CUFF OF SHOULDER Left 12/29/2023    Procedure: REPAIR, ROTATOR CUFF, ARTHROSCOPIC;  Surgeon: Gary Tony MD;  Location: [facility];  Service: Orthopedics;  Laterality: Left;  arthrex    ARTHROSCOPY OF SHOULDER WITH DECOMPRESSION OF SUBACROMIAL SPACE Left 12/29/2023    Procedure: ARTHROSCOPY, SHOULDER, WITH SUBACROMIAL SPACE DECOMPRESSION;  Surgeon: Gary Tony MD;  Location: [facility];  Service: Orthopedics;  Laterality: Left;    CATARACT EXTRACTION, BILATERAL      CHOLECYSTECTOMY  10/01/2014    EYE SURGERY      KNEE ARTHROSCOPY Left     TONSILLECTOMY         Current Outpatient Medications   Medication Sig    dulaglutide (TRULICITY) 0.75 mg/0.5 mL pen injector Inject 1 pen into the skin every 7 days.    ELIQUIS 5 mg Tab TAKE 1 TABLET BY MOUTH TWICE A DAY    enoxaparin (LOVENOX) 150 mg/mL Syrg Inject into the skin. Prior to surgery    ergocalciferol (ERGOCALCIFEROL) 50,000 unit Cap Take 50,000 Units by mouth every 7 days.    ezetimibe (ZETIA) 10 mg tablet Take 10 mg by mouth once daily.    glimepiride (AMARYL) 2 MG tablet Take 2 mg by mouth every morning.    hydroCHLOROthiazide (HYDRODIURIL) 25 MG tablet Take 25 mg by mouth once daily.    pantoprazole (PROTONIX) 40 MG tablet Take 40 mg by mouth every morning.    potassium chloride (MICRO-K) 10 MEQ CpSR TAKE 1 CAPSULE BY MOUTH EVERY DAY    WESTAB MAX 2.5-25-2 mg Tab TAKE 1 TABLET BY MOUTH EVERY DAY    acetaminophen (TYLENOL) 500 MG tablet Take 2 tablets (1,000 mg total) by mouth every 8 (eight) hours as needed for Pain. (Patient not taking: Reported on 2/8/2024)    cyclobenzaprine (FLEXERIL) 10 MG tablet Take 1 tablet  (10 mg total) by mouth 3 (three) times daily as needed for Muscle spasms. (Patient not taking: Reported on 2024)    ondansetron (ZOFRAN) 4 MG tablet Take 4-8 mg by mouth every 8 (eight) hours as needed.    ondansetron (ZOFRAN) 4 MG tablet Take 1 tablet (4 mg total) by mouth every 6 (six) hours as needed for Nausea. (Patient not taking: Reported on 2024)    oxyCODONE-acetaminophen (PERCOCET) 5-325 mg per tablet Take 1 tablet by mouth every 6 (six) hours as needed for Pain. (Patient not taking: Reported on 2024)     No current facility-administered medications for this visit.     Facility-Administered Medications Ordered in Other Visits   Medication    electrolyte-S (ISOLYTE)    electrolyte-S (ISOLYTE)    fentaNYL 50 mcg/mL injection 25 mcg    fentaNYL 50 mcg/mL injection  mcg    LIDOcaine (PF) 10 mg/ml (1%) injection 10 mg    midazolam (VERSED) 1 mg/mL injection 2 mg    ondansetron injection 4 mg    oxyCODONE immediate release tablet 5 mg       Review of patient's allergies indicates:   Allergen Reactions    Statins-hmg-coa reductase inhibitors Swelling    Metformin Other (See Comments)     metallic taste , burn of the esophagus       Family History   Problem Relation Age of Onset    Breast cancer Paternal Grandmother 70    Colon cancer Mother             Cancer Daughter         crohns    Ovarian cancer Daughter     Cancer Daughter 20        uterine cancer with mets s/p chemo/rad/surgery; ?genetic mutation; now with hip tumors    Breast cancer Maternal Grandmother        Social History     Socioeconomic History    Marital status:    Tobacco Use    Smoking status: Former    Smokeless tobacco: Never   Substance and Sexual Activity    Alcohol use: No    Drug use: No    Sexual activity: Never       Chief Complaint:   Chief Complaint   Patient presents with    Follow-up     S/p left shoulder RCR, SAD. Dos 23       Date of surgery:  2023    History of present illness:   73-year-old female underwent left arthroscopic speed bridge rotator cuff repair for a high-grade bursal tear.  Patient also underwent a subacromial decompression with acromioplasty.  Patient is doing well.  She is compliant with the sling.  Pain is a 3/10.  No wound issues.  Some pain up into her neck.      Review of Systems:    Musculoskeletal:  See HPI        Physical Examination:    Vital Signs:    Vitals:    02/08/24 0956   Resp: 18       Body mass index is 32.54 kg/m².    This a well-developed, well nourished patient in no acute distress.  They are alert and oriented and cooperative to examination.  Pt. walks without an antalgic gait.      Examination left shoulder shows well-healed surgical incisions.  No erythema drainage.  Patient is neurovascularly intact.  Compliant with the sling.  Patient has decent passive range of motion with just a little tightness.    X-rays:  None     Assessment::  Status post SpeedBridge rotator cuff repair and subacromial decompression for high-grade bursal surface tear    Plan:  We will stop the sling.  Okay to start formal physical therapy.  I will see her back in 6 weeks.  We will also get her in with back and spine.    This note was created using [employer] voice recognition software that occasionally misinterpreted phrases or words.

## 2024-02-14 ENCOUNTER — OFFICE VISIT (OUTPATIENT)
Dept: SPINE | Facility: CLINIC | Age: 74
End: 2024-02-14
Payer: MEDICARE

## 2024-02-14 VITALS — BODY MASS INDEX: 32.44 KG/M2 | HEIGHT: 68 IN | WEIGHT: 214.06 LBS

## 2024-02-14 DIAGNOSIS — M54.12 CERVICAL RADICULITIS: ICD-10-CM

## 2024-02-14 DIAGNOSIS — M54.2 CERVICALGIA: Primary | ICD-10-CM

## 2024-02-14 PROCEDURE — 99204 OFFICE O/P NEW MOD 45 MIN: CPT | Mod: S$PBB,,, | Performed by: PHYSICAL MEDICINE & REHABILITATION

## 2024-02-14 PROCEDURE — 99999 PR PBB SHADOW E&M-EST. PATIENT-LVL IV: CPT | Mod: PBBFAC,,, | Performed by: PHYSICAL MEDICINE & REHABILITATION

## 2024-02-14 PROCEDURE — 99214 OFFICE O/P EST MOD 30 MIN: CPT | Mod: PBBFAC,PN | Performed by: PHYSICAL MEDICINE & REHABILITATION

## 2024-02-14 NOTE — PROGRESS NOTES
SUBJECTIVE:    Patient ID: Jen Torres is a 73 y.o. female.    Chief Complaint: Neck Pain    This is a 73-year-old woman who sees Dr. Garcia for her primary care.  History of diabetes hypertension and hypercoagulable state followed by Hematology.  She is on Eliquis.  Remote history of cervical cancer.  Presents with approximately 1 year history left-sided neck discomfort radiating down the left arm primarily to digits 1 and 2 of the left hand.  No difficulty writing or walking.  No bowel or bladder dysfunction fever chills sweats or unexpected weight loss.  Pain also radiates into the left periscapular region and occasionally is associated with posterior headaches.  I note that she is underwent arthroscopic left shoulder surgery with Dr. Tony on 12/29/2023.  She is coming along just fine from that standpoint.  Current pain level is 4/10 but at times as high as 10/10 and interferes with her quality of life in terms of activities of daily living recreation and social activities.  I have no imaging to review          Past Medical History:   Diagnosis Date    Abnormal Pap smear     s/p cryo at 20 y/o    Anticoagulant long-term use     GERD (gastroesophageal reflux disease)     Hyperlipidemia     Migraine     Migraines     Sleep apnea     Can not use c-pap     Social History     Socioeconomic History    Marital status:    Tobacco Use    Smoking status: Former    Smokeless tobacco: Never   Substance and Sexual Activity    Alcohol use: No    Drug use: No    Sexual activity: Never     Past Surgical History:   Procedure Laterality Date    ARTHROSCOPIC REPAIR OF ROTATOR CUFF OF SHOULDER Left 12/29/2023    Procedure: REPAIR, ROTATOR CUFF, ARTHROSCOPIC;  Surgeon: Gary Tony MD;  Location: [facility];  Service: Orthopedics;  Laterality: Left;  arthrex    ARTHROSCOPY OF SHOULDER WITH DECOMPRESSION OF SUBACROMIAL SPACE Left 12/29/2023    Procedure: ARTHROSCOPY, SHOULDER, WITH SUBACROMIAL SPACE  "DECOMPRESSION;  Surgeon: Gary Tony MD;  Location: [facility];  Service: Orthopedics;  Laterality: Left;    CATARACT EXTRACTION, BILATERAL      CHOLECYSTECTOMY  10/01/2014    EYE SURGERY      KNEE ARTHROSCOPY Left     TONSILLECTOMY       Family History   Problem Relation Age of Onset    Breast cancer Paternal Grandmother 70    Colon cancer Mother             Cancer Daughter         crohns    Ovarian cancer Daughter     Cancer Daughter 20        uterine cancer with mets s/p chemo/rad/surgery; ?genetic mutation; now with hip tumors    Breast cancer Maternal Grandmother      Vitals:    24 0947   Weight: 97.1 kg (214 lb 1.1 oz)   Height: 5' 8" (1.727 m)       Review of Systems   Constitutional:  Negative for chills, diaphoresis, fatigue, fever and unexpected weight change.   HENT:  Negative for trouble swallowing.    Eyes:  Negative for visual disturbance.   Respiratory:  Negative for shortness of breath.    Cardiovascular:  Negative for chest pain.   Gastrointestinal:  Negative for abdominal pain, constipation, nausea and vomiting.   Genitourinary:  Negative for difficulty urinating.   Musculoskeletal:  Negative for arthralgias, back pain, gait problem, joint swelling, myalgias, neck pain and neck stiffness.   Neurological:  Negative for dizziness, speech difficulty, weakness, light-headedness, numbness and headaches.          Objective:      Physical Exam  Neurological:      Mental Status: She is alert and oriented to person, place, and time.      Comments: She is awake and in no acute distress  Mild tenderness palpation posterior cervical paraspinous musculature on left with no external lesions or palpable masses  Cervical range of motion is within normal limits albeit with some discomfort at the endpoints of her range in all planes  Reflexes- +1-+2 reflexes at the following:   C5-Biceps   C6-Brachioradialis   C7-Triceps   L3/4-Patellar   S1-Achilles   Candi sign is negative " bilaterally  Strength testing- 5/5 strength in the following muscle groups:  C5-Elbow flexion  C6-Wrist extension  C7-Elbow extension  C8-Finger flexion  T1-Finger abduction  L2-Hip flexion  L3-Knee extension  L4-Ankle dorsiflexion  L5-Great toe extension  S1-Ankle plantar flexion                    Assessment:       1. Cervicalgia    2. Cervical radiculitis           Plan:     She has a nonfocal neurological examination and no historical red flags.  I suspect she has neck pain on basis of cervical degenerative disc disease and facet arthropathy.  She has symptoms of left C6/C7 radiculitis with no evidence of nerve root dysfunction.  We talked about treatment options which include living with her ongoing pain versus physical therapy versus epidural steroid injections or surgery.  She says she wants to find out what is going on with her neck.  She would like to have an MRI we will order.  She can follow up with me afterwards.      Cervicalgia  -     MRI Cervical Spine Without Contrast; Future; Expected date: 02/14/2024    Cervical radiculitis  -     MRI Cervical Spine Without Contrast; Future; Expected date: 02/14/2024

## 2024-02-21 ENCOUNTER — HOSPITAL ENCOUNTER (OUTPATIENT)
Dept: RADIOLOGY | Facility: HOSPITAL | Age: 74
Discharge: HOME | End: 2024-02-21
Attending: PHYSICAL MEDICINE & REHABILITATION
Payer: MEDICARE

## 2024-02-21 DIAGNOSIS — M54.2 CERVICALGIA: ICD-10-CM

## 2024-02-21 DIAGNOSIS — M54.12 CERVICAL RADICULITIS: ICD-10-CM

## 2024-02-21 PROCEDURE — 72141 MRI NECK SPINE W/O DYE: CPT | Mod: TC

## 2024-02-21 PROCEDURE — 72141 MRI NECK SPINE W/O DYE: CPT | Mod: 26,,, | Performed by: RADIOLOGY

## 2024-02-26 ENCOUNTER — OFFICE VISIT (OUTPATIENT)
Dept: SPINE | Facility: CLINIC | Age: 74
End: 2024-02-26
Payer: MEDICARE

## 2024-02-26 VITALS — BODY MASS INDEX: 32.44 KG/M2 | WEIGHT: 214.06 LBS | HEIGHT: 68 IN

## 2024-02-26 DIAGNOSIS — M54.12 CERVICAL RADICULITIS: ICD-10-CM

## 2024-02-26 DIAGNOSIS — M54.2 CERVICALGIA: Primary | ICD-10-CM

## 2024-02-26 PROCEDURE — 99999 PR PBB SHADOW E&M-EST. PATIENT-LVL III: CPT | Mod: PBBFAC,,, | Performed by: PHYSICAL MEDICINE & REHABILITATION

## 2024-02-26 PROCEDURE — 99213 OFFICE O/P EST LOW 20 MIN: CPT | Mod: PBBFAC,PN | Performed by: PHYSICAL MEDICINE & REHABILITATION

## 2024-02-26 PROCEDURE — 99213 OFFICE O/P EST LOW 20 MIN: CPT | Mod: S$PBB,,, | Performed by: PHYSICAL MEDICINE & REHABILITATION

## 2024-02-26 NOTE — PROGRESS NOTES
SUBJECTIVE:    Patient ID: Jen Torres is a 73 y.o. female.    Chief Complaint: Follow-up    She is here to review her cervical MRI done 02/21/2024 to evaluate her complaint of  left-sided neck discomfort radiating down the left arm primarily to digits 1 and 2 of the left hand.      The MRI is summarized below:      FINDINGS:  Alignment: Straightening of the cervical lordosis without significant spondylolisthesis.     Vertebral Column: Vertebral body heights are maintained.  No evidence of an acute fracture or aggressive marrow replacement process. Mild multilevel disc degeneration with disc desiccation, intervertebral disc space narrowing and posterior disc osteophyte complexes.     Cord: Minimal flattening of the ventral cord at C3-4 and C4-5.  No definite focal cord signal abnormality.  Remainder of the cord demonstrates normal caliber and signal.     Skull base and craniocervical junction: Normal.     Degenerative findings:     C2-C3: Minimal posterior disc osteophyte complex.  Mild bilateral facet arthropathy.  There is no neural foraminal stenosis.  There is no spinal canal stenosis.     C3-C4: Right asymmetric posterior disc osteophyte complex.  Mild bilateral facet arthropathy.  Mild bilateral uncovertebral joint spurring.  Mild bilateral neural foraminal stenosis.  Ligamentum flavum thickening.  Mild spinal canal stenosis.     C4-C5: Mild right asymmetric posterior disc osteophyte complex with small right central disc protrusion through an annular fissure.  Moderate left and mild right facet arthropathy.  Mild bilateral uncovertebral joint spurring.  Mild bilateral neural foraminal stenosis.  Ligamentum flavum thickening.  Mild spinal canal stenosis.     C5-C6: Mild posterior disc osteophyte complex.  Moderate left and mild right neural foraminal stenosis.  Moderate right and mild left uncovertebral joint spurring.  Moderate right and mild left neural foraminal stenosis.  Ligamentum flavum  thickening.  Mild spinal canal stenosis.     C6-C7: Mild posterior disc osteophyte complex.  Moderate left and mild right facet arthropathy.  Moderate right and mild left uncovertebral joint spurring.  Moderate right and mild left neural foraminal stenosis.  Ligamentum flavum thickening.  Mild spinal canal stenosis.     C7-T1: Minimal posterior disc osteophyte complex.  Mild bilateral facet arthropathy.  Ligamentum flavum thickening.  Mild bilateral uncovertebral joint spurring.  Mild right neural foraminal stenosis.  Right perineural nerve root sleeve cyst incidentally noted.    There is no spinal canal stenosis.     Paraspinal muscles & soft tissues: No significant abnormality.     Normal signal voids are present in the vertebral arteries.     Impression:     Multilevel degenerative changes of the cervical spine, as detailed above, resulting in mild-to-moderate neural foraminal stenosis and mild spinal canal stenosis at C3-4 through C6-7.      Clinically she is about the same.  Her symptoms are described above.  Current pain level is 5/10 but at times as high as 10/10 interferes with quality of life in terms of activities of daily living recreation and social activities            Past Medical History:   Diagnosis Date    Abnormal Pap smear     s/p cryo at 22 y/o    Anticoagulant long-term use     GERD (gastroesophageal reflux disease)     Hyperlipidemia     Migraine     Migraines     Sleep apnea     Can not use c-pap     Social History     Socioeconomic History    Marital status:    Tobacco Use    Smoking status: Former    Smokeless tobacco: Never   Substance and Sexual Activity    Alcohol use: No    Drug use: No    Sexual activity: Never     Past Surgical History:   Procedure Laterality Date    ARTHROSCOPIC REPAIR OF ROTATOR CUFF OF SHOULDER Left 12/29/2023    Procedure: REPAIR, ROTATOR CUFF, ARTHROSCOPIC;  Surgeon: Gary Tony MD;  Location: [facility];  Service: Orthopedics;  Laterality: Left;   "arthrex    ARTHROSCOPY OF SHOULDER WITH DECOMPRESSION OF SUBACROMIAL SPACE Left 2023    Procedure: ARTHROSCOPY, SHOULDER, WITH SUBACROMIAL SPACE DECOMPRESSION;  Surgeon: Gary Tony MD;  Location: [facility];  Service: Orthopedics;  Laterality: Left;    CATARACT EXTRACTION, BILATERAL      CHOLECYSTECTOMY  10/01/2014    EYE SURGERY      KNEE ARTHROSCOPY Left     TONSILLECTOMY       Family History   Problem Relation Age of Onset    Breast cancer Paternal Grandmother 70    Colon cancer Mother             Cancer Daughter         crohns    Ovarian cancer Daughter     Cancer Daughter 20        uterine cancer with mets s/p chemo/rad/surgery; ?genetic mutation; now with hip tumors    Breast cancer Maternal Grandmother      Vitals:    24 1141   Weight: 97.1 kg (214 lb 1.1 oz)   Height: 5' 8" (1.727 m)       Review of Systems   Constitutional:  Negative for chills, diaphoresis, fatigue, fever and unexpected weight change.   HENT:  Negative for trouble swallowing.    Eyes:  Negative for visual disturbance.   Respiratory:  Negative for shortness of breath.    Cardiovascular:  Negative for chest pain.   Gastrointestinal:  Negative for abdominal pain, constipation, nausea and vomiting.   Genitourinary:  Negative for difficulty urinating.   Musculoskeletal:  Negative for arthralgias, back pain, gait problem, joint swelling, myalgias, neck pain and neck stiffness.   Neurological:  Negative for dizziness, speech difficulty, weakness, light-headedness, numbness and headaches.          Objective:      Physical Exam  Neurological:      Mental Status: She is alert and oriented to person, place, and time.             Assessment:       1. Cervicalgia    2. Cervical radiculitis           Plan:     I reassured her she has no worrisome findings on her MRI.  We talked about treatment options which include living with her ongoing pain versus additional therapy versus epidural steroid injections or surgical " intervention.  She opts to continue with therapy.  At her discretion we could consider interlaminar injections at C7-T1.  She can follow up here as needed      Cervicalgia    Cervical radiculitis

## 2024-03-07 ENCOUNTER — HOSPITAL ENCOUNTER (OUTPATIENT)
Facility: HOSPITAL | Age: 74
Discharge: HOME | End: 2024-03-08
Attending: EMERGENCY MEDICINE | Admitting: INTERNAL MEDICINE
Payer: MEDICARE

## 2024-03-07 DIAGNOSIS — R07.9 CHEST PAIN: ICD-10-CM

## 2024-03-07 DIAGNOSIS — G45.9 TIA (TRANSIENT ISCHEMIC ATTACK): Primary | ICD-10-CM

## 2024-03-07 DIAGNOSIS — R06.02 SOB (SHORTNESS OF BREATH): ICD-10-CM

## 2024-03-07 DIAGNOSIS — R06.09 DYSPNEA ON EXERTION: ICD-10-CM

## 2024-03-07 DIAGNOSIS — R55 SYNCOPE: ICD-10-CM

## 2024-03-07 DIAGNOSIS — R29.818 ACUTE FOCAL NEUROLOGICAL DEFICIT: ICD-10-CM

## 2024-03-07 PROBLEM — R42 VERTIGO: Status: ACTIVE | Noted: 2024-03-07

## 2024-03-07 PROBLEM — D68.51 FACTOR 5 LEIDEN MUTATION, HETEROZYGOUS: Status: ACTIVE | Noted: 2024-03-07

## 2024-03-07 LAB
ALBUMIN SERPL BCP-MCNC: 4.1 G/DL (ref 3.5–5.2)
ALP SERPL-CCNC: 123 U/L (ref 55–135)
ALT SERPL W/O P-5'-P-CCNC: 46 U/L (ref 10–44)
ANION GAP SERPL CALC-SCNC: 10 MMOL/L (ref 8–16)
AST SERPL-CCNC: 54 U/L (ref 10–40)
BASOPHILS # BLD AUTO: 0.04 K/UL (ref 0–0.2)
BASOPHILS NFR BLD: 0.4 % (ref 0–1.9)
BILIRUB SERPL-MCNC: 0.3 MG/DL (ref 0.1–1)
BNP SERPL-MCNC: 16 PG/ML (ref 0–99)
BUN SERPL-MCNC: 19 MG/DL (ref 8–23)
CALCIUM SERPL-MCNC: 9.2 MG/DL (ref 8.7–10.5)
CHLORIDE SERPL-SCNC: 101 MMOL/L (ref 95–110)
CHOLEST SERPL-MCNC: 199 MG/DL (ref 120–199)
CHOLEST/HDLC SERPL: 4.5 {RATIO} (ref 2–5)
CO2 SERPL-SCNC: 24 MMOL/L (ref 23–29)
CREAT SERPL-MCNC: 1.2 MG/DL (ref 0.5–1.4)
CREAT SERPL-MCNC: 1.2 MG/DL (ref 0.5–1.4)
DIFFERENTIAL METHOD BLD: ABNORMAL
EOSINOPHIL # BLD AUTO: 0.2 K/UL (ref 0–0.5)
EOSINOPHIL NFR BLD: 1.5 % (ref 0–8)
ERYTHROCYTE [DISTWIDTH] IN BLOOD BY AUTOMATED COUNT: 13.5 % (ref 11.5–14.5)
EST. GFR  (NO RACE VARIABLE): 48.4 ML/MIN/1.73 M^2
GLUCOSE SERPL-MCNC: 258 MG/DL (ref 70–110)
GLUCOSE SERPL-MCNC: 272 MG/DL (ref 70–110)
HCT VFR BLD AUTO: 33.8 % (ref 37–48.5)
HDLC SERPL-MCNC: 44 MG/DL (ref 40–75)
HDLC SERPL: 22.1 % (ref 20–50)
HGB BLD-MCNC: 10.6 G/DL (ref 12–16)
IMM GRANULOCYTES # BLD AUTO: 0.03 K/UL (ref 0–0.04)
IMM GRANULOCYTES NFR BLD AUTO: 0.3 % (ref 0–0.5)
INR PPP: 0.9 (ref 0.8–1.2)
LDLC SERPL CALC-MCNC: 99 MG/DL (ref 63–159)
LYMPHOCYTES # BLD AUTO: 3.5 K/UL (ref 1–4.8)
LYMPHOCYTES NFR BLD: 33.1 % (ref 18–48)
MCH RBC QN AUTO: 25.7 PG (ref 27–31)
MCHC RBC AUTO-ENTMCNC: 31.4 G/DL (ref 32–36)
MCV RBC AUTO: 82 FL (ref 82–98)
MONOCYTES # BLD AUTO: 0.7 K/UL (ref 0.3–1)
MONOCYTES NFR BLD: 6.6 % (ref 4–15)
NEUTROPHILS # BLD AUTO: 6.2 K/UL (ref 1.8–7.7)
NEUTROPHILS NFR BLD: 58.1 % (ref 38–73)
NONHDLC SERPL-MCNC: 155 MG/DL
NRBC BLD-RTO: 0 /100 WBC
PLATELET # BLD AUTO: 486 K/UL (ref 150–450)
PMV BLD AUTO: 11 FL (ref 9.2–12.9)
POTASSIUM SERPL-SCNC: 3.8 MMOL/L (ref 3.5–5.1)
PROT SERPL-MCNC: 7.6 G/DL (ref 6–8.4)
PROTHROMBIN TIME: 10.5 SEC (ref 9–12.5)
RBC # BLD AUTO: 4.13 M/UL (ref 4–5.4)
SAMPLE: NORMAL
SODIUM SERPL-SCNC: 135 MMOL/L (ref 136–145)
TRIGL SERPL-MCNC: 280 MG/DL (ref 30–150)
TROPONIN I SERPL HS-MCNC: 4.6 PG/ML (ref 0–14.9)
TSH SERPL DL<=0.005 MIU/L-ACNC: 2.75 UIU/ML (ref 0.34–5.6)
WBC # BLD AUTO: 10.61 K/UL (ref 3.9–12.7)

## 2024-03-07 PROCEDURE — 87040 BLOOD CULTURE FOR BACTERIA: CPT | Performed by: INTERNAL MEDICINE

## 2024-03-07 PROCEDURE — 80053 COMPREHEN METABOLIC PANEL: CPT

## 2024-03-07 PROCEDURE — G0378 HOSPITAL OBSERVATION PER HR: HCPCS

## 2024-03-07 PROCEDURE — 36415 COLL VENOUS BLD VENIPUNCTURE: CPT | Performed by: INTERNAL MEDICINE

## 2024-03-07 PROCEDURE — 93005 ELECTROCARDIOGRAM TRACING: CPT | Performed by: INTERNAL MEDICINE

## 2024-03-07 PROCEDURE — 93010 ELECTROCARDIOGRAM REPORT: CPT | Mod: ,,, | Performed by: INTERNAL MEDICINE

## 2024-03-07 PROCEDURE — G0425 INPT/ED TELECONSULT30: HCPCS | Mod: 95,,, | Performed by: STUDENT IN AN ORGANIZED HEALTH CARE EDUCATION/TRAINING PROGRAM

## 2024-03-07 PROCEDURE — 25500020 PHARM REV CODE 255

## 2024-03-07 PROCEDURE — 84484 ASSAY OF TROPONIN QUANT: CPT | Performed by: EMERGENCY MEDICINE

## 2024-03-07 PROCEDURE — 99285 EMERGENCY DEPT VISIT HI MDM: CPT | Mod: 25

## 2024-03-07 PROCEDURE — 85025 COMPLETE CBC W/AUTO DIFF WBC: CPT

## 2024-03-07 PROCEDURE — 80061 LIPID PANEL: CPT

## 2024-03-07 PROCEDURE — 84443 ASSAY THYROID STIM HORMONE: CPT

## 2024-03-07 PROCEDURE — 82565 ASSAY OF CREATININE: CPT

## 2024-03-07 PROCEDURE — 83880 ASSAY OF NATRIURETIC PEPTIDE: CPT | Performed by: EMERGENCY MEDICINE

## 2024-03-07 PROCEDURE — 85610 PROTHROMBIN TIME: CPT

## 2024-03-07 PROCEDURE — 25000003 PHARM REV CODE 250: Performed by: INTERNAL MEDICINE

## 2024-03-07 PROCEDURE — 82962 GLUCOSE BLOOD TEST: CPT

## 2024-03-07 RX ORDER — INSULIN ASPART 100 [IU]/ML
0-10 INJECTION, SOLUTION INTRAVENOUS; SUBCUTANEOUS
Status: DISCONTINUED | OUTPATIENT
Start: 2024-03-07 | End: 2024-03-08 | Stop reason: HOSPADM

## 2024-03-07 RX ORDER — IBUPROFEN 200 MG
24 TABLET ORAL
Status: DISCONTINUED | OUTPATIENT
Start: 2024-03-07 | End: 2024-03-08 | Stop reason: HOSPADM

## 2024-03-07 RX ORDER — ONDANSETRON HYDROCHLORIDE 2 MG/ML
4 INJECTION, SOLUTION INTRAVENOUS EVERY 6 HOURS PRN
Status: DISCONTINUED | OUTPATIENT
Start: 2024-03-07 | End: 2024-03-08 | Stop reason: HOSPADM

## 2024-03-07 RX ORDER — PANTOPRAZOLE SODIUM 40 MG/1
40 TABLET, DELAYED RELEASE ORAL EVERY MORNING
Status: DISCONTINUED | OUTPATIENT
Start: 2024-03-08 | End: 2024-03-08 | Stop reason: HOSPADM

## 2024-03-07 RX ORDER — OXYCODONE AND ACETAMINOPHEN 5; 325 MG/1; MG/1
1 TABLET ORAL EVERY 6 HOURS PRN
Status: DISCONTINUED | OUTPATIENT
Start: 2024-03-07 | End: 2024-03-08 | Stop reason: HOSPADM

## 2024-03-07 RX ORDER — EZETIMIBE 10 MG/1
10 TABLET ORAL DAILY
Status: DISCONTINUED | OUTPATIENT
Start: 2024-03-08 | End: 2024-03-08 | Stop reason: HOSPADM

## 2024-03-07 RX ORDER — LANOLIN ALCOHOL/MO/W.PET/CERES
800 CREAM (GRAM) TOPICAL
Status: DISCONTINUED | OUTPATIENT
Start: 2024-03-07 | End: 2024-03-08 | Stop reason: HOSPADM

## 2024-03-07 RX ORDER — ACETAMINOPHEN 325 MG/1
650 TABLET ORAL EVERY 6 HOURS PRN
Status: DISCONTINUED | OUTPATIENT
Start: 2024-03-07 | End: 2024-03-08 | Stop reason: HOSPADM

## 2024-03-07 RX ORDER — SODIUM,POTASSIUM PHOSPHATES 280-250MG
2 POWDER IN PACKET (EA) ORAL
Status: DISCONTINUED | OUTPATIENT
Start: 2024-03-07 | End: 2024-03-08 | Stop reason: HOSPADM

## 2024-03-07 RX ORDER — IBUPROFEN 200 MG
16 TABLET ORAL
Status: DISCONTINUED | OUTPATIENT
Start: 2024-03-07 | End: 2024-03-08 | Stop reason: HOSPADM

## 2024-03-07 RX ORDER — HYDROCHLOROTHIAZIDE 12.5 MG/1
25 TABLET ORAL DAILY
Status: DISCONTINUED | OUTPATIENT
Start: 2024-03-08 | End: 2024-03-08 | Stop reason: HOSPADM

## 2024-03-07 RX ORDER — GLUCAGON 1 MG
1 KIT INJECTION
Status: DISCONTINUED | OUTPATIENT
Start: 2024-03-07 | End: 2024-03-08 | Stop reason: HOSPADM

## 2024-03-07 RX ADMIN — APIXABAN 5 MG: 5 TABLET, FILM COATED ORAL at 10:03

## 2024-03-07 RX ADMIN — ACETAMINOPHEN 650 MG: 325 TABLET ORAL at 10:03

## 2024-03-07 RX ADMIN — IOHEXOL 100 ML: 350 INJECTION, SOLUTION INTRAVENOUS at 02:03

## 2024-03-07 NOTE — TELEMEDICINE CONSULT
[facility]  Vascular Neurology  Comprehensive Stroke Center  TeleVascular Neurology Acute Consultation Note        Consult Information  Consult to Telemedicine - Acute Stroke  Consult performed by: June Martel MD  Consult ordered by: Sade Wayne NP          Consulting Provider: SADE WAYNE   Current Providers  No providers found    Patient Location:  [facility] EMERGENCY DEPARTMENT Emergency Department    Spoke hospital nurse at bedside with patient assisting consultant.  Patient information was obtained from patient and past medical records.       Stroke Documentation  Acute Stroke Times   Last Known Normal Date: 03/07/24  Last Known Normal Time: 1000  Symptom Onset Date: 03/07/24  Symptom Onset Time: 1000  Stroke Team Called Date: 03/07/24  Stroke Team Called Time: 1401  Stroke Team Arrival Date: 03/07/24  CT Interpretation Time: 1415  Thrombolytic Therapy Recommended: No  CTA Interpretation Time: 1415  Thrombectomy Recommended: No    NIH Scale:  1a. Level of Consciousness: 0-->Alert, keenly responsive  1b. LOC Questions: 0-->Answers both questions correctly  1c. LOC Commands: 0-->Performs both tasks correctly  2. Best Gaze: 0-->Normal  3. Visual: 0-->No visual loss  4. Facial Palsy: 0-->Normal symmetrical movements  5a. Motor Arm, Left: 0-->No drift, limb holds 90 (or 45) degrees for full 10 secs  5b. Motor Arm, Right: 0-->No drift, limb holds 90 (or 45) degrees for full 10 secs  6a. Motor Leg, Left: 1-->Drift, leg falls by the end of the 5-sec period but does not hit bed  6b. Motor Leg, Right: 1-->Drift, leg falls by the end of the 5-sec period but does not hit bed  7. Limb Ataxia: 0-->Absent  8. Sensory: 1-->Mild-to-moderate sensory loss, patient feels pinprick is less sharp or is dull on the affected side, or there is a loss of superficial pain with pinprick, but patient is aware of being touched  9. Best Language: 0-->No aphasia, normal  10. Dysarthria: 0-->Normal  11.  "Extinction and Inattention (formerly Neglect): 0-->No abnormality  Total (NIH Stroke Scale): 3      Modified [place]:    Shira Coma Scale:     ABCD2 Score:    [id number] Score:    HAS -BLED Score:    ICH Score:    Hunt & Arroyo Classification:      Blood pressure (!) 168/84, pulse 90, temperature 97.3 °F (36.3 °C), temperature source Oral, resp. rate 20, height 5' 8" (1.727 m), weight 97.1 kg (214 lb 1.1 oz), SpO2 98 %.    Van Negative    Medical Decision Making  HPI:  73 y.o. female w/ PMHx of LUE DVT, HTN, HLD, DM II, L rotator cuff repair who noted lightheadedness and L face numbness and near-syncope while at PT  LKW 10:00  Reports that she is feeling better but still has chronic SOB and some L facial numbness.   Reports neck pain and upper shoulder pain.        Images personally reviewed and interpreted:  Study: Head CT CTA H/N  Study Interpretation: No acute intracranial abnormalities. No LVO     Assessment and plan:  73 y.o. female w/ PMHx of LUE DVT, HTN, HLD, DM II, L rotator cuff repair who noted lightheadedness and L face numbness and near-syncope while at PT  LKW 10:00  NIHSS 3 for L facial numbness and b/l LE drift.   Not a TNK candidate due to mild non-disabling symptoms.   Patient w/ multiple stroke RF and it would be reasonable to obtain an MRI Brain w/o contrast to r/o an AIS   - Allow for permissive HTNsion up to 220/110 until AIS is r/o.     Lytics recommendation: Thrombolytic therapy not recommended due to Suspected stroke mimic  and Mild Non-Disabling Symptoms  Thrombectomy recommendation: No; at this time symptoms not suggestive of large vessel occlusion  Placement recommendation: pending further studies           L facial sensory deficit   LLE and RLE drift     Past Medical History:   Diagnosis Date    Abnormal Pap smear     s/p cryo at 22 y/o    Anticoagulant long-term use     GERD (gastroesophageal reflux disease)     Hyperlipidemia     Migraine     Migraines     Sleep apnea     Can not use " c-pap     Past Surgical History:   Procedure Laterality Date    ARTHROSCOPIC REPAIR OF ROTATOR CUFF OF SHOULDER Left 2023    Procedure: REPAIR, ROTATOR CUFF, ARTHROSCOPIC;  Surgeon: Gary Tony MD;  Location: [facility];  Service: Orthopedics;  Laterality: Left;  arthrex    ARTHROSCOPY OF SHOULDER WITH DECOMPRESSION OF SUBACROMIAL SPACE Left 2023    Procedure: ARTHROSCOPY, SHOULDER, WITH SUBACROMIAL SPACE DECOMPRESSION;  Surgeon: Gary Tony MD;  Location: [facility];  Service: Orthopedics;  Laterality: Left;    CATARACT EXTRACTION, BILATERAL      CHOLECYSTECTOMY  10/01/2014    EYE SURGERY      KNEE ARTHROSCOPY Left     TONSILLECTOMY       Family History   Problem Relation Age of Onset    Breast cancer Paternal Grandmother 70    Colon cancer Mother             Cancer Daughter         crohns    Ovarian cancer Daughter     Cancer Daughter 20        uterine cancer with mets s/p chemo/rad/surgery; ?genetic mutation; now with hip tumors    Breast cancer Maternal Grandmother        Diagnoses  No problems updated.    June Martel MD      Emergent/Acute neurological consultation requested by spoke provider due to critical concerns for possible cerebrovascular event that could result in permanent loss of neurologic/bodily function, severe disability or death of this patient.  Immediate/timely evaluation by a highly prepared expert is paramount for optimal outcomes  High risk for neurological deterioration if not properly diagnosed  High risk for neurological deterioration if not treated promplty/as soon as possible  Complex diagnostic evaluation may be required (advanced imaging)  High risk treatment options (thrombolytics and/or thrombectomy)    Patient care was coordinated with spoke provider, including but not limted to    Discussing likely diagnosis/etiology of symptoms  Making recommendations for further diagnostic studies  Making recommendations for intravenous thrombolytics or other  advanced therapies  Making recommendations for disposition (admission/transfer for higher level of care)

## 2024-03-07 NOTE — SUBJECTIVE & OBJECTIVE
HPI:  73 y.o. female w/ PMHx of LUE DVT, HTN, HLD, DM II, L rotator cuff repair who noted lightheadedness and L face numbness and near-syncope while at PT  LKW 10:00  Reports that she is feeling better but still has chronic SOB and some L facial numbness.   Reports neck pain and upper shoulder pain.        Images personally reviewed and interpreted:  Study: Head CT CTA H/N  Study Interpretation: No acute intracranial abnormalities. No LVO     Assessment and plan:  73 y.o. female w/ PMHx of LUE DVT, HTN, HLD, DM II, L rotator cuff repair who noted lightheadedness and L face numbness and near-syncope while at PT  LKW 10:00  NIHSS 3 for L facial numbness and b/l LE drift.   Not a TNK candidate due to mild non-disabling symptoms.   Patient w/ multiple stroke RF and it would be reasonable to obtain an MRI Brain w/o contrast to r/o an AIS   - Allow for permissive HTNsion up to 220/110 until AIS is r/o.     Lytics recommendation: Thrombolytic therapy not recommended due to Suspected stroke mimic  and Mild Non-Disabling Symptoms  Thrombectomy recommendation: No; at this time symptoms not suggestive of large vessel occlusion  Placement recommendation: pending further studies

## 2024-03-07 NOTE — ED PROVIDER NOTES
Encounter Date: 3/7/2024       History     Chief Complaint   Patient presents with    Extremity Weakness     Left arm weakness, L side facial numbness      Patient with PMH HTN, HLD, DM,  left rotator cuff repair 12/29/23, LLE DVT on Eliquis presents to ED with left-sided facial numbness that began acutely while at PT session today.  She states she deals with chronic paresthesias of both arms, primarily left-sided, but the facial numbness was new.  She felt that her left eyelid felt droopy and heavy although no one else reported seeing anything abnormal.  She reports normal speech.  She denies chest pain.  She did feel lightheaded and short of breath.  She states she took all of her home medications this morning as prescribed.  She reports chronic pain to the left shoulder and arm that is unchanged from baseline.  On further questioning, she does endorse having dyspnea on exertion that has been ongoing for several weeks to months.  She states she becomes very fatigued with minimal walking and has to stop to catch her breath.  Denies orthopnea or any new lower extremity swelling.      Review of patient's allergies indicates:   Allergen Reactions    Statins-hmg-coa reductase inhibitors Swelling    Metformin Other (See Comments)     metallic taste , burn of the esophagus     Past Medical History:   Diagnosis Date    Abnormal Pap smear     s/p cryo at 22 y/o    Anticoagulant long-term use     GERD (gastroesophageal reflux disease)     Hyperlipidemia     Migraine     Migraines     Sleep apnea     Can not use c-pap     Past Surgical History:   Procedure Laterality Date    ARTHROSCOPIC REPAIR OF ROTATOR CUFF OF SHOULDER Left 12/29/2023    Procedure: REPAIR, ROTATOR CUFF, ARTHROSCOPIC;  Surgeon: Gary Tony MD;  Location: [facility];  Service: Orthopedics;  Laterality: Left;  arthrex    ARTHROSCOPY OF SHOULDER WITH DECOMPRESSION OF SUBACROMIAL SPACE Left 12/29/2023    Procedure: ARTHROSCOPY, SHOULDER, WITH  SUBACROMIAL SPACE DECOMPRESSION;  Surgeon: Gary Tony MD;  Location: [facility];  Service: Orthopedics;  Laterality: Left;    CATARACT EXTRACTION, BILATERAL      CHOLECYSTECTOMY  10/01/2014    EYE SURGERY      KNEE ARTHROSCOPY Left     TONSILLECTOMY       Family History   Problem Relation Age of Onset    Breast cancer Paternal Grandmother 70    Colon cancer Mother             Cancer Daughter         crohns    Ovarian cancer Daughter     Cancer Daughter 20        uterine cancer with mets s/p chemo/rad/surgery; ?genetic mutation; now with hip tumors    Breast cancer Maternal Grandmother      Social History     Tobacco Use    Smoking status: Former    Smokeless tobacco: Never   Substance Use Topics    Alcohol use: No    Drug use: No     Review of Systems   Constitutional:  Negative for fever.   HENT:  Negative for sore throat.    Respiratory:  Positive for shortness of breath.    Cardiovascular:  Negative for chest pain.   Gastrointestinal:  Negative for nausea.   Genitourinary:  Negative for dysuria.   Musculoskeletal:  Negative for back pain.   Skin:  Negative for rash.   Neurological:  Positive for weakness, light-headedness and numbness.   Hematological:  Does not bruise/bleed easily.       Physical Exam     Initial Vitals [24 1321]   BP Pulse Resp Temp SpO2   (!) 168/84 90 20 97.3 °F (36.3 °C) 98 %      MAP       --         Physical Exam    Nursing note and vitals reviewed.  Constitutional: She appears well-developed and well-nourished. No distress.   HENT:   Head: Normocephalic and atraumatic.   Mouth/Throat: Oropharynx is clear and moist.   Eyes: EOM are normal. Pupils are equal, round, and reactive to light.   Neck: Neck supple.   Normal range of motion.  Cardiovascular:  Normal rate and regular rhythm.           Pulmonary/Chest: Breath sounds normal. No respiratory distress.   Abdominal: Abdomen is soft. There is no abdominal tenderness. There is no rebound and no guarding.    Musculoskeletal:         General: No tenderness or edema. Normal range of motion.      Cervical back: Normal range of motion and neck supple.     Neurological: She is alert and oriented to person, place, and time. She has normal strength. A sensory deficit (reports sensation deficit to light touch of left face) is present. No cranial nerve deficit. GCS score is 15. GCS eye subscore is 4. GCS verbal subscore is 5. GCS motor subscore is 6.   Skin: Skin is warm and dry. Capillary refill takes less than 2 seconds. No rash noted.   Psychiatric: She has a normal mood and affect. Thought content normal.         ED Course   Procedures  Labs Reviewed   CBC W/ AUTO DIFFERENTIAL - Abnormal; Notable for the following components:       Result Value    Hemoglobin 10.6 (*)     Hematocrit 33.8 (*)     MCH 25.7 (*)     MCHC 31.4 (*)     Platelets 486 (*)     All other components within normal limits   COMPREHENSIVE METABOLIC PANEL - Abnormal; Notable for the following components:    Sodium 135 (*)     Glucose 272 (*)     AST 54 (*)     ALT 46 (*)     eGFR 48.4 (*)     All other components within normal limits   LIPID PANEL - Abnormal; Notable for the following components:    Triglycerides 280 (*)     All other components within normal limits   POCT GLUCOSE - Abnormal; Notable for the following components:    POC Glucose 258 (*)     All other components within normal limits   PROTIME-INR   TSH   TROPONIN I HIGH SENSITIVITY   B-TYPE NATRIURETIC PEPTIDE   ISTAT CREATININE     EKG Readings: (Independently Interpreted)   NSR, HR 81, PACs present, no STEMI.     ECG Results              ECG 12 lead (In process)        Collection Time Result Time QRS Duration OHS QTC Calculation    03/07/24 13:46:00 03/07/24 14:23:08 88 464                     In process by Interface, Lab In [facility] (03/07/24 14:23:16)                   Narrative:    Test Reason : R29.818,    Vent. Rate : 081 BPM     Atrial Rate : 081 BPM     P-R Int : 160 ms          QRS  Dur : 088 ms      QT Int : 400 ms       P-R-T Axes : 073 074 075 degrees     QTc Int : 464 ms    Sinus rhythm with Premature atrial complexes  Otherwise normal ECG  When compared with ECG of 18-DEC-2023 09:49,  Premature atrial complexes are now Present  Nonspecific T wave abnormality no longer evident in Anterior leads    Referred By:             Confirmed By:                                   Imaging Results              X-Ray Chest 1 View (Final result)  Result time 03/07/24 18:06:17      Final result by Mahin Anna Jr., MD (03/07/24 18:06:17)                   Narrative:    CLINICAL HISTORY:  73 years (1950) Female SOB (shortness of breath)    TECHNIQUE:  XR CHEST 1 VIEW. 1 images obtained.    COMPARISON:  10/10/2019    FINDINGS:    The cardiomediastinal silhouette appears normal in size. Lungs are well-expanded and free of active disease. No significant hilar mediastinal infarction. Pulmonary vascularity appears normal. Chronic degenerative spondylosis changes of the thoracic spine. Presumed postoperative changes in the right upper quadrant related to prior cholecystectomy. No soft tissue abnormality.    IMPRESSION: No evidence of acute cardiopulmonary process.    Electronically signed by:  Mahin Anna MD  03/07/2024 06:06 PM [facility] Workstation: [id number]                                     MRI Brain Without Contrast (Final result)  Result time 03/07/24 15:50:23      Final result by Torsten Cazares MD (03/07/24 15:50:23)                   Narrative:    Reason: Neuro deficit, acute, stroke suspected Left sided facial numbness/ weakness and dizziness. Suspected stroke.    TECHNIQUE: MRI brain without IV contrast    COMPARISON: CT 3/7/2024    FINDINGS:  Diffusion-weighted imaging shows no diffusion restriction to suggest acute ischemia.    Few punctate foci of T2 hyperintensity in right frontal lobe and left parietal lobe white matter suggest gliosis. Gray and white matter are otherwise normal. No  intracranial mass effect or midline shift. Ventricles and cisterns are maintained. Major intracranial flow voids are unremarkable.    Calvarial bone marrow signal is normal. Trace left sphenoid sinus mucosal thickening is evident.    IMPRESSION:    1. No acute intracranial abnormality. Negative for acute infarct.  2. Minimal punctate foci of T2 hyperintensity in cerebral hemispheric white matter suggesting gliosis from minor chronic small vessel ischemic changes.    Electronically signed by:  Torsten Cazares MD  03/07/2024 03:50 PM [facility] Workstation: [id number]                                     CTA Head and Neck (xpd) (Final result)  Result time 03/07/24 14:21:10      Final result by Artemio Ray MD (03/07/24 14:21:10)                   Narrative:    CMS MANDATED QUALITY DATA-CT RADIATION DOSE-436, CAROTID-195  All CT scans at this facility use dose modulation, iterative reconstruction, and or weight based dosing when appropriate, to reduce radiation dose to as low as reasonably achievable. NASCET criteria were utilized for evaluation of carotid arterial stenosis.    HISTORY: Neuro deficit, acute, stroke suspected    FINDINGS: Thin axial imaging through the head and neck was performed with 100 mL Omnipaque 350 IV contrast, with sagittal and coronal reformatted images and MIP reconstructions performed, and images stored in the patient's permanent electronic medical record. No prior studies for comparison.    CTA HEAD: The distal cervical, petrous, cavernous and supraclinoid segments of the internal carotid arteries are widely patent, with mild calcified plaque of the carotid siphons. The intracranial segment of diminutive distal right vertebral artery is not well visualized and probably terminates in PICA, with dominant left vertebral artery and basilar artery widely patent.    The bilateral anterior, middle and posterior cerebral arteries are widely patent and taper appropriately, with no intracranial aneurysm or  vascular malformation. The visualized dural venous sinuses and cortical veins enhance normally.    CTA NECK: The aortic arch and arch vessels are widely patent, with mild calcified plaque. There is incidental bovine arch variant anatomy, with the visualized subclavian arteries widely patent. The bilateral common carotid arteries are widely patent.    There is calcified and soft plaque of the carotid bulbs and ICA origins, with the internal carotid arteries widely patent. The external carotid arteries and branches are patent. The vertebral arteries arise normally from the subclavian arteries, and are widely patent, with the left vertebral artery dominant and the right vertebral artery severely developed mentally diminutive. There is no arterial dissection or aneurysm.    The cervical soft tissues enhance normally. There are multiple thyroid nodules, including 16 mm heterogeneously enhancing nodule arising from the left thyroid lobe. The superior mediastinum enhances normally, with the visualized upper lungs clear. No acute fractures or destructive osseous lesions.    IMPRESSION:  1. No significant abnormality of the intracranial arterial vasculature.  2. Atheromatous plaque of the carotid bulbs and ICA origins, with no carotid arterial stenosis or occlusion.  3. Widely patent dominant left vertebral artery, with severely developmentally diminutive right vertebral artery.  4. Multiple thyroid nodules, the largest in the left thyroid lobe 16 mm. Further evaluation with nonemergent outpatient thyroid ultrasound is recommended.    Electronically signed by:  Artemio Ray MD  03/07/2024 02:21 PM [facility]ST Workstation: [id number]                                     CT HEAD FOR STROKE (Final result)  Result time 03/07/24 13:57:47      Final result by Angelito Allen MD (03/07/24 13:57:47)                   Narrative:    CT HEAD WITHOUT CONTRAST    CMS MANDATED QUALITY DATA - CT RADIATION  436    All CT scans at this facility  utilize dose modulation, iterative reconstruction, and/or weight based dosing when appropriate to reduce radiation dose to as low as reasonably achievable    Clinical data: Neurologic deficit, stroke suspected. Left arm weakness.    FINDINGS: Noninfusion images were obtained from the skull base to the vertex. There is no intracranial mass, hemorrhage, or midline shift. Ventricles and sulci are normal. There are no pathologic extra-axial fluid collections. There is no evidence of ischemic change or edema. Cerebellum and brainstem are normal.    Mild atherosclerotic calcification of the intracranial internal carotid arteries is noted.    The calvarium is intact.  There is mild mucoperiosteal thickening in the left sphenoid sinus.    IMPRESSION:    1. No acute intracranial abnormalities.    Findings were called to Dr. Buenrostro at 1:57 PM on March 7, 2024.    Electronically signed by:  Angelito Allen MD  03/07/2024 01:57 PM [facility] Workstation: [id number]                                     Medications   apixaban tablet 5 mg (has no administration in time range)   ezetimibe tablet 10 mg (has no administration in time range)   hydroCHLOROthiazide tablet 25 mg (has no administration in time range)   oxyCODONE-acetaminophen 5-325 mg per tablet 1 tablet (has no administration in time range)   pantoprazole EC tablet 40 mg (has no administration in time range)   ondansetron injection 4 mg (has no administration in time range)   potassium bicarbonate disintegrating tablet 50 mEq (has no administration in time range)   potassium bicarbonate disintegrating tablet 35 mEq (has no administration in time range)   potassium bicarbonate disintegrating tablet 60 mEq (has no administration in time range)   magnesium oxide tablet 800 mg (has no administration in time range)   magnesium oxide tablet 800 mg (has no administration in time range)   potassium, sodium phosphates 280-160-250 mg packet 2 packet (has no administration in time  range)   potassium, sodium phosphates 280-160-250 mg packet 2 packet (has no administration in time range)   potassium, sodium phosphates 280-160-250 mg packet 2 packet (has no administration in time range)   glucose chewable tablet 16 g (has no administration in time range)   glucose chewable tablet 24 g (has no administration in time range)   dextrose 50% injection 12.5 g (has no administration in time range)   dextrose 50% injection 25 g (has no administration in time range)   glucagon (human recombinant) injection 1 mg (has no administration in time range)   insulin aspart U-100 pen 0-10 Units (has no administration in time range)   acetaminophen tablet 650 mg (has no administration in time range)   iohexoL (OMNIPAQUE 350) injection 100 mL (100 mLs Intravenous Given 3/7/24 1403)     Medical Decision Making  Patient presents to ED as above.  She is hypertensive with otherwise stable vitals.  Differential includes but not limited to acute stroke, TIA, arrhythmia, ACS, PE, CHF, metabolic derangement, anxiety.  CT angio head/neck shows no LVO or hemorrhage.  Discussed case with tele neurology.  They do not believe that she requires thrombolytic therapy at this time given her minimal symptoms are rapidly improving.  Do recommend MRI brain which was obtained and is negative for acute ischemic stroke.  All labs reviewed by me and significant for hyperglycemia without evidence of DKA.  She is mildly anemic.  Will add on high sensitivity troponin and BNP given patient's reported dyspnea on exertion.  She has no significant EKG changes.  Chest x-ray shows no acute cardiopulmonary process per my read.  Will admit to hospitalist for dyspnea, near syncope/TIA workup.  No recent echocardiogram on file.    Amount and/or Complexity of Data Reviewed  Labs: ordered. Decision-making details documented in ED Course.     Details: CBC, CMP, high sensitivity troponin, BNP, lipid panel, PT/INR, TSH  Radiology: ordered and independent  interpretation performed. Decision-making details documented in ED Course.  ECG/medicine tests: ordered and independent interpretation performed. Decision-making details documented in ED Course.    Risk  Decision regarding hospitalization.    Critical Care  Total time providing critical care: 45 minutes                                      Clinical Impression:  Final diagnoses:  [R29.818] Acute focal neurological deficit  [G45.9] TIA (transient ischemic attack) (Primary)  [R06.09] Dyspnea on exertion  [R06.02] SOB (shortness of breath)  [R55] Syncope          ED Disposition Condition    Observation Stable                Olesya Calvo MD  03/07/24 7321

## 2024-03-08 ENCOUNTER — CLINICAL SUPPORT (OUTPATIENT)
Dept: CARDIOLOGY | Facility: HOSPITAL | Age: 74
End: 2024-03-08
Attending: INTERNAL MEDICINE
Payer: MEDICARE

## 2024-03-08 VITALS — HEIGHT: 68 IN | WEIGHT: 211 LBS | BODY MASS INDEX: 31.98 KG/M2

## 2024-03-08 VITALS
HEART RATE: 85 BPM | DIASTOLIC BLOOD PRESSURE: 68 MMHG | WEIGHT: 211.13 LBS | SYSTOLIC BLOOD PRESSURE: 137 MMHG | TEMPERATURE: 98 F | HEIGHT: 68 IN | BODY MASS INDEX: 32 KG/M2 | RESPIRATION RATE: 16 BRPM | OXYGEN SATURATION: 95 %

## 2024-03-08 LAB
ALBUMIN SERPL BCP-MCNC: 3.7 G/DL (ref 3.5–5.2)
ALP SERPL-CCNC: 105 U/L (ref 55–135)
ALT SERPL W/O P-5'-P-CCNC: 38 U/L (ref 10–44)
ANION GAP SERPL CALC-SCNC: 10 MMOL/L (ref 8–16)
AORTIC ROOT ANNULUS: 3 CM
AORTIC VALVE CUSP SEPERATION: 2 CM
AST SERPL-CCNC: 37 U/L (ref 10–40)
AV INDEX (PROSTH): 0.83
AV MEAN GRADIENT: 4 MMHG
AV PEAK GRADIENT: 7 MMHG
AV REGURGITATION PRESSURE HALF TIME: 407 MS
AV VALVE AREA BY VELOCITY RATIO: 2.51 CM²
AV VALVE AREA: 2.6 CM²
AV VELOCITY RATIO: 0.8
BASOPHILS # BLD AUTO: 0.04 K/UL (ref 0–0.2)
BASOPHILS NFR BLD: 0.4 % (ref 0–1.9)
BILIRUB SERPL-MCNC: 0.3 MG/DL (ref 0.1–1)
BSA FOR ECHO PROCEDURE: 2.14 M2
BUN SERPL-MCNC: 19 MG/DL (ref 8–23)
CALCIUM SERPL-MCNC: 8.9 MG/DL (ref 8.7–10.5)
CHLORIDE SERPL-SCNC: 104 MMOL/L (ref 95–110)
CO2 SERPL-SCNC: 23 MMOL/L (ref 23–29)
CREAT SERPL-MCNC: 1.1 MG/DL (ref 0.5–1.4)
CV ECHO LV RWT: 0.65 CM
DIFFERENTIAL METHOD BLD: ABNORMAL
DOP CALC AO PEAK VEL: 1.3 M/S
DOP CALC AO VTI: 24.3 CM
DOP CALC LVOT AREA: 3.1 CM2
DOP CALC LVOT DIAMETER: 2 CM
DOP CALC LVOT PEAK VEL: 1.04 M/S
DOP CALC LVOT STROKE VOLUME: 63.11 CM3
DOP CALC MV VTI: 30.2 CM
DOP CALCLVOT PEAK VEL VTI: 20.1 CM
E WAVE DECELERATION TIME: 194 MSEC
E/A RATIO: 0.8
E/E' RATIO: 10.25 M/S
ECHO LV POSTERIOR WALL: 1.24 CM (ref 0.6–1.1)
EOSINOPHIL # BLD AUTO: 0.2 K/UL (ref 0–0.5)
EOSINOPHIL NFR BLD: 1.7 % (ref 0–8)
ERYTHROCYTE [DISTWIDTH] IN BLOOD BY AUTOMATED COUNT: 13.6 % (ref 11.5–14.5)
EST. GFR  (NO RACE VARIABLE): 53.7 ML/MIN/1.73 M^2
ESTIMATED AVG GLUCOSE: 212 MG/DL (ref 68–131)
FRACTIONAL SHORTENING: 36 % (ref 28–44)
GLUCOSE SERPL-MCNC: 187 MG/DL (ref 70–110)
GLUCOSE SERPL-MCNC: 200 MG/DL (ref 70–110)
GLUCOSE SERPL-MCNC: 206 MG/DL (ref 70–110)
HBA1C MFR BLD: 9 % (ref 4.5–6.2)
HCT VFR BLD AUTO: 31.3 % (ref 37–48.5)
HGB BLD-MCNC: 9.8 G/DL (ref 12–16)
IMM GRANULOCYTES # BLD AUTO: 0.03 K/UL (ref 0–0.04)
IMM GRANULOCYTES NFR BLD AUTO: 0.3 % (ref 0–0.5)
INTERVENTRICULAR SEPTUM: 1.1 CM (ref 0.6–1.1)
IVC DIAMETER: 1.28 CM
LEFT INTERNAL DIMENSION IN SYSTOLE: 2.41 CM (ref 2.1–4)
LEFT VENTRICLE DIASTOLIC VOLUME INDEX: 29.45 ML/M2
LEFT VENTRICLE DIASTOLIC VOLUME: 61.56 ML
LEFT VENTRICLE MASS INDEX: 70 G/M2
LEFT VENTRICLE SYSTOLIC VOLUME INDEX: 9.7 ML/M2
LEFT VENTRICLE SYSTOLIC VOLUME: 20.37 ML
LEFT VENTRICULAR INTERNAL DIMENSION IN DIASTOLE: 3.79 CM (ref 3.5–6)
LEFT VENTRICULAR MASS: 146.95 G
LV LATERAL E/E' RATIO: 11.71 M/S
LV SEPTAL E/E' RATIO: 9.11 M/S
LVOT MG: 2 MMHG
LVOT MV: 0.69 CM/S
LYMPHOCYTES # BLD AUTO: 3.7 K/UL (ref 1–4.8)
LYMPHOCYTES NFR BLD: 37.9 % (ref 18–48)
MAGNESIUM SERPL-MCNC: 1.7 MG/DL (ref 1.6–2.6)
MCH RBC QN AUTO: 25.8 PG (ref 27–31)
MCHC RBC AUTO-ENTMCNC: 31.3 G/DL (ref 32–36)
MCV RBC AUTO: 82 FL (ref 82–98)
MONOCYTES # BLD AUTO: 0.7 K/UL (ref 0.3–1)
MONOCYTES NFR BLD: 6.8 % (ref 4–15)
MV MEAN GRADIENT: 3 MMHG
MV PEAK A VEL: 1.02 M/S
MV PEAK E VEL: 0.82 M/S
MV PEAK GRADIENT: 7 MMHG
MV STENOSIS PRESSURE HALF TIME: 79 MS
MV VALVE AREA BY CONTINUITY EQUATION: 2.09 CM2
MV VALVE AREA P 1/2 METHOD: 2.78 CM2
NEUTROPHILS # BLD AUTO: 5.1 K/UL (ref 1.8–7.7)
NEUTROPHILS NFR BLD: 52.9 % (ref 38–73)
NRBC BLD-RTO: 0 /100 WBC
PISA AR MAX VEL: 2.5 M/S
PISA MRMAX VEL: 4.37 M/S
PISA TR MAX VEL: 2.09 M/S
PLATELET # BLD AUTO: 419 K/UL (ref 150–450)
PMV BLD AUTO: 10.6 FL (ref 9.2–12.9)
POTASSIUM SERPL-SCNC: 4 MMOL/L (ref 3.5–5.1)
PROT SERPL-MCNC: 6.9 G/DL (ref 6–8.4)
PV MV: 0.5 M/S
PV PEAK GRADIENT: 2 MMHG
PV PEAK VELOCITY: 0.73 M/S
RA PRESSURE ESTIMATED: 3 MMHG
RBC # BLD AUTO: 3.8 M/UL (ref 4–5.4)
RV TB RVSP: 5 MMHG
RV TISSUE DOPPLER FREE WALL SYSTOLIC VELOCITY 1 (APICAL 4 CHAMBER VIEW): 16.4 CM/S
SODIUM SERPL-SCNC: 137 MMOL/L (ref 136–145)
TDI LATERAL: 0.07 M/S
TDI SEPTAL: 0.09 M/S
TDI: 0.08 M/S
TR MAX PG: 17 MMHG
TRICUSPID ANNULAR PLANE SYSTOLIC EXCURSION: 2.61 CM
TV REST PULMONARY ARTERY PRESSURE: 20 MMHG
WBC # BLD AUTO: 9.64 K/UL (ref 3.9–12.7)
Z-SCORE OF LEFT VENTRICULAR DIMENSION IN END DIASTOLE: -5.31
Z-SCORE OF LEFT VENTRICULAR DIMENSION IN END SYSTOLE: -3.89

## 2024-03-08 PROCEDURE — 85025 COMPLETE CBC W/AUTO DIFF WBC: CPT | Performed by: INTERNAL MEDICINE

## 2024-03-08 PROCEDURE — 83735 ASSAY OF MAGNESIUM: CPT | Performed by: INTERNAL MEDICINE

## 2024-03-08 PROCEDURE — 97161 PT EVAL LOW COMPLEX 20 MIN: CPT

## 2024-03-08 PROCEDURE — 93306 TTE W/DOPPLER COMPLETE: CPT | Mod: 26,,, | Performed by: INTERNAL MEDICINE

## 2024-03-08 PROCEDURE — 97165 OT EVAL LOW COMPLEX 30 MIN: CPT

## 2024-03-08 PROCEDURE — G0378 HOSPITAL OBSERVATION PER HR: HCPCS

## 2024-03-08 PROCEDURE — 25000003 PHARM REV CODE 250: Performed by: INTERNAL MEDICINE

## 2024-03-08 PROCEDURE — 93306 TTE W/DOPPLER COMPLETE: CPT

## 2024-03-08 PROCEDURE — 80053 COMPREHEN METABOLIC PANEL: CPT | Performed by: INTERNAL MEDICINE

## 2024-03-08 PROCEDURE — 36415 COLL VENOUS BLD VENIPUNCTURE: CPT | Performed by: INTERNAL MEDICINE

## 2024-03-08 PROCEDURE — 83036 HEMOGLOBIN GLYCOSYLATED A1C: CPT | Performed by: INTERNAL MEDICINE

## 2024-03-08 RX ADMIN — HYDROCHLOROTHIAZIDE 25 MG: 12.5 TABLET ORAL at 10:03

## 2024-03-08 RX ADMIN — PANTOPRAZOLE SODIUM 40 MG: 40 TABLET, DELAYED RELEASE ORAL at 06:03

## 2024-03-08 RX ADMIN — APIXABAN 5 MG: 5 TABLET, FILM COATED ORAL at 10:03

## 2024-03-08 RX ADMIN — EZETIMIBE 10 MG: 10 TABLET ORAL at 10:03

## 2024-03-08 NOTE — ASSESSMENT & PLAN NOTE
Chronic issue and pt was never been worked up before   Check ECHO  Pt already received contrast today and if there is strong suspicion pt may need CTA vs CT W contrast Chest before discharge

## 2024-03-08 NOTE — PLAN OF CARE
[facility]  Initial Discharge Assessment       Primary Care Provider: Sam Garcia IV, MD    Admission Diagnosis: Syncope [R55]    Admission Date: 3/7/2024  Expected Discharge Date: 3/8/2024     completed discharge assessment with pt at bedside. Pt AAOx4s. Pt lives at home with her  and her brother in law. Demographics, PCP, and insurance verified. No home health. No dialysis. Pt completes ADLs without assistance. Pt to discharge home via family transport. Pt has no other needs to be addressed at this time.      Transition of Care Barriers: None    Payor: MEDICARE / Plan: MEDICARE PART A & B / Product Type: Government /     Extended Emergency Contact Information  Primary Emergency Contact: Javi Torres  Address: [address] [place] [address] [facility]  Home Phone: [phone]  Mobile Phone: [phone]  Relation: Spouse    Discharge Plan A: Home with family  Discharge Plan B: Home with family      [id number] - [place], [place] - [address] AT [facility]  [address]  [place] [address]  Phone: [phone] Fax: [phone]      Initial Assessment (most recent)       Adult Discharge Assessment - 03/08/24 0858          Discharge Assessment    Assessment Type Discharge Planning Assessment     Confirmed/corrected address, phone number and insurance Yes     Confirmed Demographics Correct on Facesheet     Source of Information patient     When was your last doctors appointment? --   1 month ago    Does patient/caregiver understand observation status Yes     Reason For Admission Syncope     People in Home spouse;other relative(s)   Brother in law (intellectual disability)    Facility Arrived From: --   Home    Do you expect to return to your current living situation? Yes     Do you have help at home or someone to help you manage your care at home? Yes   Javi Torres () [phone]    Who are your  caregiver(s) and their phone number(s)? --   Javi Torres () [phone]    Prior to hospitilization cognitive status: Alert/Oriented     Current cognitive status: Alert/Oriented     Walking or Climbing Stairs Difficulty no     Dressing/Bathing Difficulty no     Home Accessibility wheelchair accessible;stairs to enter home     Number of Stairs, Main Entrance four     Home Layout Able to live on 1st floor     Equipment Currently Used at Home none     Readmission within 30 days? No     Patient currently being followed by outpatient case management? No     Do you currently have service(s) that help you manage your care at home? No     Do you take prescription medications? Yes     Do you have prescription coverage? Yes     Coverage MEDICARE - MEDICARE PART A & B -     Do you have any problems affording any of your prescribed medications? No     Is the patient taking medications as prescribed? yes     Who is going to help you get home at discharge? Javi Torres () [phone]     How do you get to doctors appointments? car, drives self;family or friend will provide   Javi Torres () [phone]    Are you on dialysis? No     Do you take coumadin? No   Eliquis    Discharge Plan A Home with family     Discharge Plan B Home with family     DME Needed Upon Discharge  none     Discharge Plan discussed with: Patient     Transition of Care Barriers None        OTHER    Name(s) of People in Home --   Javi Torres () [phone] / Brother in law (intellectual disability)

## 2024-03-08 NOTE — PT/OT/SLP EVAL
Physical Therapy Evaluation and Discharge Note    Patient Name:  Jen Torres   MRN:  [id number]    Recommendations:     Discharge Recommendations: Low Intensity Therapy (Resume OP PT)  Discharge Equipment Recommendations: none   Barriers to discharge: None    Assessment:     Jen Torres is a 73 y.o. female admitted with a medical diagnosis of Syncope. .  At this time, patient is functioning at their prior level of function and does not require further acute PT services. PT feels pt should resume OP PT for post surgical shoulder rehab.     Recent Surgery: * No surgery found *      Plan:     During this hospitalization, patient does not require further acute PT services.  Please re-consult if situation changes.      Subjective     Chief Complaint: L shoulder and neck pain  Patient/Family Comments/goals: home upon discharge.  Pain/Comfort:  Pain Rating 1:  (not rated)  Location - Side 1: Left  Location 1: shoulder (& neck)    Patients cultural, spiritual, Christian conflicts given the current situation:      Living Environment:  Pt lives with her  and a TONO w/ intellectual disability.  Prior to admission, patients level of function was independent with mobility and drives.  Equipment used at home: none.  DME owned (not currently used): none.  Upon discharge, patient will have assistance from her .    Objective:     Communicated with DAVID Sandhu prior to and after session.  Patient found HOB elevated with peripheral IV upon PT entry to room.    General Precautions: Standard, fall    Orthopedic Precautions:N/A   Braces: N/A  Respiratory Status: Room air    Exams:  Cognitive Exam:  Patient is oriented to Person, Place, Time, and Situation  RLE ROM: WFL  RLE Strength: WFL  LLE ROM: WFL  LLE Strength: WFL    Functional Mobility:  Bed Mobility:     Scooting: independence  Supine to Sit: independence  Sit to Supine: independence  Transfers:     Sit to Stand:  independence with no AD  Gait: x 210' with no  AD with Supervision progressing to Jay with stable gait pattern noted.    AM-PAC 6 CLICK MOBILITY  Total Score:24       Treatment and Education:  Pt was educated on the following: call light use, importance of OOB activity and functional mobility to negate the negative effects of prolonged bed rest during this hospitalization, safe transfers/ambulation and discharge planning recommendations/options.      AM-PAC 6 CLICK MOBILITY  Total Score:24     Patient left HOB elevated with all lines intact, call button in reach, and RN notified.    GOALS:   Multidisciplinary Problems       Physical Therapy Goals       Not on file                    History:     Past Medical History:   Diagnosis Date    Abnormal Pap smear     s/p cryo at 20 y/o    Anticoagulant long-term use     GERD (gastroesophageal reflux disease)     Hyperlipidemia     Migraine     Migraines     Sleep apnea     Can not use c-pap       Past Surgical History:   Procedure Laterality Date    ARTHROSCOPIC REPAIR OF ROTATOR CUFF OF SHOULDER Left 12/29/2023    Procedure: REPAIR, ROTATOR CUFF, ARTHROSCOPIC;  Surgeon: Gary Tony MD;  Location: [facility];  Service: Orthopedics;  Laterality: Left;  arthrex    ARTHROSCOPY OF SHOULDER WITH DECOMPRESSION OF SUBACROMIAL SPACE Left 12/29/2023    Procedure: ARTHROSCOPY, SHOULDER, WITH SUBACROMIAL SPACE DECOMPRESSION;  Surgeon: Gary Tony MD;  Location: [facility];  Service: Orthopedics;  Laterality: Left;    CATARACT EXTRACTION, BILATERAL      CHOLECYSTECTOMY  10/01/2014    EYE SURGERY      KNEE ARTHROSCOPY Left     TONSILLECTOMY         Time Tracking:     PT Received On: 03/08/24  PT Start Time: 1054     PT Stop Time: 1103  PT Total Time (min): 9 min     Billable Minutes: Evaluation 9      03/08/2024

## 2024-03-08 NOTE — HOSPITAL COURSE
Patient is a 73 year old female who was at outpatient rehab and was brought to ED after she felt dizzy and facial numbness on the left side. Patient states she has on going SOB on exertion for years. Also vertigo, which sounds like BPPV. But both these are chronic issues. On admission stroke code was called and work up was negative. Echo pending and will follow the report. However patient is euvolemic currently. Patient worked with PT, OT and we recommended to follow up with outpatient therapy for possible eplys maneuver.

## 2024-03-08 NOTE — PLAN OF CARE
Discharge orders and chart reviewed. No other discharge needs noted at this time. Pt is clear for discharge from case management, after echo. Pt is discharging to home.    No pcp apts available - clinic sent message to Dr. Garcia's nurse - clinic to contact patient with hospital follow up apt    OP therapy order placed for patient to resume therapy at therapy clinic - patient to follow up as regularly scheduled     03/08/24 1104   Final Note   Assessment Type Final Discharge Note   Anticipated Discharge Disposition Home   What phone number can be called within the next 1-3 days to see how you are doing after discharge? [phone]   Hospital Resources/Appts/Education Provided Appointment suggestion unavailable   Post-Acute Status   Discharge Delays (!) Personal Transportation

## 2024-03-08 NOTE — PHARMACY MED REC
"Admission Medication History     The home medication history was taken by Ender Vaughan.    You may go to "Admission" then "Reconcile Home Medications" tabs to review and/or act upon these items.     The home medication list has been updated by the Pharmacy department.   Please read ALL comments highlighted in yellow.   Please address this information as you see fit.    Feel free to contact us if you have any questions or require assistance.      The medications listed below were removed from the home medication list. Please reorder if appropriate:  Patient reports no longer taking the following medication(s):  Ondansetron 4 mg    Medications listed below were obtained from: Patient/family and Analytic software- [employer]  Current Facility-Administered Medications on File Prior to Encounter   Medication Dose Route Frequency Provider Last Rate Last Admin    electrolyte-S (ISOLYTE)   Intravenous Continuous Blade Packer MD        electrolyte-S (ISOLYTE)   Intravenous Continuous Blade Packer  mL/hr at 12/29/23 1159 Rate Change at 12/29/23 1159    LIDOcaine (PF) 10 mg/ml (1%) injection 10 mg  1 mL Intradermal Once Blade Packer MD        midazolam (VERSED) 1 mg/mL injection 2 mg  2 mg Intravenous PRN Blade Packer MD   2 mg at 12/29/23 0955    [DISCONTINUED] fentaNYL 50 mcg/mL injection 25 mcg  25 mcg Intravenous Q5 Min PRN Blade Packer MD        [DISCONTINUED] fentaNYL 50 mcg/mL injection  mcg   mcg Intravenous PRN Blade Packer MD   50 mcg at 12/29/23 1043    [DISCONTINUED] ondansetron injection 4 mg  4 mg Intravenous Once PRN Blade Packer MD        [DISCONTINUED] oxyCODONE immediate release tablet 5 mg  5 mg Oral Once PRN Blade Packer MD         Current Outpatient Medications on File Prior to Encounter   Medication Sig Dispense Refill    acetaminophen (TYLENOL) 500 MG tablet Take 2 tablets (1,000 mg total) by mouth every 8 (eight) hours as needed for Pain. " (Patient taking differently: Take 500 mg by mouth 2 (two) times daily as needed for Pain.) 30 tablet 0    dulaglutide (TRULICITY) 0.75 mg/0.5 mL pen injector Inject 1 pen  into the skin every 7 days. Fridays      ELIQUIS 5 mg Tab TAKE 1 TABLET BY MOUTH TWICE A DAY (Patient taking differently: Take 5 mg by mouth 2 (two) times daily.) 60 tablet 4    ergocalciferol (ERGOCALCIFEROL) 50,000 unit Cap Take 50,000 Units by mouth every 7 days. Fridays      ezetimibe (ZETIA) 10 mg tablet Take 10 mg by mouth once daily.      glimepiride (AMARYL) 2 MG tablet Take 2 mg by mouth every morning.      hydroCHLOROthiazide (HYDRODIURIL) 25 MG tablet Take 25 mg by mouth every Mon, Wed, Fri.      pantoprazole (PROTONIX) 40 MG tablet Take 40 mg by mouth every morning.      potassium chloride (MICRO-K) 10 MEQ CpSR TAKE 1 CAPSULE BY MOUTH EVERY DAY (Patient taking differently: Take 10 mEq by mouth once daily.) 90 capsule 3    WESTAB MAX 2.5-25-2 mg Tab TAKE 1 TABLET BY MOUTH EVERY DAY (Patient taking differently: Take 1 tablet by mouth once daily.) 90 tablet 4    cyclobenzaprine (FLEXERIL) 10 MG tablet Take 1 tablet (10 mg total) by mouth 3 (three) times daily as needed for Muscle spasms. (Patient not taking: Reported on 3/7/2024) 30 tablet 0    enoxaparin (LOVENOX) 150 mg/mL Syrg Inject into the skin. Prior to surgery      oxyCODONE-acetaminophen (PERCOCET) 5-325 mg per tablet Take 1 tablet by mouth every 6 (six) hours as needed for Pain. (Patient not taking: Reported on 3/7/2024) 14 tablet 0    [DISCONTINUED] ondansetron (ZOFRAN) 4 MG tablet Take 4-8 mg by mouth every 8 (eight) hours as needed.      [DISCONTINUED] ondansetron (ZOFRAN) 4 MG tablet Take 1 tablet (4 mg total) by mouth every 6 (six) hours as needed for Nausea. 30 tablet 0           Ender Vaughan  EXT 1924                .

## 2024-03-08 NOTE — PT/OT/SLP EVAL
Occupational Therapy   Evaluation and Discharge Note    Name: Jen Torres  MRN: [id number]  Admitting Diagnosis: Syncope  Recent Surgery: * No surgery found *      Recommendations:     Discharge Recommendations: No Therapy Indicated; Resume OP therapy  Discharge Equipment Recommendations: none  Barriers to discharge:  None    Assessment:     Jen Torres is a 73 y.o. female with a medical diagnosis of Syncope. At this time, patient is functioning at their prior level of function and does not require further acute OT services.     Plan:     During this hospitalization, patient does not require further acute OT services.  Please re-consult if situation changes.    Plan of Care Reviewed with: patient    Subjective     Chief Complaint: L shldr pain  Patient/Family Comments/goals: none stated    Occupational Profile:  Living Environment: Pt lives with spouse and brother-in-law in a mobile home with 4STE with BHR. Pt has a walk-in shower  Previous level of function: Independent with ADLs, IADLs, and mobility  Roles and Routines: primary homemaker; wife  Equipment Used at home: none  Assistance upon Discharge: yes, from family    Pain/Comfort:  Pain Rating 1:  (not rated)  Location - Side 1: Left  Location 1: shoulder  Pain Addressed 1: Reposition, Cessation of Activity, Distraction    Patients cultural, spiritual, Sabianism conflicts given the current situation:      Objective:     Communicated with: nursing prior to session.  Patient found HOB elevated with peripheral IV upon OT entry to room.    General Precautions: Standard, fall  Orthopedic Precautions: N/A  Braces: N/A  Respiratory Status: Room air     Occupational Performance:    Bed Mobility:    Patient completed Supine to Sit with independence  Patient completed Sit to Supine with independence    Functional Mobility/Transfers:  Patient completed Sit <> Stand Transfer with independence  with  no assistive device   Functional Mobility: pt amb in room with no  AD, no LOB, no SOB    Activities of Daily Living:  Feeding:  independence    Lower Body Dressing: independence seated EOB to don shoes  Toileting: independence per pt    Cognitive/Visual Perceptual:  Cognitive/Psychosocial Skills:     -       Oriented to: Person, Place, Time, and Situation   -       Follows Commands/attention:Follows two-step commands  -       Communication: clear/fluent  -       Memory: No Deficits noted  -       Safety awareness/insight to disability: intact   -       Mood/Affect/Coping skills/emotional control: Appropriate to situation, Cooperative, and Pleasant    Physical Exam:  Balance:    -       Good seated/standing balance  Upper Extremity Range of Motion:     -       Right Upper Extremity: WFL  -       Left Upper Extremity: ~90 degrees shldr flex; WFL distally; is working on shldr ROM at OP therapy  Upper Extremity Strength:    -       Left Upper Extremity: WFL   Strength:    -       Right Upper Extremity: WFL  -       Left Upper Extremity: WFL  Fine Motor Coordination:    -       Intact  Gross motor coordination:   WFL    AMPAC 6 Click ADL:  AMPAC Total Score: 24    Treatment & Education:  Pt educated on role of OT/POC, importance of OOB/EOB activity, use of call bell, and safety during ADLs, transfers, and functional mobility.    Patient left HOB elevated with all lines intact and call button in reach    GOALS:   Multidisciplinary Problems       Occupational Therapy Goals       Not on file                    History:     Past Medical History:   Diagnosis Date    Abnormal Pap smear     s/p cryo at 20 y/o    Anticoagulant long-term use     GERD (gastroesophageal reflux disease)     Hyperlipidemia     Migraine     Migraines     Sleep apnea     Can not use c-pap         Past Surgical History:   Procedure Laterality Date    ARTHROSCOPIC REPAIR OF ROTATOR CUFF OF SHOULDER Left 12/29/2023    Procedure: REPAIR, ROTATOR CUFF, ARTHROSCOPIC;  Surgeon: Gary Tony MD;  Location: [facility]  OR;  Service: Orthopedics;  Laterality: Left;  arthrex    ARTHROSCOPY OF SHOULDER WITH DECOMPRESSION OF SUBACROMIAL SPACE Left 12/29/2023    Procedure: ARTHROSCOPY, SHOULDER, WITH SUBACROMIAL SPACE DECOMPRESSION;  Surgeon: Gary Tony MD;  Location: [facility] OR;  Service: Orthopedics;  Laterality: Left;    CATARACT EXTRACTION, BILATERAL      CHOLECYSTECTOMY  10/01/2014    EYE SURGERY      KNEE ARTHROSCOPY Left     TONSILLECTOMY         Time Tracking:     OT Date of Treatment: 03/08/24  OT Start Time: 0916  OT Stop Time: 0932  OT Total Time (min): 16 min    Billable Minutes:Evaluation 16    3/8/2024

## 2024-03-08 NOTE — H&P
[facility] - Emergency Dept  [facility] Medicine  History & Physical    Patient Name: Jen Torres  MRN: [id number]  Patient Class: OP- Observation  Admission Date: 3/7/2024  Attending Physician: Tim Rdz MD   Primary Care Provider: Sam Garcia IV, MD         Patient information was obtained from patient, past medical records, and ER records.     Subjective:     Principal Problem:Syncope    Chief Complaint:   Chief Complaint   Patient presents with    Extremity Weakness     Left arm weakness, L side facial numbness         HPI: 73 year old pt getting admitted with syncope  Pt on chronic basis suffers from shortness of breath and vertigo  She recently had L shoulder surgery  As per Pt since she had nerve block on neck before the Shoulder surgery she has not been feeling good  Today in Rehab center when pt was about to start rehab sessions she felt L sided weakness  She was told by bystanders she looks pale  Also pt was diaphoretics and was very fatigued   Ambulance was called and pt was escorted to ER and Code stroked was initiated     Past Medical History:   Diagnosis Date    Abnormal Pap smear     s/p cryo at 22 y/o    Anticoagulant long-term use     GERD (gastroesophageal reflux disease)     Hyperlipidemia     Migraine     Migraines     Sleep apnea     Can not use c-pap       Past Surgical History:   Procedure Laterality Date    ARTHROSCOPIC REPAIR OF ROTATOR CUFF OF SHOULDER Left 12/29/2023    Procedure: REPAIR, ROTATOR CUFF, ARTHROSCOPIC;  Surgeon: Gary Tony MD;  Location: [facility] OR;  Service: Orthopedics;  Laterality: Left;  arthrex    ARTHROSCOPY OF SHOULDER WITH DECOMPRESSION OF SUBACROMIAL SPACE Left 12/29/2023    Procedure: ARTHROSCOPY, SHOULDER, WITH SUBACROMIAL SPACE DECOMPRESSION;  Surgeon: Gary Tony MD;  Location: [facility] OR;  Service: Orthopedics;  Laterality: Left;    CATARACT EXTRACTION, BILATERAL      CHOLECYSTECTOMY  10/01/2014    EYE SURGERY      KNEE  ARTHROSCOPY Left     TONSILLECTOMY         Review of patient's allergies indicates:   Allergen Reactions    Statins-hmg-coa reductase inhibitors Swelling    Metformin Other (See Comments)     metallic taste , burn of the esophagus       Current Facility-Administered Medications on File Prior to Encounter   Medication    electrolyte-S (ISOLYTE)    electrolyte-S (ISOLYTE)    LIDOcaine (PF) 10 mg/ml (1%) injection 10 mg    midazolam (VERSED) 1 mg/mL injection 2 mg    [DISCONTINUED] fentaNYL 50 mcg/mL injection 25 mcg    [DISCONTINUED] fentaNYL 50 mcg/mL injection  mcg    [DISCONTINUED] ondansetron injection 4 mg    [DISCONTINUED] oxyCODONE immediate release tablet 5 mg     Current Outpatient Medications on File Prior to Encounter   Medication Sig    acetaminophen (TYLENOL) 500 MG tablet Take 2 tablets (1,000 mg total) by mouth every 8 (eight) hours as needed for Pain. (Patient taking differently: Take 500 mg by mouth 2 (two) times daily as needed for Pain.)    dulaglutide (TRULICITY) 0.75 mg/0.5 mL pen injector Inject 1 pen  into the skin every 7 days. Fridays    ELIQUIS 5 mg Tab TAKE 1 TABLET BY MOUTH TWICE A DAY (Patient taking differently: Take 5 mg by mouth 2 (two) times daily.)    ergocalciferol (ERGOCALCIFEROL) 50,000 unit Cap Take 50,000 Units by mouth every 7 days. Fridays    ezetimibe (ZETIA) 10 mg tablet Take 10 mg by mouth once daily.    glimepiride (AMARYL) 2 MG tablet Take 2 mg by mouth every morning.    hydroCHLOROthiazide (HYDRODIURIL) 25 MG tablet Take 25 mg by mouth every Mon, Wed, Fri.    pantoprazole (PROTONIX) 40 MG tablet Take 40 mg by mouth every morning.    potassium chloride (MICRO-K) 10 MEQ CpSR TAKE 1 CAPSULE BY MOUTH EVERY DAY (Patient taking differently: Take 10 mEq by mouth once daily.)    WESTAB MAX 2.5-25-2 mg Tab TAKE 1 TABLET BY MOUTH EVERY DAY (Patient taking differently: Take 1 tablet by mouth once daily.)    cyclobenzaprine (FLEXERIL) 10 MG tablet Take 1 tablet (10 mg total) by  mouth 3 (three) times daily as needed for Muscle spasms. (Patient not taking: Reported on 3/7/2024)    enoxaparin (LOVENOX) 150 mg/mL Syrg Inject into the skin. Prior to surgery    oxyCODONE-acetaminophen (PERCOCET) 5-325 mg per tablet Take 1 tablet by mouth every 6 (six) hours as needed for Pain. (Patient not taking: Reported on 3/7/2024)    [DISCONTINUED] ondansetron (ZOFRAN) 4 MG tablet Take 4-8 mg by mouth every 8 (eight) hours as needed.    [DISCONTINUED] ondansetron (ZOFRAN) 4 MG tablet Take 1 tablet (4 mg total) by mouth every 6 (six) hours as needed for Nausea.     Family History       Problem Relation (Age of Onset)    Breast cancer Paternal Grandmother (70), Maternal Grandmother    Cancer Daughter, Daughter (20)    Colon cancer Mother    Ovarian cancer Daughter          Tobacco Use    Smoking status: Former    Smokeless tobacco: Never   Substance and Sexual Activity    Alcohol use: No    Drug use: No    Sexual activity: Never     Review of Systems   Constitutional:  Positive for diaphoresis and fatigue. Negative for activity change and appetite change.   HENT:  Negative for congestion and dental problem.    Eyes:  Negative for discharge and itching.   Respiratory:  Negative for shortness of breath.    Cardiovascular:  Negative for chest pain.   Gastrointestinal:  Negative for abdominal distention and abdominal pain.   Endocrine: Negative for cold intolerance.   Genitourinary:  Negative for difficulty urinating and dysuria.   Musculoskeletal:  Negative for arthralgias and back pain.   Skin:  Negative for color change.   Neurological:  Positive for weakness. Negative for dizziness and facial asymmetry.   Hematological:  Negative for adenopathy.   Psychiatric/Behavioral:  Negative for agitation and behavioral problems.      Objective:     Vital Signs (Most Recent):  Temp: 97.3 °F (36.3 °C) (03/07/24 1321)  Pulse: 84 (03/07/24 1902)  Resp: 16 (03/07/24 1902)  BP: (!) 189/72 (03/07/24 1902)  SpO2: 100 %  (03/07/24 1902) Vital Signs (24h Range):  Temp:  [97.3 °F (36.3 °C)] 97.3 °F (36.3 °C)  Pulse:  [76-90] 84  Resp:  [16-20] 16  SpO2:  [94 %-100 %] 100 %  BP: (138-189)/(68-84) 189/72     Weight: 97.1 kg (214 lb 1.1 oz)  Body mass index is 32.55 kg/m².     Physical Exam  Vitals and nursing note reviewed.   Constitutional:       General: She is not in acute distress.  HENT:      Head: Atraumatic.      Right Ear: External ear normal.      Left Ear: External ear normal.      Nose: Nose normal.      Mouth/Throat:      Mouth: Mucous membranes are moist.   Eyes:      Extraocular Movements: Extraocular movements intact.   Cardiovascular:      Rate and Rhythm: Normal rate.   Pulmonary:      Effort: Pulmonary effort is normal.   Musculoskeletal:         General: Normal range of motion.      Cervical back: Normal range of motion.   Skin:     General: Skin is warm.   Neurological:      Mental Status: She is alert and oriented to person, place, and time.   Psychiatric:         Behavior: Behavior normal.                Significant Labs: All pertinent labs within the past 24 hours have been reviewed.  CBC:   Recent Labs   Lab 03/07/24  1355   WBC 10.61   HGB 10.6*   HCT 33.8*   *     CMP:   Recent Labs   Lab 03/07/24  1355   *   K 3.8      CO2 24   *   BUN 19   CREATININE 1.2   CALCIUM 9.2   PROT 7.6   ALBUMIN 4.1   BILITOT 0.3   ALKPHOS 123   AST 54*   ALT 46*   ANIONGAP 10       Significant Imaging: I have reviewed all pertinent imaging results/findings within the past 24 hours.    Assessment/Plan:     * Syncope  No evidence of CVA  Check Orthostatics and ECHO      Shortness of breath  Chronic issue and pt was never been worked up before   Check ECHO  Pt already received contrast today and if there is strong suspicion pt may need CTA vs CT W contrast Chest before discharge       Factor 5 Leiden mutation, heterozygous  Maintain NOAC       Vertigo  Chronic issue       Type 2 diabetes mellitus without  complication, without long-term current use of insulin  Maintain SSI  Check HbA1C    Dyslipidemia  Statins      Chronic embolism and thrombosis of other specified deep vein of left lower extremity  Aware  Maintain NOAC        VTE Risk Mitigation (From admission, onward)           Ordered     apixaban tablet 5 mg  2 times daily         03/07/24 1815     IP VTE HIGH RISK PATIENT  Once         03/07/24 1815     Place sequential compression device  Until discontinued         03/07/24 1815                       On 03/07/2024, patient should be placed in hospital observation services under my care.             Tim Rdz MD  Department of Hospital Medicine  [facility] - Emergency Dept

## 2024-03-08 NOTE — HPI
73 year old pt getting admitted with syncope  Pt on chronic basis suffers from shortness of breath and vertigo  She recently had L shoulder surgery  As per Pt since she had nerve block on neck before the Shoulder surgery she has not been feeling good  Today in Rehab center when pt was about to start rehab sessions she felt L sided weakness  She was told by bystanders she looks pale  Also pt was diaphoretics and was very fatigued   Ambulance was called and pt was escorted to ER and Code stroked was initiated

## 2024-03-08 NOTE — SUBJECTIVE & OBJECTIVE
Past Medical History:   Diagnosis Date    Abnormal Pap smear     s/p cryo at 22 y/o    Anticoagulant long-term use     GERD (gastroesophageal reflux disease)     Hyperlipidemia     Migraine     Migraines     Sleep apnea     Can not use c-pap       Past Surgical History:   Procedure Laterality Date    ARTHROSCOPIC REPAIR OF ROTATOR CUFF OF SHOULDER Left 12/29/2023    Procedure: REPAIR, ROTATOR CUFF, ARTHROSCOPIC;  Surgeon: Gary Tony MD;  Location: [facility];  Service: Orthopedics;  Laterality: Left;  arthrex    ARTHROSCOPY OF SHOULDER WITH DECOMPRESSION OF SUBACROMIAL SPACE Left 12/29/2023    Procedure: ARTHROSCOPY, SHOULDER, WITH SUBACROMIAL SPACE DECOMPRESSION;  Surgeon: Gary Tony MD;  Location: [facility];  Service: Orthopedics;  Laterality: Left;    CATARACT EXTRACTION, BILATERAL      CHOLECYSTECTOMY  10/01/2014    EYE SURGERY      KNEE ARTHROSCOPY Left     TONSILLECTOMY         Review of patient's allergies indicates:   Allergen Reactions    Statins-hmg-coa reductase inhibitors Swelling    Metformin Other (See Comments)     metallic taste , burn of the esophagus       Current Facility-Administered Medications on File Prior to Encounter   Medication    electrolyte-S (ISOLYTE)    electrolyte-S (ISOLYTE)    LIDOcaine (PF) 10 mg/ml (1%) injection 10 mg    midazolam (VERSED) 1 mg/mL injection 2 mg    [DISCONTINUED] fentaNYL 50 mcg/mL injection 25 mcg    [DISCONTINUED] fentaNYL 50 mcg/mL injection  mcg    [DISCONTINUED] ondansetron injection 4 mg    [DISCONTINUED] oxyCODONE immediate release tablet 5 mg     Current Outpatient Medications on File Prior to Encounter   Medication Sig    acetaminophen (TYLENOL) 500 MG tablet Take 2 tablets (1,000 mg total) by mouth every 8 (eight) hours as needed for Pain. (Patient taking differently: Take 500 mg by mouth 2 (two) times daily as needed for Pain.)    dulaglutide (TRULICITY) 0.75 mg/0.5 mL pen injector Inject 1 pen  into the skin every 7 days.  Fridays    ELIQUIS 5 mg Tab TAKE 1 TABLET BY MOUTH TWICE A DAY (Patient taking differently: Take 5 mg by mouth 2 (two) times daily.)    ergocalciferol (ERGOCALCIFEROL) 50,000 unit Cap Take 50,000 Units by mouth every 7 days. Fridays    ezetimibe (ZETIA) 10 mg tablet Take 10 mg by mouth once daily.    glimepiride (AMARYL) 2 MG tablet Take 2 mg by mouth every morning.    hydroCHLOROthiazide (HYDRODIURIL) 25 MG tablet Take 25 mg by mouth every Mon, Wed, Fri.    pantoprazole (PROTONIX) 40 MG tablet Take 40 mg by mouth every morning.    potassium chloride (MICRO-K) 10 MEQ CpSR TAKE 1 CAPSULE BY MOUTH EVERY DAY (Patient taking differently: Take 10 mEq by mouth once daily.)    WESTAB MAX 2.5-25-2 mg Tab TAKE 1 TABLET BY MOUTH EVERY DAY (Patient taking differently: Take 1 tablet by mouth once daily.)    cyclobenzaprine (FLEXERIL) 10 MG tablet Take 1 tablet (10 mg total) by mouth 3 (three) times daily as needed for Muscle spasms. (Patient not taking: Reported on 3/7/2024)    enoxaparin (LOVENOX) 150 mg/mL Syrg Inject into the skin. Prior to surgery    oxyCODONE-acetaminophen (PERCOCET) 5-325 mg per tablet Take 1 tablet by mouth every 6 (six) hours as needed for Pain. (Patient not taking: Reported on 3/7/2024)    [DISCONTINUED] ondansetron (ZOFRAN) 4 MG tablet Take 4-8 mg by mouth every 8 (eight) hours as needed.    [DISCONTINUED] ondansetron (ZOFRAN) 4 MG tablet Take 1 tablet (4 mg total) by mouth every 6 (six) hours as needed for Nausea.     Family History       Problem Relation (Age of Onset)    Breast cancer Paternal Grandmother (70), Maternal Grandmother    Cancer Daughter, Daughter (20)    Colon cancer Mother    Ovarian cancer Daughter          Tobacco Use    Smoking status: Former    Smokeless tobacco: Never   Substance and Sexual Activity    Alcohol use: No    Drug use: No    Sexual activity: Never     Review of Systems   Constitutional:  Positive for diaphoresis and fatigue. Negative for activity change and appetite  change.   HENT:  Negative for congestion and dental problem.    Eyes:  Negative for discharge and itching.   Respiratory:  Negative for shortness of breath.    Cardiovascular:  Negative for chest pain.   Gastrointestinal:  Negative for abdominal distention and abdominal pain.   Endocrine: Negative for cold intolerance.   Genitourinary:  Negative for difficulty urinating and dysuria.   Musculoskeletal:  Negative for arthralgias and back pain.   Skin:  Negative for color change.   Neurological:  Positive for weakness. Negative for dizziness and facial asymmetry.   Hematological:  Negative for adenopathy.   Psychiatric/Behavioral:  Negative for agitation and behavioral problems.      Objective:     Vital Signs (Most Recent):  Temp: 97.3 °F (36.3 °C) (03/07/24 1321)  Pulse: 84 (03/07/24 1902)  Resp: 16 (03/07/24 1902)  BP: (!) 189/72 (03/07/24 1902)  SpO2: 100 % (03/07/24 1902) Vital Signs (24h Range):  Temp:  [97.3 °F (36.3 °C)] 97.3 °F (36.3 °C)  Pulse:  [76-90] 84  Resp:  [16-20] 16  SpO2:  [94 %-100 %] 100 %  BP: (138-189)/(68-84) 189/72     Weight: 97.1 kg (214 lb 1.1 oz)  Body mass index is 32.55 kg/m².     Physical Exam  Vitals and nursing note reviewed.   Constitutional:       General: She is not in acute distress.  HENT:      Head: Atraumatic.      Right Ear: External ear normal.      Left Ear: External ear normal.      Nose: Nose normal.      Mouth/Throat:      Mouth: Mucous membranes are moist.   Eyes:      Extraocular Movements: Extraocular movements intact.   Cardiovascular:      Rate and Rhythm: Normal rate.   Pulmonary:      Effort: Pulmonary effort is normal.   Musculoskeletal:         General: Normal range of motion.      Cervical back: Normal range of motion.   Skin:     General: Skin is warm.   Neurological:      Mental Status: She is alert and oriented to person, place, and time.   Psychiatric:         Behavior: Behavior normal.                Significant Labs: All pertinent labs within the past 24  hours have been reviewed.  CBC:   Recent Labs   Lab 03/07/24  1355   WBC 10.61   HGB 10.6*   HCT 33.8*   *     CMP:   Recent Labs   Lab 03/07/24  1355   *   K 3.8      CO2 24   *   BUN 19   CREATININE 1.2   CALCIUM 9.2   PROT 7.6   ALBUMIN 4.1   BILITOT 0.3   ALKPHOS 123   AST 54*   ALT 46*   ANIONGAP 10       Significant Imaging: I have reviewed all pertinent imaging results/findings within the past 24 hours.

## 2024-03-08 NOTE — NURSING
Nurses Note -- 4 Eyes      3/8/2024   5:22 AM      Skin assessed during: Admit      [] No Altered Skin Integrity Present    []Prevention Measures Documented      [] Yes- Altered Skin Integrity Present or Discovered   [] LDA Added if Not in Epic (Describe Wound)   [] New Altered Skin Integrity was Present on Admit and Documented in LDA   [] Wound Image Taken    Wound Care Consulted? No    Attending Nurse:  Tammy Mckeon RN/Staff Member:  cesario hobbs

## 2024-03-08 NOTE — DISCHARGE SUMMARY
[facility] Medicine  Discharge Summary      Patient Name: Jen Torres  MRN: [id number]  VALENCIA: [id number]  Patient Class: OP- Observation  Admission Date: 3/7/2024  Hospital Length of Stay: 0 days  Discharge Date and Time:  03/08/2024 9:37 AM  Attending Physician: Tania Myrick MD   Discharging Provider: Tania Myrick MD  Primary Care Provider: Sam Garcia IV, MD    Primary Care Team: Networked reference to record PCT     HPI:   73 year old pt getting admitted with syncope  Pt on chronic basis suffers from shortness of breath and vertigo  She recently had L shoulder surgery  As per Pt since she had nerve block on neck before the Shoulder surgery she has not been feeling good  Today in Rehab center when pt was about to start rehab sessions she felt L sided weakness  She was told by bystanders she looks pale  Also pt was diaphoretics and was very fatigued   Ambulance was called and pt was escorted to ER and Code stroked was initiated     * No surgery found *      Hospital Course:   Patient is a 73 year old female who was at outpatient rehab and was brought to ED after she felt dizzy and facial numbness on the left side. Patient states she has on going SOB on exertion for years. Also vertigo, which sounds like BPPV. But both these are chronic issues. On admission stroke code was called and work up was negative. Echo pending and will follow the report. However patient is euvolemic currently. Patient worked with PT, OT and we recommended to follow up with outpatient therapy for possible eplys maneuver.      Goals of Care Treatment Preferences:  Code Status: Full Code    Physical Exam  Vitals and nursing note reviewed.   Constitutional:       General: She is not in acute distress.  HENT:      Head: Atraumatic.      Right Ear: External ear normal.      Left Ear: External ear normal.      Nose: Nose normal.      Mouth/Throat:      Mouth: Mucous membranes are moist.   Eyes:       Extraocular Movements: Extraocular movements intact.   Cardiovascular:      Rate and Rhythm: Normal rate.   Pulmonary:      Effort: Pulmonary effort is normal.   Musculoskeletal:         General: Normal range of motion.      Cervical back: Normal range of motion.   Skin:     General: Skin is warm.   Neurological:      Mental Status: She is alert and oriented to person, place, and time.   Psychiatric:         Behavior: Behavior normal.     Consults:   Consults (From admission, onward)          Status Ordering Provider     Consult to Telemedicine - Acute Stroke  Once        Provider:  (Not yet assigned)    MARK Cabrera            Pulmonary  Shortness of breath  Follow echo  Outpatient follow up with cardiology, already scheduled       Cardiac/Vascular  Dyslipidemia  Statins      Endocrine  Type 2 diabetes mellitus without complication, without long-term current use of insulin  Resume home meds     Other  * Syncope  Orthostatic negative   Stroke work up negative       Vertigo  Chronic issue   Cont outpatient therapy       Chronic embolism and thrombosis of other specified deep vein of left lower extremity  Aware  Maintain NOAC        Final Active Diagnoses:    Diagnosis Date Noted POA    PRINCIPAL PROBLEM:  Syncope [R55] 03/07/2024 Yes    Vertigo [R42] 03/07/2024 Yes    Factor 5 Leiden mutation, heterozygous [D68.51] 03/07/2024 Yes    Shortness of breath [R06.02] 03/07/2024 Yes    Dyslipidemia [E78.5] 01/18/2024 Yes    Type 2 diabetes mellitus without complication, without long-term current use of insulin [E11.9] 01/18/2024 Yes    Chronic embolism and thrombosis of other specified deep vein of left lower extremity [I82.592] 01/18/2024 Yes      Problems Resolved During this Admission:       Discharged Condition: good    Disposition: Home or Self Care    Follow Up:   Follow-up Information       Sam Garcia IV, MD Follow up in 1 week(s).    Specialty: Family Medicine  Contact information:  [address]   [place] [address]  [phone]                           Patient Instructions:   No discharge procedures on file.    Significant Diagnostic Studies: Labs: CMP   Recent Labs   Lab 03/07/24  1355 03/08/24 0735   * 137   K 3.8 4.0    104   CO2 24 23   * 187*   BUN 19 19   CREATININE 1.2 1.1   CALCIUM 9.2 8.9   PROT 7.6 6.9   ALBUMIN 4.1 3.7   BILITOT 0.3 0.3   ALKPHOS 123 105   AST 54* 37   ALT 46* 38   ANIONGAP 10 10    and CBC   Recent Labs   Lab 03/07/24  1355 03/08/24 0735   WBC 10.61 9.64   HGB 10.6* 9.8*   HCT 33.8* 31.3*   * 419       Pending Diagnostic Studies:       Procedure Component Value Units Date/Time    Echo [[id number]]     Order Status: Sent Lab Status: No result     Hemoglobin A1c [[id number]] Collected: 03/08/24 0735    Order Status: Sent Lab Status: In process Updated: 03/08/24 0749    Specimen: Blood            Medications:  Reconciled Home Medications:      Medication List        CHANGE how you take these medications      ELIQUIS 5 mg Tab  Generic drug: apixaban  TAKE 1 TABLET BY MOUTH TWICE A DAY  What changed: how much to take     PAIN RELIEVER ES(ACETAMINOPHN) 500 MG tablet  Generic drug: acetaminophen  Take 2 tablets (1,000 mg total) by mouth every 8 (eight) hours as needed for Pain.  What changed:   how much to take  when to take this     potassium chloride 10 MEQ Cpsr  Commonly known as: MICRO-K  TAKE 1 CAPSULE BY MOUTH EVERY DAY  What changed: when to take this     WESTAB MAX 2.5-25-2 mg Tab  Generic drug: folic acid-vit B6-vit B12 2.5-25-2 mg  TAKE 1 TABLET BY MOUTH EVERY DAY  What changed: when to take this            CONTINUE taking these medications      enoxaparin 150 mg/mL Syrg  Commonly known as: LOVENOX  Inject into the skin. Prior to surgery     ergocalciferol 50,000 unit Cap  Commonly known as: ERGOCALCIFEROL  Take 50,000 Units by mouth every 7 days. Fridays     ezetimibe 10 mg tablet  Commonly known as: ZETIA  Take 10 mg by mouth once  daily.     glimepiride 2 MG tablet  Commonly known as: AMARYL  Take 2 mg by mouth every morning.     hydroCHLOROthiazide 25 MG tablet  Commonly known as: HYDRODIURIL  Take 25 mg by mouth every Mon, Wed, Fri.     pantoprazole 40 MG tablet  Commonly known as: PROTONIX  Take 40 mg by mouth every morning.     TRULICITY 0.75 mg/0.5 mL pen injector  Generic drug: dulaglutide  Inject 1 pen  into the skin every 7 days. Fridays            STOP taking these medications      cyclobenzaprine 10 MG tablet  Commonly known as: FLEXERIL     oxyCODONE-acetaminophen 5-325 mg per tablet  Commonly known as: PERCOCET              Indwelling Lines/Drains at time of discharge:   Lines/Drains/Airways       None                   Time spent on the discharge of patient: 40 minutes         Tania Myrick MD  Department of Hospital Medicine  [facility]

## 2024-03-12 LAB — BACTERIA BLD CULT: NORMAL

## 2024-03-13 ENCOUNTER — TELEPHONE (OUTPATIENT)
Dept: CARDIOLOGY | Facility: CLINIC | Age: 74
End: 2024-03-13
Payer: MEDICARE

## 2024-03-13 NOTE — TELEPHONE ENCOUNTER
Scheduled follow up for her and  3/27 10:15 and 10:30    Sending to Dilcia to call with date and time

## 2024-03-13 NOTE — TELEPHONE ENCOUNTER
----- Message from Steve Goncalves sent at 3/13/2024 11:46 AM CDT -----  Contact: Self  Type:  Sooner Appointment Request    Caller is requesting a sooner appointment.  Caller declined first available appointment listed below.  Caller will not accept being placed on the waitlist and is requesting a message be sent to doctor.    Name of Caller:  Patient  When is the first available appointment?  5/28  Symptoms:  Echo results.   Would the patient rather a call back or a response via [facility]? Call  Best Call Back Number:  [phone]   Additional Information:   Called to speak with office to get herself and spouse seen within the month.Spouse Rao MRN [id number]

## 2024-03-14 ENCOUNTER — LAB VISIT (OUTPATIENT)
Dept: LAB | Facility: HOSPITAL | Age: 74
End: 2024-03-14
Attending: INTERNAL MEDICINE
Payer: MEDICARE

## 2024-03-14 ENCOUNTER — OFFICE VISIT (OUTPATIENT)
Dept: HEMATOLOGY/ONCOLOGY | Facility: CLINIC | Age: 74
End: 2024-03-14
Payer: MEDICARE

## 2024-03-14 VITALS
SYSTOLIC BLOOD PRESSURE: 123 MMHG | HEART RATE: 88 BPM | RESPIRATION RATE: 18 BRPM | DIASTOLIC BLOOD PRESSURE: 77 MMHG | TEMPERATURE: 97 F | BODY MASS INDEX: 32.56 KG/M2 | HEIGHT: 68 IN | WEIGHT: 214.81 LBS

## 2024-03-14 DIAGNOSIS — N18.30 ANEMIA ASSOCIATED WITH STAGE 3 CHRONIC RENAL FAILURE: ICD-10-CM

## 2024-03-14 DIAGNOSIS — E07.9 THYROID DYSFUNCTION: Primary | ICD-10-CM

## 2024-03-14 DIAGNOSIS — D53.9 NUTRITIONAL ANEMIA: ICD-10-CM

## 2024-03-14 DIAGNOSIS — I82.5Z2 CHRONIC DEEP VEIN THROMBOSIS (DVT) OF DISTAL VEIN OF LEFT LOWER EXTREMITY: ICD-10-CM

## 2024-03-14 DIAGNOSIS — E07.9 THYROID DYSFUNCTION: ICD-10-CM

## 2024-03-14 DIAGNOSIS — D63.1 ANEMIA ASSOCIATED WITH STAGE 3 CHRONIC RENAL FAILURE: ICD-10-CM

## 2024-03-14 DIAGNOSIS — R92.8 ABNORMAL MAMMOGRAM OF BOTH BREASTS: ICD-10-CM

## 2024-03-14 LAB
ALBUMIN SERPL BCP-MCNC: 4 G/DL (ref 3.5–5.2)
ALP SERPL-CCNC: 102 U/L (ref 55–135)
ALT SERPL W/O P-5'-P-CCNC: 42 U/L (ref 10–44)
ANION GAP SERPL CALC-SCNC: 11 MMOL/L (ref 8–16)
AST SERPL-CCNC: 48 U/L (ref 10–40)
BASOPHILS # BLD AUTO: 0.03 K/UL (ref 0–0.2)
BASOPHILS NFR BLD: 0.3 % (ref 0–1.9)
BILIRUB SERPL-MCNC: 0.3 MG/DL (ref 0.1–1)
BUN SERPL-MCNC: 17 MG/DL (ref 8–23)
CALCIUM SERPL-MCNC: 9.3 MG/DL (ref 8.7–10.5)
CHLORIDE SERPL-SCNC: 103 MMOL/L (ref 95–110)
CO2 SERPL-SCNC: 24 MMOL/L (ref 23–29)
CREAT SERPL-MCNC: 1.3 MG/DL (ref 0.5–1.4)
DIFFERENTIAL METHOD BLD: ABNORMAL
EOSINOPHIL # BLD AUTO: 0.1 K/UL (ref 0–0.5)
EOSINOPHIL NFR BLD: 1.4 % (ref 0–8)
ERYTHROCYTE [DISTWIDTH] IN BLOOD BY AUTOMATED COUNT: 13.6 % (ref 11.5–14.5)
EST. GFR  (NO RACE VARIABLE): 44 ML/MIN/1.73 M^2
FERRITIN SERPL-MCNC: 9.4 NG/ML (ref 20–300)
FIBRINOGEN PPP-MCNC: 448 MG/DL (ref 182–400)
FOLATE SERPL-MCNC: >22.3 NG/ML (ref 4–24)
GLUCOSE SERPL-MCNC: 244 MG/DL (ref 70–110)
HCT VFR BLD AUTO: 31.2 % (ref 37–48.5)
HGB BLD-MCNC: 9.9 G/DL (ref 12–16)
IMM GRANULOCYTES # BLD AUTO: 0.03 K/UL (ref 0–0.04)
IMM GRANULOCYTES NFR BLD AUTO: 0.3 % (ref 0–0.5)
LYMPHOCYTES # BLD AUTO: 2.7 K/UL (ref 1–4.8)
LYMPHOCYTES NFR BLD: 30.8 % (ref 18–48)
MCH RBC QN AUTO: 25.5 PG (ref 27–31)
MCHC RBC AUTO-ENTMCNC: 31.7 G/DL (ref 32–36)
MCV RBC AUTO: 80 FL (ref 82–98)
MONOCYTES # BLD AUTO: 0.5 K/UL (ref 0.3–1)
MONOCYTES NFR BLD: 6 % (ref 4–15)
NEUTROPHILS # BLD AUTO: 5.3 K/UL (ref 1.8–7.7)
NEUTROPHILS NFR BLD: 61.2 % (ref 38–73)
NRBC BLD-RTO: 0 /100 WBC
PLATELET # BLD AUTO: 449 K/UL (ref 150–450)
PMV BLD AUTO: 9.7 FL (ref 9.2–12.9)
POTASSIUM SERPL-SCNC: 3.8 MMOL/L (ref 3.5–5.1)
PROT SERPL-MCNC: 7.3 G/DL (ref 6–8.4)
RBC # BLD AUTO: 3.88 M/UL (ref 4–5.4)
RETICS/RBC NFR AUTO: 1.4 % (ref 0.5–2.5)
SODIUM SERPL-SCNC: 138 MMOL/L (ref 136–145)
TSH SERPL DL<=0.005 MIU/L-ACNC: 3.16 UIU/ML (ref 0.34–5.6)
VIT B12 SERPL-MCNC: >1500 PG/ML (ref 210–950)
WBC # BLD AUTO: 8.7 K/UL (ref 3.9–12.7)

## 2024-03-14 PROCEDURE — 85384 FIBRINOGEN ACTIVITY: CPT | Performed by: INTERNAL MEDICINE

## 2024-03-14 PROCEDURE — 85025 COMPLETE CBC W/AUTO DIFF WBC: CPT | Performed by: INTERNAL MEDICINE

## 2024-03-14 PROCEDURE — 99214 OFFICE O/P EST MOD 30 MIN: CPT | Mod: S$GLB,,, | Performed by: INTERNAL MEDICINE

## 2024-03-14 PROCEDURE — 84443 ASSAY THYROID STIM HORMONE: CPT | Performed by: INTERNAL MEDICINE

## 2024-03-14 PROCEDURE — 82668 ASSAY OF ERYTHROPOIETIN: CPT | Performed by: INTERNAL MEDICINE

## 2024-03-14 PROCEDURE — 80053 COMPREHEN METABOLIC PANEL: CPT | Performed by: INTERNAL MEDICINE

## 2024-03-14 PROCEDURE — 85240 CLOT FACTOR VIII AHG 1 STAGE: CPT | Performed by: INTERNAL MEDICINE

## 2024-03-14 PROCEDURE — 36415 COLL VENOUS BLD VENIPUNCTURE: CPT | Performed by: INTERNAL MEDICINE

## 2024-03-14 PROCEDURE — 85045 AUTOMATED RETICULOCYTE COUNT: CPT | Performed by: INTERNAL MEDICINE

## 2024-03-14 PROCEDURE — 82607 VITAMIN B-12: CPT | Performed by: INTERNAL MEDICINE

## 2024-03-14 PROCEDURE — 82728 ASSAY OF FERRITIN: CPT | Performed by: INTERNAL MEDICINE

## 2024-03-14 PROCEDURE — 82746 ASSAY OF FOLIC ACID SERUM: CPT | Performed by: INTERNAL MEDICINE

## 2024-03-14 NOTE — PROGRESS NOTES
[facility] hematology Oncology in office Subsequent encounter note    3/14/24    Subjective:      Patient ID:   Jen Torres  73 y.o. female  1950  Sam Guerrero MD      Chief Complaint:   Recurrent leg clots    HPI:  73 y.o. female with 1 or 2 episodes of superficial venous clots involving the left leg.    Patient has history of falling off a porch, she has lumbar spine disease and low back pain symptoms with ski attic a. She is to see a chiropractor.  Other history includes a twisted left knee and torn meniscus.  She hit L leg, it was black and blue.  Check U/S of L leg now 12/2022.  Seeing Dr. Raz Jimenes for L knee pain.  Sample orders for lovenox bridge written and reviewed with her.  Await a definite surgical date, to decide on lovenox bridge dates.  She had L knee surgery 2/2023, now on eliquis 5 mg BID.    L rotator cuff surgery 12/29/23.  3/7/24 TIA.  Had Vertigo sx, SOB, L facial numbness and justa white in appearance, increased BP.  Now on Eliquis 5 mg 1 BID and ASA 81 mg 1 daily.  Expressive aphasia x's 2 months.    She has history of cancer of the cervix, a cyst on her ovary, and calcium deposits in the left breast.    She has history of asthma, type 2 diabetes, high cholesterol, hypertension, sleep apnea syndrome, GERD and dysphagia symptoms history of ulcer per Dr. Ritchie, she has a small bladder and arthritic symptoms in the fingers and knee.    Significantly around March of 2021 her medial lower knee area was swollen at the left leg and she was found to have a superficial clot.  Recently she had left leg fullness with increased edema and ultrasound showed superficial vein clot.  Will discuss with radiologist if the event of 2021 and 2022 likely represent the same clot or different clot events.    She has seen Dr. Guerrero of vascular surgery for venous evaluation.  I do not believe she has had any specific treatment or intervention as of yet.    She does not smoke, she  does not drink alcohol, she is allergic to statins with swelling and metformin with metallic taste and burning of the esophagus.    She has history of abnormal Pap smear with cryo procedure at age 21.  Other history includes GERD, hyperlipidemia, migraine headaches and biliary colic.    Medications include Trulicity, Amaryl, hydrochlorothiazide, Medrol Dosepak, Starlix, and Micro-K.    Her mother had colon cancer, and had been hit in the ankle area of the left leg and later developed a DVT and pulmonary embolus.  She had venous disease and venous stripping procedure.  Her dad had Alzheimer's disease.  Her aunt had colon cancer.  A paternal grandmother had breast cancer.  Another aunt had colitis, colon cancer irritable bowel syndrome.  A brother  of suicide, a brother  in MVA and another brother is alive and well.    Her daughter had Crohn's disease and ovarian cancer.  Another daughter had uterine cancer at age 20.    She has hx of L knee injury, needed elective arthroscopy.  On Elliquis 5 mg 1 BID.  On folbic.  Re check F8, fibrinogen, homocystine at [facility].  Recent U/S residual superficial clot seen in L leg veins.  She had surgery 25, with lovenox bridge.  Dr. Hatfield.    Repeat U/S of leg no DVT seen.  22.    She had UTI, now on nitrofurantoin antibiotic.    Hgb 14.1 to 11.5 to 10.6 to 9,8.  Hx of PUD, and black BM 1 month ago.  Eats 2 meals daily.  Check mamm,  check lab for anemia eval.    ROS:   GEN: normal without any fever, night sweats or weight loss  HEENT: normal with no HA's, sore throat, stiff neck, changes in vision  CV: See HPI  PULM: See HPI  GI: See HPI  : normal with no hematuria, dysuria  BREAST: normal with no mass, discharge, pain  SKIN: See HPI     Past Medical History:   Diagnosis Date    Abnormal Pap smear     s/p cryo at 20 y/o    Anticoagulant long-term use     GERD (gastroesophageal reflux disease)     Hyperlipidemia     Migraine     Migraines     Sleep apnea      Can not use c-pap     Past Surgical History:   Procedure Laterality Date    ARTHROSCOPIC REPAIR OF ROTATOR CUFF OF SHOULDER Left 12/29/2023    Procedure: REPAIR, ROTATOR CUFF, ARTHROSCOPIC;  Surgeon: Gary Tony MD;  Location: [facility] OR;  Service: Orthopedics;  Laterality: Left;  arthrex    ARTHROSCOPY OF SHOULDER WITH DECOMPRESSION OF SUBACROMIAL SPACE Left 12/29/2023    Procedure: ARTHROSCOPY, SHOULDER, WITH SUBACROMIAL SPACE DECOMPRESSION;  Surgeon: Gary Tony MD;  Location: [facility] OR;  Service: Orthopedics;  Laterality: Left;    CATARACT EXTRACTION, BILATERAL      CHOLECYSTECTOMY  10/01/2014    EYE SURGERY      KNEE ARTHROSCOPY Left     TONSILLECTOMY         Review of patient's allergies indicates:   Allergen Reactions    Statins-hmg-coa reductase inhibitors Swelling    Metformin Other (See Comments)     metallic taste , burn of the esophagus     Social History     Socioeconomic History    Marital status:    Tobacco Use    Smoking status: Former    Smokeless tobacco: Never   Substance and Sexual Activity    Alcohol use: No    Drug use: No    Sexual activity: Never         Current Outpatient Medications:     acetaminophen (TYLENOL) 500 MG tablet, Take 2 tablets (1,000 mg total) by mouth every 8 (eight) hours as needed for Pain., Disp: 30 tablet, Rfl: 0    dulaglutide (TRULICITY) 0.75 mg/0.5 mL pen injector, Inject 1.5 pens into the skin every 7 days. Fridays, Disp: , Rfl:     ELIQUIS 5 mg Tab, TAKE 1 TABLET BY MOUTH TWICE A DAY (Patient taking differently: Take 5 mg by mouth 2 (two) times daily.), Disp: 60 tablet, Rfl: 4    enoxaparin (LOVENOX) 150 mg/mL Syrg, Inject into the skin. Prior to surgery, Disp: , Rfl:     ergocalciferol (ERGOCALCIFEROL) 50,000 unit Cap, Take 50,000 Units by mouth every 7 days. Fridays, Disp: , Rfl:     ezetimibe (ZETIA) 10 mg tablet, Take 10 mg by mouth once daily., Disp: , Rfl:     glimepiride (AMARYL) 2 MG tablet, Take 2 mg by mouth every morning., Disp: ,  "Rfl:     hydroCHLOROthiazide (HYDRODIURIL) 25 MG tablet, Take 25 mg by mouth every Mon, Wed, Fri., Disp: , Rfl:     pantoprazole (PROTONIX) 40 MG tablet, Take 40 mg by mouth every morning., Disp: , Rfl:     potassium chloride (MICRO-K) 10 MEQ CpSR, TAKE 1 CAPSULE BY MOUTH EVERY DAY (Patient taking differently: Take 10 mEq by mouth once daily.), Disp: 90 capsule, Rfl: 3    WESTAB MAX 2.5-25-2 mg Tab, TAKE 1 TABLET BY MOUTH EVERY DAY, Disp: 90 tablet, Rfl: 4  No current facility-administered medications for this visit.    Facility-Administered Medications Ordered in Other Visits:     electrolyte-S (ISOLYTE), , Intravenous, Continuous, Blade Packer MD    electrolyte-S (ISOLYTE), , Intravenous, Continuous, Blade Packer MD, Last Rate: 200 mL/hr at 12/29/23 1159, Rate Change at 12/29/23 1159    LIDOcaine (PF) 10 mg/ml (1%) injection 10 mg, 1 mL, Intradermal, Once, Blade Packer MD    midazolam (VERSED) 1 mg/mL injection 2 mg, 2 mg, Intravenous, PRN, Blade Packer MD, 2 mg at 12/29/23 0955          Objective:   Vitals:  Blood pressure 123/77, pulse 88, temperature 97.2 °F (36.2 °C), resp. rate 18, height 5' 8" (1.727 m), weight 97.4 kg (214 lb 12.8 oz).    Physical Examination:   GEN: no apparent distress, comfortable  HEAD: atraumatic and normocephalic  EYES: no pallor, no icterus  ENT:  no pharyngeal erythema, external ears WNL; no nasal discharge  NECK: no masses, thyroid normal, trachea midline, no LAD/LN's, supple  CV: RRR with no murmur; normal pulse; normal S1 and S2  CHEST: Normal respiratory effort; CTAB; normal breath sounds; no wheeze or crackles  ABDOM: nontender and nondistended; soft; L/S NP, no rebound/guarding  MUSC/Skeletal: ROM normal; no crepitus; joints normal  EXTREM: no clubbing, cyanosis, inflammation   SKIN: no rashes, lesions, ulcers, petechiae.  The left leg has chronic edema changes with prominent varicose veins, without palpable venous cord and is nontender.  The right leg " is without edema, without prominent varicose veins and without palpable venous cord and is nontender.  She has qusdricepts weakness at L leg, Calf NT at L or R leg.  : no CVAT  NEURO: grossly intact; motor/sensory WNL;  no tremors  PSYCH: normal mood, affect and behavior  LYMPH: normal cervical, supraclavicular, axillary and groin LN's    CBC, CMP, TSH Negative    Ultrasound July 11, 2022 shows superficial venous thrombus in the mid and proximal greater saphenous vein, DVT is not seen.  Left leg.  Ultrasound Sept. 15, 2022 shows persistent superficial venous thrombus at calf of L leg.    Ultrasound report from March 4, 2020 shows no evidence of DVT.  Thrombosed superficial lower extremity varices are noted, there is reflux from the origin of the greater saphenous vein to the knee.    Hypercoag. Eval. MTHFR mutation + -  -  -, [id number]  Homocystine 12.3.  Homocystine Now Nl. At 6.81.  Factor 8 increased 221.  Anticardiolipid AB increased IgG 25.    Mamm shows microcalcifications.  Stable since 2021.  Re check 1 year.    Assessment:    1. This individual has 1 or 2 episodes of thrombosis of the superficial veins of the left leg.      2. Hypercoag. Eval. Is with Positive results as above.    3. Will discuss with Dr. Guerrero to see if impedance plethismography  studies are available to evaluate her for venous insufficiency and to see if any procedure can be done to reduce the of varicosities at the L leg.?    4.On a trial of Folbic daily.  Continue Eliquis 5 mg BID.  Had TIA, now on added ASA 81 mg daily.    Homocystine level has now normalized.  She proceeded with knee surgery  with lovenox bridge and Eliquis management. 1/18/23.    Anemia eval.  PUD hx, black BM 1 month ago.  Await lab eval.    CBC, homocystine, fibrinogen,  other lab and RTC 3 weeks.

## 2024-03-14 NOTE — LETTER
March 16, 2024        Sam Garcia IV, MD  [address]   [place] [address]             [facility] - Hematology Oncology  [address] [facility] [address]  Phone: [phone]  Fax: [phone]   Patient: Jen Torres   MR Number: [id number]   YOB: 1950   Date of Visit: 3/14/2024       Dear Dr. Garcia:    Thank you for referring Jen Melissa to me for evaluation. Below are the relevant portions of my assessment and plan of care.            If you have questions, please do not hesitate to call me. I look forward to following Jen along with you.    Sincerely,      PABLO Moreland MD           CC    No Recipients

## 2024-03-15 ENCOUNTER — TELEPHONE (OUTPATIENT)
Dept: HEMATOLOGY/ONCOLOGY | Facility: CLINIC | Age: 74
End: 2024-03-15

## 2024-03-15 DIAGNOSIS — R92.8 ABNORMAL MAMMOGRAM OF BOTH BREASTS: Primary | ICD-10-CM

## 2024-03-16 LAB
EPO SERPL-ACNC: 56.6 MIU/ML (ref 2.6–18.5)
FACT VIII ACT/NOR PPP: 267 % (ref 56–140)

## 2024-03-21 ENCOUNTER — OFFICE VISIT (OUTPATIENT)
Dept: ORTHOPEDICS | Facility: CLINIC | Age: 74
End: 2024-03-21
Payer: MEDICARE

## 2024-03-21 VITALS — RESPIRATION RATE: 18 BRPM | BODY MASS INDEX: 32.55 KG/M2 | WEIGHT: 214.75 LBS | HEIGHT: 68 IN

## 2024-03-21 DIAGNOSIS — M75.122 COMPLETE TEAR OF LEFT ROTATOR CUFF, UNSPECIFIED WHETHER TRAUMATIC: Primary | ICD-10-CM

## 2024-03-21 DIAGNOSIS — R42 VERTIGO: ICD-10-CM

## 2024-03-21 DIAGNOSIS — R26.89 BALANCE DISORDER: ICD-10-CM

## 2024-03-21 PROCEDURE — 99024 POSTOP FOLLOW-UP VISIT: CPT | Mod: S$PBB,,, | Performed by: ORTHOPAEDIC SURGERY

## 2024-03-21 PROCEDURE — 99213 OFFICE O/P EST LOW 20 MIN: CPT | Mod: PBBFAC,PO | Performed by: ORTHOPAEDIC SURGERY

## 2024-03-21 PROCEDURE — 99999 PR PBB SHADOW E&M-EST. PATIENT-LVL III: CPT | Mod: PBBFAC,,, | Performed by: ORTHOPAEDIC SURGERY

## 2024-03-21 NOTE — PROGRESS NOTES
Past Medical History:   Diagnosis Date    Abnormal Pap smear     s/p cryo at 22 y/o    Anticoagulant long-term use     GERD (gastroesophageal reflux disease)     Hyperlipidemia     Migraine     Migraines     Sleep apnea     Can not use c-pap       Past Surgical History:   Procedure Laterality Date    ARTHROSCOPIC REPAIR OF ROTATOR CUFF OF SHOULDER Left 12/29/2023    Procedure: REPAIR, ROTATOR CUFF, ARTHROSCOPIC;  Surgeon: Gary Tony MD;  Location: [facility];  Service: Orthopedics;  Laterality: Left;  arthrex    ARTHROSCOPY OF SHOULDER WITH DECOMPRESSION OF SUBACROMIAL SPACE Left 12/29/2023    Procedure: ARTHROSCOPY, SHOULDER, WITH SUBACROMIAL SPACE DECOMPRESSION;  Surgeon: Gary Tony MD;  Location: [facility];  Service: Orthopedics;  Laterality: Left;    CATARACT EXTRACTION, BILATERAL      CHOLECYSTECTOMY  10/01/2014    EYE SURGERY      KNEE ARTHROSCOPY Left     TONSILLECTOMY         Current Outpatient Medications   Medication Sig    acetaminophen (TYLENOL) 500 MG tablet Take 2 tablets (1,000 mg total) by mouth every 8 (eight) hours as needed for Pain.    dulaglutide (TRULICITY) 0.75 mg/0.5 mL pen injector Inject 1.5 pens into the skin every 7 days. Fridays    ELIQUIS 5 mg Tab TAKE 1 TABLET BY MOUTH TWICE A DAY (Patient taking differently: Take 5 mg by mouth 2 (two) times daily.)    ergocalciferol (ERGOCALCIFEROL) 50,000 unit Cap Take 50,000 Units by mouth every 7 days. Fridays    ezetimibe (ZETIA) 10 mg tablet Take 10 mg by mouth once daily.    glimepiride (AMARYL) 2 MG tablet Take 2 mg by mouth every morning.    hydroCHLOROthiazide (HYDRODIURIL) 25 MG tablet Take 25 mg by mouth every Mon, Wed, Fri.    pantoprazole (PROTONIX) 40 MG tablet Take 40 mg by mouth every morning.    potassium chloride (MICRO-K) 10 MEQ CpSR TAKE 1 CAPSULE BY MOUTH EVERY DAY (Patient taking differently: Take 10 mEq by mouth once daily.)    WESTAB MAX 2.5-25-2 mg Tab TAKE 1 TABLET BY MOUTH EVERY DAY     No current  facility-administered medications for this visit.     Facility-Administered Medications Ordered in Other Visits   Medication    electrolyte-S (ISOLYTE)    electrolyte-S (ISOLYTE)    LIDOcaine (PF) 10 mg/ml (1%) injection 10 mg    midazolam (VERSED) 1 mg/mL injection 2 mg       Review of patient's allergies indicates:   Allergen Reactions    Statins-hmg-coa reductase inhibitors Swelling    Metformin Other (See Comments)     metallic taste , burn of the esophagus       Family History   Problem Relation Age of Onset    Breast cancer Paternal Grandmother 70    Colon cancer Mother             Cancer Daughter         crohns    Ovarian cancer Daughter     Cancer Daughter 20        uterine cancer with mets s/p chemo/rad/surgery; ?genetic mutation; now with hip tumors    Breast cancer Maternal Grandmother        Social History     Socioeconomic History    Marital status:    Tobacco Use    Smoking status: Former    Smokeless tobacco: Never   Substance and Sexual Activity    Alcohol use: No    Drug use: No    Sexual activity: Never       Chief Complaint:   Chief Complaint   Patient presents with    Follow-up     Follow up left shoulder post RCR, dos 24       Date of surgery:  2023    History of present illness:  73-year-old female underwent left arthroscopic speed bridge rotator cuff repair for a high-grade bursal tear.  Patient also underwent a subacromial decompression with acromioplasty.  Patient is doing pretty well.  Patient had to put off some physical therapy because she had a severe bout of vertigo and was in the hospital for few days.  Pain is a 2/10.  Still a little on the tighter side.      Review of Systems:    Musculoskeletal:  See HPI        Physical Examination:    Vital Signs:    Vitals:    24 1017   Resp: 18       Body mass index is 32.65 kg/m².    This a well-developed, well nourished patient in no acute distress.  They are alert and oriented and cooperative to examination.   Pt. walks without an antalgic gait.      Examination left shoulder shows well-healed surgical incisions.  No erythema drainage.  Patient is neurovascularly intact.   Patient has decent passive range of motion with just a little tightness still.    X-rays:  None     Assessment::  Status post SpeedBridge rotator cuff repair and subacromial decompression for high-grade bursal surface tear    Plan:  Continue the formal physical therapy.  I will see her back in 8 weeks.  We will also put in an order for some OT for her vertigo.    This note was created using [employer] voice recognition software that occasionally misinterpreted phrases or words.

## 2024-03-25 LAB
OHS QRS DURATION: 88 MS
OHS QTC CALCULATION: 464 MS

## 2024-03-27 ENCOUNTER — OFFICE VISIT (OUTPATIENT)
Dept: CARDIOLOGY | Facility: CLINIC | Age: 74
End: 2024-03-27
Payer: MEDICARE

## 2024-03-27 VITALS
SYSTOLIC BLOOD PRESSURE: 144 MMHG | HEART RATE: 61 BPM | RESPIRATION RATE: 16 BRPM | OXYGEN SATURATION: 99 % | WEIGHT: 213 LBS | BODY MASS INDEX: 32.28 KG/M2 | DIASTOLIC BLOOD PRESSURE: 70 MMHG | HEIGHT: 68 IN

## 2024-03-27 DIAGNOSIS — R06.02 SHORTNESS OF BREATH: Primary | ICD-10-CM

## 2024-03-27 DIAGNOSIS — E78.5 DYSLIPIDEMIA: ICD-10-CM

## 2024-03-27 DIAGNOSIS — E11.9 TYPE 2 DIABETES MELLITUS WITHOUT COMPLICATION, WITHOUT LONG-TERM CURRENT USE OF INSULIN: ICD-10-CM

## 2024-03-27 DIAGNOSIS — I82.402 DEEP VEIN THROMBOSIS (DVT) OF LEFT LOWER EXTREMITY, UNSPECIFIED CHRONICITY, UNSPECIFIED VEIN: ICD-10-CM

## 2024-03-27 DIAGNOSIS — I10 ESSENTIAL HYPERTENSION: ICD-10-CM

## 2024-03-27 PROCEDURE — 93005 ELECTROCARDIOGRAM TRACING: CPT | Mod: PBBFAC,PN | Performed by: INTERNAL MEDICINE

## 2024-03-27 PROCEDURE — 93010 ELECTROCARDIOGRAM REPORT: CPT | Mod: S$PBB,,, | Performed by: INTERNAL MEDICINE

## 2024-03-27 PROCEDURE — 99999 PR PBB SHADOW E&M-EST. PATIENT-LVL III: CPT | Mod: PBBFAC,,, | Performed by: INTERNAL MEDICINE

## 2024-03-27 PROCEDURE — 99213 OFFICE O/P EST LOW 20 MIN: CPT | Mod: PBBFAC,PN | Performed by: INTERNAL MEDICINE

## 2024-03-27 PROCEDURE — 99215 OFFICE O/P EST HI 40 MIN: CPT | Mod: S$PBB,,, | Performed by: INTERNAL MEDICINE

## 2024-03-27 RX ORDER — DILTIAZEM HYDROCHLORIDE 120 MG/1
120 CAPSULE, COATED, EXTENDED RELEASE ORAL DAILY
Qty: 30 CAPSULE | Refills: 11 | Status: SHIPPED | OUTPATIENT
Start: 2024-03-27 | End: 2025-03-27

## 2024-03-27 RX ORDER — NAPROXEN SODIUM 220 MG/1
81 TABLET, FILM COATED ORAL DAILY
COMMUNITY

## 2024-03-27 NOTE — ASSESSMENT & PLAN NOTE
History of DVT presently on Eliquis 5 mg p.o. b.i.d. encouraged her to continue the same and follow-up with Dr. Felder

## 2024-03-27 NOTE — ASSESSMENT & PLAN NOTE
Shortness of breath appears to be unclear etiology.    Her echocardiogram results are as follows    Summary         Left Ventricle: The left ventricle is normal in size. Mildly increased wall thickness. There is mild concentric hypertrophy. Normal wall motion. There is normal systolic function with a visually estimated ejection fraction of 65 - 70%. There is normal diastolic function.    Right Ventricle: Normal right ventricular cavity size. Wall thickness is normal. Right ventricle wall motion  is normal. Systolic function is normal.    Mitral Valve: There is mild regurgitation with a centrally directed jet.    IVC/SVC: Normal venous pressure at 3 mmHg.     Recommend to obtain a Lexiscan Myoview to complete cardiac workup.  In the meanwhile encouraged to seek evaluation with hematologist she was severe iron deficiency and she may benefit from iron infusions this will help the shortness of breath

## 2024-03-27 NOTE — ASSESSMENT & PLAN NOTE
Encouraged to follow-up with primary care.  Presently on Trulicity I suspect this may be causing some worsening abdominal symptoms she was also on Amaryl

## 2024-03-27 NOTE — ASSESSMENT & PLAN NOTE
Blood pressure is reasonably controlled at 144/80 mm Hg.  Her baseline heart rate is 61 beats per minute may consider adding Cardizem CD at 120 mg a day to regular rhythm and also to optimize blood pressure control she is on hydrochlorothiazide 25 mg every other day maintain the same regimen.

## 2024-03-27 NOTE — PROGRESS NOTES
Subjective:    Patient ID:  Jen Torres is a 73 y.o. female patient here for evaluation Follow-up (She had been having sob, elevated bp and heart rate) and Transient Ischemic Attack (About 2 weeks ago)      History of Present Illness:   Patient is a 73-year-old with history of complex medical problems including history of chronic thromboembolic events and deep vein thrombosis as well as factor 5 Leiden deficiency and type 2 diabetes had developed progressive shortness of breath.  Early March she had an episode of transient  Weakness and dizziness while she was doing physical therapy  And reportedly her heart was racing and blood pressure felt very weak and she was brought to the emergency room for evaluation.  She feels very fatigued following that she was some diaphoresis and speech impediment was noted she was brought by ambulance.  She was some numbness in the left side of the face and left side.  Her blood pressure is 168/84 mm Hg neuro workup was initiated she was admitted to the hospital for further evaluation and neurology consult was obtained.  These symptoms have resolved.  She was some varying neurologic symptoms periodically she was history of severe migraine headaches in the past.  She reports shortness of breath with activity and so the house abdominal bloating and discomfort is noted and she reports periodic GI bleeding as well she is now actively evaluated by Dr. Feldre for hematologic problems  And she sees Dr. Ritchie for GI symptoms.  Encouraged her to make a follow-up visit with him        Discharge Summary        Patient Name: Jen Torres  MRN: [id number]  VALENCIA: [id number]  Patient Class: OP- Observation  Admission Date: 3/7/2024  Hospital Length of Stay: 0 days  Discharge Date and Time:  03/08/2024 9:37 AM  Attending Physician: Tania Myrick MD   Discharging Provider: Tania Myrick MD  Primary Care Provider: Sam Garcia IV, MD     Primary Care Team: Networked  reference to record PCT      HPI:   73 year old pt getting admitted with syncope  Pt on chronic basis suffers from shortness of breath and vertigo  She recently had L shoulder surgery  As per Pt since she had nerve block on neck before the Shoulder surgery she has not been feeling good  Today in Rehab center when pt was about to start rehab sessions she felt L sided weakness  She was told by bystanders she looks pale  Also pt was diaphoretics and was very fatigued   Ambulance was called and pt was escorted to ER and Code stroked was initiated      * No surgery found *       Hospital Course:   Patient is a 73 year old female who was at outpatient rehab and was brought to ED after she felt dizzy and facial numbness on the left side. Patient states she has on going SOB on exertion for years. Also vertigo, which sounds like BPPV. But both these are chronic issues. On admission stroke code was called and work up was negative. Echo pending and will follow the report. However patient is euvolemic currently. Patient worked with PT, OT and we recommended to follow up with outpatient therapy for possible eplys maneuver.         Review of patient's allergies indicates:   Allergen Reactions    Statins-hmg-coa reductase inhibitors Swelling    Metformin Other (See Comments)     metallic taste , burn of the esophagus       Past Medical History:   Diagnosis Date    Abnormal Pap smear     s/p cryo at 22 y/o    Anticoagulant long-term use     GERD (gastroesophageal reflux disease)     Hyperlipidemia     Migraine     Migraines     Sleep apnea     Can not use c-pap     Past Surgical History:   Procedure Laterality Date    ARTHROSCOPIC REPAIR OF ROTATOR CUFF OF SHOULDER Left 12/29/2023    Procedure: REPAIR, ROTATOR CUFF, ARTHROSCOPIC;  Surgeon: Gary Tony MD;  Location: [facility];  Service: Orthopedics;  Laterality: Left;  arthrex    ARTHROSCOPY OF SHOULDER WITH DECOMPRESSION OF SUBACROMIAL SPACE Left 12/29/2023    Procedure:  ARTHROSCOPY, SHOULDER, WITH SUBACROMIAL SPACE DECOMPRESSION;  Surgeon: Gary Tony MD;  Location: [facility];  Service: Orthopedics;  Laterality: Left;    CATARACT EXTRACTION, BILATERAL      CHOLECYSTECTOMY  10/01/2014    EYE SURGERY      KNEE ARTHROSCOPY Left     TONSILLECTOMY       Social History     Tobacco Use    Smoking status: Former    Smokeless tobacco: Never   Substance Use Topics    Alcohol use: No    Drug use: No        Review of Systems:    As noted in HPI in addition      REVIEW OF SYSTEMS  CARDIOVASCULAR: No recent chest pain, palpitations, arm, neck, or jaw pain  RESPIRATORY: No recent fever, cough chills, SOB or congestion  : No blood in the urine  GI: No Nausea, vomiting, constipation, diarrhea, blood, or reflux.  MUSCULOSKELETAL: No myalgias  NEURO: No lightheadedness or dizziness  EYES: No Double vision, blurry, vision or headache     , recording of her blood pressure has been reasonable.  And she does have occasional elevations however resting heart rate         Objective        Vitals:    03/27/24 1038   BP: (!) 144/70   Pulse: 61   Resp: 16       LIPIDS - LAST 2   Lab Results   Component Value Date    CHOL 199 03/07/2024    CHOL 234 (H) 10/10/2019    HDL 44 03/07/2024    HDL 43 10/10/2019    LDLCALC 99.0 03/07/2024    LDLCALC 163.8 (H) 10/10/2019    TRIG 280 (H) 03/07/2024    TRIG 136 10/10/2019    CHOLHDL 22.1 03/07/2024    CHOLHDL 18.4 (L) 10/10/2019       CBC - LAST 2  Lab Results   Component Value Date    WBC 8.70 03/14/2024    WBC 9.64 03/08/2024    RBC 3.88 (L) 03/14/2024    RBC 3.80 (L) 03/08/2024    HGB 9.9 (L) 03/14/2024    HGB 9.8 (L) 03/08/2024    HCT 31.2 (L) 03/14/2024    HCT 31.3 (L) 03/08/2024    MCV 80 (L) 03/14/2024    MCV 82 03/08/2024    MCH 25.5 (L) 03/14/2024    MCH 25.8 (L) 03/08/2024    MCHC 31.7 (L) 03/14/2024    MCHC 31.3 (L) 03/08/2024    RDW 13.6 03/14/2024    RDW 13.6 03/08/2024     03/14/2024     03/08/2024    MPV 9.7 03/14/2024    MPV 10.6  03/08/2024    GRAN 5.3 03/14/2024    GRAN 61.2 03/14/2024    LYMPH 2.7 03/14/2024    LYMPH 30.8 03/14/2024    MONO 0.5 03/14/2024    MONO 6.0 03/14/2024    BASO 0.03 03/14/2024    BASO 0.04 03/08/2024    NRBC 0 03/14/2024    NRBC 0 03/08/2024       CHEMISTRY & LIVER FUNCTION - LAST 2  Lab Results   Component Value Date     03/14/2024     03/08/2024    K 3.8 03/14/2024    K 4.0 03/08/2024     03/14/2024     03/08/2024    CO2 24 03/14/2024    CO2 23 03/08/2024    ANIONGAP 11 03/14/2024    ANIONGAP 10 03/08/2024    BUN 17 03/14/2024    BUN 19 03/08/2024    CREATININE 1.3 03/14/2024    CREATININE 1.1 03/08/2024     (H) 03/14/2024     (H) 03/08/2024    CALCIUM 9.3 03/14/2024    CALCIUM 8.9 03/08/2024    MG 1.7 03/08/2024    ALBUMIN 4.0 03/14/2024    ALBUMIN 3.7 03/08/2024    PROT 7.3 03/14/2024    PROT 6.9 03/08/2024    ALKPHOS 102 03/14/2024    ALKPHOS 105 03/08/2024    ALT 42 03/14/2024    ALT 38 03/08/2024    AST 48 (H) 03/14/2024    AST 37 03/08/2024    BILITOT 0.3 03/14/2024    BILITOT 0.3 03/08/2024        CARDIAC PROFILE - LAST 2  Lab Results   Component Value Date    BNP 16 03/07/2024    BNP 18 10/10/2019    TROPONINIHS 4.6 03/07/2024        COAGULATION - LAST 2  Lab Results   Component Value Date    INR 0.9 03/07/2024    INR 1.0 12/18/2023    APTT 31.6 12/18/2023    APTT 29 08/01/2022       ENDOCRINE & PSA - LAST 2  Lab Results   Component Value Date    HGBA1C 9.0 (H) 03/08/2024    TSH 3.161 03/14/2024    TSH 2.754 03/07/2024        ECHOCARDIOGRAM RESULTS  Results for orders placed during the hospital encounter of 03/07/24    Echo    Interpretation Summary    Left Ventricle: The left ventricle is normal in size. Mildly increased wall thickness. There is mild concentric hypertrophy. Normal wall motion. There is normal systolic function with a visually estimated ejection fraction of 65 - 70%. There is normal diastolic function.    Right Ventricle: Normal right ventricular  cavity size. Wall thickness is normal. Right ventricle wall motion  is normal. Systolic function is normal.    Mitral Valve: There is mild regurgitation with a centrally directed jet.    IVC/SVC: Normal venous pressure at 3 mmHg.      CURRENT/PREVIOUS VISIT EKG  Results for orders placed or performed during the hospital encounter of 03/07/24   ECG 12 lead    Collection Time: 03/07/24  2:09 PM   Result Value Ref Range    QRS Duration 88 ms    [facility] QTC Calculation 464 ms    Narrative    Test Reason : R29.818,    Vent. Rate : 081 BPM     Atrial Rate : 081 BPM     P-R Int : 160 ms          QRS Dur : 088 ms      QT Int : 400 ms       P-R-T Axes : 073 074 075 degrees     QTc Int : 464 ms    Sinus rhythm with Premature atrial complexes  Otherwise normal ECG  When compared with ECG of 18-DEC-2023 09:49,  Premature atrial complexes are now Present  Nonspecific T wave abnormality no longer evident in Anterior leads  Confirmed by Patrick Maria MD ([id number]) on 3/25/2024 12:02:44 PM    Referred By:             Confirmed By:Patrick Maria MD     No valid procedures specified.   No results found for this or any previous visit.    No valid procedures specified.    PHYSICAL EXAM  CONSTITUTIONAL: Well built, well nourished in no apparent distress  NECK: no carotid bruit, no JVD  LUNGS: CTA  CHEST WALL: no tenderness  HEART: regular rate and rhythm, S1, S2 normal, no murmur, click, rub or gallop   ABDOMEN:  Abdominal distention is noted without guarding or rebound, soft, non-tender; bowel sounds normal; no masses,  no organomegaly  EXTREMITIES: Extremities normal, no edema, no calf tenderness noted  NEURO: AAO X 3    I HAVE REVIEWED :    The vital signs, nurses notes, and all the pertinent radiology and labs.    EKG shows sinus rhythm with isolated atrial premature complex/sinus arrhythmia and tiny inferior Q-waves noted nonspecific    Current Outpatient Medications   Medication Instructions    aspirin 81 mg, Oral, Daily     dulaglutide (TRULICITY) 0.75 mg/0.5 mL pen injector 1.5 pens, Subcutaneous, Every 7 days, Fridays    ELIQUIS 5 mg Tab TAKE 1 TABLET BY MOUTH TWICE A DAY    ergocalciferol (ERGOCALCIFEROL) 50,000 Units, Oral, Every 7 days, Fridays    ezetimibe (ZETIA) 10 mg, Oral, Daily    glimepiride (AMARYL) 2 mg, Oral, Every morning    hydroCHLOROthiazide (HYDRODIURIL) 25 mg, Oral, Every Mon, Wed, Fri    PAIN RELIEVER ES(ACETAMINOPHN) 1,000 mg, Oral, Every 8 hours PRN    pantoprazole (PROTONIX) 40 mg, Oral, Every morning    potassium chloride (MICRO-K) 10 MEQ CpSR TAKE 1 CAPSULE BY MOUTH EVERY DAY    WESTAB MAX 2.5-25-2 mg Tab 1 tablet, Oral          Assessment & Plan     Shortness of breath  Shortness of breath appears to be unclear etiology.    Her echocardiogram results are as follows    Summary         Left Ventricle: The left ventricle is normal in size. Mildly increased wall thickness. There is mild concentric hypertrophy. Normal wall motion. There is normal systolic function with a visually estimated ejection fraction of 65 - 70%. There is normal diastolic function.    Right Ventricle: Normal right ventricular cavity size. Wall thickness is normal. Right ventricle wall motion  is normal. Systolic function is normal.    Mitral Valve: There is mild regurgitation with a centrally directed jet.    IVC/SVC: Normal venous pressure at 3 mmHg.     Recommend to obtain a Lexiscan Myoview to complete cardiac workup.  In the meanwhile encouraged to seek evaluation with hematologist she was severe iron deficiency and she may benefit from iron infusions this will help the shortness of breath    Essential hypertension  Blood pressure is reasonably controlled at 144/80 mm Hg.  Her baseline heart rate is 61 beats per minute may consider adding Cardizem CD at 120 mg a day to regular rhythm and also to optimize blood pressure control she is on hydrochlorothiazide 25 mg every other day maintain the same regimen.    Dyslipidemia  History of  dyslipidemia statin intolerance.  However she was able to tolerate Zetia 10 mg nightly    Deep vein thrombosis (DVT) of left lower extremity, unspecified chronicity, unspecified vein  History of DVT presently on Eliquis 5 mg p.o. b.i.d. encouraged her to continue the same and follow-up with Dr. Felder    Type 2 diabetes mellitus without complication, without long-term current use of insulin  Encouraged to follow-up with primary care.  Presently on Trulicity I suspect this may be causing some worsening abdominal symptoms she was also on Amaryl    I have encouraged her to follow-up with gastroenterologist as well as the hematologist.      Follow up in about 6 weeks (around 5/8/2024).

## 2024-04-03 ENCOUNTER — HOSPITAL ENCOUNTER (OUTPATIENT)
Dept: RADIOLOGY | Facility: HOSPITAL | Age: 74
Discharge: HOME | End: 2024-04-03
Attending: INTERNAL MEDICINE
Payer: MEDICARE

## 2024-04-03 DIAGNOSIS — R92.8 ABNORMAL MAMMOGRAM OF BOTH BREASTS: ICD-10-CM

## 2024-04-03 PROCEDURE — 77065 DX MAMMO INCL CAD UNI: CPT | Mod: TC,RT

## 2024-04-03 PROCEDURE — 77061 BREAST TOMOSYNTHESIS UNI: CPT | Mod: 26,RT,, | Performed by: RADIOLOGY

## 2024-04-03 PROCEDURE — 77065 DX MAMMO INCL CAD UNI: CPT | Mod: 26,RT,, | Performed by: RADIOLOGY

## 2024-04-03 PROCEDURE — 77061 BREAST TOMOSYNTHESIS UNI: CPT | Mod: TC,RT

## 2024-04-04 ENCOUNTER — OFFICE VISIT (OUTPATIENT)
Dept: HEMATOLOGY/ONCOLOGY | Facility: CLINIC | Age: 74
End: 2024-04-04
Payer: MEDICARE

## 2024-04-04 VITALS
TEMPERATURE: 99 F | SYSTOLIC BLOOD PRESSURE: 145 MMHG | WEIGHT: 215.5 LBS | HEIGHT: 68 IN | DIASTOLIC BLOOD PRESSURE: 84 MMHG | BODY MASS INDEX: 32.66 KG/M2 | RESPIRATION RATE: 16 BRPM | HEART RATE: 103 BPM

## 2024-04-04 DIAGNOSIS — K27.9 PUD (PEPTIC ULCER DISEASE): ICD-10-CM

## 2024-04-04 DIAGNOSIS — D50.0 IRON DEFICIENCY ANEMIA DUE TO CHRONIC BLOOD LOSS: ICD-10-CM

## 2024-04-04 DIAGNOSIS — K92.1 MELENA: Primary | ICD-10-CM

## 2024-04-04 PROCEDURE — 99214 OFFICE O/P EST MOD 30 MIN: CPT | Mod: S$GLB,,, | Performed by: INTERNAL MEDICINE

## 2024-04-04 RX ORDER — EPINEPHRINE 0.3 MG/.3ML
0.3 INJECTION SUBCUTANEOUS ONCE AS NEEDED
Status: CANCELLED | OUTPATIENT
Start: 2024-04-17

## 2024-04-04 RX ORDER — HEPARIN 100 UNIT/ML
5 SYRINGE INTRAVENOUS
Status: CANCELLED | OUTPATIENT
Start: 2024-04-17

## 2024-04-04 RX ORDER — ORAL SEMAGLUTIDE 3 MG/1
3 TABLET ORAL DAILY
Status: ON HOLD | COMMUNITY
Start: 2024-03-26 | End: 2024-04-20

## 2024-04-04 RX ORDER — SODIUM CHLORIDE 9 MG/ML
INJECTION, SOLUTION INTRAVENOUS CONTINUOUS
Status: CANCELLED | OUTPATIENT
Start: 2024-04-17

## 2024-04-04 RX ORDER — SODIUM CHLORIDE 0.9 % (FLUSH) 0.9 %
10 SYRINGE (ML) INJECTION
Status: CANCELLED | OUTPATIENT
Start: 2024-04-17

## 2024-04-04 RX ORDER — DIPHENHYDRAMINE HYDROCHLORIDE 50 MG/ML
50 INJECTION INTRAMUSCULAR; INTRAVENOUS ONCE AS NEEDED
Status: CANCELLED | OUTPATIENT
Start: 2024-04-17

## 2024-04-04 NOTE — PROGRESS NOTES
[facility] hematology Oncology in office Subsequent encounter note    4/4/24    Subjective:      Patient ID:   Jen Torres  73 y.o. female  1950  MD JESSICA Martinez MD, MD      Chief Complaint:   Recurrent leg clots    HPI:  73 y.o. female with 1 or 2 episodes of superficial venous clots involving the left leg.    Patient has history of falling off a porch, she has lumbar spine disease and low back pain symptoms with ski attic a. She is to see a chiropractor.  Other history includes a twisted left knee and torn meniscus.  She hit L leg, it was black and blue.  Check U/S of L leg now 12/2022.  Seeing Dr. Raz Jimenes for L knee pain.  Sample orders for lovenox bridge written and reviewed with her.  Await a definite surgical date, to decide on lovenox bridge dates.  She had L knee surgery 2/2023, now on eliquis 5 mg BID.    L rotator cuff surgery 12/29/23.  3/7/24 TIA.  Had Vertigo sx, SOB, L facial numbness and justa white in appearance, increased BP.  Now on Eliquis 5 mg 1 BID and ASA 81 mg 1 daily.  Expressive aphasia x's 2 months.    She has history of cancer of the cervix, a cyst on her ovary, and calcium deposits in the left breast.    She has history of asthma, type 2 diabetes, high cholesterol, hypertension, sleep apnea syndrome, GERD and dysphagia symptoms history of ulcer per Dr. Ritchie, she has a small bladder and arthritic symptoms in the fingers and knee.    Significantly around March of 2021 her medial lower knee area was swollen at the left leg and she was found to have a superficial clot.  Recently she had left leg fullness with increased edema and ultrasound showed superficial vein clot.  Will discuss with radiologist if the event of 2021 and 2022 likely represent the same clot or different clot events.    She has seen Dr. Guerrero of vascular surgery for venous evaluation.  I do not believe she has had any specific treatment or intervention as of yet.    She does  not smoke, she does not drink alcohol, she is allergic to statins with swelling and metformin with metallic taste and burning of the esophagus.    She has history of abnormal Pap smear with cryo procedure at age 21.  Other history includes GERD, hyperlipidemia, migraine headaches and biliary colic.    Medications include Trulicity, Amaryl, hydrochlorothiazide, Medrol Dosepak, Starlix, and Micro-K.    Her mother had colon cancer, and had been hit in the ankle area of the left leg and later developed a DVT and pulmonary embolus.  She had venous disease and venous stripping procedure.  Her dad had Alzheimer's disease.  Her aunt had colon cancer.  A paternal grandmother had breast cancer.  Another aunt had colitis, colon cancer irritable bowel syndrome.  A brother  of suicide, a brother  in MVA and another brother is alive and well.    Her daughter had Crohn's disease and ovarian cancer.  Another daughter had uterine cancer at age 20.    She has hx of L knee injury, needed elective arthroscopy.  On Elliquis 5 mg 1 BID.  On folbic.  Re check F8, fibrinogen, homocystine at [facility].  Recent U/S residual superficial clot seen in L leg veins.  She had surgery 25, with lovenox bridge.  Dr. Hatfield.    Repeat U/S of leg no DVT seen.  22.    She had UTI, now on nitrofurantoin antibiotic.    Hgb 14.1 to 11.5 to 10.6 to 9,8.  Hx of PUD, and black BM 1 month ago.  Eats 2 meals daily.  Check mamm,  check lab for anemia eval.  Ferritin 9.  EGD and colonoscopy 1 year ago.  Injectafer x's 2.    ROS:   GEN: normal without any fever, night sweats or weight loss  HEENT: normal with no HA's, sore throat, stiff neck, changes in vision  CV: See HPI  PULM: See HPI  GI: See HPI  : normal with no hematuria, dysuria  BREAST: normal with no mass, discharge, pain  SKIN: See HPI     Past Medical History:   Diagnosis Date    Abnormal Pap smear     s/p cryo at 20 y/o    Anticoagulant long-term use     GERD (gastroesophageal  reflux disease)     Hyperlipidemia     Migraine     Migraines     Sleep apnea     Can not use c-pap     Past Surgical History:   Procedure Laterality Date    ARTHROSCOPIC REPAIR OF ROTATOR CUFF OF SHOULDER Left 12/29/2023    Procedure: REPAIR, ROTATOR CUFF, ARTHROSCOPIC;  Surgeon: Gary Tony MD;  Location: [facility];  Service: Orthopedics;  Laterality: Left;  arthrex    ARTHROSCOPY OF SHOULDER WITH DECOMPRESSION OF SUBACROMIAL SPACE Left 12/29/2023    Procedure: ARTHROSCOPY, SHOULDER, WITH SUBACROMIAL SPACE DECOMPRESSION;  Surgeon: Gary Tony MD;  Location: [facility];  Service: Orthopedics;  Laterality: Left;    CATARACT EXTRACTION, BILATERAL      CHOLECYSTECTOMY  10/01/2014    EYE SURGERY      KNEE ARTHROSCOPY Left     TONSILLECTOMY         Review of patient's allergies indicates:   Allergen Reactions    Statins-hmg-coa reductase inhibitors Swelling    Metformin Other (See Comments)     metallic taste , burn of the esophagus     Social History     Socioeconomic History    Marital status:    Tobacco Use    Smoking status: Former    Smokeless tobacco: Never   Substance and Sexual Activity    Alcohol use: No    Drug use: No    Sexual activity: Never         Current Outpatient Medications:     acetaminophen (TYLENOL) 500 MG tablet, Take 2 tablets (1,000 mg total) by mouth every 8 (eight) hours as needed for Pain., Disp: 30 tablet, Rfl: 0    aspirin 81 MG Chew, Take 81 mg by mouth once daily., Disp: , Rfl:     diltiaZEM (CARDIZEM CD) 120 MG Cp24, Take 1 capsule (120 mg total) by mouth once daily., Disp: 30 capsule, Rfl: 11    dulaglutide (TRULICITY) 0.75 mg/0.5 mL pen injector, Inject 1.5 pens into the skin every 7 days. Fridays, Disp: , Rfl:     ELIQUIS 5 mg Tab, TAKE 1 TABLET BY MOUTH TWICE A DAY (Patient taking differently: Take 5 mg by mouth 2 (two) times daily.), Disp: 60 tablet, Rfl: 4    ergocalciferol (ERGOCALCIFEROL) 50,000 unit Cap, Take 50,000 Units by mouth every 7 days. Fridays,  "Disp: , Rfl:     ezetimibe (ZETIA) 10 mg tablet, Take 10 mg by mouth once daily., Disp: , Rfl:     glimepiride (AMARYL) 2 MG tablet, Take 2 mg by mouth every morning., Disp: , Rfl:     hydroCHLOROthiazide (HYDRODIURIL) 25 MG tablet, Take 25 mg by mouth every Mon, Wed, Fri., Disp: , Rfl:     pantoprazole (PROTONIX) 40 MG tablet, Take 40 mg by mouth every morning., Disp: , Rfl:     potassium chloride (MICRO-K) 10 MEQ CpSR, TAKE 1 CAPSULE BY MOUTH EVERY DAY (Patient taking differently: Take 10 mEq by mouth once daily.), Disp: 90 capsule, Rfl: 3    semaglutide (RYBELSUS) 3 mg tablet, Take 3 mg by mouth once daily., Disp: , Rfl:     WESTAB MAX 2.5-25-2 mg Tab, TAKE 1 TABLET BY MOUTH EVERY DAY, Disp: 90 tablet, Rfl: 4  No current facility-administered medications for this visit.    Facility-Administered Medications Ordered in Other Visits:     electrolyte-S (ISOLYTE), , Intravenous, Continuous, Blade Packer MD    electrolyte-S (ISOLYTE), , Intravenous, Continuous, Blade Packer MD, Last Rate: 200 mL/hr at 12/29/23 1159, Rate Change at 12/29/23 1159    LIDOcaine (PF) 10 mg/ml (1%) injection 10 mg, 1 mL, Intradermal, Once, Blade Packer MD    midazolam (VERSED) 1 mg/mL injection 2 mg, 2 mg, Intravenous, PRN, Blade Packer MD, 2 mg at 12/29/23 0955          Objective:   Vitals:  Blood pressure (!) 145/84, pulse 103, temperature 98.7 °F (37.1 °C), resp. rate 16, height 5' 8" (1.727 m), weight 97.8 kg (215 lb 8 oz).    Physical Examination:   GEN: no apparent distress, comfortable  HEAD: atraumatic and normocephalic  EYES: no pallor, no icterus  ENT:  no pharyngeal erythema, external ears WNL; no nasal discharge  NECK: no masses, thyroid normal, trachea midline, no LAD/LN's, supple  CV: RRR with no murmur; normal pulse; normal S1 and S2  CHEST: Normal respiratory effort; CTAB; normal breath sounds; no wheeze or crackles  ABDOM: nontender and nondistended; soft; L/S NP, no rebound/guarding  MUSC/Skeletal: " ROM normal; no crepitus; joints normal  EXTREM: no clubbing, cyanosis, inflammation   SKIN: no rashes, lesions, ulcers, petechiae.  The left leg has chronic edema changes with prominent varicose veins, without palpable venous cord and is nontender.  The right leg is without edema, without prominent varicose veins and without palpable venous cord and is nontender.  She has qusdricepts weakness at L leg, Calf NT at L or R leg.  : no CVAT  NEURO: grossly intact; motor/sensory WNL;  no tremors  PSYCH: normal mood, affect and behavior  LYMPH: normal cervical, supraclavicular, axillary and groin LN's    CBC, CMP, TSH Negative    Ultrasound July 11, 2022 shows superficial venous thrombus in the mid and proximal greater saphenous vein, DVT is not seen.  Left leg.  Ultrasound Sept. 15, 2022 shows persistent superficial venous thrombus at calf of L leg.    Ultrasound report from March 4, 2020 shows no evidence of DVT.  Thrombosed superficial lower extremity varices are noted, there is reflux from the origin of the greater saphenous vein to the knee.    Hypercoag. Eval. MTHFR mutation + -  -  -, [id number]  Homocystine 12.3.  Homocystine Now Nl. At 6.81.  Factor 8 increased 221.  Anticardiolipid AB increased IgG 25.    Mamm shows microcalcifications.  Stable since 2021.  Re check 1 year.    Assessment:    1. This individual has 1 or 2 episodes of thrombosis of the superficial veins of the left leg.      2. Hypercoag. Eval. Is with Positive results as above.    3. Will discuss with Dr. Guerrero to see if impedance plethismography  studies are available to evaluate her for venous insufficiency and to see if any procedure can be done to reduce the of varicosities at the L leg.?    4.On a trial of Folbic daily.  Continue Eliquis 5 mg BID.  Had TIA, now on added ASA 81 mg daily.    Homocystine level has now normalized.  She proceeded with knee surgery  with lovenox bridge and Eliquis management. 1/18/23.    Anemia eval.  PUD hx, black  BM 1 month ago.  DURAN,+, Fatigue+  4/10 energy.  Fe deficiency ferritin 9.  GI EVAL now?  Injectafer x's2.    RTC 3 months with CBC, Ferritin.

## 2024-04-04 NOTE — LETTER
April 4, 2024        Sam Garcia IV, MD  [address]   [place] [address]             [facility] - Hematology Oncology  [address] [facility] [address]  Phone: [phone]  Fax: [phone]   Patient: Jen Torres   MR Number: [id number]   YOB: 1950   Date of Visit: 4/4/2024       Dear Dr. Garcia:    Thank you for referring Jen Melissa to me for evaluation. Below are the relevant portions of my assessment and plan of care.            If you have questions, please do not hesitate to call me. I look forward to following Jen along with you.    Sincerely,      PABLO Moreland MD           CC    No Recipients

## 2024-04-05 ENCOUNTER — CLINICAL SUPPORT (OUTPATIENT)
Dept: REHABILITATION | Facility: HOSPITAL | Age: 74
End: 2024-04-05
Attending: ORTHOPAEDIC SURGERY
Payer: MEDICARE

## 2024-04-05 DIAGNOSIS — R42 LIGHTHEADEDNESS: ICD-10-CM

## 2024-04-05 DIAGNOSIS — R42 VERTIGO: ICD-10-CM

## 2024-04-05 DIAGNOSIS — R26.89 BALANCE DISORDER: ICD-10-CM

## 2024-04-05 DIAGNOSIS — R42 DYSEQUILIBRIUM: Primary | ICD-10-CM

## 2024-04-05 PROCEDURE — 97161 PT EVAL LOW COMPLEX 20 MIN: CPT | Mod: PO

## 2024-04-05 PROCEDURE — 97112 NEUROMUSCULAR REEDUCATION: CPT | Mod: PO

## 2024-04-05 NOTE — PLAN OF CARE
[facility]  Physical Therapy Neurological Rehabilitation Initial Evaluation    Name: Jen Torres  Clinic Number: [id number]    Therapy Diagnosis:   Encounter Diagnoses   Name Primary?    Balance disorder     Dysequilibrium Yes    Lightheadedness     Vertigo      Physician: Gary Tony,*    Physician Orders: physical therapy evaluation and treat; vestibular rehab therapy   Medical Diagnosis from Referral: balance disorder  Evaluation Date: 4/5/2024  Authorization Period Expiration: 12/31/2024  Plan of Care Expiration: 05/18/2024  Visit # / Visits authorized: 1/ 20    Time In: 0850  Time Out: 0930  Total Billable Time: 40 minutes    Precautions: Fall; history of TIA (per patient)      Subjective     Date of onset: 03/25/2024  History of Current Symptoms, Jen reports: about one year history of room-spinning vertigo while turning in bed; patient also reports generalized lightheadedness and occasional dysequilibrium during transitional movement; patient's condition is further complicated by history of anemia, HTN and migraines.     History of migraines: yes      Medical History:   Past Medical History:   Diagnosis Date    Abnormal Pap smear     s/p cryo at 22 y/o    Anticoagulant long-term use     GERD (gastroesophageal reflux disease)     Hyperlipidemia     Migraine     Migraines     Sleep apnea     Can not use c-pap     Surgical History:   Jen Torres  has a past surgical history that includes Cholecystectomy (10/01/2014); Tonsillectomy; Eye surgery; Cataract extraction, bilateral; Knee arthroscopy (Left); Arthroscopic repair of rotator cuff of shoulder (Left, 12/29/2023); and Arthroscopy of shoulder with decompression of subacromial space (Left, 12/29/2023).    Medications:   Jen has a current medication list which includes the following prescription(s): acetaminophen, aspirin, diltiazem, trulicity, eliquis, ergocalciferol, ezetimibe, glimepiride,  hydrochlorothiazide, pantoprazole, potassium chloride, rybelsus, and westab max, and the following Facility-Administered Medications: electrolyte-s (ph 7.4), electrolyte-s (ph 7.4), lidocaine (pf) 10 mg/ml (1%), and midazolam.    Allergies:   Review of patient's allergies indicates:   Allergen Reactions    Statins-hmg-coa reductase inhibitors Swelling    Metformin Other (See Comments)     metallic taste , burn of the esophagus      Imaging (MRI of brain on 03/07/2024):     1. No acute intracranial abnormality. Negative for acute infarct.  2. Minimal punctate foci of T2 hyperintensity in cerebral hemispheric white matter suggesting gliosis from minor chronic small vessel ischemic changes.    Prior Therapy: for left shoulder surgery  Social History: lives with her family in mobile home (4 steps into enter)  Falls: none   Occupation: retired  Prior Level of Function: supervision   Current Level of Function: minimal difficulty with activities of daily living     Pain:  Current 3/10, worst 4-5/10, best 0/10   Location: left neck   Description: Aching and Dull  Aggravating Factors: end range of motion   Easing Factors: rest    Pts goals: decrease symptoms      Objective     - Follows commands: 100% of time   - Speech deficits: no    Functional Mobility & ADLs:   Bed mobility: stand by assist   Supine to sit: stand by assist   Sit to supine: stand by assist   Sit to stand: stand by assist     Mental status: alert, oriented to person, place, and time, normal mood, behavior, speech, dress, motor activity, and thought processes  Appearance: Casually dressed  Behavior:  calm and cooperative  Attention Span and Concentration:  Normal    Posture Alignment in sitting:   Head: forward head     Sensation: Light Touch: tingling in left upper extremity and both hands           Proprioception: Intact, Kinesthesia Intact         Visual/Auditory:   Tracking/Smooth Pursuits:Intact  Saccades: Intact  Modified Dynamic Visual Acuity  "Test:Impaired: blurred vision with head movement   Gaze Evoked: Intact  VOR: Intact    Coordination:   - fine motor: within functional limits   - UE coordination: within functional limits    - LE coordination:  within functional limits    ROM:   CERVICAL SPINE  Flexion 35 degrees (80-90 deg)  Extension 45 degrees (70-80 deg)  L side bend 30 degrees, R side bend 30 degrees (20-45 degrees)  L rotation 45 degrees, R rotation 45 degrees (70-90 degrees)  Are concurrent symptoms present with any of these movements = none    Modified VAS (Vertebral Artery Screen), in sitting (rotation, then extension):  R: NT  L: NT    RANGE OF MOTION--LOWER EXTREMITIES  (R) LE Hip: normal   Knee: normal   Ankle: normal    (L) LE: Hip: normal   Knee: normal   Ankle: normal    Strength: manual muscle test grades below     Lower Extremity Strength  Right LE  Left LE    Hip flexion:  4-/5 Hip flexion: 4-/5   Knee extension: 4-/5 Knee extension: 4-/5   Ankle dorsiflexion:  4/5 Ankle dorsiflexion: 4/5       ROCIO SENSORY TESTING:  (P= Pass, F= Fail; note any sway; hold each position for 30")  Condition 6: (soft surface/feet together/eyes closed) = minimal to moderate sway; opened eyes x 1  Condition 7: (Fakuda step test), measure distance varied from center starting position, > 30 deg deviation to either side indicates hypofunction of biased side = 10 degree rotation to the right after 50 steps    Gait Assessment:(if indicated)  - AD used: none  - Assistance: stand by assist   - Distance: 2 x 50 feet     GAIT DEVIATIONS:  Jen displays the following deviations with ambulation: mild path deviation    Impairments contributing to deviations: dysequilibrium     Endurance Deficit: minimal complaints of fatigue       POSITIONAL CANAL TESTING  Looking for nystagmus (slow phase followed by quick phase to the affected side for BPPV)    Wicho Hallpike (posterior / CL anterior)   Right : positive   Left: negative  Horizontal Canals   Right: n/a   Left: " n/a  Treatment Performed: n/a        Intake Outcome Measure for FOTO Vestibular Survey    Therapist reviewed FOTO scores for Jen Sessions on 4/5/2024.   FOTO documents entered into [employer] - see Media section.    Intake Score: 31.5% disability         TREATMENT     Treatment Time In: 0920  Treatment Time Out: 0930  Total Treatment time separate from Evaluation: 10 minutes    Jen participated in neuromuscular re-education activities to assess: Balance and Vestibular function for 10 minutes. The following activities were included:    X 5 each seated smooth pursuits = side to side, up and down  X 5 each seated saccades = side to side, up and down  X 5 each seated VOR1 = side to side, up and down  X 20 seconds stand on AirEx = eyes closed  X 50 stepping in place = eyes closed      Home Exercises and Patient Education Provided    Education provided:   - proper therapeutic exercise technique  - treatment plan    Written Home Exercises Provided: to be provided at future appointment.      Assessment     Jen is a 73 y.o. female referred to outpatient Physical Therapy with a medical diagnosis of balance disorder. Pt presents to PT with the following impairments leading to her functional decline: vertigo/lightheadedness, dysequilibrium. Patient's condition is likely due to both benign paroxysmal positional vertigo and unilateral vestibular hypofunction.    Pt prognosis is Fair.   Pt will benefit from skilled outpatient Physical Therapy to address the deficits stated above and in the chart below, provide pt/family education, and to maximize pt's level of independence.     Plan of care discussed with patient: Yes  Pt's spiritual, cultural and educational needs considered and patient is agreeable to the plan of care and goals as stated below:     Anticipated Barriers for therapy: severity of symptoms    Medical Necessity is demonstrated by the following  History  Co-morbidities and personal factors that may impact the  plan of care [] LOW: no personal factors / co-morbidities  [x] MODERATE: 1-2 personal factors / co-morbidities  [] HIGH: 3+ personal factors / co-morbidities    Moderate / High Support Documentation: history of migraine and abdominal surgery     Examination  Body Structures and Functions, activity limitations and participation restrictions that may impact the plan of care [] LOW: addressing 1-2 elements  [x] MODERATE: 3+ elements  [] HIGH: 4+ elements (please support below)    Moderate / High Support Documentation: gait; balance; vestibular     Clinical Presentation [x] LOW: stable  [] MODERATE: Evolving  [] HIGH: Unstable     Decision Making/ Complexity Score: low       Goals:    Short Term Goals (3 Weeks):   Maintain patient's complaints of dizziness to less than 5/10 during performance of activities of daily living for independence of self care activities.  Patient to tolerate x 45 seconds full Romberg stance, eyes closed to improve upright tolerance.  Patient to begin adaptation home exercise program.     Long Term Goals (6 Weeks):   Patient to demonstrate competence with home exercise program to maintain therapeutic gains.  2.   Patient to ambulate 20 feet in less than 7 seconds to improve theresa/symmetry.  3.   Patient to perform floor ladder high stepping without loss of balance.      Plan     Plan of care Certification: 4/5/2024 to 05/18/2024.    Outpatient Physical Therapy evaluation, plus 2 times weekly for 6 weeks to include the following interventions (starting week of 04/08/24): Gait Training, Manual Therapy, Moist Heat/ Ice, Neuromuscular Re-ed, Patient Education, Self Care, Therapeutic Activities, Therapeutic Exercise, and home exercise program .     Delta Jett, PT

## 2024-04-08 ENCOUNTER — CLINICAL SUPPORT (OUTPATIENT)
Dept: REHABILITATION | Facility: HOSPITAL | Age: 74
End: 2024-04-08
Payer: MEDICARE

## 2024-04-08 DIAGNOSIS — R42 VERTIGO: Primary | ICD-10-CM

## 2024-04-08 DIAGNOSIS — R42 DYSEQUILIBRIUM: ICD-10-CM

## 2024-04-08 DIAGNOSIS — R42 LIGHTHEADEDNESS: ICD-10-CM

## 2024-04-08 PROCEDURE — 97530 THERAPEUTIC ACTIVITIES: CPT | Mod: PO

## 2024-04-08 PROCEDURE — 97110 THERAPEUTIC EXERCISES: CPT | Mod: PO

## 2024-04-08 NOTE — PROGRESS NOTES
[facility]   Physical Therapy Treatment Note      Name: Jen Torres  Clinic Number: [id number]    Therapy Diagnosis:   Encounter Diagnoses   Name Primary?    Vertigo Yes    Dysequilibrium     Lightheadedness      Physician: Gary Tony,*    Visit Date: 4/8/2024    Physician Orders: physical therapy evaluation and treat; vestibular rehab therapy   Medical Diagnosis from Referral: balance disorder  Evaluation Date: 4/5/2024  Authorization Period Expiration: 12/31/2024  Plan of Care Expiration: 05/18/2024  Visit # / Visits authorized: 2/ 20     Time In: 1302  Time Out: 1342  Total Billable Time: 40 minutes     Precautions: Fall; history of TIA (per patient)    PTA Visit #: 0/5       Subjective     Patient reports: Patient arrived with complaint of shortness of breath related to anemia. She reports that her dizziness with sitting up in bed is gone since repositioning procedure in eval.     She has not yet been provided with home exercise program.  Response to previous treatment: no complaints reported with regards to initial evaluation  Functional change: decreased dizziness with bed mobility     Pain: 0/10  Location: no pain reported    Objective      Objective Measures updated at progress report unless specified.     Oxygen saturation: 94%    POSITIONAL CANAL TESTING  Looking for nystagmus (slow phase followed by quick phase to the affected side for BPPV)     [place] Hallpike (posterior / CL anterior)              Right : negative              Left: negative  Treatment Performed: n/a        Treatment     Jen received the treatments listed below:      therapeutic exercises to develop strength and endurance for 10 minutes including:    Standing heel raise, 2 x 10 repetitions  Standing hip abduction, 2 x 10 repetitions   Seated hip adduction, ball squeeze x 10 repetitions  Seated hip abduction with orange theraband x 10 repetitions      neuromuscular re-education activities to  "improve: Balance, Proprioception, and Vestibular System Functioning for 30 minutes. The following activities were included:    X 30 seconds seated smooth pursuits, "X target" (horizontal plane)   X 30 seconds seated smooth pursuits, "X target" (vertical plane)   X 30 seconds seated saccades,  "X targets" (horizontal plane)   X 30 seconds seated saccades,  "X targets" (vertical plane)   X 30 seconds VOR x 1, "X target" (horizontal plane)   X 10 repetitions  horizontal head turns in standing, feet together  X 10 repetitions  vertical head turns in standing, feet together  X 30 seconds static balance, eyes closed, feet together*  X 10 repetitions anterior/posterior ankle sways with no upper extremity support*        Patient Education and Home Exercises       Education provided:   - Provided with verbal/demonstrative instruction for proper technique with all exercises and activities     Written Home Exercises Provided: yes. Exercises were reviewed and Jen was able to demonstrate them prior to the end of the session.  Jen demonstrated good  understanding of the education provided. See Electronic Medical Record under Patient Instructions for exercises provided during therapy sessions    Assessment     Jen provided good participation and effort during session today with focus on reassessment of BPPV, introduction of home exercise program for  and lower extremity strengthening. She demonstrated no nystagmus and reported no dizziness with repeat assessment of the [place] Hallpike today. Patient reported no dizziness or blurred vision with vestibular adaptation exercises. She reported dyspnea with prolonged standing and final exercises of session was modified to be performed in sitting. She is a good candidate for continued skilled physical therapy intervention to address goals below and reduce residual dizziness.         Jen Is progressing well towards her goals.   Patient prognosis is Good.     Patient will " continue to benefit from skilled outpatient physical therapy to address the deficits listed in the problem list box on initial evaluation, provide pt/family education and to maximize pt's level of independence in the home and community environment.     Patient's spiritual, cultural and educational needs considered and pt agreeable to plan of care and goals.     Anticipated barriers to physical therapy: comorbidities and anemia contributing to dyspnea on exertion    Goals:   Short Term Goals (3 Weeks):   Maintain patient's complaints of dizziness to less than 5/10 during performance of activities of daily living for independence of self care activities.  Patient to tolerate x 45 seconds full Romberg stance, eyes closed to improve upright tolerance.  Patient to begin adaptation home exercise program.     Long Term Goals (6 Weeks):   Patient to demonstrate competence with home exercise program to maintain therapeutic gains.  2.   Patient to ambulate 20 feet in less than 7 seconds to improve theresa/symmetry.  3.   Patient to perform floor ladder high stepping without loss of balance.    Plan     Recommendations for next treatment session: progress VRT exercises, monitoring dyspnea and activity tolerance       RAMY MENDOZA, PT

## 2024-04-10 ENCOUNTER — TELEPHONE (OUTPATIENT)
Dept: HEMATOLOGY/ONCOLOGY | Facility: CLINIC | Age: 74
End: 2024-04-10

## 2024-04-10 NOTE — TELEPHONE ENCOUNTER
Sent info to [facility] letting them know pt wants infusions at that location as opposed to [place].

## 2024-04-10 NOTE — TELEPHONE ENCOUNTER
Spoke with patient and she would like to have her iron infusions done at [facility] as oppose to [place]. Advised I would send orders to them and then should be calling her shortly to set up appts. Pt verbalized understanding.

## 2024-04-17 ENCOUNTER — INFUSION (OUTPATIENT)
Dept: INFUSION THERAPY | Facility: HOSPITAL | Age: 74
DRG: 330 | End: 2024-04-17
Attending: INTERNAL MEDICINE
Payer: MEDICARE

## 2024-04-17 VITALS
DIASTOLIC BLOOD PRESSURE: 60 MMHG | OXYGEN SATURATION: 99 % | HEIGHT: 68 IN | SYSTOLIC BLOOD PRESSURE: 127 MMHG | WEIGHT: 212.94 LBS | TEMPERATURE: 98 F | HEART RATE: 75 BPM | BODY MASS INDEX: 32.27 KG/M2 | RESPIRATION RATE: 18 BRPM

## 2024-04-17 DIAGNOSIS — D50.0 IRON DEFICIENCY ANEMIA DUE TO CHRONIC BLOOD LOSS: Primary | ICD-10-CM

## 2024-04-17 PROCEDURE — 25000003 PHARM REV CODE 250: Performed by: INTERNAL MEDICINE

## 2024-04-17 PROCEDURE — 96365 THER/PROPH/DIAG IV INF INIT: CPT

## 2024-04-17 PROCEDURE — 63600175 PHARM REV CODE 636 W HCPCS: Mod: JZ | Performed by: INTERNAL MEDICINE

## 2024-04-17 RX ORDER — HEPARIN 100 UNIT/ML
5 SYRINGE INTRAVENOUS
Status: CANCELLED | OUTPATIENT
Start: 2024-04-24

## 2024-04-17 RX ORDER — DIPHENHYDRAMINE HYDROCHLORIDE 50 MG/ML
50 INJECTION INTRAMUSCULAR; INTRAVENOUS ONCE AS NEEDED
OUTPATIENT
Start: 2024-04-24

## 2024-04-17 RX ORDER — SODIUM CHLORIDE 0.9 % (FLUSH) 0.9 %
10 SYRINGE (ML) INJECTION
Status: CANCELLED | OUTPATIENT
Start: 2024-04-24

## 2024-04-17 RX ORDER — SODIUM CHLORIDE 9 MG/ML
INJECTION, SOLUTION INTRAVENOUS CONTINUOUS
Status: CANCELLED | OUTPATIENT
Start: 2024-04-24

## 2024-04-17 RX ORDER — SODIUM CHLORIDE 0.9 % (FLUSH) 0.9 %
10 SYRINGE (ML) INJECTION
Status: DISCONTINUED | OUTPATIENT
Start: 2024-04-17 | End: 2024-04-17 | Stop reason: HOSPADM

## 2024-04-17 RX ORDER — SODIUM CHLORIDE 9 MG/ML
INJECTION, SOLUTION INTRAVENOUS CONTINUOUS
Status: DISCONTINUED | OUTPATIENT
Start: 2024-04-17 | End: 2024-04-17 | Stop reason: HOSPADM

## 2024-04-17 RX ORDER — EPINEPHRINE 0.3 MG/.3ML
0.3 INJECTION SUBCUTANEOUS ONCE AS NEEDED
OUTPATIENT
Start: 2024-04-24

## 2024-04-17 RX ADMIN — FERRIC CARBOXYMALTOSE INJECTION 750 MG: 50 INJECTION, SOLUTION INTRAVENOUS at 02:04

## 2024-04-17 RX ADMIN — SODIUM CHLORIDE: 9 INJECTION, SOLUTION INTRAVENOUS at 02:04

## 2024-04-17 NOTE — PLAN OF CARE
Problem: Adult Inpatient Plan of Care  Goal: Optimal Comfort and Wellbeing  Outcome: Ongoing, Progressing  Intervention: Provide Person-Centered Care  Flowsheets (Taken 4/17/2024 1432)  Trust Relationship/Rapport:   care explained   choices provided   emotional support provided   empathic listening provided   questions answered   questions encouraged   reassurance provided   thoughts/feelings acknowledged

## 2024-04-18 ENCOUNTER — TELEPHONE (OUTPATIENT)
Dept: INFUSION THERAPY | Facility: HOSPITAL | Age: 74
End: 2024-04-18

## 2024-04-18 NOTE — TELEPHONE ENCOUNTER
4/18/24 [facility] post transfusion phone call - very pleased with care given.  Very clean unit. Very smooth procedure, staff very kind and professional.  Dixie very good nurse.

## 2024-04-19 ENCOUNTER — HOSPITAL ENCOUNTER (INPATIENT)
Facility: HOSPITAL | Age: 74
LOS: 10 days | Discharge: NURSING, REHAB OR LONG-TERM CARE | DRG: 330 | End: 2024-04-30
Attending: EMERGENCY MEDICINE | Admitting: INTERNAL MEDICINE
Payer: MEDICARE

## 2024-04-19 DIAGNOSIS — D64.9 SYMPTOMATIC ANEMIA: Primary | ICD-10-CM

## 2024-04-19 DIAGNOSIS — K92.2 GASTROINTESTINAL HEMORRHAGE, UNSPECIFIED GASTROINTESTINAL HEMORRHAGE TYPE: ICD-10-CM

## 2024-04-19 DIAGNOSIS — D50.0 IRON DEFICIENCY ANEMIA DUE TO CHRONIC BLOOD LOSS: ICD-10-CM

## 2024-04-19 DIAGNOSIS — K63.89 COLONIC MASS: ICD-10-CM

## 2024-04-19 DIAGNOSIS — K92.2 GI BLEED: ICD-10-CM

## 2024-04-19 DIAGNOSIS — R07.9 CHEST PAIN: ICD-10-CM

## 2024-04-19 LAB
ABO + RH BLD: NORMAL
ALBUMIN SERPL BCP-MCNC: 3.7 G/DL (ref 3.5–5.2)
ALBUMIN SERPL BCP-MCNC: 3.7 G/DL (ref 3.5–5.2)
ALP SERPL-CCNC: 107 U/L (ref 55–135)
ALP SERPL-CCNC: 107 U/L (ref 55–135)
ALT SERPL W/O P-5'-P-CCNC: 38 U/L (ref 10–44)
ALT SERPL W/O P-5'-P-CCNC: 38 U/L (ref 10–44)
ANION GAP SERPL CALC-SCNC: 12 MMOL/L (ref 8–16)
ANION GAP SERPL CALC-SCNC: 12 MMOL/L (ref 8–16)
APTT PPP: 32.3 SEC (ref 21–32)
AST SERPL-CCNC: 43 U/L (ref 10–40)
AST SERPL-CCNC: 43 U/L (ref 10–40)
BASOPHILS # BLD AUTO: 0.04 K/UL (ref 0–0.2)
BASOPHILS # BLD AUTO: 0.04 K/UL (ref 0–0.2)
BASOPHILS NFR BLD: 0.3 % (ref 0–1.9)
BASOPHILS NFR BLD: 0.3 % (ref 0–1.9)
BILIRUB SERPL-MCNC: 0.2 MG/DL (ref 0.1–1)
BILIRUB SERPL-MCNC: 0.2 MG/DL (ref 0.1–1)
BILIRUB UR QL STRIP: NEGATIVE
BLD GP AB SCN CELLS X3 SERPL QL: NORMAL
BLD PROD TYP BPU: NORMAL
BLOOD UNIT EXPIRATION DATE: NORMAL
BLOOD UNIT TYPE CODE: 7300
BLOOD UNIT TYPE: NORMAL
BNP SERPL-MCNC: 37 PG/ML (ref 0–99)
BUN SERPL-MCNC: 14 MG/DL (ref 8–23)
BUN SERPL-MCNC: 14 MG/DL (ref 8–23)
CALCIUM SERPL-MCNC: 8.9 MG/DL (ref 8.7–10.5)
CALCIUM SERPL-MCNC: 8.9 MG/DL (ref 8.7–10.5)
CHLORIDE SERPL-SCNC: 103 MMOL/L (ref 95–110)
CHLORIDE SERPL-SCNC: 103 MMOL/L (ref 95–110)
CK SERPL-CCNC: 34 U/L (ref 20–180)
CLARITY UR: CLEAR
CO2 SERPL-SCNC: 22 MMOL/L (ref 23–29)
CO2 SERPL-SCNC: 22 MMOL/L (ref 23–29)
CODING SYSTEM: NORMAL
COLOR UR: YELLOW
CREAT SERPL-MCNC: 1.2 MG/DL (ref 0.5–1.4)
CREAT SERPL-MCNC: 1.2 MG/DL (ref 0.5–1.4)
CROSSMATCH INTERPRETATION: NORMAL
D DIMER PPP IA.FEU-MCNC: 0.55 MG/L FEU (ref 0–0.49)
DIFFERENTIAL METHOD BLD: ABNORMAL
DIFFERENTIAL METHOD BLD: ABNORMAL
DISPENSE STATUS: NORMAL
EOSINOPHIL # BLD AUTO: 0.2 K/UL (ref 0–0.5)
EOSINOPHIL # BLD AUTO: 0.2 K/UL (ref 0–0.5)
EOSINOPHIL NFR BLD: 1.2 % (ref 0–8)
EOSINOPHIL NFR BLD: 1.2 % (ref 0–8)
ERYTHROCYTE [DISTWIDTH] IN BLOOD BY AUTOMATED COUNT: 15.4 % (ref 11.5–14.5)
ERYTHROCYTE [DISTWIDTH] IN BLOOD BY AUTOMATED COUNT: 15.4 % (ref 11.5–14.5)
EST. GFR  (NO RACE VARIABLE): 48.4 ML/MIN/1.73 M^2
EST. GFR  (NO RACE VARIABLE): 48.4 ML/MIN/1.73 M^2
GLUCOSE SERPL-MCNC: 255 MG/DL (ref 70–110)
GLUCOSE SERPL-MCNC: 255 MG/DL (ref 70–110)
GLUCOSE UR QL STRIP: NEGATIVE
HCT VFR BLD AUTO: 24.5 % (ref 37–48.5)
HCT VFR BLD AUTO: 24.5 % (ref 37–48.5)
HGB BLD-MCNC: 7.4 G/DL (ref 12–16)
HGB BLD-MCNC: 7.4 G/DL (ref 12–16)
HGB UR QL STRIP: NEGATIVE
IMM GRANULOCYTES # BLD AUTO: 0.08 K/UL (ref 0–0.04)
IMM GRANULOCYTES # BLD AUTO: 0.08 K/UL (ref 0–0.04)
IMM GRANULOCYTES NFR BLD AUTO: 0.6 % (ref 0–0.5)
IMM GRANULOCYTES NFR BLD AUTO: 0.6 % (ref 0–0.5)
INR PPP: 1 (ref 0.8–1.2)
KETONES UR QL STRIP: NEGATIVE
LEUKOCYTE ESTERASE UR QL STRIP: NEGATIVE
LIPASE SERPL-CCNC: 59 U/L (ref 4–60)
LYMPHOCYTES # BLD AUTO: 2.8 K/UL (ref 1–4.8)
LYMPHOCYTES # BLD AUTO: 2.8 K/UL (ref 1–4.8)
LYMPHOCYTES NFR BLD: 21.7 % (ref 18–48)
LYMPHOCYTES NFR BLD: 21.7 % (ref 18–48)
MAGNESIUM SERPL-MCNC: 1.6 MG/DL (ref 1.6–2.6)
MAGNESIUM SERPL-MCNC: 1.6 MG/DL (ref 1.6–2.6)
MCH RBC QN AUTO: 22.8 PG (ref 27–31)
MCH RBC QN AUTO: 22.8 PG (ref 27–31)
MCHC RBC AUTO-ENTMCNC: 30.2 G/DL (ref 32–36)
MCHC RBC AUTO-ENTMCNC: 30.2 G/DL (ref 32–36)
MCV RBC AUTO: 75 FL (ref 82–98)
MCV RBC AUTO: 75 FL (ref 82–98)
MONOCYTES # BLD AUTO: 0.9 K/UL (ref 0.3–1)
MONOCYTES # BLD AUTO: 0.9 K/UL (ref 0.3–1)
MONOCYTES NFR BLD: 6.6 % (ref 4–15)
MONOCYTES NFR BLD: 6.6 % (ref 4–15)
NEUTROPHILS # BLD AUTO: 9 K/UL (ref 1.8–7.7)
NEUTROPHILS # BLD AUTO: 9 K/UL (ref 1.8–7.7)
NEUTROPHILS NFR BLD: 69.6 % (ref 38–73)
NEUTROPHILS NFR BLD: 69.6 % (ref 38–73)
NITRITE UR QL STRIP: NEGATIVE
NRBC BLD-RTO: 0 /100 WBC
NRBC BLD-RTO: 0 /100 WBC
NUM UNITS TRANS PACKED RBC: NORMAL
OB PNL STL: POSITIVE
PH UR STRIP: 5 [PH] (ref 5–8)
PLATELET # BLD AUTO: 574 K/UL (ref 150–450)
PLATELET # BLD AUTO: 574 K/UL (ref 150–450)
PMV BLD AUTO: 10.1 FL (ref 9.2–12.9)
PMV BLD AUTO: 10.1 FL (ref 9.2–12.9)
POTASSIUM SERPL-SCNC: 3.5 MMOL/L (ref 3.5–5.1)
POTASSIUM SERPL-SCNC: 3.5 MMOL/L (ref 3.5–5.1)
PROT SERPL-MCNC: 6.9 G/DL (ref 6–8.4)
PROT SERPL-MCNC: 6.9 G/DL (ref 6–8.4)
PROT UR QL STRIP: NEGATIVE
PROTHROMBIN TIME: 11.2 SEC (ref 9–12.5)
RBC # BLD AUTO: 3.25 M/UL (ref 4–5.4)
RBC # BLD AUTO: 3.25 M/UL (ref 4–5.4)
SODIUM SERPL-SCNC: 137 MMOL/L (ref 136–145)
SODIUM SERPL-SCNC: 137 MMOL/L (ref 136–145)
SP GR UR STRIP: 1.01 (ref 1–1.03)
SPECIMEN OUTDATE: NORMAL
TROPONIN I SERPL HS-MCNC: 5 PG/ML (ref 0–14.9)
TROPONIN I SERPL HS-MCNC: 5.1 PG/ML (ref 0–14.9)
TSH SERPL DL<=0.005 MIU/L-ACNC: 2.25 UIU/ML (ref 0.34–5.6)
URN SPEC COLLECT METH UR: NORMAL
UROBILINOGEN UR STRIP-ACNC: NEGATIVE EU/DL
WBC # BLD AUTO: 12.93 K/UL (ref 3.9–12.7)
WBC # BLD AUTO: 12.93 K/UL (ref 3.9–12.7)

## 2024-04-19 PROCEDURE — 93010 ELECTROCARDIOGRAM REPORT: CPT | Mod: ,,, | Performed by: GENERAL PRACTICE

## 2024-04-19 PROCEDURE — 25000003 PHARM REV CODE 250: Performed by: EMERGENCY MEDICINE

## 2024-04-19 PROCEDURE — 36430 TRANSFUSION BLD/BLD COMPNT: CPT

## 2024-04-19 PROCEDURE — C9113 INJ PANTOPRAZOLE SODIUM, VIA: HCPCS | Performed by: EMERGENCY MEDICINE

## 2024-04-19 PROCEDURE — 80053 COMPREHEN METABOLIC PANEL: CPT | Performed by: EMERGENCY MEDICINE

## 2024-04-19 PROCEDURE — 93005 ELECTROCARDIOGRAM TRACING: CPT | Performed by: GENERAL PRACTICE

## 2024-04-19 PROCEDURE — 82272 OCCULT BLD FECES 1-3 TESTS: CPT | Performed by: EMERGENCY MEDICINE

## 2024-04-19 PROCEDURE — P9016 RBC LEUKOCYTES REDUCED: HCPCS | Performed by: EMERGENCY MEDICINE

## 2024-04-19 PROCEDURE — 63600175 PHARM REV CODE 636 W HCPCS: Performed by: EMERGENCY MEDICINE

## 2024-04-19 PROCEDURE — 84443 ASSAY THYROID STIM HORMONE: CPT | Performed by: EMERGENCY MEDICINE

## 2024-04-19 PROCEDURE — 30233N1 TRANSFUSION OF NONAUTOLOGOUS RED BLOOD CELLS INTO PERIPHERAL VEIN, PERCUTANEOUS APPROACH: ICD-10-PCS | Performed by: HOSPITALIST

## 2024-04-19 PROCEDURE — 82550 ASSAY OF CK (CPK): CPT | Performed by: EMERGENCY MEDICINE

## 2024-04-19 PROCEDURE — 83690 ASSAY OF LIPASE: CPT | Performed by: EMERGENCY MEDICINE

## 2024-04-19 PROCEDURE — 85379 FIBRIN DEGRADATION QUANT: CPT | Performed by: EMERGENCY MEDICINE

## 2024-04-19 PROCEDURE — 96361 HYDRATE IV INFUSION ADD-ON: CPT | Mod: 59

## 2024-04-19 PROCEDURE — 99285 EMERGENCY DEPT VISIT HI MDM: CPT | Mod: 25

## 2024-04-19 PROCEDURE — 83735 ASSAY OF MAGNESIUM: CPT | Performed by: EMERGENCY MEDICINE

## 2024-04-19 PROCEDURE — 84484 ASSAY OF TROPONIN QUANT: CPT | Mod: 91 | Performed by: EMERGENCY MEDICINE

## 2024-04-19 PROCEDURE — 85730 THROMBOPLASTIN TIME PARTIAL: CPT | Performed by: EMERGENCY MEDICINE

## 2024-04-19 PROCEDURE — 86850 RBC ANTIBODY SCREEN: CPT | Performed by: EMERGENCY MEDICINE

## 2024-04-19 PROCEDURE — G0378 HOSPITAL OBSERVATION PER HR: HCPCS

## 2024-04-19 PROCEDURE — 25500020 PHARM REV CODE 255: Performed by: EMERGENCY MEDICINE

## 2024-04-19 PROCEDURE — 83880 ASSAY OF NATRIURETIC PEPTIDE: CPT | Performed by: EMERGENCY MEDICINE

## 2024-04-19 PROCEDURE — 96374 THER/PROPH/DIAG INJ IV PUSH: CPT | Mod: 59

## 2024-04-19 PROCEDURE — 81003 URINALYSIS AUTO W/O SCOPE: CPT | Performed by: EMERGENCY MEDICINE

## 2024-04-19 PROCEDURE — 36415 COLL VENOUS BLD VENIPUNCTURE: CPT | Performed by: EMERGENCY MEDICINE

## 2024-04-19 PROCEDURE — 85610 PROTHROMBIN TIME: CPT | Performed by: EMERGENCY MEDICINE

## 2024-04-19 PROCEDURE — 86920 COMPATIBILITY TEST SPIN: CPT | Performed by: EMERGENCY MEDICINE

## 2024-04-19 PROCEDURE — 85025 COMPLETE CBC W/AUTO DIFF WBC: CPT | Performed by: EMERGENCY MEDICINE

## 2024-04-19 RX ORDER — SODIUM CHLORIDE 0.9 % (FLUSH) 0.9 %
10 SYRINGE (ML) INJECTION EVERY 12 HOURS PRN
Status: DISCONTINUED | OUTPATIENT
Start: 2024-04-19 | End: 2024-04-30 | Stop reason: HOSPADM

## 2024-04-19 RX ORDER — DILTIAZEM HYDROCHLORIDE 120 MG/1
120 CAPSULE, COATED, EXTENDED RELEASE ORAL DAILY
Status: DISCONTINUED | OUTPATIENT
Start: 2024-04-20 | End: 2024-04-26

## 2024-04-19 RX ORDER — GLUCAGON 1 MG
1 KIT INJECTION
Status: DISCONTINUED | OUTPATIENT
Start: 2024-04-19 | End: 2024-04-30 | Stop reason: HOSPADM

## 2024-04-19 RX ORDER — SODIUM,POTASSIUM PHOSPHATES 280-250MG
2 POWDER IN PACKET (EA) ORAL
Status: DISCONTINUED | OUTPATIENT
Start: 2024-04-19 | End: 2024-04-26

## 2024-04-19 RX ORDER — EZETIMIBE 10 MG/1
10 TABLET ORAL DAILY
Status: DISCONTINUED | OUTPATIENT
Start: 2024-04-20 | End: 2024-04-26

## 2024-04-19 RX ORDER — HYDROCODONE BITARTRATE AND ACETAMINOPHEN 5; 325 MG/1; MG/1
1 TABLET ORAL EVERY 6 HOURS PRN
Status: DISCONTINUED | OUTPATIENT
Start: 2024-04-19 | End: 2024-04-23

## 2024-04-19 RX ORDER — PANTOPRAZOLE SODIUM 40 MG/10ML
80 INJECTION, POWDER, LYOPHILIZED, FOR SOLUTION INTRAVENOUS
Status: COMPLETED | OUTPATIENT
Start: 2024-04-19 | End: 2024-04-19

## 2024-04-19 RX ORDER — SODIUM,POTASSIUM PHOSPHATES 280-250MG
2 POWDER IN PACKET (EA) ORAL
Status: DISCONTINUED | OUTPATIENT
Start: 2024-04-19 | End: 2024-04-30 | Stop reason: HOSPADM

## 2024-04-19 RX ORDER — IBUPROFEN 200 MG
24 TABLET ORAL
Status: DISCONTINUED | OUTPATIENT
Start: 2024-04-19 | End: 2024-04-30 | Stop reason: HOSPADM

## 2024-04-19 RX ORDER — ALUMINUM HYDROXIDE, MAGNESIUM HYDROXIDE, AND SIMETHICONE 1200; 120; 1200 MG/30ML; MG/30ML; MG/30ML
30 SUSPENSION ORAL 4 TIMES DAILY PRN
Status: DISCONTINUED | OUTPATIENT
Start: 2024-04-19 | End: 2024-04-30 | Stop reason: HOSPADM

## 2024-04-19 RX ORDER — ACETAMINOPHEN 325 MG/1
650 TABLET ORAL EVERY 4 HOURS PRN
Status: DISCONTINUED | OUTPATIENT
Start: 2024-04-19 | End: 2024-04-30 | Stop reason: HOSPADM

## 2024-04-19 RX ORDER — IBUPROFEN 200 MG
16 TABLET ORAL
Status: DISCONTINUED | OUTPATIENT
Start: 2024-04-19 | End: 2024-04-30 | Stop reason: HOSPADM

## 2024-04-19 RX ORDER — PANTOPRAZOLE SODIUM 40 MG/10ML
80 INJECTION, POWDER, LYOPHILIZED, FOR SOLUTION INTRAVENOUS DAILY
Status: DISCONTINUED | OUTPATIENT
Start: 2024-04-20 | End: 2024-04-27

## 2024-04-19 RX ORDER — HYDROCODONE BITARTRATE AND ACETAMINOPHEN 500; 5 MG/1; MG/1
TABLET ORAL
Status: DISCONTINUED | OUTPATIENT
Start: 2024-04-19 | End: 2024-04-30 | Stop reason: HOSPADM

## 2024-04-19 RX ORDER — HYDROCHLOROTHIAZIDE 25 MG/1
25 TABLET ORAL
Status: DISCONTINUED | OUTPATIENT
Start: 2024-04-22 | End: 2024-04-26

## 2024-04-19 RX ORDER — LANOLIN ALCOHOL/MO/W.PET/CERES
800 CREAM (GRAM) TOPICAL
Status: DISCONTINUED | OUTPATIENT
Start: 2024-04-19 | End: 2024-04-30 | Stop reason: HOSPADM

## 2024-04-19 RX ORDER — TALC
6 POWDER (GRAM) TOPICAL NIGHTLY PRN
Status: DISCONTINUED | OUTPATIENT
Start: 2024-04-19 | End: 2024-04-30 | Stop reason: HOSPADM

## 2024-04-19 RX ORDER — AMOXICILLIN 250 MG
1 CAPSULE ORAL 2 TIMES DAILY PRN
Status: DISCONTINUED | OUTPATIENT
Start: 2024-04-19 | End: 2024-04-30 | Stop reason: HOSPADM

## 2024-04-19 RX ORDER — NALOXONE HCL 0.4 MG/ML
0.02 VIAL (ML) INJECTION
Status: DISCONTINUED | OUTPATIENT
Start: 2024-04-19 | End: 2024-04-30 | Stop reason: HOSPADM

## 2024-04-19 RX ORDER — ONDANSETRON HYDROCHLORIDE 2 MG/ML
4 INJECTION, SOLUTION INTRAVENOUS EVERY 6 HOURS PRN
Status: DISCONTINUED | OUTPATIENT
Start: 2024-04-19 | End: 2024-04-30 | Stop reason: HOSPADM

## 2024-04-19 RX ORDER — ACETAMINOPHEN 325 MG/1
650 TABLET ORAL EVERY 8 HOURS PRN
Status: DISCONTINUED | OUTPATIENT
Start: 2024-04-19 | End: 2024-04-30 | Stop reason: HOSPADM

## 2024-04-19 RX ADMIN — IOHEXOL 100 ML: 350 INJECTION, SOLUTION INTRAVENOUS at 03:04

## 2024-04-19 RX ADMIN — SODIUM CHLORIDE 1000 ML: 9 INJECTION, SOLUTION INTRAVENOUS at 01:04

## 2024-04-19 RX ADMIN — PANTOPRAZOLE SODIUM 80 MG: 40 INJECTION, POWDER, LYOPHILIZED, FOR SOLUTION INTRAVENOUS at 01:04

## 2024-04-19 NOTE — ASSESSMENT & PLAN NOTE
NPO at midnight, GI consulted   Serial CBC, monitor on telemetry   Hbg dropped from 11.5 to 7.4 in 4 months.   Transfuse one unit blood, iron studies pending, monitor for transfusion adverse effects.

## 2024-04-19 NOTE — HPI
Patient is a 73 old female medical history of anemia, GERD, hyperlipidemia, and migraines.  Patient presents to the ED today with complaints of worsening fatigue shortness for breath worse on exertion, and intermittent chest pain on exertion.  Patient was seen in GI office earlier today, thus sent into the ED for further evaluation.  Patient was found to have hemoglobin of 7.4, hematocrit 24.5.  1 unit blood ordered in the ED for transfusion.  Patient just had recent iron infusion on Wednesday.  Patient denies previous history of blood transfusions in the past.  Patient has had rectal bleeding and melena that has been present and steady for the past 2 months.  Patient reports being scheduled for outpatient stress test next week.  D-dimer elevated in the ED at 0.55, CTA negative for acute PE.  CT of the abdomen without acute abnormalities. Patient has history of fhypercoagulable state, DVT in LLE 2021. Patient does report taking Eliquis BID, last dose this morning. GI consulted.  Pt denies constipation/diarrhea, dysuria, abdominal pain, LE edema, diaphoresis, nausea, or vomiting. Patient is full code.    In the ED:  Chest x-ray negative.  Occult positive.  Troponin 5.0, TSH 2.254, UA negative.  Potassium 3.5, magnesium 1.6, INR 1.0, PTT 11.2.

## 2024-04-19 NOTE — ASSESSMENT & PLAN NOTE
Last iron infusion Wednesday, updated iron studies pending.   Symptomatic anemia with SOB, pale, fatigue  1 unit blood ordered for transfusion in ED  Serial CBCs, monitor on telemetry

## 2024-04-19 NOTE — ED NOTES
Orthostats       04/19/24 1329 04/19/24 1330 04/19/24 1331   Vital Signs   Pulse 85 90 95   SpO2 99 % 100 % 98 %   /61 133/64 (!) 132/59   MAP (mmHg) 88 92 85   Patient Position Lying Sitting Standing

## 2024-04-19 NOTE — H&P
[facility] - Emergency Dept  Hospital Medicine  History & Physical    Patient Name: Jen Torres  MRN: [id number]  Patient Class: OP- Observation  Admission Date: 4/19/2024  Attending Physician: Tim Rdz MD   Primary Care Provider: Sam Garcia IV, MD         Patient information was obtained from patient and ER records.     Subjective:     Principal Problem:GI bleed    Chief Complaint:   Chief Complaint   Patient presents with    Rectal Bleeding     For over a year that has been steady for the last 1.5 months. HnH low iron transfusion Wednesday     Shortness of Breath        HPI: Patient is a 73 old female medical history of anemia, GERD, hyperlipidemia, and migraines.  Patient presents to the ED today with complaints of worsening fatigue shortness for breath worse on exertion, and intermittent chest pain on exertion.  Patient was seen in GI office earlier today, thus sent into the ED for further evaluation.  Patient was found to have hemoglobin of 7.4, hematocrit 24.5.  1 unit blood ordered in the ED for transfusion.  Patient just had recent iron infusion on Wednesday.  Patient denies previous history of blood transfusions in the past.  Patient has had rectal bleeding and melena that has been present and steady for the past 2 months.  Patient reports being scheduled for outpatient stress test next week.  D-dimer elevated in the ED at 0.55, CTA negative for acute PE.  CT of the abdomen without acute abnormalities. Patient has history of factor 5 Leiden, DVT in LLE 2021. Patient does report taking Eliquis BID, last dose this morning. GI consulted.  Pt denies constipation/diarrhea, dysuria, abdominal pain, LE edema, diaphoresis, nausea, or vomiting. Patient is full code.    In the ED:  Chest x-ray negative.  Occult positive.  Troponin 5.0, TSH 2.254, UA negative.  Potassium 3.5, magnesium 1.6, INR 1.0, PTT 11.2.    Past Medical History:   Diagnosis Date    Abnormal Pap smear     s/p cryo at 21  y/o    Anticoagulant long-term use     GERD (gastroesophageal reflux disease)     Hyperlipidemia     Migraine     Migraines     Sleep apnea     Can not use c-pap       Past Surgical History:   Procedure Laterality Date    ARTHROSCOPIC REPAIR OF ROTATOR CUFF OF SHOULDER Left 12/29/2023    Procedure: REPAIR, ROTATOR CUFF, ARTHROSCOPIC;  Surgeon: Gary Tony MD;  Location: [facility];  Service: Orthopedics;  Laterality: Left;  arthrex    ARTHROSCOPY OF SHOULDER WITH DECOMPRESSION OF SUBACROMIAL SPACE Left 12/29/2023    Procedure: ARTHROSCOPY, SHOULDER, WITH SUBACROMIAL SPACE DECOMPRESSION;  Surgeon: Gary Tony MD;  Location: [facility];  Service: Orthopedics;  Laterality: Left;    CATARACT EXTRACTION, BILATERAL      CHOLECYSTECTOMY  10/01/2014    EYE SURGERY      KNEE ARTHROSCOPY Left     TONSILLECTOMY         Review of patient's allergies indicates:   Allergen Reactions    Statins-hmg-coa reductase inhibitors Swelling    Metformin Other (See Comments)     metallic taste , burn of the esophagus       Current Facility-Administered Medications   Medication Dose Route Frequency Provider Last Rate Last Admin    0.9%  NaCl infusion (for blood administration)   Intravenous Q24H PRN Kimmie Perera MD        acetaminophen tablet 650 mg  650 mg Oral Q8H PRN Judith Amato PA        acetaminophen tablet 650 mg  650 mg Oral Q4H PRN Judith Amato PA        aluminum-magnesium hydroxide-simethicone 200-200-20 mg/5 mL suspension 30 mL  30 mL Oral QID PRN Judith Amato PA        dextrose 50% injection 12.5 g  12.5 g Intravenous PRN Judith Amato PA        dextrose 50% injection 25 g  25 g Intravenous PRN Judith Amato PA        glucagon (human recombinant) injection 1 mg  1 mg Intramuscular PRN Judith Amato PA        glucose chewable tablet 16 g  16 g Oral PRN Judith Amato PA        glucose chewable tablet 24 g  24 g Oral PRN Judith Amato PA         HYDROcodone-acetaminophen 5-325 mg per tablet 1 tablet  1 tablet Oral Q6H PRN Judith Amato PA        magnesium oxide tablet 800 mg  800 mg Oral PRN Judith Amato PA        magnesium oxide tablet 800 mg  800 mg Oral PRN Judith Amato PA        melatonin tablet 6 mg  6 mg Oral Nightly PRN Judith Amato PA        naloxone 0.4 mg/mL injection 0.02 mg  0.02 mg Intravenous PRN Judith Amato PA        ondansetron injection 4 mg  4 mg Intravenous Q6H PRN Judith Amato PA        [START ON 4/20/2024] pantoprazole injection 80 mg  80 mg Intravenous Daily Judith Amato PA        potassium bicarbonate disintegrating tablet 35 mEq  35 mEq Oral PRN Judith Amato PA        potassium bicarbonate disintegrating tablet 50 mEq  50 mEq Oral PRN Judith Amato PA        potassium bicarbonate disintegrating tablet 60 mEq  60 mEq Oral PRN Judith Amato PA        potassium, sodium phosphates 280-160-250 mg packet 2 packet  2 packet Oral PRN Judith Amato PA        potassium, sodium phosphates 280-160-250 mg packet 2 packet  2 packet Oral PRN Judith Amato PA        potassium, sodium phosphates 280-160-250 mg packet 2 packet  2 packet Oral PRN Judith Amato PA        senna-docusate 8.6-50 mg per tablet 1 tablet  1 tablet Oral BID PRN Judith Amato PA        sodium chloride 0.9% flush 10 mL  10 mL Intravenous Q12H PRN Judith Amato PA         Current Outpatient Medications   Medication Sig Dispense Refill    acetaminophen (TYLENOL) 500 MG tablet Take 2 tablets (1,000 mg total) by mouth every 8 (eight) hours as needed for Pain. 30 tablet 0    aspirin 81 MG Chew Take 81 mg by mouth once daily.      diltiaZEM (CARDIZEM CD) 120 MG Cp24 Take 1 capsule (120 mg total) by mouth once daily. 30 capsule 11    dulaglutide (TRULICITY) 0.75 mg/0.5 mL pen injector Inject 1.5 pens into the skin every 7 days. Fridays      ELIQUIS 5 mg Tab TAKE 1 TABLET BY MOUTH TWICE A DAY (Patient  taking differently: Take 5 mg by mouth 2 (two) times daily.) 60 tablet 4    ergocalciferol (ERGOCALCIFEROL) 50,000 unit Cap Take 50,000 Units by mouth every 7 days. Fridays      ezetimibe (ZETIA) 10 mg tablet Take 10 mg by mouth once daily.      glimepiride (AMARYL) 2 MG tablet Take 2 mg by mouth every morning.      hydroCHLOROthiazide (HYDRODIURIL) 25 MG tablet Take 25 mg by mouth every Mon, Wed, Fri.      pantoprazole (PROTONIX) 40 MG tablet Take 40 mg by mouth every morning.      potassium chloride (MICRO-K) 10 MEQ CpSR TAKE 1 CAPSULE BY MOUTH EVERY DAY (Patient taking differently: Take 10 mEq by mouth once daily.) 90 capsule 3    semaglutide (RYBELSUS) 3 mg tablet Take 3 mg by mouth once daily.      WESTAB MAX 2.5-25-2 mg Tab TAKE 1 TABLET BY MOUTH EVERY DAY 90 tablet 4     Facility-Administered Medications Ordered in Other Encounters   Medication Dose Route Frequency Provider Last Rate Last Admin    electrolyte-S (ISOLYTE)   Intravenous Continuous Blade Packer MD        electrolyte-S (ISOLYTE)   Intravenous Continuous Blade Packer  mL/hr at 12/29/23 1159 Rate Change at 12/29/23 1159    LIDOcaine (PF) 10 mg/ml (1%) injection 10 mg  1 mL Intradermal Once Blade Packer MD        midazolam (VERSED) 1 mg/mL injection 2 mg  2 mg Intravenous PRN Blade Packer MD   2 mg at 12/29/23 0955     Family History       Problem Relation (Age of Onset)    Breast cancer Paternal Grandmother (70), Maternal Grandmother    Cancer Daughter, Daughter (20)    Colon cancer Mother    Ovarian cancer Daughter          Tobacco Use    Smoking status: Former    Smokeless tobacco: Never   Substance and Sexual Activity    Alcohol use: No    Drug use: No    Sexual activity: Never     Review of Systems   Constitutional:  Positive for fatigue. Negative for appetite change, chills, fever and unexpected weight change.   HENT: Negative.     Eyes: Negative.    Respiratory:  Positive for shortness of breath. Negative for  cough, wheezing and stridor.    Cardiovascular:  Positive for chest pain. Negative for palpitations and leg swelling.   Gastrointestinal:  Positive for blood in stool. Negative for abdominal distention, abdominal pain, constipation, diarrhea, nausea and vomiting.   Endocrine: Negative.    Genitourinary: Negative.  Negative for decreased urine volume, difficulty urinating, flank pain, hematuria and urgency.   Musculoskeletal: Negative.         Intermittent muscle cramps BLE    Skin:  Positive for pallor. Negative for rash.   Allergic/Immunologic: Negative.    Neurological:  Positive for weakness and headaches. Negative for dizziness, syncope and facial asymmetry.   Hematological: Negative.    Psychiatric/Behavioral: Negative.  Negative for agitation, confusion, self-injury and suicidal ideas.      Objective:     Vital Signs (Most Recent):  Temp: 98.1 °F (36.7 °C) (04/19/24 1608)  Pulse: 81 (04/19/24 1608)  Resp: 20 (04/19/24 1608)  BP: 135/60 (04/19/24 1608)  SpO2: 100 % (04/19/24 1608) Vital Signs (24h Range):  Temp:  [98.1 °F (36.7 °C)-98.2 °F (36.8 °C)] 98.1 °F (36.7 °C)  Pulse:  [77-95] 81  Resp:  [19-20] 20  SpO2:  [98 %-100 %] 100 %  BP: (119-159)/(58-95) 135/60     Weight: 95.7 kg (211 lb)  Body mass index is 32.08 kg/m².     Physical Exam  Vitals reviewed.   Constitutional:       General: She is not in acute distress.     Appearance: Normal appearance. She is normal weight. She is not toxic-appearing.   Cardiovascular:      Rate and Rhythm: Normal rate and regular rhythm.      Pulses: Normal pulses.      Heart sounds: Normal heart sounds.   Pulmonary:      Effort: Pulmonary effort is normal. No respiratory distress.      Breath sounds: Normal breath sounds.   Abdominal:      General: Abdomen is flat. Bowel sounds are normal. There is no distension.      Palpations: Abdomen is soft.      Tenderness: There is no abdominal tenderness. There is no guarding.   Musculoskeletal:         General: Normal range of  motion.      Cervical back: Normal range of motion and neck supple.      Right lower leg: No edema.      Left lower leg: No edema.   Skin:     General: Skin is warm and dry.      Coloration: Skin is pale.   Neurological:      General: No focal deficit present.      Mental Status: She is alert and oriented to person, place, and time. Mental status is at baseline.      Motor: No weakness.   Psychiatric:         Mood and Affect: Mood normal.              Significant Labs: All pertinent labs within the past 24 hours have been reviewed.  CBC:   Recent Labs   Lab 04/19/24  1313   WBC 12.93*  12.93*   HGB 7.4*  7.4*   HCT 24.5*  24.5*   *  574*     CMP:   Recent Labs   Lab 04/19/24  1313     137   K 3.5  3.5     103   CO2 22*  22*   *  255*   BUN 14  14   CREATININE 1.2  1.2   CALCIUM 8.9  8.9   PROT 6.9  6.9   ALBUMIN 3.7  3.7   BILITOT 0.2  0.2   ALKPHOS 107  107   AST 43*  43*   ALT 38  38   ANIONGAP 12  12     Lipase:   Recent Labs   Lab 04/19/24  1313   LIPASE 59     Magnesium:   Recent Labs   Lab 04/19/24  1313   MG 1.6  1.6     Troponin:   Recent Labs   Lab 04/19/24  1313 04/19/24  1516   TROPONINIHS 5.0 5.1     TSH:   Recent Labs   Lab 04/19/24  1313   TSH 2.254     Urine Studies:   Recent Labs   Lab 04/19/24  1447   COLORU Yellow   APPEARANCEUA Clear   PHUR 5.0   SPECGRAV 1.015   PROTEINUA Negative   GLUCUA Negative   KETONESU Negative   BILIRUBINUA Negative   OCCULTUA Negative   NITRITE Negative   UROBILINOGEN Negative   LEUKOCYTESUR Negative       Significant Imaging: I have reviewed all pertinent imaging results/findings within the past 24 hours.  Assessment/Plan:     * GI bleed  NPO at midnight, GI consulted   Serial CBC, monitor on telemetry   Hbg dropped from 11.5 to 7.4 in 4 months.   Transfuse one unit blood, iron studies pending, monitor for transfusion adverse effects.     Iron deficiency anemia due to chronic blood loss  Last iron infusion Wednesday, updated  iron studies pending.   Symptomatic anemia with SOB, pale, fatigue  1 unit blood ordered for transfusion in ED  Serial CBCs, monitor on telemetry     Factor 5 Leiden mutation, heterozygous  With history of LLE clot in 2021, Ddimer elevated but no PE found on CTA  Last dose of eliquis this morning, will hold for now due to active bleeding.       Type 2 diabetes mellitus without complication, without long-term current use of insulin  POCT glucose, insulin sliding scale     Essential hypertension  Chronic, controlled. Latest blood pressure and vitals reviewed-     Temp:  [98.1 °F (36.7 °C)-98.2 °F (36.8 °C)]   Pulse:  [77-95]   Resp:  [15-20]   BP: (119-159)/(58-95)   SpO2:  [98 %-100 %] .   Home meds for hypertension were reviewed and noted below.   Hypertension Medications               diltiaZEM (CARDIZEM CD) 120 MG Cp24 Take 1 capsule (120 mg total) by mouth once daily.    hydroCHLOROthiazide (HYDRODIURIL) 25 MG tablet Take 25 mg by mouth every Mon, Wed, Fri.            While in the hospital, will manage blood pressure as follows; Continue home antihypertensive regimen    Will utilize p.r.n. blood pressure medication only if patient's blood pressure greater than 180/110 and she develops symptoms such as worsening chest pain or shortness of breath.      VTE Risk Mitigation (From admission, onward)           Ordered     Reason for No Pharmacological VTE Prophylaxis  Once        Question:  Reasons:  Answer:  Active Bleeding    04/19/24 1641     IP VTE HIGH RISK PATIENT  Once         04/19/24 1641     Place sequential compression device  Until discontinued         04/19/24 1641                     On 04/19/2024, patient should be placed in hospital observation services under my care in collaboration with Dr Rdz.       KOBY Thurman  Department of Hospital Medicine  [facility] - Emergency Dept

## 2024-04-19 NOTE — SUBJECTIVE & OBJECTIVE
Past Medical History:   Diagnosis Date    Abnormal Pap smear     s/p cryo at 22 y/o    Anticoagulant long-term use     GERD (gastroesophageal reflux disease)     Hyperlipidemia     Migraine     Migraines     Sleep apnea     Can not use c-pap       Past Surgical History:   Procedure Laterality Date    ARTHROSCOPIC REPAIR OF ROTATOR CUFF OF SHOULDER Left 12/29/2023    Procedure: REPAIR, ROTATOR CUFF, ARTHROSCOPIC;  Surgeon: Gary Tony MD;  Location: [facility];  Service: Orthopedics;  Laterality: Left;  arthrex    ARTHROSCOPY OF SHOULDER WITH DECOMPRESSION OF SUBACROMIAL SPACE Left 12/29/2023    Procedure: ARTHROSCOPY, SHOULDER, WITH SUBACROMIAL SPACE DECOMPRESSION;  Surgeon: Gary Tony MD;  Location: [facility];  Service: Orthopedics;  Laterality: Left;    CATARACT EXTRACTION, BILATERAL      CHOLECYSTECTOMY  10/01/2014    EYE SURGERY      KNEE ARTHROSCOPY Left     TONSILLECTOMY         Review of patient's allergies indicates:   Allergen Reactions    Statins-hmg-coa reductase inhibitors Swelling    Metformin Other (See Comments)     metallic taste , burn of the esophagus       Current Facility-Administered Medications   Medication Dose Route Frequency Provider Last Rate Last Admin    0.9%  NaCl infusion (for blood administration)   Intravenous Q24H PRN Kimmie Perera MD        acetaminophen tablet 650 mg  650 mg Oral Q8H PRN Judith Amato PA        acetaminophen tablet 650 mg  650 mg Oral Q4H PRN Judith Amato PA        aluminum-magnesium hydroxide-simethicone 200-200-20 mg/5 mL suspension 30 mL  30 mL Oral QID PRN Judith Amato PA        dextrose 50% injection 12.5 g  12.5 g Intravenous PRN Judith Amato PA        dextrose 50% injection 25 g  25 g Intravenous PRN Judith Amato PA        glucagon (human recombinant) injection 1 mg  1 mg Intramuscular PRN Judith Amato PA        glucose chewable tablet 16 g  16 g Oral PRN Judith Amato PA        glucose chewable  tablet 24 g  24 g Oral PRN Judith Amato PA        HYDROcodone-acetaminophen 5-325 mg per tablet 1 tablet  1 tablet Oral Q6H PRN Judith Amato PA        magnesium oxide tablet 800 mg  800 mg Oral PRN Judith Amato PA        magnesium oxide tablet 800 mg  800 mg Oral PRN Judith Amato PA        melatonin tablet 6 mg  6 mg Oral Nightly PRN Judith Amato PA        naloxone 0.4 mg/mL injection 0.02 mg  0.02 mg Intravenous PRN Judith Amato PA        ondansetron injection 4 mg  4 mg Intravenous Q6H PRN Judith Amato PA        [START ON 4/20/2024] pantoprazole injection 80 mg  80 mg Intravenous Daily Judith Amato PA        potassium bicarbonate disintegrating tablet 35 mEq  35 mEq Oral PRJudith Peterson PA        potassium bicarbonate disintegrating tablet 50 mEq  50 mEq Oral PRN Judith Amato PA        potassium bicarbonate disintegrating tablet 60 mEq  60 mEq Oral PRN Judith Amato PA        potassium, sodium phosphates 280-160-250 mg packet 2 packet  2 packet Oral PRN Judith Amato PA        potassium, sodium phosphates 280-160-250 mg packet 2 packet  2 packet Oral PRN Judith Amato PA        potassium, sodium phosphates 280-160-250 mg packet 2 packet  2 packet Oral PRN Judith Amato PA        senna-docusate 8.6-50 mg per tablet 1 tablet  1 tablet Oral BID PRN Judith Amato PA        sodium chloride 0.9% flush 10 mL  10 mL Intravenous Q12H PRN Judith Amato PA         Current Outpatient Medications   Medication Sig Dispense Refill    acetaminophen (TYLENOL) 500 MG tablet Take 2 tablets (1,000 mg total) by mouth every 8 (eight) hours as needed for Pain. 30 tablet 0    aspirin 81 MG Chew Take 81 mg by mouth once daily.      diltiaZEM (CARDIZEM CD) 120 MG Cp24 Take 1 capsule (120 mg total) by mouth once daily. 30 capsule 11    dulaglutide (TRULICITY) 0.75 mg/0.5 mL pen injector Inject 1.5 pens into the skin every 7 days. Fridays      ELIQUIS  5 mg Tab TAKE 1 TABLET BY MOUTH TWICE A DAY (Patient taking differently: Take 5 mg by mouth 2 (two) times daily.) 60 tablet 4    ergocalciferol (ERGOCALCIFEROL) 50,000 unit Cap Take 50,000 Units by mouth every 7 days. Fridays      ezetimibe (ZETIA) 10 mg tablet Take 10 mg by mouth once daily.      glimepiride (AMARYL) 2 MG tablet Take 2 mg by mouth every morning.      hydroCHLOROthiazide (HYDRODIURIL) 25 MG tablet Take 25 mg by mouth every Mon, Wed, Fri.      pantoprazole (PROTONIX) 40 MG tablet Take 40 mg by mouth every morning.      potassium chloride (MICRO-K) 10 MEQ CpSR TAKE 1 CAPSULE BY MOUTH EVERY DAY (Patient taking differently: Take 10 mEq by mouth once daily.) 90 capsule 3    semaglutide (RYBELSUS) 3 mg tablet Take 3 mg by mouth once daily.      WESTAB MAX 2.5-25-2 mg Tab TAKE 1 TABLET BY MOUTH EVERY DAY 90 tablet 4     Facility-Administered Medications Ordered in Other Encounters   Medication Dose Route Frequency Provider Last Rate Last Admin    electrolyte-S (ISOLYTE)   Intravenous Continuous Blade Packer MD        electrolyte-S (ISOLYTE)   Intravenous Continuous Blade Packer  mL/hr at 12/29/23 1159 Rate Change at 12/29/23 1159    LIDOcaine (PF) 10 mg/ml (1%) injection 10 mg  1 mL Intradermal Once Blade Packer MD        midazolam (VERSED) 1 mg/mL injection 2 mg  2 mg Intravenous PRN Blade Packer MD   2 mg at 12/29/23 0962     Family History       Problem Relation (Age of Onset)    Breast cancer Paternal Grandmother (70), Maternal Grandmother    Cancer Daughter, Daughter (20)    Colon cancer Mother    Ovarian cancer Daughter          Tobacco Use    Smoking status: Former    Smokeless tobacco: Never   Substance and Sexual Activity    Alcohol use: No    Drug use: No    Sexual activity: Never     Review of Systems   Constitutional:  Positive for fatigue. Negative for appetite change, chills, fever and unexpected weight change.   HENT: Negative.     Eyes: Negative.     Respiratory:  Positive for shortness of breath. Negative for cough, wheezing and stridor.    Cardiovascular:  Positive for chest pain. Negative for palpitations and leg swelling.   Gastrointestinal:  Positive for blood in stool. Negative for abdominal distention, abdominal pain, constipation, diarrhea, nausea and vomiting.   Endocrine: Negative.    Genitourinary: Negative.  Negative for decreased urine volume, difficulty urinating, flank pain, hematuria and urgency.   Musculoskeletal: Negative.         Intermittent muscle cramps BLE    Skin:  Positive for pallor. Negative for rash.   Allergic/Immunologic: Negative.    Neurological:  Positive for weakness and headaches. Negative for dizziness, syncope and facial asymmetry.   Hematological: Negative.    Psychiatric/Behavioral: Negative.  Negative for agitation, confusion, self-injury and suicidal ideas.      Objective:     Vital Signs (Most Recent):  Temp: 98.1 °F (36.7 °C) (04/19/24 1608)  Pulse: 81 (04/19/24 1608)  Resp: 20 (04/19/24 1608)  BP: 135/60 (04/19/24 1608)  SpO2: 100 % (04/19/24 1608) Vital Signs (24h Range):  Temp:  [98.1 °F (36.7 °C)-98.2 °F (36.8 °C)] 98.1 °F (36.7 °C)  Pulse:  [77-95] 81  Resp:  [19-20] 20  SpO2:  [98 %-100 %] 100 %  BP: (119-159)/(58-95) 135/60     Weight: 95.7 kg (211 lb)  Body mass index is 32.08 kg/m².     Physical Exam  Vitals reviewed.   Constitutional:       General: She is not in acute distress.     Appearance: Normal appearance. She is normal weight. She is not toxic-appearing.   Cardiovascular:      Rate and Rhythm: Normal rate and regular rhythm.      Pulses: Normal pulses.      Heart sounds: Normal heart sounds.   Pulmonary:      Effort: Pulmonary effort is normal. No respiratory distress.      Breath sounds: Normal breath sounds.   Abdominal:      General: Abdomen is flat. Bowel sounds are normal. There is no distension.      Palpations: Abdomen is soft.      Tenderness: There is no abdominal tenderness. There is no  guarding.   Musculoskeletal:         General: Normal range of motion.      Cervical back: Normal range of motion and neck supple.      Right lower leg: No edema.      Left lower leg: No edema.   Skin:     General: Skin is warm and dry.      Coloration: Skin is pale.   Neurological:      General: No focal deficit present.      Mental Status: She is alert and oriented to person, place, and time. Mental status is at baseline.      Motor: No weakness.   Psychiatric:         Mood and Affect: Mood normal.              Significant Labs: All pertinent labs within the past 24 hours have been reviewed.  CBC:   Recent Labs   Lab 04/19/24  1313   WBC 12.93*  12.93*   HGB 7.4*  7.4*   HCT 24.5*  24.5*   *  574*     CMP:   Recent Labs   Lab 04/19/24  1313     137   K 3.5  3.5     103   CO2 22*  22*   *  255*   BUN 14  14   CREATININE 1.2  1.2   CALCIUM 8.9  8.9   PROT 6.9  6.9   ALBUMIN 3.7  3.7   BILITOT 0.2  0.2   ALKPHOS 107  107   AST 43*  43*   ALT 38  38   ANIONGAP 12  12     Lipase:   Recent Labs   Lab 04/19/24  1313   LIPASE 59     Magnesium:   Recent Labs   Lab 04/19/24  1313   MG 1.6  1.6     Troponin:   Recent Labs   Lab 04/19/24  1313 04/19/24  1516   TROPONINIHS 5.0 5.1     TSH:   Recent Labs   Lab 04/19/24  1313   TSH 2.254     Urine Studies:   Recent Labs   Lab 04/19/24  1447   COLORU Yellow   APPEARANCEUA Clear   PHUR 5.0   SPECGRAV 1.015   PROTEINUA Negative   GLUCUA Negative   KETONESU Negative   BILIRUBINUA Negative   OCCULTUA Negative   NITRITE Negative   UROBILINOGEN Negative   LEUKOCYTESUR Negative       Significant Imaging: I have reviewed all pertinent imaging results/findings within the past 24 hours.

## 2024-04-19 NOTE — ED PROVIDER NOTES
Encounter Date: 4/19/2024       History     Chief Complaint   Patient presents with    Rectal Bleeding     For over a year that has been steady for the last 1.5 months. HnH low iron transfusion Wednesday     Shortness of Breath     73 old female medical history of anemia, GERD, hyperlipidemia, and migraines who presents to the ED today with complaints of worsening fatigue shortness for breath worse on exertion, and intermittent chest pain on exertion.  Patient was seen in GI office earlier today, thus sent into the ED for further evaluation.  Patient was found to have hemoglobin of 7.4, hematocrit 24.5.   Patient just had recent iron infusion on Wednesday.  Patient denies previous history of blood transfusions in the past.  Patient has had rectal bleeding and melena that has been present and steady for the past 2 months.  Patient reports being scheduled for outpatient stress test next week.  Patient has history of factor 5 Leiden, DVT in LLE 2021. Patient does report taking Eliquis BID, last dose this morning.  She denies any current chest pain.  Denies any abdominal pain, fever, weight loss or urinary problems or changes.  Has had generalized weakness and fatigue but denies any focal weakness numbness tingling or paresthesias and denies any headache or visual changes.        Review of patient's allergies indicates:   Allergen Reactions    Statins-hmg-coa reductase inhibitors Swelling    Metformin Other (See Comments)     metallic taste , burn of the esophagus     Past Medical History:   Diagnosis Date    Abnormal Pap smear     s/p cryo at 20 y/o    Anticoagulant long-term use     GERD (gastroesophageal reflux disease)     Hyperlipidemia     Migraine     Migraines     Sleep apnea     Can not use c-pap     Past Surgical History:   Procedure Laterality Date    ARTHROSCOPIC REPAIR OF ROTATOR CUFF OF SHOULDER Left 12/29/2023    Procedure: REPAIR, ROTATOR CUFF, ARTHROSCOPIC;  Surgeon: Gary Tony MD;   Location: [facility];  Service: Orthopedics;  Laterality: Left;  arthrex    ARTHROSCOPY OF SHOULDER WITH DECOMPRESSION OF SUBACROMIAL SPACE Left 2023    Procedure: ARTHROSCOPY, SHOULDER, WITH SUBACROMIAL SPACE DECOMPRESSION;  Surgeon: Gary Tony MD;  Location: [facility];  Service: Orthopedics;  Laterality: Left;    CATARACT EXTRACTION, BILATERAL      CHOLECYSTECTOMY  10/01/2014    COLECTOMY, RIGHT  2024    Procedure: COLECTOMY, RIGHT;  Surgeon: Jaden Barrios MD;  Location: [facility];  Service: General;;    COLONOSCOPIC SURGICAL PROCEDURE  2024    Procedure: SURGICAL PROCEDURE, COLONOSCOPIC;  Surgeon: Jaden Barrios MD;  Location: [facility];  Service: General;;    COLONOSCOPY N/A 2024    Procedure: COLONOSCOPY;  Surgeon: Ceasar Arias III, MD;  Location: [facility];  Service: Endoscopy;  Laterality: N/A;    ESOPHAGOGASTRODUODENOSCOPY N/A 2024    Procedure: EGD (ESOPHAGOGASTRODUODENOSCOPY);  Surgeon: Ceasar Arias III, MD;  Location: [facility];  Service: Endoscopy;  Laterality: N/A;    EYE SURGERY      KNEE ARTHROSCOPY Left     LAPAROTOMY, EXPLORATORY N/A 2024    Procedure: LAPAROTOMY, EXPLORATORY;  Surgeon: Jaden Barrios MD;  Location: [facility];  Service: General;  Laterality: N/A;    LYSIS OF ADHESIONS  2024    Procedure: LYSIS, ADHESIONS;  Surgeon: Jaden Barrios MD;  Location: [facility];  Service: General;;    TONSILLECTOMY       Family History   Problem Relation Name Age of Onset    Breast cancer Paternal Grandmother  70    Colon cancer Mother              Cancer Daughter          crohns    Ovarian cancer Daughter      Cancer Daughter  20        uterine cancer with mets s/p chemo/rad/surgery; ?genetic mutation; now with hip tumors    Breast cancer Maternal Grandmother       Social History     Tobacco Use    Smoking status: Former    Smokeless tobacco: Never   Substance Use Topics    Alcohol use: No    Drug use: No     Review of Systems    Constitutional:  Positive for fatigue. Negative for activity change, appetite change, chills and fever.   HENT: Negative.  Negative for congestion, rhinorrhea, sore throat and trouble swallowing.    Eyes: Negative.  Negative for photophobia, pain, redness and visual disturbance.   Respiratory:  Positive for shortness of breath. Negative for cough, chest tightness and wheezing.    Cardiovascular:  Positive for chest pain. Negative for palpitations and leg swelling.   Gastrointestinal:  Positive for blood in stool and nausea. Negative for abdominal distention, abdominal pain, constipation, diarrhea, rectal pain and vomiting.   Endocrine: Negative.    Genitourinary: Negative.  Negative for decreased urine volume, difficulty urinating, dysuria, flank pain, frequency, pelvic pain and urgency.   Musculoskeletal: Negative.  Negative for arthralgias, back pain, gait problem, myalgias, neck pain and neck stiffness.   Skin: Negative.  Negative for rash.   Neurological: Negative.  Negative for dizziness, syncope, facial asymmetry, speech difficulty, weakness, light-headedness, numbness and headaches.   Hematological:  Does not bruise/bleed easily.   Psychiatric/Behavioral: Negative.  Negative for confusion.    All other systems reviewed and are negative.      Physical Exam     Initial Vitals [04/19/24 1227]   BP Pulse Resp Temp SpO2   (!) 156/68 88 19 98.2 °F (36.8 °C) 99 %      MAP       --         Physical Exam    Nursing note and vitals reviewed.  Constitutional: She is not diaphoretic. She is active and cooperative.  Non-toxic appearance. She does not have a sickly appearance. She does not appear ill. No distress.   HENT:   Head: Normocephalic and atraumatic.   Right Ear: Tympanic membrane normal.   Left Ear: Tympanic membrane normal.   Nose: Nose normal.   Mouth/Throat: Uvula is midline, oropharynx is clear and moist and mucous membranes are normal. No oral lesions. No uvula swelling. No oropharyngeal exudate, posterior  oropharyngeal edema or posterior oropharyngeal erythema.   Eyes: Conjunctivae, EOM and lids are normal. Pupils are equal, round, and reactive to light. No scleral icterus.   Neck: Trachea normal and phonation normal. Neck supple. No thyroid mass and no thyromegaly present. No stridor present. No JVD present.   Normal range of motion.   Full passive range of motion without pain.     Cardiovascular:  Normal rate, regular rhythm, normal heart sounds, intact distal pulses and normal pulses.     Exam reveals no gallop, no distant heart sounds, no friction rub and no decreased pulses.       No murmur heard.  Pulmonary/Chest: Effort normal and breath sounds normal. No accessory muscle usage or stridor. No tachypnea. No respiratory distress. She has no wheezes. She has no rhonchi. She has no rales.   Abdominal: Abdomen is soft. Bowel sounds are normal. She exhibits no distension, no pulsatile midline mass and no mass. There is no abdominal tenderness. There is no rigidity and no guarding.   Genitourinary: Rectum:      No rectal mass, anal fissure, tenderness, external hemorrhoid, internal hemorrhoid or abnormal anal tone.      Genitourinary Comments: Heme-negative brown appearing stool     Musculoskeletal:         General: No tenderness or edema. Normal range of motion.      Right hand: Normal. Normal range of motion. Normal strength. Normal sensation. Normal capillary refill. Normal pulse.      Left hand: Normal. Normal range of motion. Normal strength. Normal sensation. Normal capillary refill. Normal pulse.      Cervical back: Normal, full passive range of motion without pain, normal range of motion and neck supple. No edema, erythema, rigidity or bony tenderness. No pain with movement, spinous process tenderness or muscular tenderness. Normal range of motion.      Thoracic back: Normal. No bony tenderness. Normal range of motion.      Lumbar back: Normal. No bony tenderness. Normal range of motion.      Right foot:  Normal. Normal range of motion and normal capillary refill. No tenderness or bony tenderness. Normal pulse.      Left foot: Normal. Normal range of motion and normal capillary refill. No tenderness or bony tenderness. Normal pulse.      Comments: Pulses are 2+ throughout, cap refill is less than 2 sec throughout, extremities are nontender throughout with full range of motion. There is no spinal tenderness to palpation.     Neurological: She is alert and oriented to person, place, and time. She has normal strength. She displays normal reflexes. No cranial nerve deficit or sensory deficit. Gait normal.   No focal deficits.   Skin: Skin is warm, dry and intact. Capillary refill takes less than 2 seconds. No ecchymosis, no petechiae and no rash noted. No erythema. No pallor.   Psychiatric: She has a normal mood and affect. Her speech is normal and behavior is normal. Judgment and thought content normal. Cognition and memory are normal.         ED Course   Procedures  Labs Reviewed   CBC W/ AUTO DIFFERENTIAL - Abnormal; Notable for the following components:       Result Value    WBC 12.93 (*)     RBC 3.25 (*)     Hemoglobin 7.4 (*)     Hematocrit 24.5 (*)     MCV 75 (*)     MCH 22.8 (*)     MCHC 30.2 (*)     RDW 15.4 (*)     Platelets 574 (*)     Immature Granulocytes 0.6 (*)     Gran # (ANC) 9.0 (*)     Immature Grans (Abs) 0.08 (*)     All other components within normal limits   COMPREHENSIVE METABOLIC PANEL - Abnormal; Notable for the following components:    CO2 22 (*)     Glucose 255 (*)     AST 43 (*)     eGFR 48.4 (*)     All other components within normal limits   APTT - Abnormal; Notable for the following components:    aPTT 32.3 (*)     All other components within normal limits   CBC W/ AUTO DIFFERENTIAL - Abnormal; Notable for the following components:    WBC 12.93 (*)     RBC 3.25 (*)     Hemoglobin 7.4 (*)     Hematocrit 24.5 (*)     MCV 75 (*)     MCH 22.8 (*)     MCHC 30.2 (*)     RDW 15.4 (*)      Platelets 574 (*)     Immature Granulocytes 0.6 (*)     Gran # (ANC) 9.0 (*)     Immature Grans (Abs) 0.08 (*)     All other components within normal limits   COMPREHENSIVE METABOLIC PANEL - Abnormal; Notable for the following components:    CO2 22 (*)     Glucose 255 (*)     AST 43 (*)     eGFR 48.4 (*)     All other components within normal limits   OCCULT BLOOD X 1, STOOL - Abnormal; Notable for the following components:    Occult Blood Positive (*)     All other components within normal limits   D DIMER, QUANTITATIVE - Abnormal; Notable for the following components:    D-Dimer 0.55 (*)     All other components within normal limits    Narrative:     Ddimr critical result(s) repeated. Called and verbal readback   obtained from Katharine Reece RN/ed by CHAMP 04/19/2024 14:06   PROTIME-INR   LIPASE   URINALYSIS, REFLEX TO URINE CULTURE    Narrative:     Specimen Source->Urine   CK   MAGNESIUM   TSH   MAGNESIUM   TROPONIN I HIGH SENSITIVITY   TROPONIN I HIGH SENSITIVITY   B-TYPE NATRIURETIC PEPTIDE   D DIMER, QUANTITATIVE   D DIMER, QUANTITATIVE   TYPE & SCREEN   PREPARE RBC SOFT        ECG Results              EKG 12-lead (In process)        Collection Time Result Time QRS Duration OHS QTC Calculation    04/19/24 13:23:24 04/19/24 13:33:30 80 387                     In process by Interface, Lab In [facility] (04/19/24 13:33:35)                   Narrative:    Test Reason : K92.2,    Vent. Rate : 087 BPM     Atrial Rate : 087 BPM     P-R Int : 158 ms          QRS Dur : 080 ms      QT Int : 322 ms       P-R-T Axes : 137 128 107 degrees     QTc Int : 387 ms     Suspect arm lead reversal, interpretation assumes no reversal  Unusual P axis, possible ectopic atrial rhythm with Premature  supraventricular complexes  Left posterior fascicular block  Septal infarct ,age undetermined  Cannot rule out Inferior infarct ,age undetermined  Abnormal ECG  No previous ECGs available    Referred By: [email or web address]   SELF           Confirmed  By:                                      EKG 12-lead (Final result)  Result time 04/23/24 12:11:13      Final result by Unknown User (04/23/24 12:11:13)                                      Imaging Results              CT Abdomen Pelvis With IV Contrast NO Oral Contrast (Final result)  Result time 04/19/24 16:07:17      Final result by Gary Cam DO (04/19/24 16:07:17)                   Impression:      No acute intra-abdominal abnormality observed.      Electronically signed by: Gary Cam  Date:    04/19/2024  Time:    16:07               Narrative:      CMS MANDATED QUALITY DATA - CT RADIATION - 436    All CT scans at this facility utilize dose modulation, iterative reconstruction, and/or weight based dosing when appropriate to reduce radiation dose to as low as reasonably achievable.    EXAMINATION:  CT ABDOMEN PELVIS WITH IV CONTRAST    CLINICAL HISTORY:  GI bleed;LLQ abdominal pain;    TECHNIQUE:  CT abdomen and pelvis with 100 mL Omnipaque 350.    COMPARISON:  CT abdomen pelvis 09/30/2014.    FINDINGS:  There is bilateral dependent subsegmental atelectasis.  Heart size is normal.    Liver is normal size.  No enhancing hepatic lesions identified.  Gallbladder has been removed.  The pancreas, spleen and adrenal glands are unremarkable.  The kidneys are normal size.  Bilateral parapelvic renal cysts again noted.  No hydronephrosis or nephrolithiasis.  The ureters are normal caliber without obstructions.  The bladder is fluid filled with no focal wall abnormalities.  The uterus and adnexal structures are unremarkable.  No free fluid in the pelvis.    The large and small bowel are normal caliber.  The appendix is not identified.  There is no bowel wall thickening or inflammatory changes.  The stomach is mostly decompressed and grossly unremarkable.    The abdominal aorta is normal caliber with atherosclerosis.  There are no enlarged intra-abdominal lymph nodes.  No mesenteric fat stranding and free  fluid.  The ventral abdominal wall is unremarkable.  There is no acute osseous abnormality.                                       CTA Chest Non-Coronary (PE Studies) (Final result)  Result time 04/19/24 15:59:08      Final result by Gary Cam DO (04/19/24 15:59:08)                   Impression:      No acute cardiopulmonary abnormality observed.  No pulmonary embolism.      Electronically signed by: Gary Cam  Date:    04/19/2024  Time:    15:59               Narrative:      CMS MANDATED QUALITY DATA - CT RADIATION - 436    All CT scans at this facility utilize dose modulation, iterative reconstruction, and/or weight based dosing when appropriate to reduce radiation dose to as low as reasonably achievable.    EXAMINATION:  CTA CHEST NON CORONARY (PE STUDIES)    CLINICAL HISTORY:  Pulmonary embolism (PE) suspected, high prob;    TECHNIQUE:  CT angiography of the chest with 100 mL Omnipaque 350. Maximum intensity projection coronal reformations were created at a separate workstation and stored in the patient's permanent medical record.    COMPARISON:  None    FINDINGS:  No pulmonary embolism identified.  Heart size is normal.  The thoracic aorta is normal caliber with mild atherosclerosis.    The central tracheobronchial tree is patent.  There is bilateral dependent subsegmental atelectasis.  No pleural effusion or consolidation.    The visualized abdominal viscera are unremarkable.  There are degenerative changes of the spine.  There is no acute osseous abnormality.                                       X-Ray Chest AP Portable (Final result)  Result time 04/19/24 13:32:42      Final result by Artemio Ray MD (04/19/24 13:32:42)                   Impression:      No evidence of active cardiopulmonary disease.      Electronically signed by: Artemio Ray  Date:    04/19/2024  Time:    13:32               Narrative:    EXAMINATION:  XR CHEST AP PORTABLE    CLINICAL HISTORY:  GI  bleed;    FINDINGS:  Portable chest radiograph at 13:10 hours compared to prior exams shows the cardiomediastinal silhouette and pulmonary vasculature are within normal limits.    The lungs are normally expanded, with no consolidation, pleural effusion, or evidence of pulmonary edema. No confluent infiltrates or pneumothorax. There are no significant osseous abnormalities.                                       Medications   0.9%  NaCl infusion (for blood administration) (has no administration in time range)   sodium chloride 0.9% flush 10 mL (has no administration in time range)   melatonin tablet 6 mg (6 mg Oral Given 4/22/24 0720)   ondansetron injection 4 mg (4 mg Intravenous Given 4/25/24 0331)   senna-docusate 8.6-50 mg per tablet 1 tablet (has no administration in time range)   acetaminophen tablet 650 mg (has no administration in time range)   aluminum-magnesium hydroxide-simethicone 200-200-20 mg/5 mL suspension 30 mL (has no administration in time range)   acetaminophen tablet 650 mg (has no administration in time range)   naloxone 0.4 mg/mL injection 0.02 mg (has no administration in time range)   potassium bicarbonate disintegrating tablet 50 mEq (50 mEq Oral Given 4/20/24 1211)   potassium bicarbonate disintegrating tablet 35 mEq (35 mEq Oral Given 4/23/24 1042)   potassium bicarbonate disintegrating tablet 60 mEq (has no administration in time range)   magnesium oxide tablet 800 mg (800 mg Oral Given 4/23/24 1614)   magnesium oxide tablet 800 mg (has no administration in time range)   potassium, sodium phosphates 280-160-250 mg packet 2 packet (has no administration in time range)   potassium, sodium phosphates 280-160-250 mg packet 2 packet (has no administration in time range)   potassium, sodium phosphates 280-160-250 mg packet 2 packet (has no administration in time range)   glucose chewable tablet 16 g (has no administration in time range)   glucose chewable tablet 24 g (has no administration in  time range)   dextrose 50% injection 12.5 g (has no administration in time range)   dextrose 50% injection 25 g (has no administration in time range)   glucagon (human recombinant) injection 1 mg (has no administration in time range)   pantoprazole injection 80 mg (80 mg Intravenous Given 4/24/24 1213)   diltiaZEM 24 hr capsule 120 mg (120 mg Oral Given 4/24/24 0854)   ezetimibe tablet 10 mg (10 mg Oral Given 4/24/24 0855)   hydroCHLOROthiazide tablet 25 mg (25 mg Oral Given 4/24/24 1630)   insulin aspart U-100 pen 0-5 Units (1 Units Subcutaneous Given 4/24/24 2058)   mupirocin 2 % ointment (1 g Nasal Given 4/24/24 2058)   HYDROcodone-acetaminophen  mg per tablet 1 tablet (1 tablet Oral Not Given 4/24/24 0021)   apixaban tablet 5 mg (5 mg Oral Given 4/24/24 2058)   sodium chloride 0.9% bolus 1,000 mL 1,000 mL (0 mLs Intravenous Stopped 4/19/24 1430)   pantoprazole injection 80 mg (80 mg Intravenous Given 4/19/24 1319)   iohexoL (OMNIPAQUE 350) injection 100 mL (100 mLs Intravenous Given 4/19/24 1545)   polyethylene glycol packet 340 g (340 g Oral Given 4/20/24 1139)   simethicone 40 mg/0.6 mL liquid (PEDS) (40 mg  Given 4/20/24 0935)   HYDROmorphone injection 0.2 mg (0.2 mg Intravenous Given 4/22/24 1345)   dexmedeTOMIDine injection 15 mcg (15 mcg Intravenous Given 4/22/24 1005)   HYDROmorphone injection 1 mg (1 mg Intravenous Given 4/23/24 0522)     Medical Decision Making  Case discussed with Gastroenterology on-call.  Transfusion has been ordered.  CT scan with no evidence of acute colitis.  The patient has remained hemodynamically stable.  Have discussed the case with the hospitalist provider who has assumed care and will admit.    Amount and/or Complexity of Data Reviewed  Labs: ordered.  Radiology: ordered.    Risk  Prescription drug management.               ED Course as of 04/25/24 0536   Thu Apr 25, 2024   0531 Occult Blood(!): Positive [AR]   0531 Troponin I High Sensitivity: 5.1 [AR]   0531  UROBILINOGEN UA: Negative [AR]   0532 BNP: 37 [AR]   0532 Magnesium : 1.6 [AR]   0532 CPK: 34 [AR]   0532 Lipase: 59 [AR]   0532 D-Dimer(!!): 0.55 [AR]   0532 PTT(!): 32.3 [AR]   0532 Sodium: 137 [AR]   0532 Potassium: 3.5 [AR]   0532 Glucose(!): 255 [AR]   0532 BUN: 14 [AR]   0532 Creatinine: 1.2 [AR]   0532 AST(!): 43 [AR]   0532 eGFR(!): 48.4 [AR]   0532 PT: 11.2 [AR]   0532 INR: 1.0 [AR]   0532 WBC(!): 12.93 [AR]   0532 Hemoglobin(!): 7.4 [AR]   0532 Hematocrit(!): 24.5 [AR]   0532 Platelet Count(!): 574 [AR]      ED Course User Index  [AR] Kimmie Perera MD                           Clinical Impression:  Final diagnoses:  [K92.2] GI bleed  [D64.9] Symptomatic anemia (Primary)          ED Disposition Condition    Observation                 Kimmie Perera MD  04/25/24 0520

## 2024-04-19 NOTE — ASSESSMENT & PLAN NOTE
Chronic, controlled. Latest blood pressure and vitals reviewed-     Temp:  [98.1 °F (36.7 °C)-98.2 °F (36.8 °C)]   Pulse:  [77-95]   Resp:  [15-20]   BP: (119-159)/(58-95)   SpO2:  [98 %-100 %] .   Home meds for hypertension were reviewed and noted below.   Hypertension Medications               diltiaZEM (CARDIZEM CD) 120 MG Cp24 Take 1 capsule (120 mg total) by mouth once daily.    hydroCHLOROthiazide (HYDRODIURIL) 25 MG tablet Take 25 mg by mouth every Mon, Wed, Fri.            While in the hospital, will manage blood pressure as follows; Continue home antihypertensive regimen    Will utilize p.r.n. blood pressure medication only if patient's blood pressure greater than 180/110 and she develops symptoms such as worsening chest pain or shortness of breath.

## 2024-04-19 NOTE — ASSESSMENT & PLAN NOTE
With history of LLE clot in 2021, Ddimer elevated but no PE found on CTA  Last dose of eliquis this morning, will hold for now due to active bleeding.

## 2024-04-20 ENCOUNTER — ANESTHESIA (OUTPATIENT)
Dept: SURGERY | Facility: HOSPITAL | Age: 74
DRG: 330 | End: 2024-04-20
Payer: MEDICARE

## 2024-04-20 ENCOUNTER — ANESTHESIA EVENT (OUTPATIENT)
Dept: SURGERY | Facility: HOSPITAL | Age: 74
DRG: 330 | End: 2024-04-20
Payer: MEDICARE

## 2024-04-20 LAB
ALBUMIN SERPL BCP-MCNC: 3.4 G/DL (ref 3.5–5.2)
ALP SERPL-CCNC: 108 U/L (ref 55–135)
ALT SERPL W/O P-5'-P-CCNC: 33 U/L (ref 10–44)
ANION GAP SERPL CALC-SCNC: 9 MMOL/L (ref 8–16)
AST SERPL-CCNC: 33 U/L (ref 10–40)
BASOPHILS # BLD AUTO: 0.05 K/UL (ref 0–0.2)
BASOPHILS NFR BLD: 0.4 % (ref 0–1.9)
BILIRUB SERPL-MCNC: 0.3 MG/DL (ref 0.1–1)
BUN SERPL-MCNC: 10 MG/DL (ref 8–23)
CALCIUM SERPL-MCNC: 8.8 MG/DL (ref 8.7–10.5)
CHLORIDE SERPL-SCNC: 104 MMOL/L (ref 95–110)
CO2 SERPL-SCNC: 25 MMOL/L (ref 23–29)
CREAT SERPL-MCNC: 1 MG/DL (ref 0.5–1.4)
DIFFERENTIAL METHOD BLD: ABNORMAL
EOSINOPHIL # BLD AUTO: 0.2 K/UL (ref 0–0.5)
EOSINOPHIL NFR BLD: 1.8 % (ref 0–8)
ERYTHROCYTE [DISTWIDTH] IN BLOOD BY AUTOMATED COUNT: 16.2 % (ref 11.5–14.5)
EST. GFR  (NO RACE VARIABLE): >60 ML/MIN/1.73 M^2
GLUCOSE SERPL-MCNC: 146 MG/DL (ref 70–110)
HCT VFR BLD AUTO: 25.8 % (ref 37–48.5)
HGB BLD-MCNC: 7.9 G/DL (ref 12–16)
IMM GRANULOCYTES # BLD AUTO: 0.08 K/UL (ref 0–0.04)
IMM GRANULOCYTES NFR BLD AUTO: 0.6 % (ref 0–0.5)
LYMPHOCYTES # BLD AUTO: 3.3 K/UL (ref 1–4.8)
LYMPHOCYTES NFR BLD: 25.4 % (ref 18–48)
MAGNESIUM SERPL-MCNC: 1.8 MG/DL (ref 1.6–2.6)
MCH RBC QN AUTO: 23.4 PG (ref 27–31)
MCHC RBC AUTO-ENTMCNC: 30.6 G/DL (ref 32–36)
MCV RBC AUTO: 76 FL (ref 82–98)
MONOCYTES # BLD AUTO: 0.9 K/UL (ref 0.3–1)
MONOCYTES NFR BLD: 7.1 % (ref 4–15)
NEUTROPHILS # BLD AUTO: 8.4 K/UL (ref 1.8–7.7)
NEUTROPHILS NFR BLD: 64.7 % (ref 38–73)
NRBC BLD-RTO: 0 /100 WBC
PHOSPHATE SERPL-MCNC: 2.7 MG/DL (ref 2.7–4.5)
PLATELET # BLD AUTO: 510 K/UL (ref 150–450)
PMV BLD AUTO: 10.1 FL (ref 9.2–12.9)
POTASSIUM SERPL-SCNC: 3.7 MMOL/L (ref 3.5–5.1)
PROT SERPL-MCNC: 6.5 G/DL (ref 6–8.4)
RBC # BLD AUTO: 3.38 M/UL (ref 4–5.4)
SODIUM SERPL-SCNC: 138 MMOL/L (ref 136–145)
TROPONIN I SERPL HS-MCNC: 6.5 PG/ML (ref 0–14.9)
WBC # BLD AUTO: 12.9 K/UL (ref 3.9–12.7)

## 2024-04-20 PROCEDURE — 25000003 PHARM REV CODE 250: Performed by: INTERNAL MEDICINE

## 2024-04-20 PROCEDURE — 84484 ASSAY OF TROPONIN QUANT: CPT | Performed by: PHYSICAL THERAPY ASSISTANT

## 2024-04-20 PROCEDURE — 85025 COMPLETE CBC W/AUTO DIFF WBC: CPT | Performed by: PHYSICAL THERAPY ASSISTANT

## 2024-04-20 PROCEDURE — 12000002 HC ACUTE/MED SURGE SEMI-PRIVATE ROOM

## 2024-04-20 PROCEDURE — 36415 COLL VENOUS BLD VENIPUNCTURE: CPT | Performed by: PHYSICAL THERAPY ASSISTANT

## 2024-04-20 PROCEDURE — 25000003 PHARM REV CODE 250: Performed by: PHYSICAL THERAPY ASSISTANT

## 2024-04-20 PROCEDURE — D9220A PRA ANESTHESIA: Mod: ANES,,, | Performed by: ANESTHESIOLOGY

## 2024-04-20 PROCEDURE — 84100 ASSAY OF PHOSPHORUS: CPT | Performed by: PHYSICAL THERAPY ASSISTANT

## 2024-04-20 PROCEDURE — 80053 COMPREHEN METABOLIC PANEL: CPT | Performed by: PHYSICAL THERAPY ASSISTANT

## 2024-04-20 PROCEDURE — 63600175 PHARM REV CODE 636 W HCPCS: Performed by: PHYSICAL THERAPY ASSISTANT

## 2024-04-20 PROCEDURE — D9220A PRA ANESTHESIA: Mod: CRNA,,, | Performed by: NURSE ANESTHETIST, CERTIFIED REGISTERED

## 2024-04-20 PROCEDURE — 37000008 HC ANESTHESIA 1ST 15 MINUTES: Performed by: INTERNAL MEDICINE

## 2024-04-20 PROCEDURE — 83735 ASSAY OF MAGNESIUM: CPT | Performed by: PHYSICAL THERAPY ASSISTANT

## 2024-04-20 PROCEDURE — 43235 EGD DIAGNOSTIC BRUSH WASH: CPT | Performed by: INTERNAL MEDICINE

## 2024-04-20 PROCEDURE — 0DJ08ZZ INSPECTION OF UPPER INTESTINAL TRACT, VIA NATURAL OR ARTIFICIAL OPENING ENDOSCOPIC: ICD-10-PCS | Performed by: INTERNAL MEDICINE

## 2024-04-20 PROCEDURE — 94761 N-INVAS EAR/PLS OXIMETRY MLT: CPT

## 2024-04-20 PROCEDURE — C9113 INJ PANTOPRAZOLE SODIUM, VIA: HCPCS | Performed by: PHYSICAL THERAPY ASSISTANT

## 2024-04-20 PROCEDURE — 63600175 PHARM REV CODE 636 W HCPCS: Performed by: NURSE ANESTHETIST, CERTIFIED REGISTERED

## 2024-04-20 RX ORDER — POLYETHYLENE GLYCOL 3350 17 G/17G
340 POWDER, FOR SOLUTION ORAL ONCE
Qty: 20 EACH | Refills: 0 | Status: COMPLETED | OUTPATIENT
Start: 2024-04-20 | End: 2024-04-20

## 2024-04-20 RX ORDER — PROPOFOL 10 MG/ML
VIAL (ML) INTRAVENOUS
Status: DISCONTINUED | OUTPATIENT
Start: 2024-04-20 | End: 2024-04-20

## 2024-04-20 RX ADMIN — POLYETHYLENE GLYCOL 3350 340 G: 17 POWDER, FOR SOLUTION ORAL at 11:04

## 2024-04-20 RX ADMIN — SODIUM CHLORIDE, SODIUM LACTATE, POTASSIUM CHLORIDE, AND CALCIUM CHLORIDE: .6; .31; .03; .02 INJECTION, SOLUTION INTRAVENOUS at 09:04

## 2024-04-20 RX ADMIN — PROPOFOL 100 MG: 10 INJECTION, EMULSION INTRAVENOUS at 09:04

## 2024-04-20 RX ADMIN — Medication 800 MG: at 05:04

## 2024-04-20 RX ADMIN — POTASSIUM BICARBONATE 50 MEQ: 977.5 TABLET, EFFERVESCENT ORAL at 12:04

## 2024-04-20 RX ADMIN — PANTOPRAZOLE SODIUM 80 MG: 40 INJECTION, POWDER, FOR SOLUTION INTRAVENOUS at 12:04

## 2024-04-20 RX ADMIN — Medication 800 MG: at 12:04

## 2024-04-20 NOTE — SUBJECTIVE & OBJECTIVE
Interval History: No acute events overnight    Review of Systems   Constitutional:  Positive for fatigue. Negative for activity change, appetite change, chills and fever.   HENT:  Negative for congestion, ear pain, nosebleeds and sinus pain.    Eyes:  Negative for discharge and itching.   Respiratory:  Positive for shortness of breath. Negative for apnea, cough and chest tightness.    Cardiovascular:  Positive for chest pain. Negative for palpitations and leg swelling.   Gastrointestinal:  Negative for abdominal distention, abdominal pain and vomiting.   Genitourinary:  Negative for difficulty urinating, dysuria, flank pain and frequency.   Musculoskeletal:  Negative for arthralgias, back pain, joint swelling and myalgias.   Skin:  Negative for color change, pallor and rash.   Neurological:  Positive for weakness. Negative for dizziness, light-headedness and headaches.   Psychiatric/Behavioral:  Negative for agitation, behavioral problems, confusion and suicidal ideas.      Objective:     Vital Signs (Most Recent):  Temp: 97.7 °F (36.5 °C) (04/20/24 0955)  Pulse: 79 (04/20/24 0955)  Resp: 18 (04/20/24 0955)  BP: (!) 125/59 (04/20/24 0955)  SpO2: (!) 94 % (ROOM AIR) (04/20/24 0955) Vital Signs (24h Range):  Temp:  [97.1 °F (36.2 °C)-99.2 °F (37.3 °C)] 97.7 °F (36.5 °C)  Pulse:  [] 79  Resp:  [15-20] 18  SpO2:  [92 %-100 %] 94 %  BP: (105-160)/(56-95) 125/59     Weight: 97.2 kg (214 lb 4.6 oz)  Body mass index is 32.58 kg/m².    Intake/Output Summary (Last 24 hours) at 4/20/2024 1017  Last data filed at 4/20/2024 0936  Gross per 24 hour   Intake 1720 ml   Output --   Net 1720 ml         Physical Exam  Vitals reviewed.   Constitutional:       General: She is not in acute distress.     Appearance: Normal appearance. She is normal weight. She is not ill-appearing.   HENT:      Head: Normocephalic and atraumatic.      Nose: Nose normal.      Mouth/Throat:      Mouth: Mucous membranes are moist.      Pharynx:  Oropharynx is clear.   Eyes:      General: No scleral icterus.     Extraocular Movements: Extraocular movements intact.      Conjunctiva/sclera: Conjunctivae normal.      Pupils: Pupils are equal, round, and reactive to light.   Cardiovascular:      Rate and Rhythm: Normal rate and regular rhythm.      Pulses: Normal pulses.      Heart sounds: Normal heart sounds. No murmur heard.     No gallop.   Pulmonary:      Effort: Pulmonary effort is normal. No respiratory distress.      Breath sounds: Normal breath sounds. No wheezing, rhonchi or rales.   Chest:      Chest wall: No tenderness.   Abdominal:      General: Abdomen is flat. There is no distension.      Palpations: Abdomen is soft.      Tenderness: There is no abdominal tenderness.   Musculoskeletal:         General: No swelling or tenderness.      Cervical back: Normal range of motion and neck supple. No rigidity or tenderness.      Right lower leg: No edema.      Left lower leg: No edema.   Skin:     General: Skin is warm and dry.   Neurological:      General: No focal deficit present.      Mental Status: She is alert and oriented to person, place, and time. Mental status is at baseline.      Motor: No weakness.   Psychiatric:         Mood and Affect: Mood normal.         Behavior: Behavior normal.         Thought Content: Thought content normal.             Significant Labs: All pertinent labs within the past 24 hours have been reviewed.  BMP:   Recent Labs   Lab 04/20/24  0501   *      K 3.7      CO2 25   BUN 10   CREATININE 1.0   CALCIUM 8.8   MG 1.8     CBC:   Recent Labs   Lab 04/19/24  1313 04/20/24  0501   WBC 12.93*  12.93* 12.90*   HGB 7.4*  7.4* 7.9*   HCT 24.5*  24.5* 25.8*   *  574* 510*     CMP:   Recent Labs   Lab 04/19/24  1313 04/20/24  0501     137 138   K 3.5  3.5 3.7     103 104   CO2 22*  22* 25   *  255* 146*   BUN 14  14 10   CREATININE 1.2  1.2 1.0   CALCIUM 8.9  8.9 8.8   PROT 6.9   6.9 6.5   ALBUMIN 3.7  3.7 3.4*   BILITOT 0.2  0.2 0.3   ALKPHOS 107  107 108   AST 43*  43* 33   ALT 38  38 33   ANIONGAP 12  12 9       Significant Imaging: I have reviewed all pertinent imaging results/findings within the past 24 hours.

## 2024-04-20 NOTE — ASSESSMENT & PLAN NOTE
Chronic, controlled. Latest blood pressure and vitals reviewed-     Temp:  [97.1 °F (36.2 °C)-99.2 °F (37.3 °C)]   Pulse:  []   Resp:  [15-20]   BP: (105-160)/(56-95)   SpO2:  [92 %-100 %] .   Home meds for hypertension were reviewed and noted below.   Hypertension Medications               diltiaZEM (CARDIZEM CD) 120 MG Cp24 Take 1 capsule (120 mg total) by mouth once daily.    hydroCHLOROthiazide (HYDRODIURIL) 25 MG tablet Take 25 mg by mouth every Mon, Wed, Fri.            While in the hospital, will manage blood pressure as follows; Continue home antihypertensive regimen    Will utilize p.r.n. blood pressure medication only if patient's blood pressure greater than 180/110 and she develops symptoms such as worsening chest pain or shortness of breath.

## 2024-04-20 NOTE — ASSESSMENT & PLAN NOTE
With history of LLE clot in 2021, Ddimer elevated but no PE found on CTA  Last dose of eliquis 04/19; will hold for now due to active bleeding.

## 2024-04-20 NOTE — NURSING
Patient arrived to unit by wheelchair aao x4 in stable condition no c/o pain or discomfort when asked oriented to staff and room voices no concerns at this time bed in lowest position call light in reach bed alarm on

## 2024-04-20 NOTE — NURSING
Nurses Note -- 4 Eyes      4/19/2024   9:25 PM      Skin assessed during: Admit      [x] No Altered Skin Integrity Present    []Prevention Measures Documented      [] Yes- Altered Skin Integrity Present or Discovered   [] LDA Added if Not in Epic (Describe Wound)   [] New Altered Skin Integrity was Present on Admit and Documented in LDA   [] Wound Image Taken    Wound Care Consulted? No    Attending Nurse:  Evelyne Mckeon RN/Staff Member:  Tammie

## 2024-04-20 NOTE — TRANSFER OF CARE
"Anesthesia Transfer of Care Note    Patient: Jen Torres    Procedure(s) Performed: Procedure(s) (LRB):  EGD (ESOPHAGOGASTRODUODENOSCOPY) (N/A)    Patient location: GI    Anesthesia Type: general    Transport from OR: Transported from OR on room air with adequate spontaneous ventilation    Post pain: adequate analgesia    Post assessment: no apparent anesthetic complications    Post vital signs: stable    Level of consciousness: awake    Nausea/Vomiting: no nausea/vomiting    Complications: none    Transfer of care protocol was followed    Last vitals: Visit Vitals  BP (!) 160/72   Pulse 82   Temp 37.1 °C (98.8 °F) (Oral)   Resp 18   Ht 5' 8" (1.727 m)   Wt 97.2 kg (214 lb 4.6 oz)   SpO2 99%   BMI 32.58 kg/m²     "

## 2024-04-20 NOTE — ASSESSMENT & PLAN NOTE
GI consulted, plan endoscopy today 04/20  Serial CBC, monitor on telemetry   Hbg dropped from 11.5 to 7.4 in 4 months.

## 2024-04-20 NOTE — ANESTHESIA POSTPROCEDURE EVALUATION
Anesthesia Post Evaluation    Patient: Jen Torres    Procedure(s) Performed: Procedure(s) (LRB):  EGD (ESOPHAGOGASTRODUODENOSCOPY) (N/A)    Final Anesthesia Type: general      Patient location during evaluation: GI PACU  Patient participation: Yes- Able to Participate  Level of consciousness: awake and alert, oriented and awake  Post-procedure vital signs: reviewed and stable  Pain management: adequate  Airway patency: patent    PONV status at discharge: No PONV  Anesthetic complications: no      Cardiovascular status: blood pressure returned to baseline, hemodynamically stable and stable  Respiratory status: unassisted, spontaneous ventilation and room air  Hydration status: euvolemic  Follow-up not needed.              Vitals Value Taken Time   /79 04/20/24 0958   Temp 97.7 04/20/24 0958   Pulse 79 04/20/24 0958   Resp 16 04/20/24 0958   SpO2 95 % 04/20/24 0958         No case tracking events are documented in the log.      Pain/Judith Score: No data recorded

## 2024-04-20 NOTE — ANESTHESIA PREPROCEDURE EVALUATION
04/20/2024  Jen Torres is a 73 y.o., female.         Results for orders placed or performed during the hospital encounter of 04/19/24   EKG 12-lead    Collection Time: 04/19/24  1:23 PM   Result Value Ref Range    QRS Duration 80 ms    [facility] QTC Calculation 387 ms    Narrative    Test Reason : K92.2,    Vent. Rate : 087 BPM     Atrial Rate : 087 BPM     P-R Int : 158 ms          QRS Dur : 080 ms      QT Int : 322 ms       P-R-T Axes : 137 128 107 degrees     QTc Int : 387 ms     Suspect arm lead reversal, interpretation assumes no reversal  Unusual P axis, possible ectopic atrial rhythm with Premature  supraventricular complexes  Left posterior fascicular block  Septal infarct ,age undetermined  Cannot rule out Inferior infarct ,age undetermined  Abnormal ECG  No previous ECGs available    Referred By: [email or web address]   SELF           Confirmed By:         Imaging Results              CT Abdomen Pelvis With IV Contrast NO Oral Contrast (Final result)  Result time 04/19/24 16:07:17      Final result by Gary Cam DO (04/19/24 16:07:17)                   Impression:      No acute intra-abdominal abnormality observed.      Electronically signed by: Gary Cam  Date:    04/19/2024  Time:    16:07               Narrative:      CMS MANDATED QUALITY DATA - CT RADIATION - 436    All CT scans at this facility utilize dose modulation, iterative reconstruction, and/or weight based dosing when appropriate to reduce radiation dose to as low as reasonably achievable.    EXAMINATION:  CT ABDOMEN PELVIS WITH IV CONTRAST    CLINICAL HISTORY:  GI bleed;LLQ abdominal pain;    TECHNIQUE:  CT abdomen and pelvis with 100 mL Omnipaque 350.    COMPARISON:  CT abdomen pelvis 09/30/2014.    FINDINGS:  There is bilateral dependent subsegmental atelectasis.  Heart size is normal.    Liver is normal size.  No enhancing hepatic  lesions identified.  Gallbladder has been removed.  The pancreas, spleen and adrenal glands are unremarkable.  The kidneys are normal size.  Bilateral parapelvic renal cysts again noted.  No hydronephrosis or nephrolithiasis.  The ureters are normal caliber without obstructions.  The bladder is fluid filled with no focal wall abnormalities.  The uterus and adnexal structures are unremarkable.  No free fluid in the pelvis.    The large and small bowel are normal caliber.  The appendix is not identified.  There is no bowel wall thickening or inflammatory changes.  The stomach is mostly decompressed and grossly unremarkable.    The abdominal aorta is normal caliber with atherosclerosis.  There are no enlarged intra-abdominal lymph nodes.  No mesenteric fat stranding and free fluid.  The ventral abdominal wall is unremarkable.  There is no acute osseous abnormality.                                       CTA Chest Non-Coronary (PE Studies) (Final result)  Result time 04/19/24 15:59:08      Final result by Gary Cam DO (04/19/24 15:59:08)                   Impression:      No acute cardiopulmonary abnormality observed.  No pulmonary embolism.      Electronically signed by: Gary Cam  Date:    04/19/2024  Time:    15:59               Narrative:      CMS MANDATED QUALITY DATA - CT RADIATION - 436    All CT scans at this facility utilize dose modulation, iterative reconstruction, and/or weight based dosing when appropriate to reduce radiation dose to as low as reasonably achievable.    EXAMINATION:  CTA CHEST NON CORONARY (PE STUDIES)    CLINICAL HISTORY:  Pulmonary embolism (PE) suspected, high prob;    TECHNIQUE:  CT angiography of the chest with 100 mL Omnipaque 350. Maximum intensity projection coronal reformations were created at a separate workstation and stored in the patient's permanent medical record.    COMPARISON:  None    FINDINGS:  No pulmonary embolism identified.  Heart size is normal.  The  thoracic aorta is normal caliber with mild atherosclerosis.    The central tracheobronchial tree is patent.  There is bilateral dependent subsegmental atelectasis.  No pleural effusion or consolidation.    The visualized abdominal viscera are unremarkable.  There are degenerative changes of the spine.  There is no acute osseous abnormality.                                       X-Ray Chest AP Portable (Final result)  Result time 04/19/24 13:32:42      Final result by Artemio Ray MD (04/19/24 13:32:42)                   Impression:      No evidence of active cardiopulmonary disease.      Electronically signed by: Artemio Ray  Date:    04/19/2024  Time:    13:32               Narrative:    EXAMINATION:  XR CHEST AP PORTABLE    CLINICAL HISTORY:  GI bleed;    FINDINGS:  Portable chest radiograph at 13:10 hours compared to prior exams shows the cardiomediastinal silhouette and pulmonary vasculature are within normal limits.    The lungs are normally expanded, with no consolidation, pleural effusion, or evidence of pulmonary edema. No confluent infiltrates or pneumothorax. There are no significant osseous abnormalities.                                       Lab Results   Component Value Date    WBC 12.90 (H) 04/20/2024    HGB 7.9 (L) 04/20/2024    HCT 25.8 (L) 04/20/2024    MCV 76 (L) 04/20/2024     (H) 04/20/2024     BMP  Lab Results   Component Value Date     04/20/2024    K 3.7 04/20/2024     04/20/2024    CO2 25 04/20/2024    BUN 10 04/20/2024    CREATININE 1.0 04/20/2024    CALCIUM 8.8 04/20/2024    ANIONGAP 9 04/20/2024     (H) 04/20/2024     (H) 04/19/2024     (H) 04/19/2024       Results for orders placed during the hospital encounter of 03/07/24    Echo    Interpretation Summary    Left Ventricle: The left ventricle is normal in size. Mildly increased wall thickness. There is mild concentric hypertrophy. Normal wall motion. There is normal systolic function with a  visually estimated ejection fraction of 65 - 70%. There is normal diastolic function.    Right Ventricle: Normal right ventricular cavity size. Wall thickness is normal. Right ventricle wall motion  is normal. Systolic function is normal.    Mitral Valve: There is mild regurgitation with a centrally directed jet.    IVC/SVC: Normal venous pressure at 3 mmHg.         Pre-op Assessment    I have reviewed the Patient Summary Reports.     I have reviewed the Nursing Notes. I have reviewed the NPO Status.   I have reviewed the Medications.     Review of Systems  Anesthesia Hx:  No problems with previous Anesthesia             Denies Family Hx of Anesthesia complications.    Denies Personal Hx of Anesthesia complications.                    Social:  Former Smoker, No Alcohol Use       Hematology/Oncology:    Oncology Normal    -- Anemia:               Hematology Comments: Factor 5 Leiden mutation, heterozygous  History of DVT  Eliquis Therapy with last dose 1 day ago                     EENT/Dental:  EENT/Dental Normal           Cardiovascular:     Hypertension, well controlled            DURAN  ECG has been reviewed. Cardiac Workup in progress                          Pulmonary:      Shortness of breath  Sleep Apnea           Education provided regarding risk of obstructive sleep apnea            Renal/:  Renal/ Normal                 Hepatic/GI:     GERD, well controlled   Rybelsus Therapy with last dose 5 day ago     Bowel Conditions:  GI Bleed        Musculoskeletal:         Spine Disorders: cervical and lumbar Chronic Pain, Degenerative disease and Disc disease           Neurological:  TIA,         Memory difficulties     Mild expressive aphasia  Dx of Headaches, Migraine Headache     Peripheral Neuropathy                          Endocrine:  Diabetes, poorly controlled, type 2         Obesity / BMI > 30  Dermatological:  Skin Normal    Psych:  Psychiatric Normal                    Physical Exam  General: Well  nourished, Cooperative, Alert and Oriented    Airway:  Mallampati: III   Mouth Opening: Normal  TM Distance: > 6 cm  Tongue: Normal  Neck ROM: Normal ROM    Dental:  Patient states fractured teeth   Chest/Lungs:  Clear to auscultation, Normal Respiratory Rate    Heart:  Rate: Normal  Rhythm: Regular Rhythm        Anesthesia Plan  Type of Anesthesia, risks & benefits discussed:    Anesthesia Type: Gen Natural Airway  Intra-op Monitoring Plan: Standard ASA Monitors  Post Op Pain Control Plan:   (medical reason for not using multimodal pain management)  Informed Consent: Informed consent signed with the Patient and all parties understand the risks and agree with anesthesia plan.  All questions answered.   ASA Score: 3  Anesthesia Plan Notes:       GNA   POM  Propofol  KARINA Precautions     Ready For Surgery From Anesthesia Perspective.     .

## 2024-04-20 NOTE — PLAN OF CARE
04/20/24 1213   PARNELL Message   Medicare Outpatient and Observation Notification regarding financial responsibility Given to patient/caregiver;Explained to patient/caregiver;Signed/date by patient/caregiver   Date PARNELL was signed 04/20/24   Time PARNELL was signed 1013

## 2024-04-20 NOTE — PROGRESS NOTES
[facility]  Progress Note    Patient Name: Jen Torres  MRN: [id number]  Patient Class: OP- Observation   Admission Date: 4/19/2024  Length of Stay: 0 days  Attending Physician: Olivia Gamboa MD  Primary Care Provider: Sam Garcia IV, MD        Subjective:     Principal Problem:GI bleed        HPI:  Patient is a 73 old female medical history of anemia, GERD, hyperlipidemia, and migraines.  Patient presents to the ED today with complaints of worsening fatigue shortness for breath worse on exertion, and intermittent chest pain on exertion.  Patient was seen in GI office earlier today, thus sent into the ED for further evaluation.  Patient was found to have hemoglobin of 7.4, hematocrit 24.5.  1 unit blood ordered in the ED for transfusion.  Patient just had recent iron infusion on Wednesday.  Patient denies previous history of blood transfusions in the past.  Patient has had rectal bleeding and melena that has been present and steady for the past 2 months.  Patient reports being scheduled for outpatient stress test next week.  D-dimer elevated in the ED at 0.55, CTA negative for acute PE.  CT of the abdomen without acute abnormalities. Patient has history of factor 5 Leiden, DVT in LLE 2021. Patient does report taking Eliquis BID, last dose this morning. GI consulted.  Pt denies constipation/diarrhea, dysuria, abdominal pain, LE edema, diaphoresis, nausea, or vomiting. Patient is full code.    In the ED:  Chest x-ray negative.  Occult positive.  Troponin 5.0, TSH 2.254, UA negative.  Potassium 3.5, magnesium 1.6, INR 1.0, PTT 11.2.    Overview/Hospital Course:  The patient was transfused one unit PRBC on 04/19. She was seen in consult by GI, who plan endoscopy on 04/20. Her H/H were monitored for any further decrease.    Interval History: No acute events overnight    Review of Systems   Constitutional:  Positive for fatigue. Negative for activity change, appetite change,  chills and fever.   HENT:  Negative for congestion, ear pain, nosebleeds and sinus pain.    Eyes:  Negative for discharge and itching.   Respiratory:  Positive for shortness of breath. Negative for apnea, cough and chest tightness.    Cardiovascular:  Positive for chest pain. Negative for palpitations and leg swelling.   Gastrointestinal:  Negative for abdominal distention, abdominal pain and vomiting.   Genitourinary:  Negative for difficulty urinating, dysuria, flank pain and frequency.   Musculoskeletal:  Negative for arthralgias, back pain, joint swelling and myalgias.   Skin:  Negative for color change, pallor and rash.   Neurological:  Positive for weakness. Negative for dizziness, light-headedness and headaches.   Psychiatric/Behavioral:  Negative for agitation, behavioral problems, confusion and suicidal ideas.      Objective:     Vital Signs (Most Recent):  Temp: 97.7 °F (36.5 °C) (04/20/24 0955)  Pulse: 79 (04/20/24 0955)  Resp: 18 (04/20/24 0955)  BP: (!) 125/59 (04/20/24 0955)  SpO2: (!) 94 % (ROOM AIR) (04/20/24 0955) Vital Signs (24h Range):  Temp:  [97.1 °F (36.2 °C)-99.2 °F (37.3 °C)] 97.7 °F (36.5 °C)  Pulse:  [] 79  Resp:  [15-20] 18  SpO2:  [92 %-100 %] 94 %  BP: (105-160)/(56-95) 125/59     Weight: 97.2 kg (214 lb 4.6 oz)  Body mass index is 32.58 kg/m².    Intake/Output Summary (Last 24 hours) at 4/20/2024 1017  Last data filed at 4/20/2024 0936  Gross per 24 hour   Intake 1720 ml   Output --   Net 1720 ml         Physical Exam  Vitals reviewed.   Constitutional:       General: She is not in acute distress.     Appearance: Normal appearance. She is normal weight. She is not ill-appearing.   HENT:      Head: Normocephalic and atraumatic.      Nose: Nose normal.      Mouth/Throat:      Mouth: Mucous membranes are moist.      Pharynx: Oropharynx is clear.   Eyes:      General: No scleral icterus.     Extraocular Movements: Extraocular movements intact.      Conjunctiva/sclera: Conjunctivae  normal.      Pupils: Pupils are equal, round, and reactive to light.   Cardiovascular:      Rate and Rhythm: Normal rate and regular rhythm.      Pulses: Normal pulses.      Heart sounds: Normal heart sounds. No murmur heard.     No gallop.   Pulmonary:      Effort: Pulmonary effort is normal. No respiratory distress.      Breath sounds: Normal breath sounds. No wheezing, rhonchi or rales.   Chest:      Chest wall: No tenderness.   Abdominal:      General: Abdomen is flat. There is no distension.      Palpations: Abdomen is soft.      Tenderness: There is no abdominal tenderness.   Musculoskeletal:         General: No swelling or tenderness.      Cervical back: Normal range of motion and neck supple. No rigidity or tenderness.      Right lower leg: No edema.      Left lower leg: No edema.   Skin:     General: Skin is warm and dry.   Neurological:      General: No focal deficit present.      Mental Status: She is alert and oriented to person, place, and time. Mental status is at baseline.      Motor: No weakness.   Psychiatric:         Mood and Affect: Mood normal.         Behavior: Behavior normal.         Thought Content: Thought content normal.             Significant Labs: All pertinent labs within the past 24 hours have been reviewed.  BMP:   Recent Labs   Lab 04/20/24  0501   *      K 3.7      CO2 25   BUN 10   CREATININE 1.0   CALCIUM 8.8   MG 1.8     CBC:   Recent Labs   Lab 04/19/24  1313 04/20/24  0501   WBC 12.93*  12.93* 12.90*   HGB 7.4*  7.4* 7.9*   HCT 24.5*  24.5* 25.8*   *  574* 510*     CMP:   Recent Labs   Lab 04/19/24  1313 04/20/24  0501     137 138   K 3.5  3.5 3.7     103 104   CO2 22*  22* 25   *  255* 146*   BUN 14  14 10   CREATININE 1.2  1.2 1.0   CALCIUM 8.9  8.9 8.8   PROT 6.9  6.9 6.5   ALBUMIN 3.7  3.7 3.4*   BILITOT 0.2  0.2 0.3   ALKPHOS 107  107 108   AST 43*  43* 33   ALT 38  38 33   ANIONGAP 12  12 9       Significant  Imaging: I have reviewed all pertinent imaging results/findings within the past 24 hours.    Assessment/Plan:      * GI bleed  GI consulted, plan endoscopy today 04/20  Serial CBC, monitor on telemetry   Hbg dropped from 11.5 to 7.4 in 4 months.       Iron deficiency anemia due to chronic blood loss  Last iron infusion 04/17  Symptomatic anemia with SOB, pale, fatigue  Transfused one unit PRBC on 04/19  Serial CBCs, monitor on telemetry     Factor 5 Leiden mutation, heterozygous  With history of LLE clot in 2021, Ddimer elevated but no PE found on CTA  Last dose of eliquis 04/19; will hold for now due to active bleeding.       Type 2 diabetes mellitus without complication, without long-term current use of insulin  POCT glucose, insulin sliding scale     Essential hypertension  Chronic, controlled. Latest blood pressure and vitals reviewed-     Temp:  [97.1 °F (36.2 °C)-99.2 °F (37.3 °C)]   Pulse:  []   Resp:  [15-20]   BP: (105-160)/(56-95)   SpO2:  [92 %-100 %] .   Home meds for hypertension were reviewed and noted below.   Hypertension Medications               diltiaZEM (CARDIZEM CD) 120 MG Cp24 Take 1 capsule (120 mg total) by mouth once daily.    hydroCHLOROthiazide (HYDRODIURIL) 25 MG tablet Take 25 mg by mouth every Mon, Wed, Fri.            While in the hospital, will manage blood pressure as follows; Continue home antihypertensive regimen    Will utilize p.r.n. blood pressure medication only if patient's blood pressure greater than 180/110 and she develops symptoms such as worsening chest pain or shortness of breath.      VTE Risk Mitigation (From admission, onward)           Ordered     Reason for No Pharmacological VTE Prophylaxis  Once        Question:  Reasons:  Answer:  Active Bleeding    04/19/24 1641     IP VTE HIGH RISK PATIENT  Once         04/19/24 1641     Place sequential compression device  Until discontinued         04/19/24 1641                    Discharge Planning   KATIE:      Code  Status: Full Code   Is the patient medically ready for discharge?:     Reason for patient still in hospital (select all that apply): Patient trending condition                     Olivia Gamboa MD, MD  Department of Hospital Medicine   [facility]

## 2024-04-20 NOTE — PROVATION PATIENT INSTRUCTIONS
Discharge Summary/Instructions after an Endoscopic Procedure  Patient Name: Jen Torres  Patient MRN: [id number]  Patient YOB: 1950 Saturday, April 20, 2024  Ceasar Arias III, MD  RESTRICTIONS:  During your procedure today, you received medications for sedation.  These   medications may affect your judgment, balance and coordination.  Therefore,   for 24 hours, you have the following restrictions:   - DO NOT drive a car, operate machinery, make legal/financial decisions,   sign important papers or drink alcohol.    ACTIVITY:  Today: no heavy lifting, straining or running due to procedural   sedation/anesthesia.  The following day: return to full activity including work.  DIET:  Eat and drink normally unless instructed otherwise.     TREATMENT FOR COMMON SIDE EFFECTS:  - Mild abdominal pain, nausea, belching, bloating or excessive gas:  rest,   eat lightly and use a heating pad.  - Sore Throat: treat with throat lozenges and/or gargle with warm salt   water.  - Because air was used during the procedure, expelling large amounts of air   from your rectum or belching is normal.  - If a bowel prep was taken, you may not have a bowel movement for 1-3 days.    This is normal.  SYMPTOMS TO WATCH FOR AND REPORT TO YOUR PHYSICIAN:  1. Abdominal pain or bloating, other than gas cramps.  2. Chest pain.  3. Back pain.  4. Signs of infection such as: chills or fever occurring within 24 hours   after the procedure.  5. Rectal bleeding, which would show as bright red, maroon, or black stools.   (A tablespoon of blood from the rectum is not serious, especially if   hemorrhoids are present.)  6. Vomiting.  7. Weakness or dizziness.  GO DIRECTLY TO THE NEAREST EMERGENCY ROOM IF YOU HAVE ANY OF THE FOLLOWING:      Difficulty breathing              Chills and/or fever over 101 F   Persistent vomiting and/or vomiting blood   Severe abdominal pain   Severe chest pain   Black, tarry stools   Bleeding- more than one  tablespoon   Any other symptom or condition that you feel may need urgent attention  Your doctor recommends these additional instructions:  If any biopsies were taken, your doctors clinic will contact you in 1 to 2   weeks with any results.  - Return patient to hospital chang for ongoing care.   - Colonoscopy tomorrow given lack of findings on today's exam  For questions, problems or results please call your physician - Ceasar Arias III, MD at Work:  [phone].  [facility], EMERGENCY ROOM PHONE NUMBER: [phone]  IF A COMPLICATION OR EMERGENCY SITUATION ARISES AND YOU ARE UNABLE TO REACH   YOUR PHYSICIAN - GO DIRECTLY TO THE EMERGENCY ROOM.  Ceasar Arias III, MD  4/20/2024 10:58:45 AM  This report has been verified and signed electronically.  Dear patient,  As a result of recent federal legislation (The Federal Cures Act), you may   receive lab or pathology results from your procedure in your [facility]   account before your physician is able to contact you. Your physician or   their representative will relay the results to you with their   recommendations at their soonest availability.  Thank you,  PROVATION

## 2024-04-20 NOTE — PLAN OF CARE
[facility]  Initial Discharge Assessment       Primary Care Provider: Sam Garcia IV, MD    Admission Diagnosis: Symptomatic anemia [D64.9]    Admission Date: 4/19/2024  Expected Discharge Date: TBD  Met with patient at bedside to complete assessment. No living will, POA or advance directives. No HH, HD, DME or Coumadin. Patient's spouse will bring her home when she is discharged. No needs were identified at this time.    Transition of Care Barriers: None    Payor: MEDICARE / Plan: MEDICARE PART A & B / Product Type: Government /     Extended Emergency Contact Information  Primary Emergency Contact: Javi Torres  Address: [address] [place] [address] [facility]  Home Phone: [phone]  Mobile Phone: [phone]  Relation: Spouse    Discharge Plan A: Home with family  Discharge Plan B: Home with family      [id number] - [place], [place] - [address] AT [facility]  [address]  [place] [address]  Phone: [phone] Fax: [phone]      Initial Assessment (most recent)       Adult Discharge Assessment - 04/20/24 1214          Discharge Assessment    Assessment Type Discharge Planning Assessment     Confirmed/corrected address, phone number and insurance Yes     Confirmed Demographics Correct on Facesheet     Source of Information patient     When was your last doctors appointment? --   one month ago    Does patient/caregiver understand observation status Yes     Communicated KATIE with patient/caregiver Date not available/Unable to determine     Reason For Admission GI bleed     People in Home spouse     Facility Arrived From: home     Do you expect to return to your current living situation? Yes     Do you have help at home or someone to help you manage your care at home? Yes     Who are your caregiver(s) and their phone number(s)? Javi Torres/spouse [phone]     Prior to hospitilization cognitive status:  Alert/Oriented;No Deficits     Current cognitive status: Alert/Oriented;No Deficits     Walking or Climbing Stairs Difficulty no     Dressing/Bathing Difficulty no     Equipment Currently Used at Home none     Patient currently being followed by outpatient case management? No     Do you currently have service(s) that help you manage your care at home? No     Do you take prescription medications? Yes     Do you have prescription coverage? Yes     Coverage [employer]     Do you have any problems affording any of your prescribed medications? No     Is the patient taking medications as prescribed? yes     Who is going to help you get home at discharge? Javi Sessions/spouse [phone]     How do you get to doctors appointments? family or friend will provide     Are you on dialysis? No     Do you take coumadin? No     Discharge Plan A Home with family     Discharge Plan B Home with family     DME Needed Upon Discharge  none     Transition of Care Barriers None

## 2024-04-20 NOTE — ASSESSMENT & PLAN NOTE
Last iron infusion 04/17  Symptomatic anemia with SOB, pale, fatigue  Transfused one unit PRBC on 04/19  Serial CBCs, monitor on telemetry

## 2024-04-20 NOTE — HOSPITAL COURSE
The patient was transfused one unit PRBC on 04/19. She was seen in consult by GI, EGD done on 04/20. No significant findings. Colonoscopy done for 04/21 and revealed colonic mass in the transverse colon. Surgery was consulted, and resection was done 04/22. Her H/H were monitored for any further decrease. Patient with expected postop pain. Patient developed n/v. Abdominal xray revealed ileus. Oral meds were changed to IV form. The patient was held NPO. She was placed on PPN.  Patient apparently has some postoperative ileus, this improved with conservative management, patient tolerated diet well, we weaned off PPN.  Patient had multiple bowel movements.  Surgical path come back shows colorectal adenocarcinoma, with margin negative, patient had CT chest prior to admission which is negative for metastatic disease.  Patient also has some leukocytosis upon discharge, procalcitonin within normal, CT abdomen and pelvis is negative for acute changes, patient cleared for discharge by General surgery.  Patient will follow up oncologist outpatient for possible chemotherapy.

## 2024-04-20 NOTE — CONSULTS
GASTROENTEROLOGY INPATIENT CONSULT NOTE  Patient Name: Jen Torres  Patient MRN: [id number]  Patient : 1950    Admit Date: 2024  Service date: 2024    Reason for Consult: acute / chronic anemia    PCP: Sam Garcia IV, MD    Chief Complaint   Patient presents with    Rectal Bleeding     For over a year that has been steady for the last 1.5 months. HnH low iron transfusion Wednesday     Shortness of Breath       HPI: Patient is a 73 y.o. female with PMHx GERD, HLD, KARINA, cholecystectomy, anemia, DVT(Eliquis), HTN(ASA), DM presents for evaluation of weakness / SOB. Acute onset, intermittent, progressive on admission. Intermittent dark stools over past few months but no rectal bleeding. Last dose of Eliquis     CHART REVIEW:   Hg 7.4 w/ MCV 76(10-11 over past few months; baseline 13)  CT Ch/Abd  - nml chest w/o PE. min vasc dz; s/p paula; otherwise nml abd  EGD  - distal esophagitis; HP neg; small gastric polyps  Colon ' - small polyps; repeat     Past Medical History:  Past Medical History:   Diagnosis Date    Abnormal Pap smear     s/p cryo at 22 y/o    Anticoagulant long-term use     GERD (gastroesophageal reflux disease)     Hyperlipidemia     Migraine     Migraines     Sleep apnea     Can not use c-pap        Past Surgical History:  Past Surgical History:   Procedure Laterality Date    ARTHROSCOPIC REPAIR OF ROTATOR CUFF OF SHOULDER Left 2023    Procedure: REPAIR, ROTATOR CUFF, ARTHROSCOPIC;  Surgeon: Gary Tony MD;  Location: [facility];  Service: Orthopedics;  Laterality: Left;  arthrex    ARTHROSCOPY OF SHOULDER WITH DECOMPRESSION OF SUBACROMIAL SPACE Left 2023    Procedure: ARTHROSCOPY, SHOULDER, WITH SUBACROMIAL SPACE DECOMPRESSION;  Surgeon: Gary Tony MD;  Location: [facility];  Service: Orthopedics;  Laterality: Left;    CATARACT EXTRACTION, BILATERAL      CHOLECYSTECTOMY  10/01/2014    EYE SURGERY      KNEE ARTHROSCOPY Left      TONSILLECTOMY          Home Medications:  Medications Prior to Admission   Medication Sig Dispense Refill Last Dose    acetaminophen (TYLENOL) 500 MG tablet Take 2 tablets (1,000 mg total) by mouth every 8 (eight) hours as needed for Pain. 30 tablet 0     aspirin 81 MG Chew Take 81 mg by mouth once daily.       diltiaZEM (CARDIZEM CD) 120 MG Cp24 Take 1 capsule (120 mg total) by mouth once daily. 30 capsule 11     ELIQUIS 5 mg Tab TAKE 1 TABLET BY MOUTH TWICE A DAY (Patient taking differently: Take 5 mg by mouth 2 (two) times daily.) 60 tablet 4     ergocalciferol (ERGOCALCIFEROL) 50,000 unit Cap Take 50,000 Units by mouth every 7 days. Fridays       ezetimibe (ZETIA) 10 mg tablet Take 10 mg by mouth once daily.       glimepiride (AMARYL) 2 MG tablet Take 2 mg by mouth every morning.       hydroCHLOROthiazide (HYDRODIURIL) 25 MG tablet Take 25 mg by mouth every Mon, Wed, Fri.       pantoprazole (PROTONIX) 40 MG tablet Take 40 mg by mouth every morning.       potassium chloride (MICRO-K) 10 MEQ CpSR TAKE 1 CAPSULE BY MOUTH EVERY DAY (Patient taking differently: Take 10 mEq by mouth once daily.) 90 capsule 3     WESTAB MAX 2.5-25-2 mg Tab TAKE 1 TABLET BY MOUTH EVERY DAY 90 tablet 4        Inpatient Medications:  Current Facility-Administered Medications   Medication Dose Route Frequency    diltiaZEM  120 mg Oral Daily    ezetimibe  10 mg Oral Daily    [START ON 4/22/2024] hydroCHLOROthiazide  25 mg Oral Every Mon, Wed, Fri    pantoprazole  80 mg Intravenous Daily       Current Facility-Administered Medications:     0.9%  NaCl infusion (for blood administration), , Intravenous, Q24H PRN    acetaminophen, 650 mg, Oral, Q8H PRN    acetaminophen, 650 mg, Oral, Q4H PRN    aluminum-magnesium hydroxide-simethicone, 30 mL, Oral, QID PRN    dextrose 50%, 12.5 g, Intravenous, PRN    dextrose 50%, 25 g, Intravenous, PRN    glucagon (human recombinant), 1 mg, Intramuscular, PRN    glucose, 16 g, Oral, PRN    glucose, 24 g, Oral,  PRN    HYDROcodone-acetaminophen, 1 tablet, Oral, Q6H PRN    magnesium oxide, 800 mg, Oral, PRN    magnesium oxide, 800 mg, Oral, PRN    melatonin, 6 mg, Oral, Nightly PRN    naloxone, 0.02 mg, Intravenous, PRN    ondansetron, 4 mg, Intravenous, Q6H PRN    potassium bicarbonate, 35 mEq, Oral, PRN    potassium bicarbonate, 50 mEq, Oral, PRN    potassium bicarbonate, 60 mEq, Oral, PRN    potassium, sodium phosphates, 2 packet, Oral, PRN    potassium, sodium phosphates, 2 packet, Oral, PRN    potassium, sodium phosphates, 2 packet, Oral, PRN    senna-docusate 8.6-50 mg, 1 tablet, Oral, BID PRN    sodium chloride 0.9%, 10 mL, Intravenous, Q12H PRN    Review of patient's allergies indicates:   Allergen Reactions    Statins-hmg-coa reductase inhibitors Swelling    Metformin Other (See Comments)     metallic taste , burn of the esophagus       Social History:   Social History     Occupational History    Not on file   Tobacco Use    Smoking status: Former    Smokeless tobacco: Never   Substance and Sexual Activity    Alcohol use: No    Drug use: No    Sexual activity: Never       Family History:   Family History   Problem Relation Name Age of Onset    Breast cancer Paternal Grandmother  70    Colon cancer Mother              Cancer Daughter          crohns    Ovarian cancer Daughter      Cancer Daughter  20        uterine cancer with mets s/p chemo/rad/surgery; ?genetic mutation; now with hip tumors    Breast cancer Maternal Grandmother         Review of Systems:  A 10 point review of systems was performed and was normal, except as mentioned in the HPI, including constitutional, HEENT, heme, lymph, cardiovascular, respiratory, gastrointestinal, genitourinary, neurologic, endocrine, psychiatric and musculoskeletal.      OBJECTIVE:    Physical Exam:  24 Hour Vital Sign Ranges: Temp:  [97.1 °F (36.2 °C)-99.2 °F (37.3 °C)] 97.7 °F (36.5 °C)  Pulse:  [] 103  Resp:  [15-20] 16  SpO2:  [95 %-100 %] 97 %  BP:  "(105-159)/(57-95) 116/71  Most recent vitals: /71   Pulse 103   Temp 97.7 °F (36.5 °C) (Oral)   Resp 16   Ht 5' 8" (1.727 m)   Wt 97.2 kg (214 lb 4.6 oz)   SpO2 97%   BMI 32.58 kg/m²    GEN: well-developed, well-nourished, awake and alert, non-toxic appearing adult  HEENT: PERRL, sclera anicteric, oral mucosa pink and moist without lesion  NECK: trachea midline; Good ROM  CV: regular rate and rhythm, no murmurs or gallops  RESP: clear to auscultation bilaterally, no wheezes, rhonci or rales  ABD: soft, non-tender, non-distended, normal bowel sounds  EXT: no swelling or edema, 2+ pulses distally  SKIN: no rashes or jaundice  PSYCH: normal affect    Labs:   Recent Labs     04/19/24  1313 04/20/24  0501   WBC 12.93*  12.93* 12.90*   MCV 75*  75* 76*   *  574* 510*     Recent Labs     04/19/24  1313 04/20/24  0501     137 138   K 3.5  3.5 3.7     103 104   CO2 22*  22* 25   BUN 14  14 10   *  255* 146*     No results for input(s): "ALB" in the last 72 hours.    Invalid input(s): "ALKP", "SGOT", "SGPT", "TBIL", "DBIL", "TPRO"  Recent Labs     04/19/24  1313   INR 1.0         Radiology Review:  CT Abdomen Pelvis With IV Contrast NO Oral Contrast   Final Result      No acute intra-abdominal abnormality observed.         Electronically signed by: Gary Cam   Date:    04/19/2024   Time:    16:07      CTA Chest Non-Coronary (PE Studies)   Final Result      No acute cardiopulmonary abnormality observed.  No pulmonary embolism.         Electronically signed by: Gary Cam   Date:    04/19/2024   Time:    15:59      X-Ray Chest AP Portable   Final Result      No evidence of active cardiopulmonary disease.         Electronically signed by: Artemio Ray   Date:    04/19/2024   Time:    13:32            IMPRESSION / RECOMMENDATIONS:  73 y.o. female with PMHx GERD, HLD, KARINA, cholecystectomy, anemia, DVT(Eliquis), HTN(ASA), DM presents for evaluation of weakness / SOB w/ acute " progression of anemia that started in late '23. RIsks, benefits, alternatives discussed in detail regarding upcoming procedures and sedation and possible complications. Some of the more common endoscopic complications include but not limited to immediate or delayed perforation, bleeding, infections, pain, inadvertent injury to surrounding tissue / organs and possible need for surgical evaluation. All questions answered and will proceed with procedure as planned.     -EGD today; Colon glendy if negative; VCE as outpatient if both E/C negative.     Thank you for this consult.    Ceasar Arisa III  4/20/2024  8:10 AM

## 2024-04-21 ENCOUNTER — ANESTHESIA EVENT (OUTPATIENT)
Dept: SURGERY | Facility: HOSPITAL | Age: 74
DRG: 330 | End: 2024-04-21
Payer: MEDICARE

## 2024-04-21 ENCOUNTER — ANESTHESIA (OUTPATIENT)
Dept: SURGERY | Facility: HOSPITAL | Age: 74
DRG: 330 | End: 2024-04-21
Payer: MEDICARE

## 2024-04-21 PROBLEM — D68.51 FACTOR 5 LEIDEN MUTATION, HETEROZYGOUS: Status: RESOLVED | Noted: 2024-03-07 | Resolved: 2024-04-21

## 2024-04-21 PROBLEM — K63.89 COLONIC MASS: Status: ACTIVE | Noted: 2024-04-21

## 2024-04-21 LAB
ALBUMIN SERPL BCP-MCNC: 3.5 G/DL (ref 3.5–5.2)
ALP SERPL-CCNC: 98 U/L (ref 55–135)
ALT SERPL W/O P-5'-P-CCNC: 39 U/L (ref 10–44)
ANION GAP SERPL CALC-SCNC: 7 MMOL/L (ref 8–16)
AST SERPL-CCNC: 60 U/L (ref 10–40)
BASOPHILS # BLD AUTO: 0.04 K/UL (ref 0–0.2)
BASOPHILS NFR BLD: 0.4 % (ref 0–1.9)
BILIRUB SERPL-MCNC: 0.3 MG/DL (ref 0.1–1)
BUN SERPL-MCNC: 9 MG/DL (ref 8–23)
CALCIUM SERPL-MCNC: 8.9 MG/DL (ref 8.7–10.5)
CHLORIDE SERPL-SCNC: 105 MMOL/L (ref 95–110)
CO2 SERPL-SCNC: 27 MMOL/L (ref 23–29)
CREAT SERPL-MCNC: 1.1 MG/DL (ref 0.5–1.4)
DIFFERENTIAL METHOD BLD: ABNORMAL
EOSINOPHIL # BLD AUTO: 0.2 K/UL (ref 0–0.5)
EOSINOPHIL NFR BLD: 2 % (ref 0–8)
ERYTHROCYTE [DISTWIDTH] IN BLOOD BY AUTOMATED COUNT: 16.6 % (ref 11.5–14.5)
EST. GFR  (NO RACE VARIABLE): 53.7 ML/MIN/1.73 M^2
GLUCOSE SERPL-MCNC: 166 MG/DL (ref 70–110)
HCT VFR BLD AUTO: 26 % (ref 37–48.5)
HGB BLD-MCNC: 7.9 G/DL (ref 12–16)
IMM GRANULOCYTES # BLD AUTO: 0.07 K/UL (ref 0–0.04)
IMM GRANULOCYTES NFR BLD AUTO: 0.7 % (ref 0–0.5)
LYMPHOCYTES # BLD AUTO: 2.4 K/UL (ref 1–4.8)
LYMPHOCYTES NFR BLD: 22.6 % (ref 18–48)
MAGNESIUM SERPL-MCNC: 1.8 MG/DL (ref 1.6–2.6)
MCH RBC QN AUTO: 23.7 PG (ref 27–31)
MCHC RBC AUTO-ENTMCNC: 30.4 G/DL (ref 32–36)
MCV RBC AUTO: 78 FL (ref 82–98)
MONOCYTES # BLD AUTO: 0.8 K/UL (ref 0.3–1)
MONOCYTES NFR BLD: 7.8 % (ref 4–15)
NEUTROPHILS # BLD AUTO: 7.2 K/UL (ref 1.8–7.7)
NEUTROPHILS NFR BLD: 66.5 % (ref 38–73)
NRBC BLD-RTO: 0 /100 WBC
PHOSPHATE SERPL-MCNC: 2.5 MG/DL (ref 2.7–4.5)
PLATELET # BLD AUTO: 481 K/UL (ref 150–450)
PMV BLD AUTO: 10.9 FL (ref 9.2–12.9)
POTASSIUM SERPL-SCNC: 3.9 MMOL/L (ref 3.5–5.1)
PROT SERPL-MCNC: 6.4 G/DL (ref 6–8.4)
RBC # BLD AUTO: 3.33 M/UL (ref 4–5.4)
SODIUM SERPL-SCNC: 139 MMOL/L (ref 136–145)
WBC # BLD AUTO: 10.73 K/UL (ref 3.9–12.7)

## 2024-04-21 PROCEDURE — 63600175 PHARM REV CODE 636 W HCPCS: Performed by: NURSE ANESTHETIST, CERTIFIED REGISTERED

## 2024-04-21 PROCEDURE — 37000008 HC ANESTHESIA 1ST 15 MINUTES: Performed by: INTERNAL MEDICINE

## 2024-04-21 PROCEDURE — C9113 INJ PANTOPRAZOLE SODIUM, VIA: HCPCS | Performed by: PHYSICAL THERAPY ASSISTANT

## 2024-04-21 PROCEDURE — 27201028 HC NEEDLE, SCLERO: Performed by: INTERNAL MEDICINE

## 2024-04-21 PROCEDURE — 45380 COLONOSCOPY AND BIOPSY: CPT | Performed by: INTERNAL MEDICINE

## 2024-04-21 PROCEDURE — 80053 COMPREHEN METABOLIC PANEL: CPT | Performed by: PHYSICAL THERAPY ASSISTANT

## 2024-04-21 PROCEDURE — 27200043 HC FORCEPS, BIOPSY: Performed by: INTERNAL MEDICINE

## 2024-04-21 PROCEDURE — 27202343 HC ENDOSCOPIC MARKER: Performed by: INTERNAL MEDICINE

## 2024-04-21 PROCEDURE — 0DBL8ZX EXCISION OF TRANSVERSE COLON, VIA NATURAL OR ARTIFICIAL OPENING ENDOSCOPIC, DIAGNOSTIC: ICD-10-PCS | Performed by: INTERNAL MEDICINE

## 2024-04-21 PROCEDURE — 99223 1ST HOSP IP/OBS HIGH 75: CPT | Mod: 57,ICN,, | Performed by: SURGERY

## 2024-04-21 PROCEDURE — 99222 1ST HOSP IP/OBS MODERATE 55: CPT | Mod: ,,, | Performed by: INTERNAL MEDICINE

## 2024-04-21 PROCEDURE — 85025 COMPLETE CBC W/AUTO DIFF WBC: CPT | Performed by: PHYSICAL THERAPY ASSISTANT

## 2024-04-21 PROCEDURE — 84100 ASSAY OF PHOSPHORUS: CPT | Performed by: PHYSICAL THERAPY ASSISTANT

## 2024-04-21 PROCEDURE — 12000002 HC ACUTE/MED SURGE SEMI-PRIVATE ROOM

## 2024-04-21 PROCEDURE — D9220A PRA ANESTHESIA: Mod: CRNA,,, | Performed by: NURSE ANESTHETIST, CERTIFIED REGISTERED

## 2024-04-21 PROCEDURE — 94761 N-INVAS EAR/PLS OXIMETRY MLT: CPT

## 2024-04-21 PROCEDURE — 88305 TISSUE EXAM BY PATHOLOGIST: CPT | Mod: TC | Performed by: PATHOLOGY

## 2024-04-21 PROCEDURE — D9220A PRA ANESTHESIA: Mod: ANES,,, | Performed by: ANESTHESIOLOGY

## 2024-04-21 PROCEDURE — 63600175 PHARM REV CODE 636 W HCPCS: Performed by: PHYSICAL THERAPY ASSISTANT

## 2024-04-21 PROCEDURE — 83735 ASSAY OF MAGNESIUM: CPT | Performed by: PHYSICAL THERAPY ASSISTANT

## 2024-04-21 PROCEDURE — 25000003 PHARM REV CODE 250: Performed by: PHYSICAL THERAPY ASSISTANT

## 2024-04-21 PROCEDURE — 36415 COLL VENOUS BLD VENIPUNCTURE: CPT | Performed by: PHYSICAL THERAPY ASSISTANT

## 2024-04-21 PROCEDURE — 37000009 HC ANESTHESIA EA ADD 15 MINS: Performed by: INTERNAL MEDICINE

## 2024-04-21 RX ORDER — PROPOFOL 10 MG/ML
VIAL (ML) INTRAVENOUS
Status: DISCONTINUED | OUTPATIENT
Start: 2024-04-21 | End: 2024-04-21

## 2024-04-21 RX ADMIN — PANTOPRAZOLE SODIUM 80 MG: 40 INJECTION, POWDER, FOR SOLUTION INTRAVENOUS at 02:04

## 2024-04-21 RX ADMIN — PROPOFOL 100 MG: 10 INJECTION, EMULSION INTRAVENOUS at 09:04

## 2024-04-21 RX ADMIN — SODIUM CHLORIDE, SODIUM LACTATE, POTASSIUM CHLORIDE, AND CALCIUM CHLORIDE: .6; .31; .03; .02 INJECTION, SOLUTION INTRAVENOUS at 09:04

## 2024-04-21 RX ADMIN — PROPOFOL 50 MG: 10 INJECTION, EMULSION INTRAVENOUS at 10:04

## 2024-04-21 RX ADMIN — HYDROCODONE BITARTRATE AND ACETAMINOPHEN 1 TABLET: 5; 325 TABLET ORAL at 02:04

## 2024-04-21 NOTE — ANESTHESIA PREPROCEDURE EVALUATION
04/21/2024  Jen Torres is a 73 y.o., female.         Results for orders placed or performed during the hospital encounter of 04/19/24   EKG 12-lead    Collection Time: 04/19/24  1:23 PM   Result Value Ref Range    QRS Duration 80 ms    [facility] QTC Calculation 387 ms    Narrative    Test Reason : K92.2,    Vent. Rate : 087 BPM     Atrial Rate : 087 BPM     P-R Int : 158 ms          QRS Dur : 080 ms      QT Int : 322 ms       P-R-T Axes : 137 128 107 degrees     QTc Int : 387 ms     Suspect arm lead reversal, interpretation assumes no reversal  Unusual P axis, possible ectopic atrial rhythm with Premature  supraventricular complexes  Left posterior fascicular block  Septal infarct ,age undetermined  Cannot rule out Inferior infarct ,age undetermined  Abnormal ECG  No previous ECGs available    Referred By: [email or web address]   SELF           Confirmed By:         Imaging Results              CT Abdomen Pelvis With IV Contrast NO Oral Contrast (Final result)  Result time 04/19/24 16:07:17      Final result by Gary Cam DO (04/19/24 16:07:17)                   Impression:      No acute intra-abdominal abnormality observed.      Electronically signed by: Gary Cam  Date:    04/19/2024  Time:    16:07               Narrative:      CMS MANDATED QUALITY DATA - CT RADIATION - 436    All CT scans at this facility utilize dose modulation, iterative reconstruction, and/or weight based dosing when appropriate to reduce radiation dose to as low as reasonably achievable.    EXAMINATION:  CT ABDOMEN PELVIS WITH IV CONTRAST    CLINICAL HISTORY:  GI bleed;LLQ abdominal pain;    TECHNIQUE:  CT abdomen and pelvis with 100 mL Omnipaque 350.    COMPARISON:  CT abdomen pelvis 09/30/2014.    FINDINGS:  There is bilateral dependent subsegmental atelectasis.  Heart size is normal.    Liver is normal size.  No enhancing hepatic  lesions identified.  Gallbladder has been removed.  The pancreas, spleen and adrenal glands are unremarkable.  The kidneys are normal size.  Bilateral parapelvic renal cysts again noted.  No hydronephrosis or nephrolithiasis.  The ureters are normal caliber without obstructions.  The bladder is fluid filled with no focal wall abnormalities.  The uterus and adnexal structures are unremarkable.  No free fluid in the pelvis.    The large and small bowel are normal caliber.  The appendix is not identified.  There is no bowel wall thickening or inflammatory changes.  The stomach is mostly decompressed and grossly unremarkable.    The abdominal aorta is normal caliber with atherosclerosis.  There are no enlarged intra-abdominal lymph nodes.  No mesenteric fat stranding and free fluid.  The ventral abdominal wall is unremarkable.  There is no acute osseous abnormality.                                       CTA Chest Non-Coronary (PE Studies) (Final result)  Result time 04/19/24 15:59:08      Final result by Gary Cam DO (04/19/24 15:59:08)                   Impression:      No acute cardiopulmonary abnormality observed.  No pulmonary embolism.      Electronically signed by: Gary Cam  Date:    04/19/2024  Time:    15:59               Narrative:      CMS MANDATED QUALITY DATA - CT RADIATION - 436    All CT scans at this facility utilize dose modulation, iterative reconstruction, and/or weight based dosing when appropriate to reduce radiation dose to as low as reasonably achievable.    EXAMINATION:  CTA CHEST NON CORONARY (PE STUDIES)    CLINICAL HISTORY:  Pulmonary embolism (PE) suspected, high prob;    TECHNIQUE:  CT angiography of the chest with 100 mL Omnipaque 350. Maximum intensity projection coronal reformations were created at a separate workstation and stored in the patient's permanent medical record.    COMPARISON:  None    FINDINGS:  No pulmonary embolism identified.  Heart size is normal.  The  thoracic aorta is normal caliber with mild atherosclerosis.    The central tracheobronchial tree is patent.  There is bilateral dependent subsegmental atelectasis.  No pleural effusion or consolidation.    The visualized abdominal viscera are unremarkable.  There are degenerative changes of the spine.  There is no acute osseous abnormality.                                       X-Ray Chest AP Portable (Final result)  Result time 04/19/24 13:32:42      Final result by Artemio Ray MD (04/19/24 13:32:42)                   Impression:      No evidence of active cardiopulmonary disease.      Electronically signed by: Artemio Ray  Date:    04/19/2024  Time:    13:32               Narrative:    EXAMINATION:  XR CHEST AP PORTABLE    CLINICAL HISTORY:  GI bleed;    FINDINGS:  Portable chest radiograph at 13:10 hours compared to prior exams shows the cardiomediastinal silhouette and pulmonary vasculature are within normal limits.    The lungs are normally expanded, with no consolidation, pleural effusion, or evidence of pulmonary edema. No confluent infiltrates or pneumothorax. There are no significant osseous abnormalities.                                       Lab Results   Component Value Date    WBC 12.90 (H) 04/20/2024    HGB 7.9 (L) 04/20/2024    HCT 25.8 (L) 04/20/2024    MCV 76 (L) 04/20/2024     (H) 04/20/2024     BMP  Lab Results   Component Value Date     04/20/2024    K 3.7 04/20/2024     04/20/2024    CO2 25 04/20/2024    BUN 10 04/20/2024    CREATININE 1.0 04/20/2024    CALCIUM 8.8 04/20/2024    ANIONGAP 9 04/20/2024     (H) 04/20/2024     (H) 04/19/2024     (H) 04/19/2024       Results for orders placed during the hospital encounter of 03/07/24    Echo    Interpretation Summary    Left Ventricle: The left ventricle is normal in size. Mildly increased wall thickness. There is mild concentric hypertrophy. Normal wall motion. There is normal systolic function with a  visually estimated ejection fraction of 65 - 70%. There is normal diastolic function.    Right Ventricle: Normal right ventricular cavity size. Wall thickness is normal. Right ventricle wall motion  is normal. Systolic function is normal.    Mitral Valve: There is mild regurgitation with a centrally directed jet.    IVC/SVC: Normal venous pressure at 3 mmHg.         Pre-op Assessment    I have reviewed the Patient Summary Reports.     I have reviewed the Nursing Notes. I have reviewed the NPO Status.   I have reviewed the Medications.     Review of Systems  Anesthesia Hx:  No problems with previous Anesthesia             Denies Family Hx of Anesthesia complications.    Denies Personal Hx of Anesthesia complications.                    Social:  Former Smoker, No Alcohol Use       Hematology/Oncology:    Oncology Normal    -- Anemia:               Hematology Comments: Factor 5 Leiden mutation, heterozygous  History of DVT  Eliquis Therapy with last dose 1 day ago                     EENT/Dental:  EENT/Dental Normal           Cardiovascular:     Hypertension, well controlled            DURAN  ECG has been reviewed. Cardiac Workup in progress                          Pulmonary:      Shortness of breath  Sleep Apnea           Education provided regarding risk of obstructive sleep apnea            Renal/:  Renal/ Normal                 Hepatic/GI:     GERD, well controlled   Rybelsus Therapy with last dose 5 day ago     Bowel Conditions:  GI Bleed        Musculoskeletal:         Spine Disorders: cervical and lumbar Chronic Pain, Degenerative disease and Disc disease           Neurological:  TIA,         Memory difficulties     Mild expressive aphasia  Dx of Headaches, Migraine Headache     Peripheral Neuropathy                          Endocrine:  Diabetes, poorly controlled, type 2         Obesity / BMI > 30  Dermatological:  Skin Normal    Psych:  Psychiatric Normal                    Physical Exam  General: Well  nourished, Cooperative, Alert and Oriented    Airway:  Mallampati: III   Mouth Opening: Normal  TM Distance: > 6 cm  Tongue: Normal  Neck ROM: Normal ROM    Dental:  Patient states fractured teeth   Chest/Lungs:  Clear to auscultation, Normal Respiratory Rate    Heart:  Rate: Normal  Rhythm: Regular Rhythm        Anesthesia Plan  Type of Anesthesia, risks & benefits discussed:    Anesthesia Type: Gen Natural Airway  Intra-op Monitoring Plan: Standard ASA Monitors  Post Op Pain Control Plan:   (medical reason for not using multimodal pain management)  Informed Consent: Informed consent signed with the Patient and all parties understand the risks and agree with anesthesia plan.  All questions answered.   ASA Score: 3  Anesthesia Plan Notes:       GNA   POM  Propofol  KARINA Precautions     Ready For Surgery From Anesthesia Perspective.     .

## 2024-04-21 NOTE — CONSULTS
[facility]  Hematology/Oncology  Consult Note    Patient Name: Jen Torres  MRN: [id number]  Admission Date: 4/19/2024  Hospital Length of Stay: 1 days  Code Status: Full Code   Attending Provider: Olivia Gamboa MD  Consulting Provider: Chandler Mccarty MD  Primary Care Physician: Sam Garcia IV, MD  Principal Problem:GI bleed    Consults  Subjective:     HPI:  Ms. Torres is a 74yo female patient of Dr. Moreland who follows  her for a hypercoagulable condition and history of multiple superficial venous thrombosis.  She is on Eiquis chronically for this.  He has more recently noted iron deficiency anemia and she started iron infusions last week.  Ms. Jj presents to the hospital with a cc of rectal bleeding and anemia and colonoscopy shows a transverse colon mass very concerning for malignancy.  She may go to surgery today.      She has a noted family history of colon cancer with her mother who had colon cancer at 55yo.  Her last colonoscopy was negative 2 years ago.      Oncology Treatment Plan:   [No matching plan found]    Medications:  Continuous Infusions:  Current Facility-Administered Medications   Medication Dose Route Frequency Last Rate Last Admin     Scheduled Meds:  Current Facility-Administered Medications   Medication Dose Route Frequency    diltiaZEM  120 mg Oral Daily    ezetimibe  10 mg Oral Daily    [START ON 4/22/2024] hydroCHLOROthiazide  25 mg Oral Every Mon, Wed, Fri    pantoprazole  80 mg Intravenous Daily     PRN Meds:  Current Facility-Administered Medications:     0.9%  NaCl infusion (for blood administration), , Intravenous, Q24H PRN    acetaminophen, 650 mg, Oral, Q8H PRN    acetaminophen, 650 mg, Oral, Q4H PRN    aluminum-magnesium hydroxide-simethicone, 30 mL, Oral, QID PRN    dextrose 50%, 12.5 g, Intravenous, PRN    dextrose 50%, 25 g, Intravenous, PRN    glucagon (human recombinant), 1 mg, Intramuscular, PRN    glucose, 16 g, Oral, PRN    glucose,  24 g, Oral, PRN    HYDROcodone-acetaminophen, 1 tablet, Oral, Q6H PRN    magnesium oxide, 800 mg, Oral, PRN    magnesium oxide, 800 mg, Oral, PRN    melatonin, 6 mg, Oral, Nightly PRN    naloxone, 0.02 mg, Intravenous, PRN    ondansetron, 4 mg, Intravenous, Q6H PRN    potassium bicarbonate, 35 mEq, Oral, PRN    potassium bicarbonate, 50 mEq, Oral, PRN    potassium bicarbonate, 60 mEq, Oral, PRN    potassium, sodium phosphates, 2 packet, Oral, PRN    potassium, sodium phosphates, 2 packet, Oral, PRN    potassium, sodium phosphates, 2 packet, Oral, PRN    senna-docusate 8.6-50 mg, 1 tablet, Oral, BID PRN    sodium chloride 0.9%, 10 mL, Intravenous, Q12H PRN     Review of patient's allergies indicates:   Allergen Reactions    Statins-hmg-coa reductase inhibitors Swelling    Metformin Other (See Comments)     metallic taste , burn of the esophagus        Past Medical History:   Diagnosis Date    Abnormal Pap smear     s/p cryo at 22 y/o    Anticoagulant long-term use     GERD (gastroesophageal reflux disease)     Hyperlipidemia     Migraine     Migraines     Sleep apnea     Can not use c-pap     Past Surgical History:   Procedure Laterality Date    ARTHROSCOPIC REPAIR OF ROTATOR CUFF OF SHOULDER Left 12/29/2023    Procedure: REPAIR, ROTATOR CUFF, ARTHROSCOPIC;  Surgeon: Gary Tony MD;  Location: [facility];  Service: Orthopedics;  Laterality: Left;  arthrex    ARTHROSCOPY OF SHOULDER WITH DECOMPRESSION OF SUBACROMIAL SPACE Left 12/29/2023    Procedure: ARTHROSCOPY, SHOULDER, WITH SUBACROMIAL SPACE DECOMPRESSION;  Surgeon: Gary Tony MD;  Location: [facility] OR;  Service: Orthopedics;  Laterality: Left;    CATARACT EXTRACTION, BILATERAL      CHOLECYSTECTOMY  10/01/2014    ESOPHAGOGASTRODUODENOSCOPY N/A 4/20/2024    Procedure: EGD (ESOPHAGOGASTRODUODENOSCOPY);  Surgeon: Ceasar Arias III, MD;  Location: [facility];  Service: Endoscopy;  Laterality: N/A;    EYE SURGERY      KNEE ARTHROSCOPY Left      TONSILLECTOMY       Family History       Problem Relation (Age of Onset)    Breast cancer Paternal Grandmother (70), Maternal Grandmother    Cancer Daughter, Daughter (20)    Colon cancer Mother    Ovarian cancer Daughter          Tobacco Use    Smoking status: Former    Smokeless tobacco: Never   Substance and Sexual Activity    Alcohol use: No    Drug use: No    Sexual activity: Never       Review of Systems   Constitutional:  Negative for fever.   Respiratory:  Negative for shortness of breath.    Cardiovascular:  Negative for chest pain and leg swelling.   Gastrointestinal:  Negative for abdominal pain and blood in stool.   Genitourinary:  Negative for hematuria.   Skin:  Negative for rash.     Objective:     Vital Signs (Most Recent):  Temp: 98.6 °F (37 °C) (04/21/24 1103)  Pulse: 71 (04/21/24 1103)  Resp: 18 (04/21/24 1103)  BP: 137/65 (04/21/24 1103)  SpO2: 99 % (04/21/24 1103) Vital Signs (24h Range):  Temp:  [97.7 °F (36.5 °C)-99.7 °F (37.6 °C)] 98.6 °F (37 °C)  Pulse:  [] 71  Resp:  [18-19] 18  SpO2:  [93 %-100 %] 99 %  BP: (106-160)/(53-75) 137/65     Weight: 97.2 kg (214 lb 4.6 oz)  Body mass index is 32.58 kg/m².  Body surface area is 2.16 meters squared.      Intake/Output Summary (Last 24 hours) at 4/21/2024 1302  Last data filed at 4/21/2024 1016  Gross per 24 hour   Intake 920 ml   Output --   Net 920 ml        Physical Exam  Constitutional:       Appearance: Normal appearance.   HENT:      Head: Normocephalic and atraumatic.   Eyes:      General: No scleral icterus.     Conjunctiva/sclera: Conjunctivae normal.   Cardiovascular:      Rate and Rhythm: Normal rate.   Pulmonary:      Effort: Pulmonary effort is normal.   Abdominal:      General: Abdomen is flat.   Neurological:      General: No focal deficit present.      Mental Status: She is alert and oriented to person, place, and time.   Psychiatric:         Mood and Affect: Mood normal.         Behavior: Behavior normal.          Significant  Labs:   CBC:   Recent Labs   Lab 04/19/24  1313 04/20/24  0501 04/21/24  0652   WBC 12.93*  12.93* 12.90* 10.73   HGB 7.4*  7.4* 7.9* 7.9*   HCT 24.5*  24.5* 25.8* 26.0*   *  574* 510* 481*    and CMP:   Recent Labs   Lab 04/19/24  1313 04/20/24  0501 04/21/24  0652     137 138 139   K 3.5  3.5 3.7 3.9     103 104 105   CO2 22*  22* 25 27   *  255* 146* 166*   BUN 14  14 10 9   CREATININE 1.2  1.2 1.0 1.1   CALCIUM 8.9  8.9 8.8 8.9   PROT 6.9  6.9 6.5 6.4   ALBUMIN 3.7  3.7 3.4* 3.5   BILITOT 0.2  0.2 0.3 0.3   ALKPHOS 107  107 108 98   AST 43*  43* 33 60*   ALT 38  38 33 39   ANIONGAP 12  12 9 7*       Diagnostic Results:  None  Assessment/Plan:     Colonic mass  Patient has a new colon mass which is the source of her GI bleed and c/w malignancy.  Path is pending at this time but given her ongoing bleeding, surgery may be done later today.  I discussed this new diagnosis and that chemotherapy may become necessary based on the pathology of the report, particularly the lymph node status.  Imaging thus far does not show stage IV disease.      Noted is her history of hypercoag syndrome.  I discussed this with her as well.  She is off her anticoagulant but would resume this as soon as possible and hopefully the surgery can be done soon to allow this therapy to continue.      Lastly,  it is charted that she has factor v leiden heterozygosity.  I reviewed this test and it is normal.  She does not have this as a diagnosis as far as I can tell.          Thank you for your consult. I will follow-up with patient. Please contact us if you have any additional questions.    Chandler Mccarty MD  Hematology/Oncology  Former[facility]

## 2024-04-21 NOTE — ASSESSMENT & PLAN NOTE
Colectomy tomorrow, or cannot accommodate today  Ok for cl diet  Npo after mn  Spoke with gi   Case ordered

## 2024-04-21 NOTE — ASSESSMENT & PLAN NOTE
Patient has a new colon mass which is the source of her GI bleed and c/w malignancy.  Path is pending at this time but given her ongoing bleeding, surgery may be done later today.  I discussed this new diagnosis and that chemotherapy may become necessary based on the pathology of the report, particularly the lymph node status.  Imaging thus far does not show stage IV disease.      Noted is her history of hypercoag syndrome.  I discussed this with her as well.  She is off her anticoagulant but would resume this as soon as possible and hopefully the surgery can be done soon to allow this therapy to continue.      Lastly,  it is charted that she has factor v leiden heterozygosity.  I reviewed this test and it is normal.  She does not have this as a diagnosis as far as I can tell.

## 2024-04-21 NOTE — ASSESSMENT & PLAN NOTE
GI consulted, had unremarkable EGD, for colonoscopy today  Serial CBC, monitor on telemetry   Hbg dropped from 11.5 to 7.4 in 4 months.

## 2024-04-21 NOTE — ANESTHESIA POSTPROCEDURE EVALUATION
Anesthesia Post Evaluation    Patient: Jen Melissa    Procedure(s) Performed: Procedure(s) (LRB):  COLONOSCOPY (N/A)    Final Anesthesia Type: general      Patient location: OR 1.  Patient participation: Yes- Able to Participate  Level of consciousness: awake and alert  Post-procedure vital signs: reviewed and stable  Pain management: adequate  Airway patency: patent    PONV status at discharge: No PONV  Anesthetic complications: no      Cardiovascular status: blood pressure returned to baseline and stable  Respiratory status: unassisted and room air  Hydration status: euvolemic  Follow-up not needed.            Vitals Value Taken Time   /65 04/21/24 1103   Temp 37 °C (98.6 °F) 04/21/24 1103   Pulse 71 04/21/24 1103   Resp 18 04/21/24 1103   SpO2 99 % 04/21/24 1103         Event Time   Out of Recovery 04/21/2024 10:31:05         Pain/Judith Score: No data recorded

## 2024-04-21 NOTE — TRANSFER OF CARE
"Anesthesia Transfer of Care Note    Patient: Jen Torres    Procedure(s) Performed: Procedure(s) (LRB):  COLONOSCOPY (N/A)    Patient location: PACU    Anesthesia Type: general    Transport from OR: Transported from OR on room air with adequate spontaneous ventilation    Post pain: adequate analgesia    Post assessment: no apparent anesthetic complications    Post vital signs: stable    Level of consciousness: awake    Nausea/Vomiting: no nausea/vomiting    Complications: none    Transfer of care protocol was followed    Last vitals: Visit Vitals  BP (!) 125/59   Pulse 72   Temp 36.9 °C (98.4 °F) (Oral)   Resp 18   Ht 5' 8" (1.727 m)   Wt 97.2 kg (214 lb 4.6 oz)   SpO2 96%   BMI 32.58 kg/m²     "

## 2024-04-21 NOTE — NURSING
Patient returned from colonoscopy complaints of pain in her ABD area. Prn medication given with effective results. Patient is on a clear liquid diet and will be NPO at midnight for a resection in the morning. Call bell in reach.

## 2024-04-21 NOTE — ANESTHESIA PREPROCEDURE EVALUATION
04/22/2024  Jen Torres is a 73 y.o., female.         Results for orders placed or performed during the hospital encounter of 04/19/24   EKG 12-lead    Collection Time: 04/19/24  1:23 PM   Result Value Ref Range    QRS Duration 80 ms    [facility] QTC Calculation 387 ms    Narrative    Test Reason : K92.2,    Vent. Rate : 087 BPM     Atrial Rate : 087 BPM     P-R Int : 158 ms          QRS Dur : 080 ms      QT Int : 322 ms       P-R-T Axes : 137 128 107 degrees     QTc Int : 387 ms     Suspect arm lead reversal, interpretation assumes no reversal  Unusual P axis, possible ectopic atrial rhythm with Premature  supraventricular complexes  Left posterior fascicular block  Septal infarct ,age undetermined  Cannot rule out Inferior infarct ,age undetermined  Abnormal ECG  No previous ECGs available    Referred By: [email or web address]   SELF           Confirmed By:         Imaging Results              CT Abdomen Pelvis With IV Contrast NO Oral Contrast (Final result)  Result time 04/19/24 16:07:17      Final result by Gary Cam DO (04/19/24 16:07:17)                   Impression:      No acute intra-abdominal abnormality observed.      Electronically signed by: Gary Cam  Date:    04/19/2024  Time:    16:07               Narrative:      CMS MANDATED QUALITY DATA - CT RADIATION - 436    All CT scans at this facility utilize dose modulation, iterative reconstruction, and/or weight based dosing when appropriate to reduce radiation dose to as low as reasonably achievable.    EXAMINATION:  CT ABDOMEN PELVIS WITH IV CONTRAST    CLINICAL HISTORY:  GI bleed;LLQ abdominal pain;    TECHNIQUE:  CT abdomen and pelvis with 100 mL Omnipaque 350.    COMPARISON:  CT abdomen pelvis 09/30/2014.    FINDINGS:  There is bilateral dependent subsegmental atelectasis.  Heart size is normal.    Liver is normal size.  No enhancing hepatic  lesions identified.  Gallbladder has been removed.  The pancreas, spleen and adrenal glands are unremarkable.  The kidneys are normal size.  Bilateral parapelvic renal cysts again noted.  No hydronephrosis or nephrolithiasis.  The ureters are normal caliber without obstructions.  The bladder is fluid filled with no focal wall abnormalities.  The uterus and adnexal structures are unremarkable.  No free fluid in the pelvis.    The large and small bowel are normal caliber.  The appendix is not identified.  There is no bowel wall thickening or inflammatory changes.  The stomach is mostly decompressed and grossly unremarkable.    The abdominal aorta is normal caliber with atherosclerosis.  There are no enlarged intra-abdominal lymph nodes.  No mesenteric fat stranding and free fluid.  The ventral abdominal wall is unremarkable.  There is no acute osseous abnormality.                                       CTA Chest Non-Coronary (PE Studies) (Final result)  Result time 04/19/24 15:59:08      Final result by Gary Cam DO (04/19/24 15:59:08)                   Impression:      No acute cardiopulmonary abnormality observed.  No pulmonary embolism.      Electronically signed by: Gary Cam  Date:    04/19/2024  Time:    15:59               Narrative:      CMS MANDATED QUALITY DATA - CT RADIATION - 436    All CT scans at this facility utilize dose modulation, iterative reconstruction, and/or weight based dosing when appropriate to reduce radiation dose to as low as reasonably achievable.    EXAMINATION:  CTA CHEST NON CORONARY (PE STUDIES)    CLINICAL HISTORY:  Pulmonary embolism (PE) suspected, high prob;    TECHNIQUE:  CT angiography of the chest with 100 mL Omnipaque 350. Maximum intensity projection coronal reformations were created at a separate workstation and stored in the patient's permanent medical record.    COMPARISON:  None    FINDINGS:  No pulmonary embolism identified.  Heart size is normal.  The  thoracic aorta is normal caliber with mild atherosclerosis.    The central tracheobronchial tree is patent.  There is bilateral dependent subsegmental atelectasis.  No pleural effusion or consolidation.    The visualized abdominal viscera are unremarkable.  There are degenerative changes of the spine.  There is no acute osseous abnormality.                                       X-Ray Chest AP Portable (Final result)  Result time 04/19/24 13:32:42      Final result by Artemio Ray MD (04/19/24 13:32:42)                   Impression:      No evidence of active cardiopulmonary disease.      Electronically signed by: Artemio Ray  Date:    04/19/2024  Time:    13:32               Narrative:    EXAMINATION:  XR CHEST AP PORTABLE    CLINICAL HISTORY:  GI bleed;    FINDINGS:  Portable chest radiograph at 13:10 hours compared to prior exams shows the cardiomediastinal silhouette and pulmonary vasculature are within normal limits.    The lungs are normally expanded, with no consolidation, pleural effusion, or evidence of pulmonary edema. No confluent infiltrates or pneumothorax. There are no significant osseous abnormalities.                                       Lab Results   Component Value Date    WBC 14.45 (H) 04/22/2024    HGB 8.2 (L) 04/22/2024    HCT 26.3 (L) 04/22/2024    MCV 78 (L) 04/22/2024     (H) 04/22/2024     BMP  Lab Results   Component Value Date     04/22/2024    K 3.7 04/22/2024     04/22/2024    CO2 24 04/22/2024    BUN 8 04/22/2024    CREATININE 1.0 04/22/2024    CALCIUM 8.6 (L) 04/22/2024    ANIONGAP 10 04/22/2024     (H) 04/22/2024     (H) 04/21/2024     (H) 04/20/2024       Results for orders placed during the hospital encounter of 03/07/24    Echo    Interpretation Summary    Left Ventricle: The left ventricle is normal in size. Mildly increased wall thickness. There is mild concentric hypertrophy. Normal wall motion. There is normal systolic function with a  visually estimated ejection fraction of 65 - 70%. There is normal diastolic function.    Right Ventricle: Normal right ventricular cavity size. Wall thickness is normal. Right ventricle wall motion  is normal. Systolic function is normal.    Mitral Valve: There is mild regurgitation with a centrally directed jet.    IVC/SVC: Normal venous pressure at 3 mmHg.         Pre-op Assessment    I have reviewed the Patient Summary Reports.     I have reviewed the Nursing Notes. I have reviewed the NPO Status.   I have reviewed the Medications.     Review of Systems  Anesthesia Hx:  No problems with previous Anesthesia             Denies Family Hx of Anesthesia complications.    Denies Personal Hx of Anesthesia complications.                    Social:  Former Smoker, No Alcohol Use       Hematology/Oncology:    Oncology Normal    -- Anemia:               Hematology Comments: Factor 5 Leiden mutation, heterozygous  History of DVT  Eliquis Therapy.                    EENT/Dental:  EENT/Dental Normal           Cardiovascular:     Hypertension, well controlled            DURAN  ECG has been reviewed. March 2024, estimated ejection fraction of 65 - 70%                         Pulmonary:      Shortness of breath  Sleep Apnea           Education provided regarding risk of obstructive sleep apnea            Renal/:  Renal/ Normal                 Hepatic/GI:     GERD, well controlled   Rybelsus Therapy.    Newly found colon mass.    Bowel Conditions:  GI Bleed        Musculoskeletal:         Spine Disorders: cervical and lumbar Chronic Pain, Degenerative disease and Disc disease           Neurological:  TIA,    Headaches     Memory difficulties     Mild expressive aphasia  Dx of Headaches, Migraine Headache     Peripheral Neuropathy                          Endocrine:  Diabetes, poorly controlled, type 2         Obesity / BMI > 30  Dermatological:  Skin Normal    Psych:  Psychiatric Normal                Hypokalemia.    Physical  Exam  General: Well nourished, Cooperative, Alert and Oriented    Airway:  Mallampati: III   Mouth Opening: Normal  TM Distance: > 6 cm  Tongue: Normal  Neck ROM: Normal ROM    Dental:  Intact  Patient states fractured teeth   Chest/Lungs:  Clear to auscultation, Normal Respiratory Rate    Heart:  Rate: Normal  Rhythm: Regular Rhythm        Anesthesia Plan  Type of Anesthesia, risks & benefits discussed:    Anesthesia Type: Gen ETT  Intra-op Monitoring Plan: Standard ASA Monitors  Post Op Pain Control Plan: multimodal analgesia and IV/PO Opioids PRN  Airway Plan: Direct and Video, Post-Induction  Informed Consent: Informed consent signed with the Patient and all parties understand the risks and agree with anesthesia plan.  All questions answered. Patient consented to blood products? Yes  ASA Score: 3  Anesthesia Plan Notes: AST elevated (Avoid tylenol)  GETA  No Versed   Precedex iv  Multimodal analgesia with ketamine 25 mg and local by surgeon.  PONV prophylaxis with Pepcid 20 mg IV, and Zofran 4 mg IV.  Consider CaCl 1 gram if patient is hypotensive.      Ready For Surgery From Anesthesia Perspective.     .

## 2024-04-21 NOTE — PROGRESS NOTES
Critical access hospital Medicine  Progress Note    Patient Name: Jen Torres  MRN: [id number]  Patient Class: IP- Inpatient   Admission Date: 4/19/2024  Length of Stay: 1 days  Attending Physician: Olivia Gabmoa MD  Primary Care Provider: Sam Garcia IV, MD        Subjective:     Principal Problem:GI bleed        HPI:  Patient is a 73 old female medical history of anemia, GERD, hyperlipidemia, and migraines.  Patient presents to the ED today with complaints of worsening fatigue shortness for breath worse on exertion, and intermittent chest pain on exertion.  Patient was seen in GI office earlier today, thus sent into the ED for further evaluation.  Patient was found to have hemoglobin of 7.4, hematocrit 24.5.  1 unit blood ordered in the ED for transfusion.  Patient just had recent iron infusion on Wednesday.  Patient denies previous history of blood transfusions in the past.  Patient has had rectal bleeding and melena that has been present and steady for the past 2 months.  Patient reports being scheduled for outpatient stress test next week.  D-dimer elevated in the ED at 0.55, CTA negative for acute PE.  CT of the abdomen without acute abnormalities. Patient has history of factor 5 Leiden, DVT in LLE 2021. Patient does report taking Eliquis BID, last dose this morning. GI consulted.  Pt denies constipation/diarrhea, dysuria, abdominal pain, LE edema, diaphoresis, nausea, or vomiting. Patient is full code.    In the ED:  Chest x-ray negative.  Occult positive.  Troponin 5.0, TSH 2.254, UA negative.  Potassium 3.5, magnesium 1.6, INR 1.0, PTT 11.2.    Overview/Hospital Course:  The patient was transfused one unit PRBC on 04/19. She was seen in consult by GI, EGD done on 04/20. No significant findings. Colonoscopy planned for 04/21. Her H/H were monitored for any further decrease.    Interval History: No acute events overnight    Review of Systems   Constitutional:  Positive for fatigue.  Negative for activity change, appetite change, chills and fever.   HENT:  Negative for congestion, ear pain, nosebleeds and sinus pain.    Eyes:  Negative for discharge and itching.   Respiratory:  Positive for shortness of breath. Negative for apnea, cough and chest tightness.    Cardiovascular:  Positive for chest pain. Negative for palpitations and leg swelling.   Gastrointestinal:  Negative for abdominal distention, abdominal pain and vomiting.   Genitourinary:  Negative for difficulty urinating, dysuria, flank pain and frequency.   Musculoskeletal:  Negative for arthralgias, back pain, joint swelling and myalgias.   Skin:  Negative for color change, pallor and rash.   Neurological:  Positive for weakness. Negative for dizziness, light-headedness and headaches.   Psychiatric/Behavioral:  Negative for agitation, behavioral problems, confusion and suicidal ideas.      Objective:     Vital Signs (Most Recent):  Temp: 98.4 °F (36.9 °C) (04/21/24 0757)  Pulse: 90 (04/21/24 1029)  Resp: 18 (04/21/24 0757)  BP: 121/68 (04/21/24 1029)  SpO2: 95 % (04/21/24 1029) Vital Signs (24h Range):  Temp:  [97.7 °F (36.5 °C)-99.7 °F (37.6 °C)] 98.4 °F (36.9 °C)  Pulse:  [] 90  Resp:  [18-19] 18  SpO2:  [93 %-100 %] 95 %  BP: (106-160)/(53-75) 121/68     Weight: 97.2 kg (214 lb 4.6 oz)  Body mass index is 32.58 kg/m².    Intake/Output Summary (Last 24 hours) at 4/21/2024 1039  Last data filed at 4/21/2024 1016  Gross per 24 hour   Intake 1460 ml   Output --   Net 1460 ml         Physical Exam  Vitals reviewed.   Constitutional:       General: She is not in acute distress.     Appearance: Normal appearance. She is normal weight. She is not ill-appearing.   HENT:      Head: Normocephalic and atraumatic.      Nose: Nose normal.      Mouth/Throat:      Mouth: Mucous membranes are moist.      Pharynx: Oropharynx is clear.   Eyes:      General: No scleral icterus.     Extraocular Movements: Extraocular movements intact.       Conjunctiva/sclera: Conjunctivae normal.      Pupils: Pupils are equal, round, and reactive to light.   Cardiovascular:      Rate and Rhythm: Normal rate and regular rhythm.      Pulses: Normal pulses.      Heart sounds: Normal heart sounds. No murmur heard.     No gallop.   Pulmonary:      Effort: Pulmonary effort is normal. No respiratory distress.      Breath sounds: Normal breath sounds. No wheezing, rhonchi or rales.   Chest:      Chest wall: No tenderness.   Abdominal:      General: Abdomen is flat. There is no distension.      Palpations: Abdomen is soft.      Tenderness: There is no abdominal tenderness.   Musculoskeletal:         General: No swelling or tenderness.      Cervical back: Normal range of motion and neck supple. No rigidity or tenderness.      Right lower leg: No edema.      Left lower leg: No edema.   Skin:     General: Skin is warm and dry.   Neurological:      General: No focal deficit present.      Mental Status: She is alert and oriented to person, place, and time. Mental status is at baseline.      Motor: No weakness.   Psychiatric:         Mood and Affect: Mood normal.         Behavior: Behavior normal.         Thought Content: Thought content normal.             Significant Labs: All pertinent labs within the past 24 hours have been reviewed.  BMP:   Recent Labs   Lab 04/21/24  0652   *      K 3.9      CO2 27   BUN 9   CREATININE 1.1   CALCIUM 8.9   MG 1.8     CBC:   Recent Labs   Lab 04/19/24  1313 04/20/24  0501 04/21/24  0652   WBC 12.93*  12.93* 12.90* 10.73   HGB 7.4*  7.4* 7.9* 7.9*   HCT 24.5*  24.5* 25.8* 26.0*   *  574* 510* 481*     CMP:   Recent Labs   Lab 04/19/24  1313 04/20/24  0501 04/21/24  0652     137 138 139   K 3.5  3.5 3.7 3.9     103 104 105   CO2 22*  22* 25 27   *  255* 146* 166*   BUN 14  14 10 9   CREATININE 1.2  1.2 1.0 1.1   CALCIUM 8.9  8.9 8.8 8.9   PROT 6.9  6.9 6.5 6.4   ALBUMIN 3.7  3.7 3.4* 3.5    BILITOT 0.2  0.2 0.3 0.3   ALKPHOS 107  107 108 98   AST 43*  43* 33 60*   ALT 38  38 33 39   ANIONGAP 12  12 9 7*       Significant Imaging: I have reviewed all pertinent imaging results/findings within the past 24 hours.    Assessment/Plan:      * GI bleed  GI consulted, had unremarkable EGD, for colonoscopy today  Serial CBC, monitor on telemetry   Hbg dropped from 11.5 to 7.4 in 4 months.       Iron deficiency anemia due to chronic blood loss  Last iron infusion 04/17  Symptomatic anemia with SOB, pale, fatigue  Transfused one unit PRBC on 04/19  Serial CBCs, monitor on telemetry     Factor 5 Leiden mutation, heterozygous  With history of LLE clot in 2021, Ddimer elevated but no PE found on CTA  Last dose of eliquis 04/19; will hold for now due to active bleeding.       Type 2 diabetes mellitus without complication, without long-term current use of insulin  POCT glucose, insulin sliding scale     Essential hypertension  Chronic, controlled. Latest blood pressure and vitals reviewed-     Temp:  [97.1 °F (36.2 °C)-99.2 °F (37.3 °C)]   Pulse:  []   Resp:  [15-20]   BP: (105-160)/(56-95)   SpO2:  [92 %-100 %] .   Home meds for hypertension were reviewed and noted below.   Hypertension Medications               diltiaZEM (CARDIZEM CD) 120 MG Cp24 Take 1 capsule (120 mg total) by mouth once daily.    hydroCHLOROthiazide (HYDRODIURIL) 25 MG tablet Take 25 mg by mouth every Mon, Wed, Fri.            While in the hospital, will manage blood pressure as follows; Continue home antihypertensive regimen    Will utilize p.r.n. blood pressure medication only if patient's blood pressure greater than 180/110 and she develops symptoms such as worsening chest pain or shortness of breath.      VTE Risk Mitigation (From admission, onward)           Ordered     Reason for No Pharmacological VTE Prophylaxis  Once        Question:  Reasons:  Answer:  Active Bleeding    04/19/24 1641     IP VTE HIGH RISK PATIENT  Once          04/19/24 1641     Place sequential compression device  Until discontinued         04/19/24 1641                    Discharge Planning   KATIE:      Code Status: Full Code   Is the patient medically ready for discharge?:     Reason for patient still in hospital (select all that apply): Patient trending condition  Discharge Plan A: Home with family                  Olivia Gamboa MD, MD  Department of Hospital Medicine   [facility]

## 2024-04-21 NOTE — SUBJECTIVE & OBJECTIVE
Interval History: No acute events overnight    Review of Systems   Constitutional:  Positive for fatigue. Negative for activity change, appetite change, chills and fever.   HENT:  Negative for congestion, ear pain, nosebleeds and sinus pain.    Eyes:  Negative for discharge and itching.   Respiratory:  Positive for shortness of breath. Negative for apnea, cough and chest tightness.    Cardiovascular:  Positive for chest pain. Negative for palpitations and leg swelling.   Gastrointestinal:  Negative for abdominal distention, abdominal pain and vomiting.   Genitourinary:  Negative for difficulty urinating, dysuria, flank pain and frequency.   Musculoskeletal:  Negative for arthralgias, back pain, joint swelling and myalgias.   Skin:  Negative for color change, pallor and rash.   Neurological:  Positive for weakness. Negative for dizziness, light-headedness and headaches.   Psychiatric/Behavioral:  Negative for agitation, behavioral problems, confusion and suicidal ideas.      Objective:     Vital Signs (Most Recent):  Temp: 98.4 °F (36.9 °C) (04/21/24 0757)  Pulse: 90 (04/21/24 1029)  Resp: 18 (04/21/24 0757)  BP: 121/68 (04/21/24 1029)  SpO2: 95 % (04/21/24 1029) Vital Signs (24h Range):  Temp:  [97.7 °F (36.5 °C)-99.7 °F (37.6 °C)] 98.4 °F (36.9 °C)  Pulse:  [] 90  Resp:  [18-19] 18  SpO2:  [93 %-100 %] 95 %  BP: (106-160)/(53-75) 121/68     Weight: 97.2 kg (214 lb 4.6 oz)  Body mass index is 32.58 kg/m².    Intake/Output Summary (Last 24 hours) at 4/21/2024 1039  Last data filed at 4/21/2024 1016  Gross per 24 hour   Intake 1460 ml   Output --   Net 1460 ml         Physical Exam  Vitals reviewed.   Constitutional:       General: She is not in acute distress.     Appearance: Normal appearance. She is normal weight. She is not ill-appearing.   HENT:      Head: Normocephalic and atraumatic.      Nose: Nose normal.      Mouth/Throat:      Mouth: Mucous membranes are moist.      Pharynx: Oropharynx is clear.    Eyes:      General: No scleral icterus.     Extraocular Movements: Extraocular movements intact.      Conjunctiva/sclera: Conjunctivae normal.      Pupils: Pupils are equal, round, and reactive to light.   Cardiovascular:      Rate and Rhythm: Normal rate and regular rhythm.      Pulses: Normal pulses.      Heart sounds: Normal heart sounds. No murmur heard.     No gallop.   Pulmonary:      Effort: Pulmonary effort is normal. No respiratory distress.      Breath sounds: Normal breath sounds. No wheezing, rhonchi or rales.   Chest:      Chest wall: No tenderness.   Abdominal:      General: Abdomen is flat. There is no distension.      Palpations: Abdomen is soft.      Tenderness: There is no abdominal tenderness.   Musculoskeletal:         General: No swelling or tenderness.      Cervical back: Normal range of motion and neck supple. No rigidity or tenderness.      Right lower leg: No edema.      Left lower leg: No edema.   Skin:     General: Skin is warm and dry.   Neurological:      General: No focal deficit present.      Mental Status: She is alert and oriented to person, place, and time. Mental status is at baseline.      Motor: No weakness.   Psychiatric:         Mood and Affect: Mood normal.         Behavior: Behavior normal.         Thought Content: Thought content normal.             Significant Labs: All pertinent labs within the past 24 hours have been reviewed.  BMP:   Recent Labs   Lab 04/21/24  0652   *      K 3.9      CO2 27   BUN 9   CREATININE 1.1   CALCIUM 8.9   MG 1.8     CBC:   Recent Labs   Lab 04/19/24  1313 04/20/24  0501 04/21/24  0652   WBC 12.93*  12.93* 12.90* 10.73   HGB 7.4*  7.4* 7.9* 7.9*   HCT 24.5*  24.5* 25.8* 26.0*   *  574* 510* 481*     CMP:   Recent Labs   Lab 04/19/24  1313 04/20/24  0501 04/21/24  0652     137 138 139   K 3.5  3.5 3.7 3.9     103 104 105   CO2 22*  22* 25 27   *  255* 146* 166*   BUN 14  14 10 9    CREATININE 1.2  1.2 1.0 1.1   CALCIUM 8.9  8.9 8.8 8.9   PROT 6.9  6.9 6.5 6.4   ALBUMIN 3.7  3.7 3.4* 3.5   BILITOT 0.2  0.2 0.3 0.3   ALKPHOS 107  107 108 98   AST 43*  43* 33 60*   ALT 38  38 33 39   ANIONGAP 12  12 9 7*       Significant Imaging: I have reviewed all pertinent imaging results/findings within the past 24 hours.

## 2024-04-21 NOTE — HPI
Ms. Torres is a 72yo female patient of Dr. Moreland who follows  her for a hypercoagulable condition and history of multiple superficial venous thrombosis.  She is on Eiquis chronically for this.  He has more recently noted iron deficiency anemia and she started iron infusions last week.  Ms. Jj presents to the hospital with a cc of rectal bleeding and anemia and colonoscopy shows a transverse colon mass very concerning for malignancy.  She may go to surgery today.      She has a noted family history of colon cancer with her mother who had colon cancer at 57yo.  Her last colonoscopy was negative 2 years ago.

## 2024-04-21 NOTE — HPI
72 y/o female with found to have bleeding colon mass.  Colonoscopy done today identified.   She is here for further treatment.

## 2024-04-21 NOTE — SUBJECTIVE & OBJECTIVE
Current Facility-Administered Medications   Medication Dose Route Frequency Provider Last Rate Last Admin    0.9%  NaCl infusion (for blood administration)   Intravenous Q24H PRN Kimmie Perera MD        acetaminophen tablet 650 mg  650 mg Oral Q8H PRN Judith Amato PA        acetaminophen tablet 650 mg  650 mg Oral Q4H PRN Judith Amato PA        aluminum-magnesium hydroxide-simethicone 200-200-20 mg/5 mL suspension 30 mL  30 mL Oral QID PRN Judith Amato PA        dextrose 50% injection 12.5 g  12.5 g Intravenous PRN Judith Amato PA        dextrose 50% injection 25 g  25 g Intravenous PRN Judith Amato PA        diltiaZEM 24 hr capsule 120 mg  120 mg Oral Daily Judith Amato PA        ezetimibe tablet 10 mg  10 mg Oral Daily Judith Amato PA        glucagon (human recombinant) injection 1 mg  1 mg Intramuscular PRN Judith Amato PA        glucose chewable tablet 16 g  16 g Oral PRN Judith Amato PA        glucose chewable tablet 24 g  24 g Oral PRN Judith Amato PA        [START ON 4/22/2024] hydroCHLOROthiazide tablet 25 mg  25 mg Oral Every Mon, Wed, Fri Judith mAato PA        HYDROcodone-acetaminophen 5-325 mg per tablet 1 tablet  1 tablet Oral Q6H PRN Judith Amato PA   1 tablet at 04/21/24 1407    magnesium oxide tablet 800 mg  800 mg Oral PRN Judith Amato PA   800 mg at 04/20/24 1730    magnesium oxide tablet 800 mg  800 mg Oral PRN Judith Amato PA        melatonin tablet 6 mg  6 mg Oral Nightly PRN Judith Amato PA        naloxone 0.4 mg/mL injection 0.02 mg  0.02 mg Intravenous PRN Judith Amato PA        ondansetron injection 4 mg  4 mg Intravenous Q6H PRN Judith Amato PA        pantoprazole injection 80 mg  80 mg Intravenous Daily Judith Amato PA   80 mg at 04/20/24 1212    potassium bicarbonate disintegrating tablet 35 mEq  35 mEq Oral PRN Judith Amato PA        potassium bicarbonate disintegrating  tablet 50 mEq  50 mEq Oral PRN Judith Amato PA   50 mEq at 04/20/24 1211    potassium bicarbonate disintegrating tablet 60 mEq  60 mEq Oral PRN Judith Amato PA        potassium, sodium phosphates 280-160-250 mg packet 2 packet  2 packet Oral PRN Judith Amato PA        potassium, sodium phosphates 280-160-250 mg packet 2 packet  2 packet Oral PRN Judith Amato PA        potassium, sodium phosphates 280-160-250 mg packet 2 packet  2 packet Oral PRN Judith Amato PA        senna-docusate 8.6-50 mg per tablet 1 tablet  1 tablet Oral BID PRN Judith Amato PA        sodium chloride 0.9% flush 10 mL  10 mL Intravenous Q12H PRN Judith Amato PA         Facility-Administered Medications Ordered in Other Encounters   Medication Dose Route Frequency Provider Last Rate Last Admin    electrolyte-S (ISOLYTE)   Intravenous Continuous lBade Packer MD        electrolyte-S (ISOLYTE)   Intravenous Continuous CouBlade johnson  mL/hr at 12/29/23 1159 Rate Change at 12/29/23 1159    LIDOcaine (PF) 10 mg/ml (1%) injection 10 mg  1 mL Intradermal Once Blade Packer MD        midazolam (VERSED) 1 mg/mL injection 2 mg  2 mg Intravenous PRN Blade Packer MD   2 mg at 12/29/23 0955       Review of patient's allergies indicates:   Allergen Reactions    Statins-hmg-coa reductase inhibitors Swelling    Metformin Other (See Comments)     metallic taste , burn of the esophagus       Past Medical History:   Diagnosis Date    Abnormal Pap smear     s/p cryo at 20 y/o    Anticoagulant long-term use     GERD (gastroesophageal reflux disease)     Hyperlipidemia     Migraine     Migraines     Sleep apnea     Can not use c-pap     Past Surgical History:   Procedure Laterality Date    ARTHROSCOPIC REPAIR OF ROTATOR CUFF OF SHOULDER Left 12/29/2023    Procedure: REPAIR, ROTATOR CUFF, ARTHROSCOPIC;  Surgeon: Gary Tony MD;  Location: [facility];  Service: Orthopedics;  Laterality: Left;   arthrex    ARTHROSCOPY OF SHOULDER WITH DECOMPRESSION OF SUBACROMIAL SPACE Left 12/29/2023    Procedure: ARTHROSCOPY, SHOULDER, WITH SUBACROMIAL SPACE DECOMPRESSION;  Surgeon: Gary Tony MD;  Location: [facility] OR;  Service: Orthopedics;  Laterality: Left;    CATARACT EXTRACTION, BILATERAL      CHOLECYSTECTOMY  10/01/2014    ESOPHAGOGASTRODUODENOSCOPY N/A 4/20/2024    Procedure: EGD (ESOPHAGOGASTRODUODENOSCOPY);  Surgeon: Ceasar Arias III, MD;  Location: [facility];  Service: Endoscopy;  Laterality: N/A;    EYE SURGERY      KNEE ARTHROSCOPY Left     TONSILLECTOMY       Family History       Problem Relation (Age of Onset)    Breast cancer Paternal Grandmother (70), Maternal Grandmother    Cancer Daughter, Daughter (20)    Colon cancer Mother    Ovarian cancer Daughter          Tobacco Use    Smoking status: Former    Smokeless tobacco: Never   Substance and Sexual Activity    Alcohol use: No    Drug use: No    Sexual activity: Never     Review of Systems   Constitutional:  Positive for fatigue. Negative for activity change, appetite change, chills and fever.   HENT:  Negative for congestion, ear pain, nosebleeds and sinus pain.    Eyes:  Negative for discharge and itching.   Respiratory:  Positive for shortness of breath. Negative for apnea, cough and chest tightness.    Cardiovascular:  Positive for chest pain. Negative for palpitations and leg swelling.   Gastrointestinal:  Negative for abdominal distention, abdominal pain and vomiting.   Genitourinary:  Negative for difficulty urinating, dysuria, flank pain and frequency.   Musculoskeletal:  Negative for arthralgias, back pain, joint swelling and myalgias.   Skin:  Negative for color change, pallor and rash.   Neurological:  Positive for weakness. Negative for dizziness, light-headedness and headaches.   Psychiatric/Behavioral:  Negative for agitation, behavioral problems, confusion and suicidal ideas.    All other systems reviewed and are  negative.    Objective:     Vital Signs (Most Recent):  Temp: 98.6 °F (37 °C) (04/21/24 1103)  Pulse: 71 (04/21/24 1103)  Resp: 18 (04/21/24 1407)  BP: 137/65 (04/21/24 1103)  SpO2: 99 % (04/21/24 1103) Vital Signs (24h Range):  Temp:  [97.7 °F (36.5 °C)-99.7 °F (37.6 °C)] 98.6 °F (37 °C)  Pulse:  [] 71  Resp:  [18-19] 18  SpO2:  [93 %-100 %] 99 %  BP: (106-160)/(53-75) 137/65     Weight: 97.2 kg (214 lb 4.6 oz)  Body mass index is 32.58 kg/m².     Physical Exam  Constitutional:       Appearance: Normal appearance.   HENT:      Head: Normocephalic and atraumatic.   Eyes:      General: No scleral icterus.     Conjunctiva/sclera: Conjunctivae normal.   Cardiovascular:      Rate and Rhythm: Normal rate.   Pulmonary:      Effort: Pulmonary effort is normal.   Abdominal:      General: Abdomen is flat.   Neurological:      General: No focal deficit present.      Mental Status: She is alert and oriented to person, place, and time.   Psychiatric:         Mood and Affect: Mood normal.         Behavior: Behavior normal.            I have reviewed all pertinent lab results within the past 24 hours.  CBC:   Recent Labs   Lab 04/21/24  0652   WBC 10.73   RBC 3.33*   HGB 7.9*   HCT 26.0*   *   MCV 78*   MCH 23.7*   MCHC 30.4*     CMP:   Recent Labs   Lab 04/21/24  0652   *   CALCIUM 8.9   ALBUMIN 3.5   PROT 6.4      K 3.9   CO2 27      BUN 9   CREATININE 1.1   ALKPHOS 98   ALT 39   AST 60*   BILITOT 0.3       Significant Diagnostics:  I have reviewed all pertinent imaging results/findings within the past 24 hours.    cT reviewed: no obvious cause for bleeding, no obvious mets or masses

## 2024-04-21 NOTE — PLAN OF CARE
Patient is a standby assist. Patient up with standby assist to the bathroom. Patient back in bed with call light in reach.

## 2024-04-21 NOTE — SUBJECTIVE & OBJECTIVE
Oncology Treatment Plan:   [No matching plan found]    Medications:  Continuous Infusions:  Current Facility-Administered Medications   Medication Dose Route Frequency Last Rate Last Admin     Scheduled Meds:  Current Facility-Administered Medications   Medication Dose Route Frequency    diltiaZEM  120 mg Oral Daily    ezetimibe  10 mg Oral Daily    [START ON 4/22/2024] hydroCHLOROthiazide  25 mg Oral Every Mon, Wed, Fri    pantoprazole  80 mg Intravenous Daily     PRN Meds:  Current Facility-Administered Medications:     0.9%  NaCl infusion (for blood administration), , Intravenous, Q24H PRN    acetaminophen, 650 mg, Oral, Q8H PRN    acetaminophen, 650 mg, Oral, Q4H PRN    aluminum-magnesium hydroxide-simethicone, 30 mL, Oral, QID PRN    dextrose 50%, 12.5 g, Intravenous, PRN    dextrose 50%, 25 g, Intravenous, PRN    glucagon (human recombinant), 1 mg, Intramuscular, PRN    glucose, 16 g, Oral, PRN    glucose, 24 g, Oral, PRN    HYDROcodone-acetaminophen, 1 tablet, Oral, Q6H PRN    magnesium oxide, 800 mg, Oral, PRN    magnesium oxide, 800 mg, Oral, PRN    melatonin, 6 mg, Oral, Nightly PRN    naloxone, 0.02 mg, Intravenous, PRN    ondansetron, 4 mg, Intravenous, Q6H PRN    potassium bicarbonate, 35 mEq, Oral, PRN    potassium bicarbonate, 50 mEq, Oral, PRN    potassium bicarbonate, 60 mEq, Oral, PRN    potassium, sodium phosphates, 2 packet, Oral, PRN    potassium, sodium phosphates, 2 packet, Oral, PRN    potassium, sodium phosphates, 2 packet, Oral, PRN    senna-docusate 8.6-50 mg, 1 tablet, Oral, BID PRN    sodium chloride 0.9%, 10 mL, Intravenous, Q12H PRN     Review of patient's allergies indicates:   Allergen Reactions    Statins-hmg-coa reductase inhibitors Swelling    Metformin Other (See Comments)     metallic taste , burn of the esophagus        Past Medical History:   Diagnosis Date    Abnormal Pap smear     s/p cryo at 20 y/o    Anticoagulant long-term use     GERD (gastroesophageal reflux disease)      Hyperlipidemia     Migraine     Migraines     Sleep apnea     Can not use c-pap     Past Surgical History:   Procedure Laterality Date    ARTHROSCOPIC REPAIR OF ROTATOR CUFF OF SHOULDER Left 12/29/2023    Procedure: REPAIR, ROTATOR CUFF, ARTHROSCOPIC;  Surgeon: Gary Tony MD;  Location: [facility];  Service: Orthopedics;  Laterality: Left;  arthrex    ARTHROSCOPY OF SHOULDER WITH DECOMPRESSION OF SUBACROMIAL SPACE Left 12/29/2023    Procedure: ARTHROSCOPY, SHOULDER, WITH SUBACROMIAL SPACE DECOMPRESSION;  Surgeon: Gary Tony MD;  Location: [facility];  Service: Orthopedics;  Laterality: Left;    CATARACT EXTRACTION, BILATERAL      CHOLECYSTECTOMY  10/01/2014    ESOPHAGOGASTRODUODENOSCOPY N/A 4/20/2024    Procedure: EGD (ESOPHAGOGASTRODUODENOSCOPY);  Surgeon: Ceasar Arias III, MD;  Location: [facility];  Service: Endoscopy;  Laterality: N/A;    EYE SURGERY      KNEE ARTHROSCOPY Left     TONSILLECTOMY       Family History       Problem Relation (Age of Onset)    Breast cancer Paternal Grandmother (70), Maternal Grandmother    Cancer Daughter, Daughter (20)    Colon cancer Mother    Ovarian cancer Daughter          Tobacco Use    Smoking status: Former    Smokeless tobacco: Never   Substance and Sexual Activity    Alcohol use: No    Drug use: No    Sexual activity: Never       Review of Systems   Constitutional:  Negative for fever.   Respiratory:  Negative for shortness of breath.    Cardiovascular:  Negative for chest pain and leg swelling.   Gastrointestinal:  Negative for abdominal pain and blood in stool.   Genitourinary:  Negative for hematuria.   Skin:  Negative for rash.     Objective:     Vital Signs (Most Recent):  Temp: 98.6 °F (37 °C) (04/21/24 1103)  Pulse: 71 (04/21/24 1103)  Resp: 18 (04/21/24 1103)  BP: 137/65 (04/21/24 1103)  SpO2: 99 % (04/21/24 1103) Vital Signs (24h Range):  Temp:  [97.7 °F (36.5 °C)-99.7 °F (37.6 °C)] 98.6 °F (37 °C)  Pulse:  [] 71  Resp:  [18-19]  18  SpO2:  [93 %-100 %] 99 %  BP: (106-160)/(53-75) 137/65     Weight: 97.2 kg (214 lb 4.6 oz)  Body mass index is 32.58 kg/m².  Body surface area is 2.16 meters squared.      Intake/Output Summary (Last 24 hours) at 4/21/2024 1302  Last data filed at 4/21/2024 1016  Gross per 24 hour   Intake 920 ml   Output --   Net 920 ml        Physical Exam  Constitutional:       Appearance: Normal appearance.   HENT:      Head: Normocephalic and atraumatic.   Eyes:      General: No scleral icterus.     Conjunctiva/sclera: Conjunctivae normal.   Cardiovascular:      Rate and Rhythm: Normal rate.   Pulmonary:      Effort: Pulmonary effort is normal.   Abdominal:      General: Abdomen is flat.   Neurological:      General: No focal deficit present.      Mental Status: She is alert and oriented to person, place, and time.   Psychiatric:         Mood and Affect: Mood normal.         Behavior: Behavior normal.          Significant Labs:   CBC:   Recent Labs   Lab 04/19/24  1313 04/20/24  0501 04/21/24  0652   WBC 12.93*  12.93* 12.90* 10.73   HGB 7.4*  7.4* 7.9* 7.9*   HCT 24.5*  24.5* 25.8* 26.0*   *  574* 510* 481*    and CMP:   Recent Labs   Lab 04/19/24  1313 04/20/24  0501 04/21/24  0652     137 138 139   K 3.5  3.5 3.7 3.9     103 104 105   CO2 22*  22* 25 27   *  255* 146* 166*   BUN 14  14 10 9   CREATININE 1.2  1.2 1.0 1.1   CALCIUM 8.9  8.9 8.8 8.9   PROT 6.9  6.9 6.5 6.4   ALBUMIN 3.7  3.7 3.4* 3.5   BILITOT 0.2  0.2 0.3 0.3   ALKPHOS 107  107 108 98   AST 43*  43* 33 60*   ALT 38  38 33 39   ANIONGAP 12  12 9 7*       Diagnostic Results:  None

## 2024-04-21 NOTE — [employer] PATIENT INSTRUCTIONS
Discharge Summary/Instructions after an Endoscopic Procedure  Patient Name: Jen Torres  Patient MRN: [id number]  Patient YOB: 1950 Sunday, April 21, 2024  Ceasar Arias III, MD  RESTRICTIONS:  During your procedure today, you received medications for sedation.  These   medications may affect your judgment, balance and coordination.  Therefore,   for 24 hours, you have the following restrictions:   - DO NOT drive a car, operate machinery, make legal/financial decisions,   sign important papers or drink alcohol.    ACTIVITY:  Today: no heavy lifting, straining or running due to procedural   sedation/anesthesia.  The following day: return to full activity including work.  DIET:  Eat and drink normally unless instructed otherwise.     TREATMENT FOR COMMON SIDE EFFECTS:  - Mild abdominal pain, nausea, belching, bloating or excessive gas:  rest,   eat lightly and use a heating pad.  - Sore Throat: treat with throat lozenges and/or gargle with warm salt   water.  - Because air was used during the procedure, expelling large amounts of air   from your rectum or belching is normal.  - If a bowel prep was taken, you may not have a bowel movement for 1-3 days.    This is normal.  SYMPTOMS TO WATCH FOR AND REPORT TO YOUR PHYSICIAN:  1. Abdominal pain or bloating, other than gas cramps.  2. Chest pain.  3. Back pain.  4. Signs of infection such as: chills or fever occurring within 24 hours   after the procedure.  5. Rectal bleeding, which would show as bright red, maroon, or black stools.   (A tablespoon of blood from the rectum is not serious, especially if   hemorrhoids are present.)  6. Vomiting.  7. Weakness or dizziness.  GO DIRECTLY TO THE NEAREST EMERGENCY ROOM IF YOU HAVE ANY OF THE FOLLOWING:      Difficulty breathing              Chills and/or fever over 101 F   Persistent vomiting and/or vomiting blood   Severe abdominal pain   Severe chest pain   Black, tarry stools   Bleeding- more than one  tablespoon   Any other symptom or condition that you feel may need urgent attention  Your doctor recommends these additional instructions:  If any biopsies were taken, your doctors clinic will contact you in 1 to 2   weeks with any results.  - Patient has a contact number available for emergencies.  The signs and   symptoms of potential delayed complications were discussed with the   patient.  Return to normal activities tomorrow.  Written discharge   instructions were provided to the patient.   - Resume previous diet.   - Continue present medications.   - Repeat colonoscopy in 1 year for surveillance.   - Consult surgery / oncology and hold anticoagulation for now as mass is   likely cause of anemia.   - Return patient to hospital [facility] for ongoing care.  For questions, problems or results please call your physician - Ceasar Arias III, MD at Work:  [phone].  [facility], EMERGENCY ROOM PHONE NUMBER: [phone]  IF A COMPLICATION OR EMERGENCY SITUATION ARISES AND YOU ARE UNABLE TO REACH   YOUR PHYSICIAN - GO DIRECTLY TO THE EMERGENCY ROOM.  Ceasar Arias III, MD  4/21/2024 10:20:23 AM  This report has been verified and signed electronically.  Dear patient,  As a result of recent federal legislation (The Federal Cures Act), you may   receive lab or pathology results from your procedure in your [facility]   account before your physician is able to contact you. Your physician or   their representative will relay the results to you with their   recommendations at their soonest availability.  Thank you,  [employer]

## 2024-04-21 NOTE — CONSULTS
[facility]  General Surgery  Consult Note    Patient Name: Jen Torres  MRN: [id number]  Code Status: Full Code  Admission Date: 4/19/2024  Hospital Length of Stay: 1 days  Attending Physician: Olivia Gamboa MD  Primary Care Provider: Sam Garcia IV, MD    Patient information was obtained from patient and ER records.     Inpatient consult to General Surgery  Consult performed by: Jaden Barrios MD  Consult ordered by: Olivia Gamboa MD  Reason for consult: colon mass  Assessment/Recommendations: surgery      Inpatient consult to General Surgery  Consult performed by: Jaden Barrios MD  Consult ordered by: Ceasar Arias III, MD  Reason for consult: colon mass  Assessment/Recommendations: surgery        Subjective:     Principal Problem: GI bleed    History of Present Illness: 72 y/o female with found to have bleeding colon mass.  Colonoscopy done today identified.   She is here for further treatment.    Current Facility-Administered Medications   Medication Dose Route Frequency Provider Last Rate Last Admin    0.9%  NaCl infusion (for blood administration)   Intravenous Q24H PRN Kimmie Perera MD        acetaminophen tablet 650 mg  650 mg Oral Q8H PRN Judith Amato PA        acetaminophen tablet 650 mg  650 mg Oral Q4H PRN Judith Amato PA        aluminum-magnesium hydroxide-simethicone 200-200-20 mg/5 mL suspension 30 mL  30 mL Oral QID PRN Judith Amato PA        dextrose 50% injection 12.5 g  12.5 g Intravenous PRN Judith Amato PA        dextrose 50% injection 25 g  25 g Intravenous PRN Judith Amato PA        diltiaZEM 24 hr capsule 120 mg  120 mg Oral Daily Judith Amato PA        ezetimibe tablet 10 mg  10 mg Oral Daily Judith Amato PA        glucagon (human recombinant) injection 1 mg  1 mg Intramuscular PRN Judith Amato PA        glucose chewable tablet 16 g  16 g Oral PRN Judith Amato PA        glucose chewable  tablet 24 g  24 g Oral PRN Judith Amato PA        [START ON 4/22/2024] hydroCHLOROthiazide tablet 25 mg  25 mg Oral Every Mon, Wed, Fri Judith Amato PA        HYDROcodone-acetaminophen 5-325 mg per tablet 1 tablet  1 tablet Oral Q6H PRN Judith Amato PA   1 tablet at 04/21/24 1407    magnesium oxide tablet 800 mg  800 mg Oral PRN Judith Amato PA   800 mg at 04/20/24 1730    magnesium oxide tablet 800 mg  800 mg Oral PRN Judith Amato PA        melatonin tablet 6 mg  6 mg Oral Nightly PRN Judith Amato PA        naloxone 0.4 mg/mL injection 0.02 mg  0.02 mg Intravenous PRN Judith Amato PA        ondansetron injection 4 mg  4 mg Intravenous Q6H PRN Judith Amato PA        pantoprazole injection 80 mg  80 mg Intravenous Daily uJdith Amato PA   80 mg at 04/20/24 1212    potassium bicarbonate disintegrating tablet 35 mEq  35 mEq Oral PRN Judith Amato PA        potassium bicarbonate disintegrating tablet 50 mEq  50 mEq Oral PRN Judith Amato PA   50 mEq at 04/20/24 1211    potassium bicarbonate disintegrating tablet 60 mEq  60 mEq Oral PRN Judith Amato PA        potassium, sodium phosphates 280-160-250 mg packet 2 packet  2 packet Oral PRN Judith Amato PA        potassium, sodium phosphates 280-160-250 mg packet 2 packet  2 packet Oral PRN Judith Amato PA        potassium, sodium phosphates 280-160-250 mg packet 2 packet  2 packet Oral PRN Judith Amato PA        senna-docusate 8.6-50 mg per tablet 1 tablet  1 tablet Oral BID PRN Judith Amato PA        sodium chloride 0.9% flush 10 mL  10 mL Intravenous Q12H PRN Judith Amato PA         Facility-Administered Medications Ordered in Other Encounters   Medication Dose Route Frequency Provider Last Rate Last Admin    electrolyte-S (ISOLYTE)   Intravenous Continuous Blade Packer MD        electrolyte-S (ISOLYTE)   Intravenous Continuous Blade Packer  mL/hr at  12/29/23 1159 Rate Change at 12/29/23 1159    LIDOcaine (PF) 10 mg/ml (1%) injection 10 mg  1 mL Intradermal Once Blade Packer MD        midazolam (VERSED) 1 mg/mL injection 2 mg  2 mg Intravenous PRN Blade Packer MD   2 mg at 12/29/23 0908       Review of patient's allergies indicates:   Allergen Reactions    Statins-hmg-coa reductase inhibitors Swelling    Metformin Other (See Comments)     metallic taste , burn of the esophagus       Past Medical History:   Diagnosis Date    Abnormal Pap smear     s/p cryo at 20 y/o    Anticoagulant long-term use     GERD (gastroesophageal reflux disease)     Hyperlipidemia     Migraine     Migraines     Sleep apnea     Can not use c-pap     Past Surgical History:   Procedure Laterality Date    ARTHROSCOPIC REPAIR OF ROTATOR CUFF OF SHOULDER Left 12/29/2023    Procedure: REPAIR, ROTATOR CUFF, ARTHROSCOPIC;  Surgeon: Gary Tony MD;  Location: [facility];  Service: Orthopedics;  Laterality: Left;  arthrex    ARTHROSCOPY OF SHOULDER WITH DECOMPRESSION OF SUBACROMIAL SPACE Left 12/29/2023    Procedure: ARTHROSCOPY, SHOULDER, WITH SUBACROMIAL SPACE DECOMPRESSION;  Surgeon: Gary Tony MD;  Location: [facility];  Service: Orthopedics;  Laterality: Left;    CATARACT EXTRACTION, BILATERAL      CHOLECYSTECTOMY  10/01/2014    ESOPHAGOGASTRODUODENOSCOPY N/A 4/20/2024    Procedure: EGD (ESOPHAGOGASTRODUODENOSCOPY);  Surgeon: Ceasar Arias III, MD;  Location: [facility];  Service: Endoscopy;  Laterality: N/A;    EYE SURGERY      KNEE ARTHROSCOPY Left     TONSILLECTOMY       Family History       Problem Relation (Age of Onset)    Breast cancer Paternal Grandmother (70), Maternal Grandmother    Cancer Daughter, Daughter (20)    Colon cancer Mother    Ovarian cancer Daughter          Tobacco Use    Smoking status: Former    Smokeless tobacco: Never   Substance and Sexual Activity    Alcohol use: No    Drug use: No    Sexual activity: Never     Review of  Systems   Constitutional:  Positive for fatigue. Negative for activity change, appetite change, chills and fever.   HENT:  Negative for congestion, ear pain, nosebleeds and sinus pain.    Eyes:  Negative for discharge and itching.   Respiratory:  Positive for shortness of breath. Negative for apnea, cough and chest tightness.    Cardiovascular:  Positive for chest pain. Negative for palpitations and leg swelling.   Gastrointestinal:  Negative for abdominal distention, abdominal pain and vomiting.   Genitourinary:  Negative for difficulty urinating, dysuria, flank pain and frequency.   Musculoskeletal:  Negative for arthralgias, back pain, joint swelling and myalgias.   Skin:  Negative for color change, pallor and rash.   Neurological:  Positive for weakness. Negative for dizziness, light-headedness and headaches.   Psychiatric/Behavioral:  Negative for agitation, behavioral problems, confusion and suicidal ideas.    All other systems reviewed and are negative.    Objective:     Vital Signs (Most Recent):  Temp: 98.6 °F (37 °C) (04/21/24 1103)  Pulse: 71 (04/21/24 1103)  Resp: 18 (04/21/24 1407)  BP: 137/65 (04/21/24 1103)  SpO2: 99 % (04/21/24 1103) Vital Signs (24h Range):  Temp:  [97.7 °F (36.5 °C)-99.7 °F (37.6 °C)] 98.6 °F (37 °C)  Pulse:  [] 71  Resp:  [18-19] 18  SpO2:  [93 %-100 %] 99 %  BP: (106-160)/(53-75) 137/65     Weight: 97.2 kg (214 lb 4.6 oz)  Body mass index is 32.58 kg/m².     Physical Exam  Constitutional:       Appearance: Normal appearance.   HENT:      Head: Normocephalic and atraumatic.   Eyes:      General: No scleral icterus.     Conjunctiva/sclera: Conjunctivae normal.   Cardiovascular:      Rate and Rhythm: Normal rate.   Pulmonary:      Effort: Pulmonary effort is normal.   Abdominal:      General: Abdomen is flat.   Neurological:      General: No focal deficit present.      Mental Status: She is alert and oriented to person, place, and time.   Psychiatric:         Mood and Affect:  Mood normal.         Behavior: Behavior normal.            I have reviewed all pertinent lab results within the past 24 hours.  CBC:   Recent Labs   Lab 04/21/24  0652   WBC 10.73   RBC 3.33*   HGB 7.9*   HCT 26.0*   *   MCV 78*   MCH 23.7*   MCHC 30.4*     CMP:   Recent Labs   Lab 04/21/24  0652   *   CALCIUM 8.9   ALBUMIN 3.5   PROT 6.4      K 3.9   CO2 27      BUN 9   CREATININE 1.1   ALKPHOS 98   ALT 39   AST 60*   BILITOT 0.3       Significant Diagnostics:  I have reviewed all pertinent imaging results/findings within the past 24 hours.    cT reviewed: no obvious cause for bleeding, no obvious mets or masses  Assessment/Plan:     Colonic mass  Colectomy tomorrow, or cannot accommodate today  Ok for cl diet  Npo after mn  Spoke with gi   Case ordered      VTE Risk Mitigation (From admission, onward)           Ordered     Reason for No Pharmacological VTE Prophylaxis  Once        Question:  Reasons:  Answer:  Active Bleeding    04/19/24 1641     IP VTE HIGH RISK PATIENT  Once         04/19/24 1641     Place sequential compression device  Until discontinued         04/19/24 1641                    Thank you for your consult. I will follow-up with patient. Please contact us if you have any additional questions.    Jaden Barrios MD  General Surgery  [facility]

## 2024-04-22 ENCOUNTER — ANESTHESIA (OUTPATIENT)
Dept: SURGERY | Facility: HOSPITAL | Age: 74
DRG: 330 | End: 2024-04-22
Payer: MEDICARE

## 2024-04-22 LAB
ALBUMIN SERPL BCP-MCNC: 3.4 G/DL (ref 3.5–5.2)
ALP SERPL-CCNC: 105 U/L (ref 55–135)
ALT SERPL W/O P-5'-P-CCNC: 39 U/L (ref 10–44)
ANION GAP SERPL CALC-SCNC: 10 MMOL/L (ref 8–16)
AST SERPL-CCNC: 42 U/L (ref 10–40)
BASOPHILS # BLD AUTO: 0.04 K/UL (ref 0–0.2)
BASOPHILS NFR BLD: 0.3 % (ref 0–1.9)
BILIRUB SERPL-MCNC: 0.4 MG/DL (ref 0.1–1)
BUN SERPL-MCNC: 8 MG/DL (ref 8–23)
CALCIUM SERPL-MCNC: 8.6 MG/DL (ref 8.7–10.5)
CHLORIDE SERPL-SCNC: 105 MMOL/L (ref 95–110)
CO2 SERPL-SCNC: 24 MMOL/L (ref 23–29)
CREAT SERPL-MCNC: 1 MG/DL (ref 0.5–1.4)
DIFFERENTIAL METHOD BLD: ABNORMAL
EOSINOPHIL # BLD AUTO: 0.2 K/UL (ref 0–0.5)
EOSINOPHIL NFR BLD: 1.2 % (ref 0–8)
ERYTHROCYTE [DISTWIDTH] IN BLOOD BY AUTOMATED COUNT: 18.8 % (ref 11.5–14.5)
EST. GFR  (NO RACE VARIABLE): >60 ML/MIN/1.73 M^2
GLUCOSE SERPL-MCNC: 170 MG/DL (ref 70–110)
GLUCOSE SERPL-MCNC: 212 MG/DL (ref 70–110)
GLUCOSE SERPL-MCNC: 283 MG/DL (ref 70–110)
HCT VFR BLD AUTO: 26.3 % (ref 37–48.5)
HGB BLD-MCNC: 8.2 G/DL (ref 12–16)
IMM GRANULOCYTES # BLD AUTO: 0.07 K/UL (ref 0–0.04)
IMM GRANULOCYTES NFR BLD AUTO: 0.5 % (ref 0–0.5)
LYMPHOCYTES # BLD AUTO: 3.1 K/UL (ref 1–4.8)
LYMPHOCYTES NFR BLD: 21.7 % (ref 18–48)
MAGNESIUM SERPL-MCNC: 1.9 MG/DL (ref 1.6–2.6)
MCH RBC QN AUTO: 24.4 PG (ref 27–31)
MCHC RBC AUTO-ENTMCNC: 31.2 G/DL (ref 32–36)
MCV RBC AUTO: 78 FL (ref 82–98)
MONOCYTES # BLD AUTO: 0.9 K/UL (ref 0.3–1)
MONOCYTES NFR BLD: 5.9 % (ref 4–15)
NEUTROPHILS # BLD AUTO: 10.2 K/UL (ref 1.8–7.7)
NEUTROPHILS NFR BLD: 70.4 % (ref 38–73)
NRBC BLD-RTO: 0 /100 WBC
PHOSPHATE SERPL-MCNC: 2.8 MG/DL (ref 2.7–4.5)
PLATELET # BLD AUTO: 485 K/UL (ref 150–450)
PMV BLD AUTO: 10 FL (ref 9.2–12.9)
POTASSIUM SERPL-SCNC: 3.7 MMOL/L (ref 3.5–5.1)
PROT SERPL-MCNC: 6.3 G/DL (ref 6–8.4)
RBC # BLD AUTO: 3.36 M/UL (ref 4–5.4)
SODIUM SERPL-SCNC: 139 MMOL/L (ref 136–145)
WBC # BLD AUTO: 14.45 K/UL (ref 3.9–12.7)

## 2024-04-22 PROCEDURE — 85025 COMPLETE CBC W/AUTO DIFF WBC: CPT | Performed by: PHYSICAL THERAPY ASSISTANT

## 2024-04-22 PROCEDURE — 63600175 PHARM REV CODE 636 W HCPCS: Performed by: NURSE ANESTHETIST, CERTIFIED REGISTERED

## 2024-04-22 PROCEDURE — 25000003 PHARM REV CODE 250: Performed by: ANESTHESIOLOGY

## 2024-04-22 PROCEDURE — 0DJD8ZZ INSPECTION OF LOWER INTESTINAL TRACT, VIA NATURAL OR ARTIFICIAL OPENING ENDOSCOPIC: ICD-10-PCS | Performed by: SURGERY

## 2024-04-22 PROCEDURE — 0DTF0ZZ RESECTION OF RIGHT LARGE INTESTINE, OPEN APPROACH: ICD-10-PCS | Performed by: SURGERY

## 2024-04-22 PROCEDURE — 27201423 OPTIME MED/SURG SUP & DEVICES STERILE SUPPLY: Performed by: SURGERY

## 2024-04-22 PROCEDURE — 88342 IMHCHEM/IMCYTCHM 1ST ANTB: CPT | Mod: TC | Performed by: PATHOLOGY

## 2024-04-22 PROCEDURE — 84100 ASSAY OF PHOSPHORUS: CPT | Performed by: PHYSICAL THERAPY ASSISTANT

## 2024-04-22 PROCEDURE — 63600175 PHARM REV CODE 636 W HCPCS: Performed by: STUDENT IN AN ORGANIZED HEALTH CARE EDUCATION/TRAINING PROGRAM

## 2024-04-22 PROCEDURE — 25000003 PHARM REV CODE 250

## 2024-04-22 PROCEDURE — 25000003 PHARM REV CODE 250: Performed by: SURGERY

## 2024-04-22 PROCEDURE — 83735 ASSAY OF MAGNESIUM: CPT | Performed by: PHYSICAL THERAPY ASSISTANT

## 2024-04-22 PROCEDURE — 37000009 HC ANESTHESIA EA ADD 15 MINS: Performed by: SURGERY

## 2024-04-22 PROCEDURE — 36415 COLL VENOUS BLD VENIPUNCTURE: CPT | Performed by: PHYSICAL THERAPY ASSISTANT

## 2024-04-22 PROCEDURE — C9113 INJ PANTOPRAZOLE SODIUM, VIA: HCPCS | Performed by: PHYSICAL THERAPY ASSISTANT

## 2024-04-22 PROCEDURE — 37000008 HC ANESTHESIA 1ST 15 MINUTES: Performed by: SURGERY

## 2024-04-22 PROCEDURE — 25000003 PHARM REV CODE 250: Performed by: NURSE ANESTHETIST, CERTIFIED REGISTERED

## 2024-04-22 PROCEDURE — 71000033 HC RECOVERY, INTIAL HOUR: Performed by: SURGERY

## 2024-04-22 PROCEDURE — 99900035 HC TECH TIME PER 15 MIN (STAT)

## 2024-04-22 PROCEDURE — 36000709 HC OR TIME LEV III EA ADD 15 MIN: Performed by: SURGERY

## 2024-04-22 PROCEDURE — 71000039 HC RECOVERY, EACH ADD'L HOUR: Performed by: SURGERY

## 2024-04-22 PROCEDURE — D9220A PRA ANESTHESIA: Mod: CRNA,,, | Performed by: NURSE ANESTHETIST, CERTIFIED REGISTERED

## 2024-04-22 PROCEDURE — 80053 COMPREHEN METABOLIC PANEL: CPT | Performed by: PHYSICAL THERAPY ASSISTANT

## 2024-04-22 PROCEDURE — 94760 N-INVAS EAR/PLS OXIMETRY 1: CPT

## 2024-04-22 PROCEDURE — 36000708 HC OR TIME LEV III 1ST 15 MIN: Performed by: SURGERY

## 2024-04-22 PROCEDURE — 27000221 HC OXYGEN, UP TO 24 HOURS

## 2024-04-22 PROCEDURE — 63600175 PHARM REV CODE 636 W HCPCS: Performed by: PHYSICAL THERAPY ASSISTANT

## 2024-04-22 PROCEDURE — 44140 PARTIAL REMOVAL OF COLON: CPT | Mod: ,,, | Performed by: SURGERY

## 2024-04-22 PROCEDURE — 12000002 HC ACUTE/MED SURGE SEMI-PRIVATE ROOM

## 2024-04-22 PROCEDURE — 63600175 PHARM REV CODE 636 W HCPCS: Performed by: ANESTHESIOLOGY

## 2024-04-22 PROCEDURE — 94799 UNLISTED PULMONARY SVC/PX: CPT

## 2024-04-22 PROCEDURE — D9220A PRA ANESTHESIA: Mod: ANES,,, | Performed by: ANESTHESIOLOGY

## 2024-04-22 RX ORDER — ONDANSETRON HYDROCHLORIDE 2 MG/ML
INJECTION, SOLUTION INTRAVENOUS
Status: DISCONTINUED | OUTPATIENT
Start: 2024-04-22 | End: 2024-04-22

## 2024-04-22 RX ORDER — INSULIN ASPART 100 [IU]/ML
0-5 INJECTION, SOLUTION INTRAVENOUS; SUBCUTANEOUS
Status: DISCONTINUED | OUTPATIENT
Start: 2024-04-22 | End: 2024-04-26

## 2024-04-22 RX ORDER — MEPERIDINE HYDROCHLORIDE 50 MG/ML
12.5 INJECTION INTRAMUSCULAR; INTRAVENOUS; SUBCUTANEOUS EVERY 10 MIN PRN
Status: DISCONTINUED | OUTPATIENT
Start: 2024-04-22 | End: 2024-04-22 | Stop reason: HOSPADM

## 2024-04-22 RX ORDER — FENTANYL CITRATE 50 UG/ML
INJECTION, SOLUTION INTRAMUSCULAR; INTRAVENOUS
Status: DISCONTINUED | OUTPATIENT
Start: 2024-04-22 | End: 2024-04-22

## 2024-04-22 RX ORDER — KETAMINE HCL IN 0.9 % NACL 50 MG/5 ML
SYRINGE (ML) INTRAVENOUS
Status: DISCONTINUED | OUTPATIENT
Start: 2024-04-22 | End: 2024-04-22

## 2024-04-22 RX ORDER — SODIUM CHLORIDE, SODIUM LACTATE, POTASSIUM CHLORIDE, CALCIUM CHLORIDE 600; 310; 30; 20 MG/100ML; MG/100ML; MG/100ML; MG/100ML
INJECTION, SOLUTION INTRAVENOUS CONTINUOUS PRN
Status: DISCONTINUED | OUTPATIENT
Start: 2024-04-22 | End: 2024-04-22

## 2024-04-22 RX ORDER — FAMOTIDINE 10 MG/ML
INJECTION INTRAVENOUS
Status: DISCONTINUED | OUTPATIENT
Start: 2024-04-22 | End: 2024-04-22

## 2024-04-22 RX ORDER — ROCURONIUM BROMIDE 10 MG/ML
INJECTION, SOLUTION INTRAVENOUS
Status: DISCONTINUED | OUTPATIENT
Start: 2024-04-22 | End: 2024-04-22

## 2024-04-22 RX ORDER — OXYCODONE HYDROCHLORIDE 5 MG/1
5 TABLET ORAL
Status: DISCONTINUED | OUTPATIENT
Start: 2024-04-22 | End: 2024-04-22 | Stop reason: HOSPADM

## 2024-04-22 RX ORDER — PHENYLEPHRINE HYDROCHLORIDE 10 MG/ML
INJECTION INTRAVENOUS
Status: DISCONTINUED | OUTPATIENT
Start: 2024-04-22 | End: 2024-04-22

## 2024-04-22 RX ORDER — HYDROMORPHONE HYDROCHLORIDE 1 MG/ML
0.2 INJECTION, SOLUTION INTRAMUSCULAR; INTRAVENOUS; SUBCUTANEOUS EVERY 5 MIN PRN
Status: COMPLETED | OUTPATIENT
Start: 2024-04-22 | End: 2024-04-22

## 2024-04-22 RX ORDER — DIPHENHYDRAMINE HYDROCHLORIDE 50 MG/ML
12.5 INJECTION INTRAMUSCULAR; INTRAVENOUS
Status: DISCONTINUED | OUTPATIENT
Start: 2024-04-22 | End: 2024-04-22 | Stop reason: HOSPADM

## 2024-04-22 RX ORDER — SUCCINYLCHOLINE CHLORIDE 20 MG/ML
INJECTION INTRAMUSCULAR; INTRAVENOUS
Status: DISCONTINUED | OUTPATIENT
Start: 2024-04-22 | End: 2024-04-22

## 2024-04-22 RX ORDER — CEFAZOLIN SODIUM 1 G/3ML
INJECTION, POWDER, FOR SOLUTION INTRAMUSCULAR; INTRAVENOUS
Status: DISCONTINUED | OUTPATIENT
Start: 2024-04-22 | End: 2024-04-22

## 2024-04-22 RX ORDER — DEXMEDETOMIDINE HYDROCHLORIDE 100 UG/ML
15 INJECTION, SOLUTION INTRAVENOUS ONCE
Status: COMPLETED | OUTPATIENT
Start: 2024-04-22 | End: 2024-04-22

## 2024-04-22 RX ORDER — LIDOCAINE HYDROCHLORIDE 20 MG/ML
INJECTION, SOLUTION EPIDURAL; INFILTRATION; INTRACAUDAL; PERINEURAL
Status: DISCONTINUED | OUTPATIENT
Start: 2024-04-22 | End: 2024-04-22

## 2024-04-22 RX ORDER — PROPOFOL 10 MG/ML
VIAL (ML) INTRAVENOUS
Status: DISCONTINUED | OUTPATIENT
Start: 2024-04-22 | End: 2024-04-22

## 2024-04-22 RX ORDER — ONDANSETRON HYDROCHLORIDE 2 MG/ML
4 INJECTION, SOLUTION INTRAVENOUS DAILY PRN
Status: DISCONTINUED | OUTPATIENT
Start: 2024-04-22 | End: 2024-04-22 | Stop reason: HOSPADM

## 2024-04-22 RX ADMIN — SODIUM CHLORIDE, SODIUM LACTATE, POTASSIUM CHLORIDE, AND CALCIUM CHLORIDE: .6; .31; .03; .02 INJECTION, SOLUTION INTRAVENOUS at 08:04

## 2024-04-22 RX ADMIN — DEXMEDETOMIDINE 15 MCG: 200 INJECTION, SOLUTION INTRAVENOUS at 10:04

## 2024-04-22 RX ADMIN — Medication 20 MG: at 09:04

## 2024-04-22 RX ADMIN — LIDOCAINE HYDROCHLORIDE 75 MG: 20 INJECTION, SOLUTION INTRAVENOUS at 09:04

## 2024-04-22 RX ADMIN — HYDROMORPHONE HYDROCHLORIDE 0.2 MG: 1 INJECTION, SOLUTION INTRAMUSCULAR; INTRAVENOUS; SUBCUTANEOUS at 01:04

## 2024-04-22 RX ADMIN — Medication 6 MG: at 11:04

## 2024-04-22 RX ADMIN — SUGAMMADEX 200 MG: 100 INJECTION, SOLUTION INTRAVENOUS at 12:04

## 2024-04-22 RX ADMIN — SODIUM CHLORIDE, SODIUM LACTATE, POTASSIUM CHLORIDE, AND CALCIUM CHLORIDE: .6; .31; .03; .02 INJECTION, SOLUTION INTRAVENOUS at 11:04

## 2024-04-22 RX ADMIN — SODIUM CHLORIDE, SODIUM LACTATE, POTASSIUM CHLORIDE, AND CALCIUM CHLORIDE: .6; .31; .03; .02 INJECTION, SOLUTION INTRAVENOUS at 10:04

## 2024-04-22 RX ADMIN — HYDROCODONE BITARTRATE AND ACETAMINOPHEN 1 TABLET: 5; 325 TABLET ORAL at 05:04

## 2024-04-22 RX ADMIN — PHENYLEPHRINE HYDROCHLORIDE 100 MCG: 10 INJECTION INTRAVENOUS at 11:04

## 2024-04-22 RX ADMIN — ROCURONIUM BROMIDE 30 MG: 10 INJECTION, SOLUTION INTRAVENOUS at 11:04

## 2024-04-22 RX ADMIN — OXYCODONE HYDROCHLORIDE 5 MG: 5 TABLET ORAL at 12:04

## 2024-04-22 RX ADMIN — PROPOFOL 100 MG: 10 INJECTION, EMULSION INTRAVENOUS at 09:04

## 2024-04-22 RX ADMIN — ROCURONIUM BROMIDE 45 MG: 10 INJECTION, SOLUTION INTRAVENOUS at 09:04

## 2024-04-22 RX ADMIN — FAMOTIDINE 20 MG: 10 INJECTION, SOLUTION INTRAVENOUS at 09:04

## 2024-04-22 RX ADMIN — HYDROCODONE BITARTRATE AND ACETAMINOPHEN 1 TABLET: 5; 325 TABLET ORAL at 11:04

## 2024-04-22 RX ADMIN — INSULIN ASPART 1 UNITS: 100 INJECTION, SOLUTION INTRAVENOUS; SUBCUTANEOUS at 10:04

## 2024-04-22 RX ADMIN — HYDROMORPHONE HYDROCHLORIDE 0.2 MG: 1 INJECTION, SOLUTION INTRAMUSCULAR; INTRAVENOUS; SUBCUTANEOUS at 12:04

## 2024-04-22 RX ADMIN — ROCURONIUM BROMIDE 5 MG: 10 INJECTION, SOLUTION INTRAVENOUS at 09:04

## 2024-04-22 RX ADMIN — CEFAZOLIN 2 G: 330 INJECTION, POWDER, FOR SOLUTION INTRAMUSCULAR; INTRAVENOUS at 09:04

## 2024-04-22 RX ADMIN — Medication 140 MG: at 09:04

## 2024-04-22 RX ADMIN — ONDANSETRON 4 MG: 2 INJECTION INTRAMUSCULAR; INTRAVENOUS at 09:04

## 2024-04-22 RX ADMIN — FENTANYL CITRATE 50 MCG: 50 INJECTION, SOLUTION INTRAMUSCULAR; INTRAVENOUS at 09:04

## 2024-04-22 RX ADMIN — PANTOPRAZOLE SODIUM 80 MG: 40 INJECTION, POWDER, FOR SOLUTION INTRAVENOUS at 02:04

## 2024-04-22 RX ADMIN — HYDROCHLOROTHIAZIDE 25 MG: 25 TABLET ORAL at 04:04

## 2024-04-22 NOTE — ANESTHESIA POSTPROCEDURE EVALUATION
Anesthesia Post Evaluation    Patient: Jen Melissa    Procedure(s) Performed: Procedure(s) (LRB):  LAPAROTOMY, EXPLORATORY (N/A)  COLECTOMY, RIGHT  LYSIS, ADHESIONS  SURGICAL PROCEDURE, COLONOSCOPIC    Final Anesthesia Type: general      Patient location during evaluation: PACU  Patient participation: Yes- Able to Participate  Level of consciousness: awake and alert, oriented and awake  Post-procedure vital signs: reviewed and stable  Pain management: adequate  Airway patency: patent    PONV status at discharge: No PONV  Anesthetic complications: no      Cardiovascular status: blood pressure returned to baseline, hemodynamically stable and stable  Respiratory status: unassisted, spontaneous ventilation and nasal cannula  Hydration status: euvolemic  Follow-up not needed.              Vitals Value Taken Time   /53 04/22/24 1345   Temp 36.4 °C (97.6 °F) 04/22/24 1228   Pulse 73 04/22/24 1348   Resp 16 04/22/24 1348   SpO2 96 % 04/22/24 1348   Vitals shown include unfiled device data.      No case tracking events are documented in the log.      Pain/Judith Score: Pain Rating Prior to Med Admin: 8 (4/22/2024  1:45 PM)  Pain Rating Post Med Admin: 2 (4/21/2024  2:38 PM)  Judith Score: 8 (4/22/2024  1:30 PM)

## 2024-04-22 NOTE — TRANSFER OF CARE
"Anesthesia Transfer of Care Note    Patient: Jen Torres    Procedure(s) Performed: Procedure(s) (LRB):  LAPAROTOMY, EXPLORATORY (N/A)    Patient location: PACU    Anesthesia Type: general    Transport from OR: Transported from OR on room air with adequate spontaneous ventilation    Post pain: adequate analgesia    Post assessment: no apparent anesthetic complications    Post vital signs: stable    Level of consciousness: awake and alert    Nausea/Vomiting: no nausea/vomiting    Complications: none    Transfer of care protocol was followed      Last vitals: Visit Vitals  BP (!) 124/59   Pulse 81   Temp 36.8 °C (98.3 °F)   Resp 20   Ht 5' 8" (1.727 m)   Wt 97.2 kg (214 lb 4.6 oz)   SpO2 98%   BMI 32.58 kg/m²     "

## 2024-04-22 NOTE — RESPIRATORY THERAPY
04/22/24 1410   Patient Assessment/Suction   Level of Consciousness (AVPU) responds to voice   Respiratory Effort Unlabored   PRE-TX-O2   Device (Oxygen Therapy) nasal cannula   $ Is the patient on Low Flow Oxygen? Yes   Flow (L/min) 2   SpO2 96 %   Pulse Oximetry Type Intermittent   Pulse 74   Resp 16

## 2024-04-22 NOTE — ANESTHESIA PROCEDURE NOTES
Intubation    Date/Time: 4/22/2024 9:35 AM    Performed by: Ashlyn Nichols CRNA  Authorized by: Blade Clement MD    Intubation:     Induction:  Intravenous    Intubated:  Postinduction    Mask Ventilation:  Easy mask    Attempts:  1    Attempted By:  CRNA    Method of Intubation:  Direct    Blade:  Maurice 3    Laryngeal View Grade: Grade I - full view of cords      Difficult Airway Encountered?: No      Complications:  None    Airway Device:  Oral endotracheal tube    Airway Device Size:  7.5    Style/Cuff Inflation:  Cuffed    Inflation Amount (mL):  6    Tube secured:  21    Secured at:  The lips    Placement Verified By:  Capnometry    Complicating Factors:  None    Findings Post-Intubation:  BS equal bilateral and atraumatic/condition of teeth unchanged

## 2024-04-22 NOTE — OP NOTE
Colectomy  Intra operative colonoscopy  Procedure Note    Date of procedure:   04/22/2024    Indications: 74 y/o with bleeding colon mass here for resection.    Pre-operative Diagnosis: Colon mass, bleeding, non obstructing    Post-operative Diagnosis: Same    Surgeon: Jaden Barrios MD    Assistants: KAREEM Henry MD    Anesthesia: General endotracheal anesthesia    ASA Class: 3    Procedure Details   The patient was seen in the Holding Room. The risks, benefits, complications, treatment options, and expected outcomes were discussed with the patient. The possibilities of reaction to medication, pulmonary aspiration, perforation of viscus, bleeding, recurrent infection, the need for additional procedures, failure to diagnose a condition, and creating a complication requiring transfusion or operation were discussed with the patient. The patient concurred with the proposed plan, giving informed consent. The site of surgery properly noted/marked. The patient was taken to Operating Room 1 identified as [facility] and the procedure verified as colectomy. A Time Out was held and the above information confirmed.    Full general anesthesia was induced with orotracheal intubation. The patient was prepped and draped in a supine position. Appropriate antibiotics were given intravenously. Arms were tucked, legs in stirrups.    A midline incision was created with a scalpel around the umbilicus.  The cautery was used to expose the linea alba which was sharply transected to allow access in the peritoneal cavity.  This incision was extended to allow visualization of the colon.  We expected to see the tattoo from the colonic mass on the left colon.  We found the tattoo area in the right transverse colon.  This was a bit unexpected but had a clearly palpable mass within it.  I would plan for a colonoscopy at the end of the case to ensure that the mass was completely resected.      The procedure was started  by taking down the white line of Toldt on the right.  This road the right colon medially.  A plane was created between the peritoneal reflection and the right colon.  Blunt dissection allowed the right colon to be completely medialized.  Adhesions to the appendix were also taken down allowing the colon to be brought superiorly as well.  Once this was complete we identified the ileocolic artery.  This was taken as proximal as possible between suture ligature.  We followed to the area of the mass and felt that we would have to take down the right colic and a portion of the middle colic to adequately get margins.  These vessels were identified and divided between suture ligatures and the transverse colon was divided at least 5 cm more distal to the marked mass.  Once this was done the mesentery was taken between ties and ligasure where appropriate.  Once this was done the bowel was passed off as specimen.  The small bowel was brought to the colon and a side-to-side stapled anastomosis was created using a blue load RAIN stapler.  The enterotomies were closed with interrupted Vicryl sutures.  The suture line was then imbricated using a running Vicryl suture.  The mesenteric defect was closed using running Vicryl sutures.  Tisseel was then sprayed over the dissection bed to ensure hemostasis the omentum was draped over the midline.  The linea alba was approximated using running nonlooped 0 PDS.  The skin was then closed with 4-0 Monocryl dressings were placed.  Patient tolerated the procedure well.    Colonoscopy done at the end of the procedure ensured that the entire mass was removed.  No tattoo marker or mass were seen in the remaining distal colon.    Instrument, sponge, and needle counts were correct prior to wound closure and at the conclusion of the case.     Findings:  colon mass    Estimated Blood Loss: 75.0 cc    Drains: none    Total IV Fluids: see anesthesia    Specimens: right colon    Implants:  tiseel    Complications:  None; patient tolerated the procedure well.    Disposition: PACU - hemodynamically stable.    Condition: stable    Attending Attestation: I was present and scrubbed for the entire procedure.

## 2024-04-22 NOTE — PROGRESS NOTES
Count includes the [facility] Medicine  Progress Note    Patient Name: Jen Torres  MRN: [id number]  Patient Class: IP- Inpatient   Admission Date: 4/19/2024  Length of Stay: 2 days  Attending Physician: Olivia Gamboa MD  Primary Care Provider: Sam Garcia IV, MD        Subjective:     Principal Problem:GI bleed        HPI:  Patient is a 73 old female medical history of anemia, GERD, hyperlipidemia, and migraines.  Patient presents to the ED today with complaints of worsening fatigue shortness for breath worse on exertion, and intermittent chest pain on exertion.  Patient was seen in GI office earlier today, thus sent into the ED for further evaluation.  Patient was found to have hemoglobin of 7.4, hematocrit 24.5.  1 unit blood ordered in the ED for transfusion.  Patient just had recent iron infusion on Wednesday.  Patient denies previous history of blood transfusions in the past.  Patient has had rectal bleeding and melena that has been present and steady for the past 2 months.  Patient reports being scheduled for outpatient stress test next week.  D-dimer elevated in the ED at 0.55, CTA negative for acute PE.  CT of the abdomen without acute abnormalities. Patient has history of factor 5 Leiden, DVT in LLE 2021. Patient does report taking Eliquis BID, last dose this morning. GI consulted.  Pt denies constipation/diarrhea, dysuria, abdominal pain, LE edema, diaphoresis, nausea, or vomiting. Patient is full code.    In the ED:  Chest x-ray negative.  Occult positive.  Troponin 5.0, TSH 2.254, UA negative.  Potassium 3.5, magnesium 1.6, INR 1.0, PTT 11.2.    Overview/Hospital Course:  The patient was transfused one unit PRBC on 04/19. She was seen in consult by GI, EGD done on 04/20. No significant findings. Colonoscopy done for 04/21 and revealed colonic mass in the transverse colon. Surgery was consulted, and resection was planned for 04/22. Her H/H were monitored for any further  decrease.    Interval History: No acute events overnight    Review of Systems   Constitutional:  Positive for fatigue. Negative for activity change, appetite change, chills and fever.   HENT:  Negative for congestion, ear pain, nosebleeds and sinus pain.    Eyes:  Negative for discharge and itching.   Respiratory:  Positive for shortness of breath. Negative for apnea, cough and chest tightness.    Cardiovascular:  Positive for chest pain. Negative for palpitations and leg swelling.   Gastrointestinal:  Negative for abdominal distention, abdominal pain and vomiting.   Genitourinary:  Negative for difficulty urinating, dysuria, flank pain and frequency.   Musculoskeletal:  Negative for arthralgias, back pain, joint swelling and myalgias.   Skin:  Negative for color change, pallor and rash.   Neurological:  Positive for weakness. Negative for dizziness, light-headedness and headaches.   Psychiatric/Behavioral:  Negative for agitation, behavioral problems, confusion and suicidal ideas.      Objective:     Vital Signs (Most Recent):  Temp: 98.3 °F (36.8 °C) (04/22/24 0729)  Pulse: 81 (04/22/24 0729)  Resp: 20 (04/22/24 0729)  BP: (!) 124/59 (04/22/24 0729)  SpO2: 98 % (04/22/24 0729) Vital Signs (24h Range):  Temp:  [98.2 °F (36.8 °C)-98.8 °F (37.1 °C)] 98.3 °F (36.8 °C)  Pulse:  [71-94] 81  Resp:  [18-20] 20  SpO2:  [97 %-99 %] 98 %  BP: (111-149)/(59-81) 124/59     Weight: 97.2 kg (214 lb 4.6 oz)  Body mass index is 32.58 kg/m².    Intake/Output Summary (Last 24 hours) at 4/22/2024 1035  Last data filed at 4/22/2024 1029  Gross per 24 hour   Intake 1060 ml   Output 125 ml   Net 935 ml         Physical Exam  Vitals reviewed.   Constitutional:       General: She is not in acute distress.     Appearance: Normal appearance. She is normal weight. She is not ill-appearing.   HENT:      Head: Normocephalic and atraumatic.      Nose: Nose normal.      Mouth/Throat:      Mouth: Mucous membranes are moist.      Pharynx:  Oropharynx is clear.   Eyes:      General: No scleral icterus.     Extraocular Movements: Extraocular movements intact.      Conjunctiva/sclera: Conjunctivae normal.      Pupils: Pupils are equal, round, and reactive to light.   Cardiovascular:      Rate and Rhythm: Normal rate and regular rhythm.      Pulses: Normal pulses.      Heart sounds: Normal heart sounds. No murmur heard.     No gallop.   Pulmonary:      Effort: Pulmonary effort is normal. No respiratory distress.      Breath sounds: Normal breath sounds. No wheezing, rhonchi or rales.   Chest:      Chest wall: No tenderness.   Abdominal:      General: Abdomen is flat. There is no distension.      Palpations: Abdomen is soft.      Tenderness: There is no abdominal tenderness.   Musculoskeletal:         General: No swelling or tenderness.      Cervical back: Normal range of motion and neck supple. No rigidity or tenderness.      Right lower leg: No edema.      Left lower leg: No edema.   Skin:     General: Skin is warm and dry.   Neurological:      General: No focal deficit present.      Mental Status: She is alert and oriented to person, place, and time. Mental status is at baseline.      Motor: No weakness.   Psychiatric:         Mood and Affect: Mood normal.         Behavior: Behavior normal.         Thought Content: Thought content normal.             Significant Labs: All pertinent labs within the past 24 hours have been reviewed.  BMP:   Recent Labs   Lab 04/22/24  0449   *      K 3.7      CO2 24   BUN 8   CREATININE 1.0   CALCIUM 8.6*   MG 1.9     CBC:   Recent Labs   Lab 04/21/24  0652 04/22/24  0450   WBC 10.73 14.45*   HGB 7.9* 8.2*   HCT 26.0* 26.3*   * 485*     CMP:   Recent Labs   Lab 04/21/24  0652 04/22/24  0449    139   K 3.9 3.7    105   CO2 27 24   * 170*   BUN 9 8   CREATININE 1.1 1.0   CALCIUM 8.9 8.6*   PROT 6.4 6.3   ALBUMIN 3.5 3.4*   BILITOT 0.3 0.4   ALKPHOS 98 105   AST 60* 42*   ALT 39  39   ANIONGAP 7* 10       Significant Imaging: I have reviewed all pertinent imaging results/findings within the past 24 hours.    Assessment/Plan:      * GI bleed  GI consulted, had unremarkable EGD; colonoscopy revealed colonic mass; for resection today  Serial CBC, monitor on telemetry   Hbg dropped from 11.5 to 7.4 in 4 months.       Colonic mass  For resection today      Iron deficiency anemia due to chronic blood loss  Last iron infusion 04/17  Symptomatic anemia with SOB, pale, fatigue  Transfused one unit PRBC on 04/19  Serial CBCs, monitor on telemetry     Type 2 diabetes mellitus without complication, without long-term current use of insulin  POCT glucose, insulin sliding scale     Essential hypertension  Chronic, controlled. Latest blood pressure and vitals reviewed-     Temp:  [97.1 °F (36.2 °C)-99.2 °F (37.3 °C)]   Pulse:  []   Resp:  [15-20]   BP: (105-160)/(56-95)   SpO2:  [92 %-100 %] .   Home meds for hypertension were reviewed and noted below.   Hypertension Medications               diltiaZEM (CARDIZEM CD) 120 MG Cp24 Take 1 capsule (120 mg total) by mouth once daily.    hydroCHLOROthiazide (HYDRODIURIL) 25 MG tablet Take 25 mg by mouth every Mon, Wed, Fri.            While in the hospital, will manage blood pressure as follows; Continue home antihypertensive regimen    Will utilize p.r.n. blood pressure medication only if patient's blood pressure greater than 180/110 and she develops symptoms such as worsening chest pain or shortness of breath.      VTE Risk Mitigation (From admission, onward)           Ordered     Reason for No Pharmacological VTE Prophylaxis  Once        Question:  Reasons:  Answer:  Active Bleeding    04/19/24 1641     IP VTE HIGH RISK PATIENT  Once         04/19/24 1641     Place sequential compression device  Until discontinued         04/19/24 1641                    Discharge Planning   KATIE:      Code Status: Full Code   Is the patient medically ready for discharge?:      Reason for patient still in hospital (select all that apply): Patient trending condition  Discharge Plan A: Home with family                  Olivia Gamboa MD, MD  Department of Hospital Medicine   [facility]

## 2024-04-22 NOTE — ASSESSMENT & PLAN NOTE
GI consulted, had unremarkable EGD; colonoscopy revealed colonic mass; for resection today  Serial CBC, monitor on telemetry   Hbg dropped from 11.5 to 7.4 in 4 months.

## 2024-04-22 NOTE — SUBJECTIVE & OBJECTIVE
Interval History: No acute events overnight    Review of Systems   Constitutional:  Positive for fatigue. Negative for activity change, appetite change, chills and fever.   HENT:  Negative for congestion, ear pain, nosebleeds and sinus pain.    Eyes:  Negative for discharge and itching.   Respiratory:  Positive for shortness of breath. Negative for apnea, cough and chest tightness.    Cardiovascular:  Positive for chest pain. Negative for palpitations and leg swelling.   Gastrointestinal:  Negative for abdominal distention, abdominal pain and vomiting.   Genitourinary:  Negative for difficulty urinating, dysuria, flank pain and frequency.   Musculoskeletal:  Negative for arthralgias, back pain, joint swelling and myalgias.   Skin:  Negative for color change, pallor and rash.   Neurological:  Positive for weakness. Negative for dizziness, light-headedness and headaches.   Psychiatric/Behavioral:  Negative for agitation, behavioral problems, confusion and suicidal ideas.      Objective:     Vital Signs (Most Recent):  Temp: 98.3 °F (36.8 °C) (04/22/24 0729)  Pulse: 81 (04/22/24 0729)  Resp: 20 (04/22/24 0729)  BP: (!) 124/59 (04/22/24 0729)  SpO2: 98 % (04/22/24 0729) Vital Signs (24h Range):  Temp:  [98.2 °F (36.8 °C)-98.8 °F (37.1 °C)] 98.3 °F (36.8 °C)  Pulse:  [71-94] 81  Resp:  [18-20] 20  SpO2:  [97 %-99 %] 98 %  BP: (111-149)/(59-81) 124/59     Weight: 97.2 kg (214 lb 4.6 oz)  Body mass index is 32.58 kg/m².    Intake/Output Summary (Last 24 hours) at 4/22/2024 1035  Last data filed at 4/22/2024 1029  Gross per 24 hour   Intake 1060 ml   Output 125 ml   Net 935 ml         Physical Exam  Vitals reviewed.   Constitutional:       General: She is not in acute distress.     Appearance: Normal appearance. She is normal weight. She is not ill-appearing.   HENT:      Head: Normocephalic and atraumatic.      Nose: Nose normal.      Mouth/Throat:      Mouth: Mucous membranes are moist.      Pharynx: Oropharynx is clear.    Eyes:      General: No scleral icterus.     Extraocular Movements: Extraocular movements intact.      Conjunctiva/sclera: Conjunctivae normal.      Pupils: Pupils are equal, round, and reactive to light.   Cardiovascular:      Rate and Rhythm: Normal rate and regular rhythm.      Pulses: Normal pulses.      Heart sounds: Normal heart sounds. No murmur heard.     No gallop.   Pulmonary:      Effort: Pulmonary effort is normal. No respiratory distress.      Breath sounds: Normal breath sounds. No wheezing, rhonchi or rales.   Chest:      Chest wall: No tenderness.   Abdominal:      General: Abdomen is flat. There is no distension.      Palpations: Abdomen is soft.      Tenderness: There is no abdominal tenderness.   Musculoskeletal:         General: No swelling or tenderness.      Cervical back: Normal range of motion and neck supple. No rigidity or tenderness.      Right lower leg: No edema.      Left lower leg: No edema.   Skin:     General: Skin is warm and dry.   Neurological:      General: No focal deficit present.      Mental Status: She is alert and oriented to person, place, and time. Mental status is at baseline.      Motor: No weakness.   Psychiatric:         Mood and Affect: Mood normal.         Behavior: Behavior normal.         Thought Content: Thought content normal.             Significant Labs: All pertinent labs within the past 24 hours have been reviewed.  BMP:   Recent Labs   Lab 04/22/24  0449   *      K 3.7      CO2 24   BUN 8   CREATININE 1.0   CALCIUM 8.6*   MG 1.9     CBC:   Recent Labs   Lab 04/21/24  0652 04/22/24  0450   WBC 10.73 14.45*   HGB 7.9* 8.2*   HCT 26.0* 26.3*   * 485*     CMP:   Recent Labs   Lab 04/21/24  0652 04/22/24  0449    139   K 3.9 3.7    105   CO2 27 24   * 170*   BUN 9 8   CREATININE 1.1 1.0   CALCIUM 8.9 8.6*   PROT 6.4 6.3   ALBUMIN 3.5 3.4*   BILITOT 0.3 0.4   ALKPHOS 98 105   AST 60* 42*   ALT 39 39   ANIONGAP 7* 10        Significant Imaging: I have reviewed all pertinent imaging results/findings within the past 24 hours.

## 2024-04-23 ENCOUNTER — TELEPHONE (OUTPATIENT)
Dept: CARDIOLOGY | Facility: HOSPITAL | Age: 74
End: 2024-04-23

## 2024-04-23 LAB
ALBUMIN SERPL BCP-MCNC: 3.3 G/DL (ref 3.5–5.2)
ALP SERPL-CCNC: 91 U/L (ref 55–135)
ALT SERPL W/O P-5'-P-CCNC: 34 U/L (ref 10–44)
ANION GAP SERPL CALC-SCNC: 10 MMOL/L (ref 8–16)
AST SERPL-CCNC: 27 U/L (ref 10–40)
BASOPHILS # BLD AUTO: 0.03 K/UL (ref 0–0.2)
BASOPHILS NFR BLD: 0.1 % (ref 0–1.9)
BILIRUB SERPL-MCNC: 0.4 MG/DL (ref 0.1–1)
BUN SERPL-MCNC: 7 MG/DL (ref 8–23)
CALCIUM SERPL-MCNC: 8.2 MG/DL (ref 8.7–10.5)
CHLORIDE SERPL-SCNC: 101 MMOL/L (ref 95–110)
CO2 SERPL-SCNC: 25 MMOL/L (ref 23–29)
CREAT SERPL-MCNC: 1 MG/DL (ref 0.5–1.4)
DIFFERENTIAL METHOD BLD: ABNORMAL
EOSINOPHIL # BLD AUTO: 0 K/UL (ref 0–0.5)
EOSINOPHIL NFR BLD: 0.1 % (ref 0–8)
ERYTHROCYTE [DISTWIDTH] IN BLOOD BY AUTOMATED COUNT: 20.1 % (ref 11.5–14.5)
EST. GFR  (NO RACE VARIABLE): >60 ML/MIN/1.73 M^2
GLUCOSE SERPL-MCNC: 195 MG/DL (ref 70–110)
GLUCOSE SERPL-MCNC: 208 MG/DL (ref 70–110)
GLUCOSE SERPL-MCNC: 213 MG/DL (ref 70–110)
GLUCOSE SERPL-MCNC: 217 MG/DL (ref 70–110)
GLUCOSE SERPL-MCNC: 245 MG/DL (ref 70–110)
HCT VFR BLD AUTO: 27.9 % (ref 37–48.5)
HGB BLD-MCNC: 8.3 G/DL (ref 12–16)
IMM GRANULOCYTES # BLD AUTO: 0.13 K/UL (ref 0–0.04)
IMM GRANULOCYTES NFR BLD AUTO: 0.6 % (ref 0–0.5)
LYMPHOCYTES # BLD AUTO: 2.1 K/UL (ref 1–4.8)
LYMPHOCYTES NFR BLD: 9.6 % (ref 18–48)
MAGNESIUM SERPL-MCNC: 1.8 MG/DL (ref 1.6–2.6)
MCH RBC QN AUTO: 24.3 PG (ref 27–31)
MCHC RBC AUTO-ENTMCNC: 29.7 G/DL (ref 32–36)
MCV RBC AUTO: 82 FL (ref 82–98)
MONOCYTES # BLD AUTO: 1.3 K/UL (ref 0.3–1)
MONOCYTES NFR BLD: 5.8 % (ref 4–15)
NEUTROPHILS # BLD AUTO: 18.4 K/UL (ref 1.8–7.7)
NEUTROPHILS NFR BLD: 83.8 % (ref 38–73)
NRBC BLD-RTO: 0 /100 WBC
PLATELET # BLD AUTO: 413 K/UL (ref 150–450)
PMV BLD AUTO: 10.3 FL (ref 9.2–12.9)
POTASSIUM SERPL-SCNC: 3.7 MMOL/L (ref 3.5–5.1)
PROT SERPL-MCNC: 6.1 G/DL (ref 6–8.4)
RBC # BLD AUTO: 3.41 M/UL (ref 4–5.4)
SODIUM SERPL-SCNC: 136 MMOL/L (ref 136–145)
WBC # BLD AUTO: 21.99 K/UL (ref 3.9–12.7)

## 2024-04-23 PROCEDURE — 63600175 PHARM REV CODE 636 W HCPCS: Performed by: SURGERY

## 2024-04-23 PROCEDURE — 85025 COMPLETE CBC W/AUTO DIFF WBC: CPT | Performed by: SURGERY

## 2024-04-23 PROCEDURE — C9113 INJ PANTOPRAZOLE SODIUM, VIA: HCPCS | Performed by: SURGERY

## 2024-04-23 PROCEDURE — 12000002 HC ACUTE/MED SURGE SEMI-PRIVATE ROOM

## 2024-04-23 PROCEDURE — 80053 COMPREHEN METABOLIC PANEL: CPT | Performed by: SURGERY

## 2024-04-23 PROCEDURE — 25000003 PHARM REV CODE 250: Performed by: SURGERY

## 2024-04-23 PROCEDURE — 93005 ELECTROCARDIOGRAM TRACING: CPT | Performed by: INTERNAL MEDICINE

## 2024-04-23 PROCEDURE — 97530 THERAPEUTIC ACTIVITIES: CPT

## 2024-04-23 PROCEDURE — 36415 COLL VENOUS BLD VENIPUNCTURE: CPT | Performed by: SURGERY

## 2024-04-23 PROCEDURE — 63600175 PHARM REV CODE 636 W HCPCS: Performed by: INTERNAL MEDICINE

## 2024-04-23 PROCEDURE — 97162 PT EVAL MOD COMPLEX 30 MIN: CPT

## 2024-04-23 PROCEDURE — 83735 ASSAY OF MAGNESIUM: CPT | Performed by: SURGERY

## 2024-04-23 PROCEDURE — 25000003 PHARM REV CODE 250: Performed by: INTERNAL MEDICINE

## 2024-04-23 PROCEDURE — 97166 OT EVAL MOD COMPLEX 45 MIN: CPT

## 2024-04-23 PROCEDURE — 27000221 HC OXYGEN, UP TO 24 HOURS

## 2024-04-23 PROCEDURE — 94761 N-INVAS EAR/PLS OXIMETRY MLT: CPT

## 2024-04-23 PROCEDURE — 93010 ELECTROCARDIOGRAM REPORT: CPT | Mod: ,,, | Performed by: INTERNAL MEDICINE

## 2024-04-23 RX ORDER — HYDROMORPHONE HYDROCHLORIDE 1 MG/ML
1 INJECTION, SOLUTION INTRAMUSCULAR; INTRAVENOUS; SUBCUTANEOUS ONCE
Status: COMPLETED | OUTPATIENT
Start: 2024-04-23 | End: 2024-04-23

## 2024-04-23 RX ORDER — MUPIROCIN 20 MG/G
OINTMENT TOPICAL 2 TIMES DAILY
Status: DISPENSED | OUTPATIENT
Start: 2024-04-23 | End: 2024-04-28

## 2024-04-23 RX ORDER — HYDROCODONE BITARTRATE AND ACETAMINOPHEN 10; 325 MG/1; MG/1
1 TABLET ORAL EVERY 4 HOURS PRN
Status: DISCONTINUED | OUTPATIENT
Start: 2024-04-23 | End: 2024-04-30 | Stop reason: HOSPADM

## 2024-04-23 RX ADMIN — HYDROMORPHONE HYDROCHLORIDE 1 MG: 1 INJECTION, SOLUTION INTRAMUSCULAR; INTRAVENOUS; SUBCUTANEOUS at 05:04

## 2024-04-23 RX ADMIN — MUPIROCIN 1 G: 20 OINTMENT TOPICAL at 08:04

## 2024-04-23 RX ADMIN — INSULIN ASPART 1 UNITS: 100 INJECTION, SOLUTION INTRAVENOUS; SUBCUTANEOUS at 08:04

## 2024-04-23 RX ADMIN — HYDROCODONE BITARTRATE AND ACETAMINOPHEN 1 TABLET: 10; 325 TABLET ORAL at 12:04

## 2024-04-23 RX ADMIN — POTASSIUM BICARBONATE 35 MEQ: 391 TABLET, EFFERVESCENT ORAL at 10:04

## 2024-04-23 RX ADMIN — Medication 800 MG: at 04:04

## 2024-04-23 RX ADMIN — HYDROCODONE BITARTRATE AND ACETAMINOPHEN 1 TABLET: 10; 325 TABLET ORAL at 04:04

## 2024-04-23 RX ADMIN — INSULIN ASPART 2 UNITS: 100 INJECTION, SOLUTION INTRAVENOUS; SUBCUTANEOUS at 10:04

## 2024-04-23 RX ADMIN — DILTIAZEM HYDROCHLORIDE 120 MG: 120 CAPSULE, COATED, EXTENDED RELEASE ORAL at 08:04

## 2024-04-23 RX ADMIN — INSULIN ASPART 2 UNITS: 100 INJECTION, SOLUTION INTRAVENOUS; SUBCUTANEOUS at 05:04

## 2024-04-23 RX ADMIN — EZETIMIBE 10 MG: 10 TABLET ORAL at 08:04

## 2024-04-23 RX ADMIN — Medication 800 MG: at 10:04

## 2024-04-23 RX ADMIN — PANTOPRAZOLE SODIUM 80 MG: 40 INJECTION, POWDER, FOR SOLUTION INTRAVENOUS at 12:04

## 2024-04-23 NOTE — PROGRESS NOTES
[facility] Medicine  Progress Note    Patient Name: Jen Torres  MRN: [id number]  Patient Class: IP- Inpatient   Admission Date: 4/19/2024  Length of Stay: 3 days  Attending Physician: Olivia Gamboa MD  Primary Care Provider: Sam Garcia IV, MD        Subjective:     Principal Problem:GI bleed        HPI:  Patient is a 73 old female medical history of anemia, GERD, hyperlipidemia, and migraines.  Patient presents to the ED today with complaints of worsening fatigue shortness for breath worse on exertion, and intermittent chest pain on exertion.  Patient was seen in GI office earlier today, thus sent into the ED for further evaluation.  Patient was found to have hemoglobin of 7.4, hematocrit 24.5.  1 unit blood ordered in the ED for transfusion.  Patient just had recent iron infusion on Wednesday.  Patient denies previous history of blood transfusions in the past.  Patient has had rectal bleeding and melena that has been present and steady for the past 2 months.  Patient reports being scheduled for outpatient stress test next week.  D-dimer elevated in the ED at 0.55, CTA negative for acute PE.  CT of the abdomen without acute abnormalities. Patient has history of fhypercoagulable state, DVT in LLE 2021. Patient does report taking Eliquis BID, last dose this morning. GI consulted.  Pt denies constipation/diarrhea, dysuria, abdominal pain, LE edema, diaphoresis, nausea, or vomiting. Patient is full code.    In the ED:  Chest x-ray negative.  Occult positive.  Troponin 5.0, TSH 2.254, UA negative.  Potassium 3.5, magnesium 1.6, INR 1.0, PTT 11.2.    Overview/Hospital Course:  The patient was transfused one unit PRBC on 04/19. She was seen in consult by GI, EGD done on 04/20. No significant findings. Colonoscopy done for 04/21 and revealed colonic mass in the transverse colon. Surgery was consulted, and resection was done 04/22. Her H/H were monitored for any further decrease.  Patient with expected postop pain.     Interval History: No acute events overnight    Review of Systems   Constitutional:  Positive for fatigue. Negative for activity change, appetite change, chills and fever.   HENT:  Negative for congestion, ear pain, nosebleeds and sinus pain.    Eyes:  Negative for discharge and itching.   Respiratory:  Positive for shortness of breath. Negative for apnea, cough and chest tightness.    Cardiovascular:  Positive for chest pain. Negative for palpitations and leg swelling.   Gastrointestinal:  Positive for abdominal pain. Negative for abdominal distention and vomiting.   Genitourinary:  Negative for difficulty urinating, dysuria, flank pain and frequency.   Musculoskeletal:  Negative for arthralgias, back pain, joint swelling and myalgias.   Skin:  Negative for color change, pallor and rash.   Neurological:  Positive for weakness. Negative for dizziness, light-headedness and headaches.   Psychiatric/Behavioral:  Negative for agitation, behavioral problems, confusion and suicidal ideas.      Objective:     Vital Signs (Most Recent):  Temp: 98.1 °F (36.7 °C) (04/23/24 1123)  Pulse: 82 (04/23/24 1123)  Resp: 18 (04/23/24 1200)  BP: (!) 117/58 (04/23/24 1123)  SpO2: 96 % (04/23/24 1123) Vital Signs (24h Range):  Temp:  [97.7 °F (36.5 °C)-98.1 °F (36.7 °C)] 98.1 °F (36.7 °C)  Pulse:  [69-93] 82  Resp:  [12-20] 18  SpO2:  [93 %-100 %] 96 %  BP: (104-158)/(51-75) 117/58     Weight: 97.2 kg (214 lb 4.6 oz)  Body mass index is 32.58 kg/m².    Intake/Output Summary (Last 24 hours) at 4/23/2024 1228  Last data filed at 4/23/2024 0939  Gross per 24 hour   Intake 1580 ml   Output 920 ml   Net 660 ml         Physical Exam  Vitals reviewed.   Constitutional:       General: She is not in acute distress.     Appearance: Normal appearance. She is normal weight. She is not ill-appearing.   HENT:      Head: Normocephalic and atraumatic.      Nose: Nose normal.      Mouth/Throat:      Mouth: Mucous  membranes are moist.      Pharynx: Oropharynx is clear.   Eyes:      General: No scleral icterus.     Extraocular Movements: Extraocular movements intact.      Conjunctiva/sclera: Conjunctivae normal.      Pupils: Pupils are equal, round, and reactive to light.   Cardiovascular:      Rate and Rhythm: Normal rate and regular rhythm.      Pulses: Normal pulses.      Heart sounds: Normal heart sounds. No murmur heard.     No gallop.   Pulmonary:      Effort: Pulmonary effort is normal. No respiratory distress.      Breath sounds: Normal breath sounds. No wheezing, rhonchi or rales.   Chest:      Chest wall: No tenderness.   Abdominal:      General: Abdomen is flat. There is no distension.      Palpations: Abdomen is soft.      Tenderness: There is abdominal tenderness.      Comments: Scant bowel sounds   Musculoskeletal:         General: No swelling or tenderness.      Cervical back: Normal range of motion and neck supple. No rigidity or tenderness.      Right lower leg: No edema.      Left lower leg: No edema.   Skin:     General: Skin is warm and dry.   Neurological:      General: No focal deficit present.      Mental Status: She is alert and oriented to person, place, and time. Mental status is at baseline.      Motor: No weakness.   Psychiatric:         Mood and Affect: Mood normal.         Behavior: Behavior normal.         Thought Content: Thought content normal.             Significant Labs: All pertinent labs within the past 24 hours have been reviewed.  BMP:   Recent Labs   Lab 04/23/24 0437   *      K 3.7      CO2 25   BUN 7*   CREATININE 1.0   CALCIUM 8.2*   MG 1.8     CBC:   Recent Labs   Lab 04/22/24  0450 04/23/24  0437   WBC 14.45* 21.99*   HGB 8.2* 8.3*   HCT 26.3* 27.9*   * 413     CMP:   Recent Labs   Lab 04/22/24  0449 04/23/24  0437    136   K 3.7 3.7    101   CO2 24 25   * 245*   BUN 8 7*   CREATININE 1.0 1.0   CALCIUM 8.6* 8.2*   PROT 6.3 6.1   ALBUMIN  3.4* 3.3*   BILITOT 0.4 0.4   ALKPHOS 105 91   AST 42* 27   ALT 39 34   ANIONGAP 10 10       Significant Imaging: I have reviewed all pertinent imaging results/findings within the past 24 hours.    Assessment/Plan:      * GI bleed  GI consulted, had unremarkable EGD; colonoscopy revealed colonic mass; s/p resection 04/22  Serial CBC, monitor on telemetry   Hbg dropped from 11.5 to 7.4 in 4 months.       Colonic mass  S/p resection 04/22      Iron deficiency anemia due to chronic blood loss  Last iron infusion 04/17  Symptomatic anemia with SOB, pale, fatigue  Transfused one unit PRBC on 04/19  Serial CBCs, monitor on telemetry     Type 2 diabetes mellitus without complication, without long-term current use of insulin  POCT glucose, insulin sliding scale     Essential hypertension  Chronic, controlled. Latest blood pressure and vitals reviewed-     Temp:  [97.1 °F (36.2 °C)-99.2 °F (37.3 °C)]   Pulse:  []   Resp:  [15-20]   BP: (105-160)/(56-95)   SpO2:  [92 %-100 %] .   Home meds for hypertension were reviewed and noted below.   Hypertension Medications               diltiaZEM (CARDIZEM CD) 120 MG Cp24 Take 1 capsule (120 mg total) by mouth once daily.    hydroCHLOROthiazide (HYDRODIURIL) 25 MG tablet Take 25 mg by mouth every Mon, Wed, Fri.            While in the hospital, will manage blood pressure as follows; Continue home antihypertensive regimen    Will utilize p.r.n. blood pressure medication only if patient's blood pressure greater than 180/110 and she develops symptoms such as worsening chest pain or shortness of breath.      VTE Risk Mitigation (From admission, onward)           Ordered     Reason for No Pharmacological VTE Prophylaxis  Once        Question:  Reasons:  Answer:  Active Bleeding    04/19/24 1641     IP VTE HIGH RISK PATIENT  Once         04/19/24 1641     Place sequential compression device  Until discontinued         04/19/24 1641                    Discharge Planning   KATIE: 4/25/2024      Code Status: Full Code   Is the patient medically ready for discharge?:     Reason for patient still in hospital (select all that apply): Patient trending condition  Discharge Plan A: Home with family                  Olivia Gamboa MD, MD  Department of Hospital Medicine   [facility]

## 2024-04-23 NOTE — CARE UPDATE
04/22/24 2042   Patient Assessment/Suction   Level of Consciousness (AVPU) alert   Respiratory Effort Normal;Unlabored   Expansion/Accessory Muscles/Retractions no use of accessory muscles;no retractions   PRE-TX-O2   Device (Oxygen Therapy) nasal cannula   $ Is the patient on Low Flow Oxygen? Yes   Flow (L/min) 2   SpO2 97 %   Pulse Oximetry Type Intermittent   $ Pulse Oximetry - Single Charge Pulse Oximetry - Single

## 2024-04-23 NOTE — ASSESSMENT & PLAN NOTE
GI consulted, had unremarkable EGD; colonoscopy revealed colonic mass; s/p resection 04/22  Serial CBC, monitor on telemetry   Hbg dropped from 11.5 to 7.4 in 4 months.

## 2024-04-23 NOTE — PLAN OF CARE
Problem: Skin Injury Risk Increased  Goal: Skin Health and Integrity  Intervention: Promote and Optimize Oral Intake  Flowsheets (Taken 4/23/2024 1555)  Oral Nutrition Promotion: other (see comments)  Nutrition Interventions:   diet adjusted   other (see comments)     Problem: Oral Intake Inadequate  Goal: Improved Oral Intake  Outcome: Progressing  Intervention: Promote and Optimize Oral Intake  Flowsheets (Taken 4/23/2024 1555)  Oral Nutrition Promotion: other (see comments)  Nutrition Interventions:   diet adjusted   other (see comments)     Recommendations  1.) Continue to advance diet as tolerated to cardiac, diabetic diet restrictions.   2.) Will add consistent CHO to CLD to aid in managing BG levels.   3.) Pt with poor nutrition x5 days. If unable to advance diet within 24-48hr, suggest PPN.              Clinimix E 4.25/5 @100mL/hr to provide 816 kcals, 102gm protein; GIR 0.9.              PPN to meet 42% EEN and 100% EPN.   4.) Hyperglycemia: suggest addition of SSI.     Goals:   1.) Diet to be advanced within 48hr.   2.) BG levels to stabilize and return to standard reference range.   3.) Progression of wound healing.  Nutrition Goal Status: new

## 2024-04-23 NOTE — PLAN OF CARE
Problem: Adult Inpatient Plan of Care  Goal: Plan of Care Review  Outcome: Progressing  Goal: Patient-Specific Goal (Individualized)  Outcome: Progressing  Goal: Absence of Hospital-Acquired Illness or Injury  Outcome: Progressing  Goal: Optimal Comfort and Wellbeing  Outcome: Progressing  Goal: Readiness for Transition of Care  Outcome: Progressing     Problem: Diabetes Comorbidity  Goal: Blood Glucose Level Within Targeted Range  Outcome: Progressing     Problem: Fall Injury Risk  Goal: Absence of Fall and Fall-Related Injury  Outcome: Progressing     Problem: Skin Injury Risk Increased  Goal: Skin Health and Integrity  Outcome: Progressing     Problem: Infection  Goal: Absence of Infection Signs and Symptoms  Outcome: Progressing     Problem: Oral Intake Inadequate  Goal: Improved Oral Intake  Outcome: Progressing     Problem: Wound  Goal: Optimal Coping  Outcome: Progressing  Goal: Optimal Functional Ability  Outcome: Progressing  Goal: Absence of Infection Signs and Symptoms  Outcome: Progressing  Goal: Improved Oral Intake  Outcome: Progressing  Goal: Optimal Pain Control and Function  Outcome: Progressing  Goal: Skin Health and Integrity  Outcome: Progressing  Goal: Optimal Wound Healing  Outcome: Progressing

## 2024-04-23 NOTE — PLAN OF CARE
CM rounded with attending. Pt with surgery 4/22/24.  Therapy to work with pt today and CM to follow for any recommendations/discharge needs.  Current plan is to discharge home with family. Will continue to follow.       04/23/24 1100   Post-Acute Status   Post-Acute Authorization Other   Other Status No Post-Acute Service Needs   Discharge Delays None known at this time   Discharge Plan   Discharge Plan A Home with family   Discharge Plan B Home Health

## 2024-04-23 NOTE — PT/OT/SLP EVAL
Physical Therapy Evaluation    Patient Name:  Jen Torres   MRN:  [id number]    Recommendations:     Discharge Recommendations: High Intensity Therapy   Discharge Equipment Recommendations: to be determined by next level of care   Barriers to discharge: Decreased caregiver support and pt is caregiver for her  and TONO    Assessment:     Jen Torres is a 73 y.o. female admitted with a medical diagnosis of GI bleed.  She presents with the following impairments/functional limitations: weakness, impaired endurance, impaired self care skills, impaired functional mobility, gait instability, impaired balance, pain, impaired cardiopulmonary response to activity, edema.    Pt found HOB elevated and agreeable to working with PT despite pain complaints. Pt A & O x  4 and has the following co-morbidities: HTN, DM, Hx DVT, Colon mass.  Pt tolerated session fairly due to pain complaints and required maximal to moderate A of 2 persons for safe mobilization during session today. Pt would benefit from acute PT during hospitalization to increase strength, endurance and safety with mobility.  The pt was Independent with mobility prior to admit and is caregiver for her  and TONO. As such she would benefit from High Intensity Therapy in order to return to prior level of Independent mobility. The pt presents with good participation and motivation to return to prior level with High Intensity Therapy. She demonstrates appropriate endurance and strength to participate in up to 3 hours of combined therapy.       Rehab Prognosis: Good; patient would benefit from acute skilled PT services to address these deficits and reach maximum level of function.    Recent Surgery: Procedure(s) (LRB):  LAPAROTOMY, EXPLORATORY (N/A)  COLECTOMY, RIGHT  LYSIS, ADHESIONS  SURGICAL PROCEDURE, COLONOSCOPIC 1 Day Post-Op    Plan:     During this hospitalization, patient to be seen daily to address the identified rehab impairments via gait  training, therapeutic activities, therapeutic exercises and progress toward the following goals:    Plan of Care Expires:  05/23/24    Subjective     Chief Complaint: abdominal pain and lightheadedness  Patient/Family Comments/goals: Return to prior level of independent mobility  Pain/Comfort:  Pain Rating 1: 9/10  Location 1: abdomen  Pain Addressed 1: Pre-medicate for activity, Reposition, Distraction, Cessation of Activity  Pain Rating Post-Intervention 1: 8/10    Patients cultural, spiritual, Quaker conflicts given the current situation:      Living Environment:  Pt lives with her  and TONO in a mobile home with 5 steps to enter.  Prior to admission, patients level of function was Independent.  Equipment used at home: walker, rolling, shower chair.  DME owned (not currently used): none.  Upon discharge, patient will have assistance from unknown.    Objective:     Communicated with DAVID Hastings prior to session.  Patient found HOB elevated with bed alarm, olivares catheter, oxygen, telemetry, peripheral IV  upon PT entry to room.    General Precautions: Standard, diabetic, fall  Orthopedic Precautions:N/A   Braces: N/A  Respiratory Status: Nasal cannula, flow 2 L/min    Exams:  Cognitive Exam:  Patient is oriented to Person, Place, Time, and Situation  RLE ROM: WFL  RLE Strength: NT due to abdominal pain/incision  LLE ROM: WFL  LLE Strength: NT due to abdominal pain/incision    Functional Mobility:  Bed Mobility:     Scooting: moderate assistance and of 2 persons  Supine to Sit: moderate assistance and of 2 persons; pt able to sit EOB several minutes with CGA for safety  Sit to Supine: moderate assistance and of 2 persons      AM-PAC 6 CLICK MOBILITY  Total Score:11       Treatment & Education:  Pt was educated on the following: call light use, importance of OOB activity and functional mobility to negate the negative effects of prolonged bed rest during this hospitalization, safe transfers/ambulation and  discharge planning recommendations/options.      Patient left HOB elevated with all lines intact, call button in reach, and bed alarm on.    GOALS:   Multidisciplinary Problems       Physical Therapy Goals          Problem: Physical Therapy    Goal Priority Disciplines Outcome Goal Variances Interventions   Physical Therapy Goal     PT, PT/OT      Description: Goals to be met by: 24     Patient will increase functional independence with mobility by performin. Supine to sit with Supervision  2. Sit to stand transfer with Supervision  3. Bed to chair transfer with Supervision using Rolling Walker  4. Gait  x 200 feet with Supervision using Rolling Walker.   5. Ascend & Descend 5 steps w/ rail and minimal Assist                             History:     Past Medical History:   Diagnosis Date    Abnormal Pap smear     s/p cryo at 20 y/o    Anticoagulant long-term use     GERD (gastroesophageal reflux disease)     Hyperlipidemia     Migraine     Migraines     Sleep apnea     Can not use c-pap       Past Surgical History:   Procedure Laterality Date    ARTHROSCOPIC REPAIR OF ROTATOR CUFF OF SHOULDER Left 2023    Procedure: REPAIR, ROTATOR CUFF, ARTHROSCOPIC;  Surgeon: Gary Tony MD;  Location: [facility] OR;  Service: Orthopedics;  Laterality: Left;  arthrex    ARTHROSCOPY OF SHOULDER WITH DECOMPRESSION OF SUBACROMIAL SPACE Left 2023    Procedure: ARTHROSCOPY, SHOULDER, WITH SUBACROMIAL SPACE DECOMPRESSION;  Surgeon: Gary Tony MD;  Location: [facility] OR;  Service: Orthopedics;  Laterality: Left;    CATARACT EXTRACTION, BILATERAL      CHOLECYSTECTOMY  10/01/2014    COLECTOMY, RIGHT  2024    Procedure: COLECTOMY, RIGHT;  Surgeon: Jaden Barrios MD;  Location: [facility] OR;  Service: General;;    COLONOSCOPIC SURGICAL PROCEDURE  2024    Procedure: SURGICAL PROCEDURE, COLONOSCOPIC;  Surgeon: Jaden Barrios MD;  Location: [facility] OR;  Service: General;;    COLONOSCOPY N/A  4/21/2024    Procedure: COLONOSCOPY;  Surgeon: Ceasar Arias III, MD;  Location: [facility] ENDO;  Service: Endoscopy;  Laterality: N/A;    ESOPHAGOGASTRODUODENOSCOPY N/A 4/20/2024    Procedure: EGD (ESOPHAGOGASTRODUODENOSCOPY);  Surgeon: Ceasar Arias III, MD;  Location: [facility] ENDO;  Service: Endoscopy;  Laterality: N/A;    EYE SURGERY      KNEE ARTHROSCOPY Left     LAPAROTOMY, EXPLORATORY N/A 4/22/2024    Procedure: LAPAROTOMY, EXPLORATORY;  Surgeon: Jaden Barrios MD;  Location: [facility] OR;  Service: General;  Laterality: N/A;    LYSIS OF ADHESIONS  4/22/2024    Procedure: LYSIS, ADHESIONS;  Surgeon: Jaden Barrios MD;  Location: [facility] OR;  Service: General;;    TONSILLECTOMY         Time Tracking:     PT Received On: 04/23/24  PT Start Time: 1421     PT Stop Time: 1433  PT Total Time (min): 12 min     Billable Minutes: Evaluation 12      04/23/2024

## 2024-04-23 NOTE — PT/OT/SLP EVAL
Occupational Therapy   Evaluation, tx    Name: Jen Torres  MRN: [id number]  Admitting Diagnosis: GI bleed  Recent Surgery: Procedure(s) (LRB):  LAPAROTOMY, EXPLORATORY (N/A)  COLECTOMY, RIGHT  LYSIS, ADHESIONS  SURGICAL PROCEDURE, COLONOSCOPIC 1 Day Post-Op  The primary encounter diagnosis was Symptomatic anemia. Diagnoses of GI bleed, Chest pain, Colonic mass [K63.89], Iron deficiency anemia due to chronic blood loss [D50.0], and Gastrointestinal hemorrhage, unspecified gastrointestinal hemorrhage type were also pertinent to this visit.    Recommendations:     Discharge Recommendations: High Intensity Therapy  Discharge Equipment Recommendations:  to be determined by next level of care  Barriers to discharge:  Decreased caregiver support, Inaccessible home environment    Assessment:     Jen Torres is a 73 y.o. female with a medical diagnosis of GI bleed.  She presents with 7/10 abd incisional pain.. Performance deficits affecting function: weakness, impaired endurance, impaired self care skills, impaired functional mobility, gait instability, impaired balance, decreased lower extremity function, decreased safety awareness, pain, decreased ROM, impaired skin, impaired cardiopulmonary response to activity.      Pt c/o abd pain rated 7/10. Pt is on a clear liquid diet, she fed herself Indep in bed. She performed bed mobility Mod A-Max A SBA due to dizziness and fading peripheral vision. Pt sat ~6 min EOB, scooted to HOB Min A in sitting. Pt was unable to stand due to high fall risk from feeling dizziness/altered vision. Pt returned to supine, symptoms resolved. Pt would benefit High Intensity Therapy post acute. Continue with OT POC.     Rehab Prognosis: Good; patient would benefit from acute skilled OT services to address these deficits and reach maximum level of function.       Plan:     Patient to be seen 5 x/week to address the above listed problems via self-care/home management, therapeutic activities,  therapeutic exercises  Plan of Care Expires: 05/23/24  Plan of Care Reviewed with: patient    Subjective     Chief Complaint: abd incisional pain 7/10.  Patient/Family Comments/goals: pt agreeable.    Occupational Profile:  Living Environment: pt lives with spouse and TONO in a MH with 5 steps and BHR; walk n shower and t/s combo acces; pt has a shower chair in the tub that belongs to her spouse. Pt's grandson and his wife live in a house behind pt.   Previous level of function: pt was Indep with ADLs, IADLs, ambulation without a device, driving. Pt.recently started ambulating with a RW due to weakness from blood loss; pt is the primary caretaker of her spouse and TONO-both are handicapped per her report-spouse uses a wc and TONO uses a RW due to [place]. Pt's spouse has been doing most of the driving lately.   Roles and Routines: homemaker, wife, JOCY,caretaker of both spouse and TONO.  Equipment Used at Home: walker, rolling, shower chair (electric bed)  Assistance upon Discharge: son and his wife, limited assist from spouse and TONO    Pain/Comfort:  Pain Rating 1: 7/10  Location - Orientation 1: midline  Location 1: abdomen (incision)  Pain Addressed 1: Pre-medicate for activity, Reposition, Distraction, Cessation of Activity  Pain Rating Post-Intervention 1: 5/10    Patients cultural, spiritual, Congregation conflicts given the current situation: no    Objective:     Communicated with: nurse prior to session.  Patient found HOB elevated with bed alarm, telemetry, olivares catheter, oxygen upon OT entry to room.    General Precautions: Standard, fall, diabetic (clear liquid)  Orthopedic Precautions: N/A  Braces: N/A  Respiratory Status: Nasal cannula, flow 2 L/min    Occupational Performance:    Bed Mobility:    Patient completed Rolling/Turning to Right with moderate assistance, with side rail, and log roll tech to protect and incision and for pain control  Patient completed Scooting/Bridging with minimum assistance  Patient  completed Supine to Sit with moderate assistance, with side rail, and log roll tech  Patient completed Sit to Supine with maximal assistance, with side rail, and log roll    Functional Mobility/Transfers:  Unable to stand due to altered vision and dizziness.  Functional Mobility: seated lateral scooting Min A toward HOB prior to retuning to supine due to dizziness.    Activities of Daily Living:  Feeding:  independence clear liquid diet with HOB elevated  Grooming: minimum assistance to comb hair seated EOB  Lower Body Dressing: total assistance socks, limited bending due to abd incision and abd pain.  Toileting: total assistance olivares    Cognitive/Visual Perceptual:  Cognitive/Psychosocial Skills:     -       Oriented to: Person, Place, Time, and Situation   -       Follows Commands/attention:Follows one-step commands  -       Communication: clear/fluent  -       Memory: Poor immediate recall  -       Safety awareness/insight to disability: impaired   -       Mood/Affect/Coping skills/emotional control: Cooperative and Anxious  Visual/Perceptual:      -Intact grossly    Physical Exam:  Balance:    -       sitting: fair [plus  dynamic: fair  standing: NT 2/2 dizziness and altered vision  Postural examination/scapula alignment:    -       No postural abnormalities identified  Skin integrity: midline abd incision with intact dressing  Edema:  distended belly  Dominant hand:    -       right  Upper Extremity Range of Motion:     -       Right Upper Extremity: WFL  -       Left Upper Extremity: WFL  Upper Extremity Strength:    -       Right Upper Extremity: WFL  -       Left Upper Extremity: WFL   Strength:    -       Right Upper Extremity: WFL  -       Left Upper Extremity: WFL    AMPAC 6 Click ADL:  AMPAC Total Score: 16    Treatment & Education:  Purpose of OT and POC.  Sitting EOB ~6 min SBA due to dizziness and altered vision.  Seated scooting Min A .  Instructed in ankle pumps and gluteal squeezes ex to do in  bed and to raise HOB to improved BP for OOB activity tolerance.  Call don't fall explained.  All questions/ concerns addressed within scope     Patient left HOB elevated with all lines intact, call button in reach, and bed alarm on    GOALS:   Multidisciplinary Problems       Occupational Therapy Goals          Problem: Occupational Therapy    Goal Priority Disciplines Outcome Interventions   Occupational Therapy Goal     OT, PT/OT Progressing    Description: Goals to be met by: 5/23/2024     Patient will increase functional independence with ADLs by performing:    UE Dressing with Modified [place].  LE Dressing with Modified [place].  Grooming while standing at sink with Modified [place].  Toileting from toilet with Modified [place] for hygiene and clothing management.   Supine to sit with Modified [place].  Toilet transfer to toilet with Modified [place].  Upper extremity exercise program x10 reps per handout, with independence.                         History:     Past Medical History:   Diagnosis Date    Abnormal Pap smear     s/p cryo at 22 y/o    Anticoagulant long-term use     GERD (gastroesophageal reflux disease)     Hyperlipidemia     Migraine     Migraines     Sleep apnea     Can not use c-pap         Past Surgical History:   Procedure Laterality Date    ARTHROSCOPIC REPAIR OF ROTATOR CUFF OF SHOULDER Left 12/29/2023    Procedure: REPAIR, ROTATOR CUFF, ARTHROSCOPIC;  Surgeon: Gary Tony MD;  Location: [facility] OR;  Service: Orthopedics;  Laterality: Left;  arthrex    ARTHROSCOPY OF SHOULDER WITH DECOMPRESSION OF SUBACROMIAL SPACE Left 12/29/2023    Procedure: ARTHROSCOPY, SHOULDER, WITH SUBACROMIAL SPACE DECOMPRESSION;  Surgeon: Gary Tony MD;  Location: [facility] OR;  Service: Orthopedics;  Laterality: Left;    CATARACT EXTRACTION, BILATERAL      CHOLECYSTECTOMY  10/01/2014    COLECTOMY, RIGHT  4/22/2024    Procedure: COLECTOMY, RIGHT;  Surgeon: Sophie  Jaden CRUZ MD;  Location: [facility];  Service: General;;    COLONOSCOPIC SURGICAL PROCEDURE  4/22/2024    Procedure: SURGICAL PROCEDURE, COLONOSCOPIC;  Surgeon: Jaden Barrios MD;  Location: [facility] OR;  Service: General;;    COLONOSCOPY N/A 4/21/2024    Procedure: COLONOSCOPY;  Surgeon: Ceasar Arias III, MD;  Location: [facility] ENDO;  Service: Endoscopy;  Laterality: N/A;    ESOPHAGOGASTRODUODENOSCOPY N/A 4/20/2024    Procedure: EGD (ESOPHAGOGASTRODUODENOSCOPY);  Surgeon: Ceasar Arias III, MD;  Location: [facility] ENDO;  Service: Endoscopy;  Laterality: N/A;    EYE SURGERY      KNEE ARTHROSCOPY Left     LAPAROTOMY, EXPLORATORY N/A 4/22/2024    Procedure: LAPAROTOMY, EXPLORATORY;  Surgeon: Jaden Barrios MD;  Location: [facility] OR;  Service: General;  Laterality: N/A;    LYSIS OF ADHESIONS  4/22/2024    Procedure: LYSIS, ADHESIONS;  Surgeon: Jaden Barrios MD;  Location: [facility];  Service: General;;    TONSILLECTOMY         Time Tracking:     OT Date of Treatment: 04/23/24  OT Start Time: 1515  OT Stop Time: 1534  OT Total Time (min): 19 min    Billable Minutes:Evaluation 10  Therapeutic Activity 9  Total Time 19    4/23/2024

## 2024-04-23 NOTE — CARE UPDATE
04/22/24 2042   Patient Assessment/Suction   Level of Consciousness (AVPU) alert   Respiratory Effort Normal;Unlabored   Expansion/Accessory Muscles/Retractions no use of accessory muscles;no retractions   PRE-TX-O2   Device (Oxygen Therapy) nasal cannula   $ Is the patient on Low Flow Oxygen? Yes   Flow (L/min) 2   SpO2 97 %   Pulse Oximetry Type Intermittent   $ Pulse Oximetry - Single Charge Pulse Oximetry - Single   Tobacco Cessation Intervention   Do you use any type of tobacco product? No   Respiratory Evaluation   $ Care Plan Tech Time 15 min   $ Eval/Re-eval Charges Evaluation   Evaluation For New Orders   Admitting Diagnosis GI bleed, Shrtness of breath   Home Oxygen   Has Home Oxygen? No   Home Aerosol, MDI, DPI, and Other Treatments/Therapies   Home Respiratory Therapy Per Patient/Review of Chart No   Oxygen Care Plan   Oxygen Care Plan Per Protocol   SPO2 Goal (%) 92% non-cardiac   Bronchodilator Care Plan   Rationale No Rationale found   Atelectasis Care Plan   Rationale No Rational Found   Airway Clearance Care Plan   Rationale No rationale found

## 2024-04-23 NOTE — PLAN OF CARE
Problem: Occupational Therapy  Goal: Occupational Therapy Goal  Description: Goals to be met by: 5/23/2024     Patient will increase functional independence with ADLs by performing:    UE Dressing with Modified [place].  LE Dressing with Modified [place].  Grooming while standing at sink with Modified [place].  Toileting from toilet with Modified [place] for hygiene and clothing management.   Supine to sit with Modified [place].  Toilet transfer to toilet with Modified [place].  Upper extremity exercise program x10 reps per handout, with independence.    Outcome: Progressing

## 2024-04-23 NOTE — SUBJECTIVE & OBJECTIVE
Interval History: No acute events overnight    Review of Systems   Constitutional:  Positive for fatigue. Negative for activity change, appetite change, chills and fever.   HENT:  Negative for congestion, ear pain, nosebleeds and sinus pain.    Eyes:  Negative for discharge and itching.   Respiratory:  Positive for shortness of breath. Negative for apnea, cough and chest tightness.    Cardiovascular:  Positive for chest pain. Negative for palpitations and leg swelling.   Gastrointestinal:  Positive for abdominal pain. Negative for abdominal distention and vomiting.   Genitourinary:  Negative for difficulty urinating, dysuria, flank pain and frequency.   Musculoskeletal:  Negative for arthralgias, back pain, joint swelling and myalgias.   Skin:  Negative for color change, pallor and rash.   Neurological:  Positive for weakness. Negative for dizziness, light-headedness and headaches.   Psychiatric/Behavioral:  Negative for agitation, behavioral problems, confusion and suicidal ideas.      Objective:     Vital Signs (Most Recent):  Temp: 98.1 °F (36.7 °C) (04/23/24 1123)  Pulse: 82 (04/23/24 1123)  Resp: 18 (04/23/24 1200)  BP: (!) 117/58 (04/23/24 1123)  SpO2: 96 % (04/23/24 1123) Vital Signs (24h Range):  Temp:  [97.7 °F (36.5 °C)-98.1 °F (36.7 °C)] 98.1 °F (36.7 °C)  Pulse:  [69-93] 82  Resp:  [12-20] 18  SpO2:  [93 %-100 %] 96 %  BP: (104-158)/(51-75) 117/58     Weight: 97.2 kg (214 lb 4.6 oz)  Body mass index is 32.58 kg/m².    Intake/Output Summary (Last 24 hours) at 4/23/2024 1228  Last data filed at 4/23/2024 0939  Gross per 24 hour   Intake 1580 ml   Output 920 ml   Net 660 ml         Physical Exam  Vitals reviewed.   Constitutional:       General: She is not in acute distress.     Appearance: Normal appearance. She is normal weight. She is not ill-appearing.   HENT:      Head: Normocephalic and atraumatic.      Nose: Nose normal.      Mouth/Throat:      Mouth: Mucous membranes are moist.      Pharynx:  Oropharynx is clear.   Eyes:      General: No scleral icterus.     Extraocular Movements: Extraocular movements intact.      Conjunctiva/sclera: Conjunctivae normal.      Pupils: Pupils are equal, round, and reactive to light.   Cardiovascular:      Rate and Rhythm: Normal rate and regular rhythm.      Pulses: Normal pulses.      Heart sounds: Normal heart sounds. No murmur heard.     No gallop.   Pulmonary:      Effort: Pulmonary effort is normal. No respiratory distress.      Breath sounds: Normal breath sounds. No wheezing, rhonchi or rales.   Chest:      Chest wall: No tenderness.   Abdominal:      General: Abdomen is flat. There is no distension.      Palpations: Abdomen is soft.      Tenderness: There is abdominal tenderness.      Comments: Scant bowel sounds   Musculoskeletal:         General: No swelling or tenderness.      Cervical back: Normal range of motion and neck supple. No rigidity or tenderness.      Right lower leg: No edema.      Left lower leg: No edema.   Skin:     General: Skin is warm and dry.   Neurological:      General: No focal deficit present.      Mental Status: She is alert and oriented to person, place, and time. Mental status is at baseline.      Motor: No weakness.   Psychiatric:         Mood and Affect: Mood normal.         Behavior: Behavior normal.         Thought Content: Thought content normal.             Significant Labs: All pertinent labs within the past 24 hours have been reviewed.  BMP:   Recent Labs   Lab 04/23/24  0437   *      K 3.7      CO2 25   BUN 7*   CREATININE 1.0   CALCIUM 8.2*   MG 1.8     CBC:   Recent Labs   Lab 04/22/24  0450 04/23/24  0437   WBC 14.45* 21.99*   HGB 8.2* 8.3*   HCT 26.3* 27.9*   * 413     CMP:   Recent Labs   Lab 04/22/24  0449 04/23/24  0437    136   K 3.7 3.7    101   CO2 24 25   * 245*   BUN 8 7*   CREATININE 1.0 1.0   CALCIUM 8.6* 8.2*   PROT 6.3 6.1   ALBUMIN 3.4* 3.3*   BILITOT 0.4 0.4    ALKPHOS 105 91   AST 42* 27   ALT 39 34   ANIONGAP 10 10       Significant Imaging: I have reviewed all pertinent imaging results/findings within the past 24 hours.

## 2024-04-23 NOTE — PROGRESS NOTES
[facility]  Adult Nutrition   Progress Note (Initial Assessment)     SUMMARY     Recommendations  1.) Continue to advance diet as tolerated to cardiac, diabetic diet restrictions.   2.) Will add consistent CHO to CLD to aid in managing BG levels.   3.) Pt with poor nutrition x5 days. If unable to advance diet within 24-48hr, suggest PPN.   Clinimix E 4.25/5 @100mL/hr to provide 816 kcals, 102gm protein; GIR 0.9.   PPN to meet 42% EEN and 100% EPN.   4.) Hyperglycemia: suggest addition of SSI.    Goals:   1.) Diet to be advanced within 48hr.   2.) BG levels to stabilize and return to standard reference range.   3.) Progression of wound healing.  Nutrition Goal Status: new    Nutrition Diagnosis PES Statement: Inadequate oral intake related to catabolism energy increases as evidenced by NPO status >4 days; s/p colectomy 4/22/24     Dietitian Rounds Brief  Pt seen for NPO/CLD >3 days. Per HPI: Patient presents to the ED with complaints of worsening fatigue shortness for breath worse on exertion, and intermittent chest pain on exertion. Colonoscopy done for 04/21 and revealed colonic mass in the transverse colon. S/p colectomy 4/22/24. Pt with good intake of CLD this morning. BG elevated. Added consistent CHO to current diet orders. No chew/swallowing issues. No GI distress. LBM 4/21. Skin: surgical incision. Wt reviewed. NFPE completed with no s/s of wasting.    Nutrition Related Social Determinants of Health: SDOH: Adequate food in home environment    Malnutrition Assessment     Skin (Micronutrient): other (see comments) (surgical incision)       Energy Intake (Malnutrition): less than 75% for greater than 7 days   Orbital Region (Subcutaneous Fat Loss): well nourished  Upper Arm Region (Subcutaneous Fat Loss): well nourished  Thoracic and Lumbar Region: well nourished   Tenriism Region (Muscle Loss): well nourished  Clavicle Bone Region (Muscle Loss): well nourished  Clavicle and Acromion Bone Region  "(Muscle Loss): well nourished  Scapular Bone Region (Muscle Loss): well nourished  Dorsal Hand (Muscle Loss): well nourished  Patellar Region (Muscle Loss): well nourished  Anterior Thigh Region (Muscle Loss): well nourished  Posterior Calf Region (Muscle Loss): well nourished     Diet order:   Current Diet Order: CLD      Evaluation of Received Nutrient/Fluid Intake  Energy Calories Required: not meeting needs  Protein Required: not meeting needs  Fluid Required: meeting needs  Tolerance: tolerating     % Intake of Estimated Energy Needs: 0 - 25 %  % Meal Intake: 0 - 25 %      Intake/Output Summary (Last 24 hours) at 4/23/2024 1550  Last data filed at 4/23/2024 0939  Gross per 24 hour   Intake 580 ml   Output 920 ml   Net -340 ml      Anthropometrics  Temp: 98.1 °F (36.7 °C)  Height Method: Measured  Height: 5' 8" (172.7 cm)  Height (inches): 68 in  Weight Method: Bed Scale  Weight: 97.2 kg (214 lb 4.6 oz)  Weight (lb): 214.29 lb  Ideal Body Weight (IBW), Female: 140 lb  % Ideal Body Weight, Female (lb): 153.06 %  BMI (Calculated): 32.6  BMI Grade: 30 - 34.9- obesity - grade I     Estimated/Assessed Needs  Weight Used For Calorie Calculations: 97.2 kg (214 lb 4.6 oz)  Energy Calorie Requirements (kcal): 1944-2430  Energy Need Method: Kcal/kg (20-25)  Protein Requirements: 78-97 (0.8-1.0)  Weight Used For Protein Calculations: 97.2 kg (214 lb 4.6 oz)  Fluid Requirements (mL): 1944-2430     RDA Method (mL): 1944  CHO Requirement: 291gm CHO per day    Reason for Assessment  Reason For Assessment: NPO/clear liquids x 5 days  Diagnosis: gastrointestinal disease  Relevant Medical History: anemia, GERD, hyperlipidemia, DM2, and migraines  Nutrition Discharge Planning: Pending hospital course    Nutrition/Diet History  Spiritual, Cultural Beliefs, Tenriism Practices, Values that Affect Care: no  Food Allergies: NKFA  Factors Affecting Nutritional Intake: clear liquid diet    Nutrition Risk Screen  Nutrition Risk Screen: " no indicators present     MST Score: 0  Have you recently lost weight without trying?: No  Weight loss score: 0  Have you been eating poorly because of a decreased appetite?: No  Appetite score: 0     Weight History:  Wt Readings from Last 10 Encounters:   04/19/24 97.2 kg (214 lb 4.6 oz)   04/17/24 96.6 kg (212 lb 15.4 oz)   04/04/24 97.8 kg (215 lb 8 oz)   03/27/24 96.6 kg (213 lb)   03/21/24 97.4 kg (214 lb 11.7 oz)   03/14/24 97.4 kg (214 lb 12.8 oz)   03/07/24 95.8 kg (211 lb 1.6 oz)   03/08/24 95.7 kg (211 lb)   02/26/24 97.1 kg (214 lb 1.1 oz)   02/14/24 97.1 kg (214 lb 1.1 oz)      Lab/Procedures/Meds: Pertinent Labs/Meds Reviewed    Medications:Pertinent Medications Reviewed  Scheduled Meds:  Current Facility-Administered Medications   Medication Dose Route Frequency    diltiaZEM  120 mg Oral Daily    ezetimibe  10 mg Oral Daily    hydroCHLOROthiazide  25 mg Oral Every Mon, Wed, Fri    mupirocin   Nasal BID    pantoprazole  80 mg Intravenous Daily     Continuous Infusions:  Current Facility-Administered Medications   Medication Dose Route Frequency Last Rate Last Admin     PRN Meds:.  Current Facility-Administered Medications:     0.9%  NaCl infusion (for blood administration), , Intravenous, Q24H PRN    acetaminophen, 650 mg, Oral, Q8H PRN    acetaminophen, 650 mg, Oral, Q4H PRN    aluminum-magnesium hydroxide-simethicone, 30 mL, Oral, QID PRN    dextrose 50%, 12.5 g, Intravenous, PRN    dextrose 50%, 25 g, Intravenous, PRN    glucagon (human recombinant), 1 mg, Intramuscular, PRN    glucose, 16 g, Oral, PRN    glucose, 24 g, Oral, PRN    HYDROcodone-acetaminophen, 1 tablet, Oral, Q4H PRN    insulin aspart U-100, 0-5 Units, Subcutaneous, QID (AC + HS) PRN    magnesium oxide, 800 mg, Oral, PRN    magnesium oxide, 800 mg, Oral, PRN    melatonin, 6 mg, Oral, Nightly PRN    naloxone, 0.02 mg, Intravenous, PRN    ondansetron, 4 mg, Intravenous, Q6H PRN    potassium bicarbonate, 35 mEq, Oral, PRN    potassium  bicarbonate, 50 mEq, Oral, PRN    potassium bicarbonate, 60 mEq, Oral, PRN    potassium, sodium phosphates, 2 packet, Oral, PRN    potassium, sodium phosphates, 2 packet, Oral, PRN    potassium, sodium phosphates, 2 packet, Oral, PRN    senna-docusate 8.6-50 mg, 1 tablet, Oral, BID PRN    sodium chloride 0.9%, 10 mL, Intravenous, Q12H PRN    Labs: Pertinent Labs Reviewed  Clinical Chemistry:  Recent Labs   Lab 04/19/24  1313 04/20/24  0501 04/21/24  0652 04/22/24  0449 04/23/24  0437     137 138 139 139 136   K 3.5  3.5 3.7 3.9 3.7 3.7     103 104 105 105 101   CO2 22*  22* 25 27 24 25   *  255* 146* 166* 170* 245*   BUN 14  14 10 9 8 7*   CREATININE 1.2  1.2 1.0 1.1 1.0 1.0   CALCIUM 8.9  8.9 8.8 8.9 8.6* 8.2*   PROT 6.9  6.9 6.5 6.4 6.3 6.1   ALBUMIN 3.7  3.7 3.4* 3.5 3.4* 3.3*   BILITOT 0.2  0.2 0.3 0.3 0.4 0.4   ALKPHOS 107  107 108 98 105 91   AST 43*  43* 33 60* 42* 27   ALT 38  38 33 39 39 34   ANIONGAP 12  12 9 7* 10 10   MG 1.6  1.6 1.8 1.8 1.9 1.8   PHOS  --  2.7 2.5* 2.8  --    LIPASE 59  --   --   --   --      CBC:   Recent Labs   Lab 04/23/24  0437   WBC 21.99*   RBC 3.41*   HGB 8.3*   HCT 27.9*      MCV 82   MCH 24.3*   MCHC 29.7*     Cardiac Profile:  Recent Labs   Lab 04/19/24  1313   BNP 37   CPK 34     Thyroid & Parathyroid:  Recent Labs   Lab 04/19/24  1313   TSH 2.254     Monitor and Evaluation  Food and Nutrient Intake: energy intake, parenteral nutrition intake  Food and Nutrient Adminstration: diet order, enteral and parenteral nutrition administration  Knowledge/Beliefs/Attitudes: food and nutrition knowledge/skill  Physical Activity and Function: nutrition-related ADLs and IADLs  Anthropometric Measurements: weight, weight change  Biochemical Data, Medical Tests and Procedures: electrolyte and renal panel, gastrointestinal profile, glucose/endocrine profile  Nutrition-Focused Physical Findings: overall appearance     Nutrition Risk  Level of  Risk/Frequency of Follow-up:  (2x/week)     Nutrition Follow-Up  RD Follow-up?: Yes      Olesya Reagan RD 04/23/2024 3:50 PM

## 2024-04-23 NOTE — PROGRESS NOTES
Doing well  No flatus  Vitals labs reviewed  Abdomen distended but benign    A/P  Sp right colectomy for bleeding mass  Await gi function return  Ok to restart anticoagulation  Leukocytsis likely reactive

## 2024-04-23 NOTE — PLAN OF CARE
Goals to be met by: 24     Patient will increase functional independence with mobility by performin. Supine to sit with Supervision  2. Sit to stand transfer with Supervision  3. Bed to chair transfer with Supervision using Rolling Walker  4. Gait  x 200 feet with Supervision using Rolling Walker.   5. Ascend & Descend 5 steps w/ rail and minimal Assist

## 2024-04-24 LAB
ALBUMIN SERPL BCP-MCNC: 3.3 G/DL (ref 3.5–5.2)
ALP SERPL-CCNC: 95 U/L (ref 55–135)
ALT SERPL W/O P-5'-P-CCNC: 28 U/L (ref 10–44)
ANION GAP SERPL CALC-SCNC: 11 MMOL/L (ref 8–16)
AST SERPL-CCNC: 19 U/L (ref 10–40)
BASOPHILS # BLD AUTO: 0.03 K/UL (ref 0–0.2)
BASOPHILS NFR BLD: 0.1 % (ref 0–1.9)
BILIRUB SERPL-MCNC: 0.6 MG/DL (ref 0.1–1)
BUN SERPL-MCNC: 10 MG/DL (ref 8–23)
CALCIUM SERPL-MCNC: 8.4 MG/DL (ref 8.7–10.5)
CEA SERPL-MCNC: 2 NG/ML (ref 0–5)
CHLORIDE SERPL-SCNC: 95 MMOL/L (ref 95–110)
CO2 SERPL-SCNC: 26 MMOL/L (ref 23–29)
CREAT SERPL-MCNC: 1.1 MG/DL (ref 0.5–1.4)
DIFFERENTIAL METHOD BLD: ABNORMAL
EOSINOPHIL # BLD AUTO: 0.1 K/UL (ref 0–0.5)
EOSINOPHIL NFR BLD: 0.5 % (ref 0–8)
ERYTHROCYTE [DISTWIDTH] IN BLOOD BY AUTOMATED COUNT: 21.3 % (ref 11.5–14.5)
EST. GFR  (NO RACE VARIABLE): 53.7 ML/MIN/1.73 M^2
GLUCOSE SERPL-MCNC: 242 MG/DL (ref 70–110)
GLUCOSE SERPL-MCNC: 248 MG/DL (ref 70–110)
GLUCOSE SERPL-MCNC: 249 MG/DL (ref 70–110)
GLUCOSE SERPL-MCNC: 249 MG/DL (ref 70–110)
GLUCOSE SERPL-MCNC: 254 MG/DL (ref 70–110)
HCT VFR BLD AUTO: 27.7 % (ref 37–48.5)
HGB BLD-MCNC: 8.3 G/DL (ref 12–16)
IMM GRANULOCYTES # BLD AUTO: 0.2 K/UL (ref 0–0.04)
IMM GRANULOCYTES NFR BLD AUTO: 0.9 % (ref 0–0.5)
LYMPHOCYTES # BLD AUTO: 1.4 K/UL (ref 1–4.8)
LYMPHOCYTES NFR BLD: 6.3 % (ref 18–48)
MAGNESIUM SERPL-MCNC: 1.8 MG/DL (ref 1.6–2.6)
MCH RBC QN AUTO: 24.6 PG (ref 27–31)
MCHC RBC AUTO-ENTMCNC: 30 G/DL (ref 32–36)
MCV RBC AUTO: 82 FL (ref 82–98)
MONOCYTES # BLD AUTO: 1.2 K/UL (ref 0.3–1)
MONOCYTES NFR BLD: 5.6 % (ref 4–15)
NEUTROPHILS # BLD AUTO: 18.6 K/UL (ref 1.8–7.7)
NEUTROPHILS NFR BLD: 86.6 % (ref 38–73)
NRBC BLD-RTO: 0 /100 WBC
PLATELET # BLD AUTO: 415 K/UL (ref 150–450)
PMV BLD AUTO: 10.2 FL (ref 9.2–12.9)
POTASSIUM SERPL-SCNC: 3.8 MMOL/L (ref 3.5–5.1)
PROT SERPL-MCNC: 6.4 G/DL (ref 6–8.4)
RBC # BLD AUTO: 3.38 M/UL (ref 4–5.4)
SODIUM SERPL-SCNC: 132 MMOL/L (ref 136–145)
WBC # BLD AUTO: 21.44 K/UL (ref 3.9–12.7)

## 2024-04-24 PROCEDURE — 63600175 PHARM REV CODE 636 W HCPCS: Performed by: SURGERY

## 2024-04-24 PROCEDURE — C9113 INJ PANTOPRAZOLE SODIUM, VIA: HCPCS | Performed by: SURGERY

## 2024-04-24 PROCEDURE — 97535 SELF CARE MNGMENT TRAINING: CPT

## 2024-04-24 PROCEDURE — 83735 ASSAY OF MAGNESIUM: CPT | Performed by: SURGERY

## 2024-04-24 PROCEDURE — 97530 THERAPEUTIC ACTIVITIES: CPT

## 2024-04-24 PROCEDURE — 85025 COMPLETE CBC W/AUTO DIFF WBC: CPT | Performed by: SURGERY

## 2024-04-24 PROCEDURE — 99900035 HC TECH TIME PER 15 MIN (STAT)

## 2024-04-24 PROCEDURE — 25000003 PHARM REV CODE 250: Performed by: SURGERY

## 2024-04-24 PROCEDURE — 80053 COMPREHEN METABOLIC PANEL: CPT | Performed by: SURGERY

## 2024-04-24 PROCEDURE — 25000003 PHARM REV CODE 250: Performed by: INTERNAL MEDICINE

## 2024-04-24 PROCEDURE — 12000002 HC ACUTE/MED SURGE SEMI-PRIVATE ROOM

## 2024-04-24 PROCEDURE — 36415 COLL VENOUS BLD VENIPUNCTURE: CPT | Performed by: SURGERY

## 2024-04-24 PROCEDURE — 82378 CARCINOEMBRYONIC ANTIGEN: CPT | Performed by: STUDENT IN AN ORGANIZED HEALTH CARE EDUCATION/TRAINING PROGRAM

## 2024-04-24 PROCEDURE — 94761 N-INVAS EAR/PLS OXIMETRY MLT: CPT

## 2024-04-24 RX ADMIN — PANTOPRAZOLE SODIUM 80 MG: 40 INJECTION, POWDER, FOR SOLUTION INTRAVENOUS at 12:04

## 2024-04-24 RX ADMIN — EZETIMIBE 10 MG: 10 TABLET ORAL at 08:04

## 2024-04-24 RX ADMIN — INSULIN ASPART 2 UNITS: 100 INJECTION, SOLUTION INTRAVENOUS; SUBCUTANEOUS at 09:04

## 2024-04-24 RX ADMIN — DILTIAZEM HYDROCHLORIDE 120 MG: 120 CAPSULE, COATED, EXTENDED RELEASE ORAL at 08:04

## 2024-04-24 RX ADMIN — INSULIN ASPART 1 UNITS: 100 INJECTION, SOLUTION INTRAVENOUS; SUBCUTANEOUS at 08:04

## 2024-04-24 RX ADMIN — APIXABAN 5 MG: 5 TABLET, FILM COATED ORAL at 08:04

## 2024-04-24 RX ADMIN — MUPIROCIN 1 G: 20 OINTMENT TOPICAL at 08:04

## 2024-04-24 RX ADMIN — HYDROCHLOROTHIAZIDE 25 MG: 25 TABLET ORAL at 04:04

## 2024-04-24 RX ADMIN — INSULIN ASPART 2 UNITS: 100 INJECTION, SOLUTION INTRAVENOUS; SUBCUTANEOUS at 05:04

## 2024-04-24 RX ADMIN — INSULIN ASPART 2 UNITS: 100 INJECTION, SOLUTION INTRAVENOUS; SUBCUTANEOUS at 12:04

## 2024-04-24 NOTE — PT/OT/SLP PROGRESS
Occupational Therapy   Treatment    Name: Jen Torres  MRN: [id number]  Admitting Diagnosis:  GI bleed  2 Days Post-Op  The primary encounter diagnosis was Symptomatic anemia. Diagnoses of GI bleed, Chest pain, Colonic mass [K63.89], Iron deficiency anemia due to chronic blood loss [D50.0], and Gastrointestinal hemorrhage, unspecified gastrointestinal hemorrhage type were also pertinent to this visit.    Recommendations:     Discharge Recommendations: High Intensity Therapy  Discharge Equipment Recommendations:  to be determined by next level of care  Barriers to discharge:  Decreased caregiver support, Inaccessible home environment    Assessment:     Jen Torres is a 73 y.o. female with a medical diagnosis of GI bleed.  She presents with Performance deficits affecting function are weakness, impaired endurance, impaired self care skills, impaired functional mobility, gait instability, impaired balance, decreased lower extremity function, decreased safety awareness, pain, decreased ROM, impaired skin, impaired cardiopulmonary response to activity.     Pt weak, mildly SOB, fatigued and c/o abdominal pain 6/10. Pt ambulated min-mod A with RW ~10' around bed to chair. BSC t/f Min A with RW. Toileting Mod A.  G/hygiene SBA seated EOB. Pt left sitting UIC for dinner, nurse made aware. /63, O2 97% on 2L.     Rehab Prognosis:  Good; patient would benefit from acute skilled OT services to address these deficits and reach maximum level of function.       Plan:     Patient to be seen 5 x/week to address the above listed problems via self-care/home management, therapeutic activities, therapeutic exercises  Plan of Care Expires: 05/23/24  Plan of Care Reviewed with: patient    Subjective     Chief Complaint: abd pan 6/10-midline incision and R lower.  Patient/Family Comments/goals: pt agreeable.  Pain/Comfort:  Pain Rating 1: 6/10  Location - Side 1:  (R lower, midline)  Location 1: abdomen  Pain Addressed 1:  Reposition, Distraction, Pre-medicate for activity  Pain Rating Post-Intervention 1: 6/10    Objective:     Communicated with: nurse prior to session.  Patient found HOB elevated with bed alarm, telemetry, oxygen upon OT entry to room.    General Precautions: Standard, diabetic, fall (clear liquids)    Orthopedic Precautions:N/A  Braces: N/A  Respiratory Status: Nasal cannula, flow 2 L/min     Occupational Performance:     Bed Mobility:  log roll tech  Patient completed Rolling/Turning to Left with  minimum assistance, with side rail, and HOB elevated  Patient completed Scooting/Bridging with minimum assistance  Patient completed Supine to Sit with minimum assistance, moderate assistance, with side rail, and HOB elevated-log roll tech      Functional Mobility/Transfers:  Patient completed Sit <> Stand Transfer with minimum assistance  with  rolling walker   Patient completed Bed <> Chair Transfer using Step Transfer technique with minimum assistance with rolling walker  Patient completed Toilet Transfer Step Transfer technique with minimum assistance with  rolling walkerand Bedside commode  Functional Mobility: ambulated min-min A with RW ~10, fatigue, increased weakness, short steppage and slow moving.    Activities of Daily Living:  Grooming: stand by assistance washed face, brushed hair and brushed teeth with electric toothbrush seated unsupported EOB. Slow pace, weak.  Toileting: moderate assistance . Pt urinated on BSC, CGA to stand and perform hygiene and assist for clothes management standing with RW.    Treatment & Education:  ADLS and fx mobility retraining.  Impt of sitting UIC daily.  All questions/concerns addressed within scope.    Patient left up in chair with all lines intact, call button in reach, chair alarm on, and nurse notified    GOALS:   Multidisciplinary Problems       Occupational Therapy Goals          Problem: Occupational Therapy    Goal Priority Disciplines Outcome Interventions    Occupational Therapy Goal     OT, PT/OT Progressing    Description: Goals to be met by: 5/23/2024     Patient will increase functional independence with ADLs by performing:    UE Dressing with Modified [place].  LE Dressing with Modified [place].  Grooming while standing at sink with Modified [place].  Toileting from toilet with Modified [place] for hygiene and clothing management.   Supine to sit with Modified [place].  Toilet transfer to toilet with Modified [place].  Upper extremity exercise program x10 reps per handout, with independence.                         Time Tracking:     OT Date of Treatment: 04/24/24  OT Start Time: 1601  OT Stop Time: 1631  OT Total Time (min): 30 min    Billable Minutes:Self Care/Home Management 15  Therapeutic Activity 15  Total Time 30    OT/ERIC: OT          4/24/2024

## 2024-04-24 NOTE — PLAN OF CARE
Problem: Adult Inpatient Plan of Care  Goal: Plan of Care Review  Outcome: Progressing  Goal: Optimal Comfort and Wellbeing  Outcome: Progressing

## 2024-04-24 NOTE — SUBJECTIVE & OBJECTIVE
Interval History: no flatus  No nausea  Still bloated    Medications:  Continuous Infusions:  Current Facility-Administered Medications   Medication Dose Route Frequency Last Rate Last Admin     Scheduled Meds:  Current Facility-Administered Medications   Medication Dose Route Frequency    apixaban  5 mg Oral BID    diltiaZEM  120 mg Oral Daily    ezetimibe  10 mg Oral Daily    hydroCHLOROthiazide  25 mg Oral Every Mon, Wed, Fri    mupirocin   Nasal BID    pantoprazole  80 mg Intravenous Daily     PRN Meds:  Current Facility-Administered Medications:     0.9%  NaCl infusion (for blood administration), , Intravenous, Q24H PRN    acetaminophen, 650 mg, Oral, Q8H PRN    acetaminophen, 650 mg, Oral, Q4H PRN    aluminum-magnesium hydroxide-simethicone, 30 mL, Oral, QID PRN    dextrose 50%, 12.5 g, Intravenous, PRN    dextrose 50%, 25 g, Intravenous, PRN    glucagon (human recombinant), 1 mg, Intramuscular, PRN    glucose, 16 g, Oral, PRN    glucose, 24 g, Oral, PRN    HYDROcodone-acetaminophen, 1 tablet, Oral, Q4H PRN    insulin aspart U-100, 0-5 Units, Subcutaneous, QID (AC + HS) PRN    magnesium oxide, 800 mg, Oral, PRN    magnesium oxide, 800 mg, Oral, PRN    melatonin, 6 mg, Oral, Nightly PRN    naloxone, 0.02 mg, Intravenous, PRN    ondansetron, 4 mg, Intravenous, Q6H PRN    potassium bicarbonate, 35 mEq, Oral, PRN    potassium bicarbonate, 50 mEq, Oral, PRN    potassium bicarbonate, 60 mEq, Oral, PRN    potassium, sodium phosphates, 2 packet, Oral, PRN    potassium, sodium phosphates, 2 packet, Oral, PRN    potassium, sodium phosphates, 2 packet, Oral, PRN    senna-docusate 8.6-50 mg, 1 tablet, Oral, BID PRN    sodium chloride 0.9%, 10 mL, Intravenous, Q12H PRN     Review of patient's allergies indicates:   Allergen Reactions    Statins-hmg-coa reductase inhibitors Swelling    Metformin Other (See Comments)     metallic taste , burn of the esophagus     Objective:     Vital Signs (Most Recent):  Temp: 98.3 °F (36.8  °C) (04/24/24 0433)  Pulse: 86 (04/24/24 0729)  Resp: 15 (04/24/24 0729)  BP: (!) 125/58 (04/24/24 0433)  SpO2: 95 % (04/24/24 0729) Vital Signs (24h Range):  Temp:  [98.1 °F (36.7 °C)-98.3 °F (36.8 °C)] 98.3 °F (36.8 °C)  Pulse:  [79-95] 86  Resp:  [15-19] 15  SpO2:  [95 %-98 %] 95 %  BP: (110-125)/(56-62) 125/58     Weight: 97.2 kg (214 lb 4.6 oz)  Body mass index is 32.58 kg/m².    Intake/Output - Last 3 Shifts         04/22 0700 04/23 0659 04/23 0700 04/24 0659 04/24 0700 04/25 0659    P.O. 100 600     I.V. (mL/kg) 3000 (30.9)      IV Piggyback       Total Intake(mL/kg) 3100 (31.9) 600 (6.2)     Urine (mL/kg/hr) 1120 (0.5) 900 (0.4)     Total Output 1120 900     Net +1980 -300            Urine Occurrence 1 x 3 x              Physical Exam  Abdominal:      General: There is distension.      Palpations: Abdomen is soft.      Tenderness: There is no abdominal tenderness.          Significant Labs:  I have reviewed all pertinent lab results within the past 24 hours.  CBC:   Recent Labs   Lab 04/24/24  0515   WBC 21.44*   RBC 3.38*   HGB 8.3*   HCT 27.7*      MCV 82   MCH 24.6*   MCHC 30.0*     CMP:   Recent Labs   Lab 04/24/24  0515   *   CALCIUM 8.4*   ALBUMIN 3.3*   PROT 6.4   *   K 3.8   CO2 26   CL 95   BUN 10   CREATININE 1.1   ALKPHOS 95   ALT 28   AST 19   BILITOT 0.6       Significant Diagnostics:  I have reviewed all pertinent imaging results/findings within the past 24 hours.

## 2024-04-24 NOTE — SUBJECTIVE & OBJECTIVE
Interval History:Still has not passed flatus or stool.    Review of Systems   Constitutional:  Positive for fatigue. Negative for activity change, appetite change, chills and fever.   HENT:  Negative for congestion, ear pain, nosebleeds and sinus pain.    Eyes:  Negative for discharge and itching.   Respiratory:  Positive for shortness of breath. Negative for apnea, cough and chest tightness.    Cardiovascular:  Positive for chest pain. Negative for palpitations and leg swelling.   Gastrointestinal:  Positive for abdominal distention and abdominal pain. Negative for vomiting.   Genitourinary:  Negative for difficulty urinating, dysuria, flank pain and frequency.   Musculoskeletal:  Negative for arthralgias, back pain, joint swelling and myalgias.   Skin:  Negative for color change, pallor and rash.   Neurological:  Positive for weakness. Negative for dizziness, light-headedness and headaches.   Psychiatric/Behavioral:  Negative for agitation, behavioral problems, confusion and suicidal ideas.      Objective:     Vital Signs (Most Recent):  Temp: 98.7 °F (37.1 °C) (04/24/24 1016)  Pulse: 96 (04/24/24 1016)  Resp: 17 (04/24/24 1016)  BP: 136/62 (04/24/24 1016)  SpO2: 97 % (04/24/24 1016) Vital Signs (24h Range):  Temp:  [98.1 °F (36.7 °C)-98.7 °F (37.1 °C)] 98.7 °F (37.1 °C)  Pulse:  [79-96] 96  Resp:  [15-19] 17  SpO2:  [95 %-98 %] 97 %  BP: (110-136)/(56-62) 136/62     Weight: 97.2 kg (214 lb 4.6 oz)  Body mass index is 32.58 kg/m².    Intake/Output Summary (Last 24 hours) at 4/24/2024 1132  Last data filed at 4/24/2024 0529  Gross per 24 hour   Intake 120 ml   Output 900 ml   Net -780 ml         Physical Exam  Vitals reviewed.   Constitutional:       General: She is not in acute distress.     Appearance: Normal appearance. She is normal weight. She is not ill-appearing.   HENT:      Head: Normocephalic and atraumatic.      Nose: Nose normal.      Mouth/Throat:      Mouth: Mucous membranes are moist.      Pharynx:  Oropharynx is clear.   Eyes:      General: No scleral icterus.     Extraocular Movements: Extraocular movements intact.      Conjunctiva/sclera: Conjunctivae normal.      Pupils: Pupils are equal, round, and reactive to light.   Cardiovascular:      Rate and Rhythm: Normal rate and regular rhythm.      Pulses: Normal pulses.      Heart sounds: Normal heart sounds. No murmur heard.     No gallop.   Pulmonary:      Effort: Pulmonary effort is normal. No respiratory distress.      Breath sounds: Normal breath sounds. No wheezing, rhonchi or rales.   Chest:      Chest wall: No tenderness.   Abdominal:      General: Abdomen is flat. There is no distension.      Palpations: Abdomen is soft.      Tenderness: There is abdominal tenderness.      Comments: Scant bowel sounds   Musculoskeletal:         General: No swelling or tenderness.      Cervical back: Normal range of motion and neck supple. No rigidity or tenderness.      Right lower leg: No edema.      Left lower leg: No edema.   Skin:     General: Skin is warm and dry.   Neurological:      General: No focal deficit present.      Mental Status: She is alert and oriented to person, place, and time. Mental status is at baseline.      Motor: No weakness.   Psychiatric:         Mood and Affect: Mood normal.         Behavior: Behavior normal.         Thought Content: Thought content normal.             Significant Labs: All pertinent labs within the past 24 hours have been reviewed.  BMP:   Recent Labs   Lab 04/24/24  0515   *   *   K 3.8   CL 95   CO2 26   BUN 10   CREATININE 1.1   CALCIUM 8.4*   MG 1.8     CBC:   Recent Labs   Lab 04/23/24  0437 04/24/24  0515   WBC 21.99* 21.44*   HGB 8.3* 8.3*   HCT 27.9* 27.7*    415     CMP:   Recent Labs   Lab 04/23/24  0437 04/24/24  0515    132*   K 3.7 3.8    95   CO2 25 26   * 242*   BUN 7* 10   CREATININE 1.0 1.1   CALCIUM 8.2* 8.4*   PROT 6.1 6.4   ALBUMIN 3.3* 3.3*   BILITOT 0.4 0.6    ALKPHOS 91 95   AST 27 19   ALT 34 28   ANIONGAP 10 11       Significant Imaging: I have reviewed all pertinent imaging results/findings within the past 24 hours.

## 2024-04-24 NOTE — PLAN OF CARE
04/24/24 1316   Post-Acute Status   Post-Acute Authorization Placement   Post-Acute Placement Status Referrals Sent   Coverage MEDICARE - MEDICARE PART A & B   Discharge Plan   Discharge Plan A Rehab   Discharge Plan B Home;Home Health     SW talked to pt about preferences on rehabs. Pt states she prefers [facility] or [facility] and if both places accept her then she would prefer [facility]. SW sent referrals via [employer], pt signed a pt choice form, and SW uploaded it to pt's .

## 2024-04-24 NOTE — PROGRESS NOTES
[facility]  General Surgery  Progress Note    Subjective:     History of Present Illness:  72 y/o female with found to have bleeding colon mass.  Colonoscopy done today identified.   She is here for further treatment.    Post-Op Info:  Procedure(s) (LRB):  LAPAROTOMY, EXPLORATORY (N/A)  COLECTOMY, RIGHT  LYSIS, ADHESIONS  SURGICAL PROCEDURE, COLONOSCOPIC   2 Days Post-Op     Interval History: no flatus  No nausea  Still bloated    Medications:  Continuous Infusions:  Current Facility-Administered Medications   Medication Dose Route Frequency Last Rate Last Admin     Scheduled Meds:  Current Facility-Administered Medications   Medication Dose Route Frequency    apixaban  5 mg Oral BID    diltiaZEM  120 mg Oral Daily    ezetimibe  10 mg Oral Daily    hydroCHLOROthiazide  25 mg Oral Every Mon, Wed, Fri    mupirocin   Nasal BID    pantoprazole  80 mg Intravenous Daily     PRN Meds:  Current Facility-Administered Medications:     0.9%  NaCl infusion (for blood administration), , Intravenous, Q24H PRN    acetaminophen, 650 mg, Oral, Q8H PRN    acetaminophen, 650 mg, Oral, Q4H PRN    aluminum-magnesium hydroxide-simethicone, 30 mL, Oral, QID PRN    dextrose 50%, 12.5 g, Intravenous, PRN    dextrose 50%, 25 g, Intravenous, PRN    glucagon (human recombinant), 1 mg, Intramuscular, PRN    glucose, 16 g, Oral, PRN    glucose, 24 g, Oral, PRN    HYDROcodone-acetaminophen, 1 tablet, Oral, Q4H PRN    insulin aspart U-100, 0-5 Units, Subcutaneous, QID (AC + HS) PRN    magnesium oxide, 800 mg, Oral, PRN    magnesium oxide, 800 mg, Oral, PRN    melatonin, 6 mg, Oral, Nightly PRN    naloxone, 0.02 mg, Intravenous, PRN    ondansetron, 4 mg, Intravenous, Q6H PRN    potassium bicarbonate, 35 mEq, Oral, PRN    potassium bicarbonate, 50 mEq, Oral, PRN    potassium bicarbonate, 60 mEq, Oral, PRN    potassium, sodium phosphates, 2 packet, Oral, PRN    potassium, sodium phosphates, 2 packet, Oral, PRN    potassium, sodium  phosphates, 2 packet, Oral, PRN    senna-docusate 8.6-50 mg, 1 tablet, Oral, BID PRN    sodium chloride 0.9%, 10 mL, Intravenous, Q12H PRN     Review of patient's allergies indicates:   Allergen Reactions    Statins-hmg-coa reductase inhibitors Swelling    Metformin Other (See Comments)     metallic taste , burn of the esophagus     Objective:     Vital Signs (Most Recent):  Temp: 98.3 °F (36.8 °C) (04/24/24 0433)  Pulse: 86 (04/24/24 0729)  Resp: 15 (04/24/24 0729)  BP: (!) 125/58 (04/24/24 0433)  SpO2: 95 % (04/24/24 0729) Vital Signs (24h Range):  Temp:  [98.1 °F (36.7 °C)-98.3 °F (36.8 °C)] 98.3 °F (36.8 °C)  Pulse:  [79-95] 86  Resp:  [15-19] 15  SpO2:  [95 %-98 %] 95 %  BP: (110-125)/(56-62) 125/58     Weight: 97.2 kg (214 lb 4.6 oz)  Body mass index is 32.58 kg/m².    Intake/Output - Last 3 Shifts         04/22 0700 04/23 0659 04/23 0700 04/24 0659 04/24 0700 04/25 0659    P.O. 100 600     I.V. (mL/kg) 3000 (30.9)      IV Piggyback       Total Intake(mL/kg) 3100 (31.9) 600 (6.2)     Urine (mL/kg/hr) 1120 (0.5) 900 (0.4)     Total Output 1120 900     Net +1980 -300            Urine Occurrence 1 x 3 x              Physical Exam  Abdominal:      General: There is distension.      Palpations: Abdomen is soft.      Tenderness: There is no abdominal tenderness.          Significant Labs:  I have reviewed all pertinent lab results within the past 24 hours.  CBC:   Recent Labs   Lab 04/24/24 0515   WBC 21.44*   RBC 3.38*   HGB 8.3*   HCT 27.7*      MCV 82   MCH 24.6*   MCHC 30.0*     CMP:   Recent Labs   Lab 04/24/24 0515   *   CALCIUM 8.4*   ALBUMIN 3.3*   PROT 6.4   *   K 3.8   CO2 26   CL 95   BUN 10   CREATININE 1.1   ALKPHOS 95   ALT 28   AST 19   BILITOT 0.6       Significant Diagnostics:  I have reviewed all pertinent imaging results/findings within the past 24 hours.  Assessment/Plan:     Colonic mass  2 Days Post-Op  Path pending  Await gi function  Is use  Ok to anti  coagulate        Jaden Barrios MD  General Surgery  [facility]

## 2024-04-24 NOTE — PT/OT/SLP PROGRESS
"Physical Therapy Treatment    Patient Name:  Jen Torres   MRN:  [id number]    Recommendations:     Discharge Recommendations: High Intensity Therapy  Discharge Equipment Recommendations: to be determined by next level of care  Barriers to discharge: Decreased caregiver support    Assessment:     Jen Torres is a 73 y.o. female admitted with a medical diagnosis of GI bleed.  She presents with the following impairments/functional limitations: weakness, impaired endurance, impaired self care skills, impaired functional mobility, gait instability, impaired balance, pain, impaired cardiopulmonary response to activity, edema.    Pt found on BSC (assisted by extender). Pt agreeable to PT despite reporting feeling "woozy" (on RA SPO2 91% so PT donned pt nc for O2). Pt tolerated session fairly well despite pain and reported she hadn't had pain medication today, but still felt "drunk". Pt required moderate to minimal A of 2 persons for safety with mobility.    Rehab Prognosis: Good and Fair; patient would benefit from acute skilled PT services to address these deficits and reach maximum level of function.    Recent Surgery: Procedure(s) (LRB):  LAPAROTOMY, EXPLORATORY (N/A)  COLECTOMY, RIGHT  LYSIS, ADHESIONS  SURGICAL PROCEDURE, COLONOSCOPIC 2 Days Post-Op    Plan:     During this hospitalization, patient to be seen daily to address the identified rehab impairments via gait training, therapeutic activities, therapeutic exercises and progress toward the following goals:    Plan of Care Expires:  05/23/24    Subjective     Chief Complaint: abdominal pain  Patient/Family Comments/goals: Return to independent functional mobility  Pain/Comfort:  Pain Rating 1: 5/10  Pain Rating Post-Intervention 1: 6/10      Objective:     Communicated with DAVID Charles prior to session.  Patient found  on BSC next to bed with extender present   with  telemetry, peripheral IV upon PT entry to room.     General Precautions: Standard, " diabetic, fall  Orthopedic Precautions: N/A  Braces: N/A  Respiratory Status: Room air (SPO2 91%)     Functional Mobility:  Bed Mobility:     Scooting: minimum assistance  Sit to Supine: moderate assistance and for LEs  Transfers:     Sit to Stand:  minimum assistance and of 2 persons with hand-held assist  Toilet Transfer: minimum assistance and of 2 persons with  hand-held assist  using  Step Transfer  Gait: x 16' with B HHA/moderate to minimum assistance of 2 persons for safety due to c/o feeling woozy      AM-PAC 6 CLICK MOBILITY          Treatment & Education:  Pt performed B LE there ex sitting x 8-10 reps with mod vc for proper execution and joint protection: ap, laq.    Pt was educated on the following: call light use, importance of OOB activity and functional mobility to negate the negative effects of prolonged bed rest during this hospitalization, safe transfers/ambulation and discharge planning recommendations/options.        Patient left HOB elevated with all lines intact, call button in reach, and bed alarm on..    GOALS:   Multidisciplinary Problems       Physical Therapy Goals          Problem: Physical Therapy    Goal Priority Disciplines Outcome Goal Variances Interventions   Physical Therapy Goal     PT, PT/OT      Description: Goals to be met by: 24     Patient will increase functional independence with mobility by performin. Supine to sit with Supervision  2. Sit to stand transfer with Supervision  3. Bed to chair transfer with Supervision using Rolling Walker  4. Gait  x 200 feet with Supervision using Rolling Walker.   5. Ascend & Descend 5 steps w/ rail and minimal Assist                             Time Tracking:     PT Received On: 24  PT Start Time: 1318     PT Stop Time: 1335  PT Total Time (min): 17 min     Billable Minutes: Therapeutic Activity 17    Treatment Type: Treatment  PT/PTA: PT           2024

## 2024-04-24 NOTE — CARE UPDATE
04/24/24 0729   PRE-TX-O2   Device (Oxygen Therapy) nasal cannula   SpO2 95 %   Pulse Oximetry Type Intermittent   $ Pulse Oximetry - Multiple Charge Pulse Oximetry - Multiple   Pulse 86   Resp 15   Respiratory Evaluation   $ Care Plan Tech Time 15 min

## 2024-04-24 NOTE — PROGRESS NOTES
[facility] Medicine  Progress Note    Patient Name: Jen Torres  MRN: [id number]  Patient Class: IP- Inpatient   Admission Date: 4/19/2024  Length of Stay: 4 days  Attending Physician: Olivia Gamboa MD  Primary Care Provider: Sam Garcia IV, MD        Subjective:     Principal Problem:GI bleed        HPI:  Patient is a 73 old female medical history of anemia, GERD, hyperlipidemia, and migraines.  Patient presents to the ED today with complaints of worsening fatigue shortness for breath worse on exertion, and intermittent chest pain on exertion.  Patient was seen in GI office earlier today, thus sent into the ED for further evaluation.  Patient was found to have hemoglobin of 7.4, hematocrit 24.5.  1 unit blood ordered in the ED for transfusion.  Patient just had recent iron infusion on Wednesday.  Patient denies previous history of blood transfusions in the past.  Patient has had rectal bleeding and melena that has been present and steady for the past 2 months.  Patient reports being scheduled for outpatient stress test next week.  D-dimer elevated in the ED at 0.55, CTA negative for acute PE.  CT of the abdomen without acute abnormalities. Patient has history of fhypercoagulable state, DVT in LLE 2021. Patient does report taking Eliquis BID, last dose this morning. GI consulted.  Pt denies constipation/diarrhea, dysuria, abdominal pain, LE edema, diaphoresis, nausea, or vomiting. Patient is full code.    In the ED:  Chest x-ray negative.  Occult positive.  Troponin 5.0, TSH 2.254, UA negative.  Potassium 3.5, magnesium 1.6, INR 1.0, PTT 11.2.    Overview/Hospital Course:  The patient was transfused one unit PRBC on 04/19. She was seen in consult by GI, EGD done on 04/20. No significant findings. Colonoscopy done for 04/21 and revealed colonic mass in the transverse colon. Surgery was consulted, and resection was done 04/22. Her H/H were monitored for any further decrease.  Patient with expected postop pain.     Interval History:Still has not passed flatus or stool.    Review of Systems   Constitutional:  Positive for fatigue. Negative for activity change, appetite change, chills and fever.   HENT:  Negative for congestion, ear pain, nosebleeds and sinus pain.    Eyes:  Negative for discharge and itching.   Respiratory:  Positive for shortness of breath. Negative for apnea, cough and chest tightness.    Cardiovascular:  Positive for chest pain. Negative for palpitations and leg swelling.   Gastrointestinal:  Positive for abdominal distention and abdominal pain. Negative for vomiting.   Genitourinary:  Negative for difficulty urinating, dysuria, flank pain and frequency.   Musculoskeletal:  Negative for arthralgias, back pain, joint swelling and myalgias.   Skin:  Negative for color change, pallor and rash.   Neurological:  Positive for weakness. Negative for dizziness, light-headedness and headaches.   Psychiatric/Behavioral:  Negative for agitation, behavioral problems, confusion and suicidal ideas.      Objective:     Vital Signs (Most Recent):  Temp: 98.7 °F (37.1 °C) (04/24/24 1016)  Pulse: 96 (04/24/24 1016)  Resp: 17 (04/24/24 1016)  BP: 136/62 (04/24/24 1016)  SpO2: 97 % (04/24/24 1016) Vital Signs (24h Range):  Temp:  [98.1 °F (36.7 °C)-98.7 °F (37.1 °C)] 98.7 °F (37.1 °C)  Pulse:  [79-96] 96  Resp:  [15-19] 17  SpO2:  [95 %-98 %] 97 %  BP: (110-136)/(56-62) 136/62     Weight: 97.2 kg (214 lb 4.6 oz)  Body mass index is 32.58 kg/m².    Intake/Output Summary (Last 24 hours) at 4/24/2024 1132  Last data filed at 4/24/2024 0529  Gross per 24 hour   Intake 120 ml   Output 900 ml   Net -780 ml         Physical Exam  Vitals reviewed.   Constitutional:       General: She is not in acute distress.     Appearance: Normal appearance. She is normal weight. She is not ill-appearing.   HENT:      Head: Normocephalic and atraumatic.      Nose: Nose normal.      Mouth/Throat:      Mouth:  Mucous membranes are moist.      Pharynx: Oropharynx is clear.   Eyes:      General: No scleral icterus.     Extraocular Movements: Extraocular movements intact.      Conjunctiva/sclera: Conjunctivae normal.      Pupils: Pupils are equal, round, and reactive to light.   Cardiovascular:      Rate and Rhythm: Normal rate and regular rhythm.      Pulses: Normal pulses.      Heart sounds: Normal heart sounds. No murmur heard.     No gallop.   Pulmonary:      Effort: Pulmonary effort is normal. No respiratory distress.      Breath sounds: Normal breath sounds. No wheezing, rhonchi or rales.   Chest:      Chest wall: No tenderness.   Abdominal:      General: Abdomen is flat. There is no distension.      Palpations: Abdomen is soft.      Tenderness: There is abdominal tenderness.      Comments: Scant bowel sounds   Musculoskeletal:         General: No swelling or tenderness.      Cervical back: Normal range of motion and neck supple. No rigidity or tenderness.      Right lower leg: No edema.      Left lower leg: No edema.   Skin:     General: Skin is warm and dry.   Neurological:      General: No focal deficit present.      Mental Status: She is alert and oriented to person, place, and time. Mental status is at baseline.      Motor: No weakness.   Psychiatric:         Mood and Affect: Mood normal.         Behavior: Behavior normal.         Thought Content: Thought content normal.             Significant Labs: All pertinent labs within the past 24 hours have been reviewed.  BMP:   Recent Labs   Lab 04/24/24  0515   *   *   K 3.8   CL 95   CO2 26   BUN 10   CREATININE 1.1   CALCIUM 8.4*   MG 1.8     CBC:   Recent Labs   Lab 04/23/24  0437 04/24/24  0515   WBC 21.99* 21.44*   HGB 8.3* 8.3*   HCT 27.9* 27.7*    415     CMP:   Recent Labs   Lab 04/23/24  0437 04/24/24  0515    132*   K 3.7 3.8    95   CO2 25 26   * 242*   BUN 7* 10   CREATININE 1.0 1.1   CALCIUM 8.2* 8.4*   PROT 6.1 6.4  "  ALBUMIN 3.3* 3.3*   BILITOT 0.4 0.6   ALKPHOS 91 95   AST 27 19   ALT 34 28   ANIONGAP 10 11       Significant Imaging: I have reviewed all pertinent imaging results/findings within the past 24 hours.    Assessment/Plan:      * GI bleed  GI consulted, had unremarkable EGD; colonoscopy revealed colonic mass; s/p resection 04/22  Serial CBC, monitor on telemetry   Hbg dropped from 11.5 to 7.4 in 4 months.       Colonic mass  S/p resection 04/22  Awaiting return of bowel function      Iron deficiency anemia due to chronic blood loss  Last iron infusion 04/17  Symptomatic anemia with SOB, pale, fatigue  Transfused one unit PRBC on 04/19  Serial CBCs, monitor on telemetry     Type 2 diabetes mellitus without complication, without long-term current use of insulin  POCT glucose, insulin sliding scale     Essential hypertension  Chronic, controlled. Latest blood pressure and vitals reviewed-     Temp:  [97.1 °F (36.2 °C)-99.2 °F (37.3 °C)]   Pulse:  []   Resp:  [15-20]   BP: (105-160)/(56-95)   SpO2:  [92 %-100 %] .   Home meds for hypertension were reviewed and noted below.   Hypertension Medications               diltiaZEM (CARDIZEM CD) 120 MG Cp24 Take 1 capsule (120 mg total) by mouth once daily.    hydroCHLOROthiazide (HYDRODIURIL) 25 MG tablet Take 25 mg by mouth every Mon, Wed, Fri.            While in the hospital, will manage blood pressure as follows; Continue home antihypertensive regimen    Will utilize p.r.n. blood pressure medication only if patient's blood pressure greater than 180/110 and she develops symptoms such as worsening chest pain or shortness of breath.      VTE Risk Mitigation (From admission, onward)           Ordered     apixaban tablet 5 mg  2 times daily        Note to Pharmacy: Per Dr. Barrios's note "Ok to restart anticoagulation"    04/24/24 0645     Reason for No Pharmacological VTE Prophylaxis  Once        Question:  Reasons:  Answer:  Active Bleeding    04/19/24 1961     IP VTE " HIGH RISK PATIENT  Once         04/19/24 1641     Place sequential compression device  Until discontinued         04/19/24 1641                    Discharge Planning   KATIE: 4/26/2024     Code Status: Full Code   Is the patient medically ready for discharge?:     Reason for patient still in hospital (select all that apply): Patient trending condition  Discharge Plan A: Home with family   Discharge Delays: None known at this time              Olivia Gamboa MD, MD  Department of Hospital Medicine   [facility]

## 2024-04-25 LAB
ALBUMIN SERPL BCP-MCNC: 3.2 G/DL (ref 3.5–5.2)
ALP SERPL-CCNC: 90 U/L (ref 55–135)
ALT SERPL W/O P-5'-P-CCNC: 20 U/L (ref 10–44)
ANION GAP SERPL CALC-SCNC: 9 MMOL/L (ref 8–16)
ANISOCYTOSIS BLD QL SMEAR: ABNORMAL
AST SERPL-CCNC: 14 U/L (ref 10–40)
BASOPHILS # BLD AUTO: 0.03 K/UL (ref 0–0.2)
BASOPHILS NFR BLD: 0.2 % (ref 0–1.9)
BILIRUB SERPL-MCNC: 0.5 MG/DL (ref 0.1–1)
BUN SERPL-MCNC: 11 MG/DL (ref 8–23)
CALCIUM SERPL-MCNC: 8.7 MG/DL (ref 8.7–10.5)
CHLORIDE SERPL-SCNC: 93 MMOL/L (ref 95–110)
CO2 SERPL-SCNC: 30 MMOL/L (ref 23–29)
CREAT SERPL-MCNC: 1 MG/DL (ref 0.5–1.4)
DACRYOCYTES BLD QL SMEAR: ABNORMAL
DIFFERENTIAL METHOD BLD: ABNORMAL
EOSINOPHIL # BLD AUTO: 0.2 K/UL (ref 0–0.5)
EOSINOPHIL NFR BLD: 1.1 % (ref 0–8)
ERYTHROCYTE [DISTWIDTH] IN BLOOD BY AUTOMATED COUNT: 22.3 % (ref 11.5–14.5)
EST. GFR  (NO RACE VARIABLE): >60 ML/MIN/1.73 M^2
GLUCOSE SERPL-MCNC: 248 MG/DL (ref 70–110)
GLUCOSE SERPL-MCNC: 248 MG/DL (ref 70–110)
GLUCOSE SERPL-MCNC: 255 MG/DL (ref 70–110)
GLUCOSE SERPL-MCNC: 280 MG/DL (ref 70–110)
GLUCOSE SERPL-MCNC: 304 MG/DL (ref 70–110)
HCT VFR BLD AUTO: 29.2 % (ref 37–48.5)
HGB BLD-MCNC: 8.8 G/DL (ref 12–16)
HYPOCHROMIA BLD QL SMEAR: ABNORMAL
IMM GRANULOCYTES # BLD AUTO: 0.16 K/UL (ref 0–0.04)
IMM GRANULOCYTES NFR BLD AUTO: 0.9 % (ref 0–0.5)
LYMPHOCYTES # BLD AUTO: 1.4 K/UL (ref 1–4.8)
LYMPHOCYTES NFR BLD: 7.9 % (ref 18–48)
MAGNESIUM SERPL-MCNC: 1.9 MG/DL (ref 1.6–2.6)
MCH RBC QN AUTO: 24.3 PG (ref 27–31)
MCHC RBC AUTO-ENTMCNC: 30.1 G/DL (ref 32–36)
MCV RBC AUTO: 81 FL (ref 82–98)
MONOCYTES # BLD AUTO: 1.1 K/UL (ref 0.3–1)
MONOCYTES NFR BLD: 6.1 % (ref 4–15)
NEUTROPHILS # BLD AUTO: 14.9 K/UL (ref 1.8–7.7)
NEUTROPHILS NFR BLD: 83.8 % (ref 38–73)
NRBC BLD-RTO: 0 /100 WBC
OVALOCYTES BLD QL SMEAR: ABNORMAL
PLATELET # BLD AUTO: 448 K/UL (ref 150–450)
PMV BLD AUTO: 10.3 FL (ref 9.2–12.9)
POLYCHROMASIA BLD QL SMEAR: ABNORMAL
POTASSIUM SERPL-SCNC: 3.6 MMOL/L (ref 3.5–5.1)
PROT SERPL-MCNC: 6.6 G/DL (ref 6–8.4)
RBC # BLD AUTO: 3.62 M/UL (ref 4–5.4)
SODIUM SERPL-SCNC: 132 MMOL/L (ref 136–145)
WBC # BLD AUTO: 17.82 K/UL (ref 3.9–12.7)

## 2024-04-25 PROCEDURE — 83735 ASSAY OF MAGNESIUM: CPT | Performed by: SURGERY

## 2024-04-25 PROCEDURE — C9113 INJ PANTOPRAZOLE SODIUM, VIA: HCPCS | Performed by: SURGERY

## 2024-04-25 PROCEDURE — 63600175 PHARM REV CODE 636 W HCPCS: Performed by: SURGERY

## 2024-04-25 PROCEDURE — 85025 COMPLETE CBC W/AUTO DIFF WBC: CPT | Performed by: SURGERY

## 2024-04-25 PROCEDURE — 80053 COMPREHEN METABOLIC PANEL: CPT | Performed by: SURGERY

## 2024-04-25 PROCEDURE — 97110 THERAPEUTIC EXERCISES: CPT

## 2024-04-25 PROCEDURE — 27000221 HC OXYGEN, UP TO 24 HOURS

## 2024-04-25 PROCEDURE — 36415 COLL VENOUS BLD VENIPUNCTURE: CPT | Performed by: SURGERY

## 2024-04-25 PROCEDURE — 94761 N-INVAS EAR/PLS OXIMETRY MLT: CPT

## 2024-04-25 PROCEDURE — 25000003 PHARM REV CODE 250: Performed by: SURGERY

## 2024-04-25 PROCEDURE — 99900035 HC TECH TIME PER 15 MIN (STAT)

## 2024-04-25 PROCEDURE — 94760 N-INVAS EAR/PLS OXIMETRY 1: CPT

## 2024-04-25 PROCEDURE — 12000002 HC ACUTE/MED SURGE SEMI-PRIVATE ROOM

## 2024-04-25 PROCEDURE — 25000003 PHARM REV CODE 250: Performed by: INTERNAL MEDICINE

## 2024-04-25 PROCEDURE — 97535 SELF CARE MNGMENT TRAINING: CPT

## 2024-04-25 RX ADMIN — LEUCINE, PHENYLALANINE, LYSINE, METHIONINE, ISOLEUCINE, VALINE, HISTIDINE, THREONINE, TRYPTOPHAN, ALANINE, GLYCINE, ARGININE, PROLINE, SERINE, TYROSINE, SODIUM ACETATE, DIBASIC POTASSIUM PHOSPHATE, MAGNESIUM CHLORIDE, SODIUM CHLORIDE, CALCIUM CHLORIDE, DEXTROSE
311; 238; 247; 170; 255; 247; 204; 179; 77; 880; 438; 489; 289; 213; 17; 297; 261; 51; 77; 33; 5 INJECTION INTRAVENOUS at 11:04

## 2024-04-25 RX ADMIN — DILTIAZEM HYDROCHLORIDE 120 MG: 120 CAPSULE, COATED, EXTENDED RELEASE ORAL at 08:04

## 2024-04-25 RX ADMIN — INSULIN ASPART 4 UNITS: 100 INJECTION, SOLUTION INTRAVENOUS; SUBCUTANEOUS at 05:04

## 2024-04-25 RX ADMIN — MUPIROCIN 1 G: 20 OINTMENT TOPICAL at 08:04

## 2024-04-25 RX ADMIN — ONDANSETRON 4 MG: 2 INJECTION INTRAMUSCULAR; INTRAVENOUS at 03:04

## 2024-04-25 RX ADMIN — LEUCINE, PHENYLALANINE, LYSINE, METHIONINE, ISOLEUCINE, VALINE, HISTIDINE, THREONINE, TRYPTOPHAN, ALANINE, GLYCINE, ARGININE, PROLINE, SERINE, TYROSINE, SODIUM ACETATE, DIBASIC POTASSIUM PHOSPHATE, MAGNESIUM CHLORIDE, SODIUM CHLORIDE, CALCIUM CHLORIDE, DEXTROSE
311; 238; 247; 170; 255; 247; 204; 179; 77; 880; 438; 489; 289; 213; 17; 297; 261; 51; 77; 33; 5 INJECTION INTRAVENOUS at 01:04

## 2024-04-25 RX ADMIN — INSULIN ASPART 2 UNITS: 100 INJECTION, SOLUTION INTRAVENOUS; SUBCUTANEOUS at 08:04

## 2024-04-25 RX ADMIN — ONDANSETRON 4 MG: 2 INJECTION INTRAMUSCULAR; INTRAVENOUS at 10:04

## 2024-04-25 RX ADMIN — PANTOPRAZOLE SODIUM 80 MG: 40 INJECTION, POWDER, FOR SOLUTION INTRAVENOUS at 01:04

## 2024-04-25 RX ADMIN — APIXABAN 5 MG: 5 TABLET, FILM COATED ORAL at 08:04

## 2024-04-25 RX ADMIN — EZETIMIBE 10 MG: 10 TABLET ORAL at 08:04

## 2024-04-25 NOTE — PT/OT/SLP PROGRESS
Occupational Therapy   Treatment    Name: Jen Torres  MRN: [id number]  Admitting Diagnosis:  GI bleed  3 Days Post-Op    Recommendations:     Discharge Recommendations: High Intensity Therapy  Discharge Equipment Recommendations:  to be determined by next level of care  Barriers to discharge:       Assessment:     Jen Torres is a 73 y.o. female with a medical diagnosis of GI bleed.  She presents with general weakness. Patient participated in bed mobility, bed/chair transfer using rolling walker and LB dressing sitting in chair. Performance deficits affecting function are weakness, impaired endurance, impaired self care skills, impaired functional mobility, gait instability, impaired balance, decreased upper extremity function, decreased lower extremity function, decreased safety awareness.     Rehab Prognosis:  Good; patient would benefit from acute skilled OT services to address these deficits and reach maximum level of function.       Plan:     Patient to be seen 5 x/week to address the above listed problems via self-care/home management, therapeutic activities, therapeutic exercises  Plan of Care Expires: 05/23/24  Plan of Care Reviewed with: patient    Subjective     Chief Complaint: General weakness  Patient/Family Comments/goals: Improved functional mobility and ADL independence.  Pain/Comfort:  Pain Rating 1: 3/10  Location 1: abdomen  Pain Rating Post-Intervention 1: 3/10    Objective:     Communicated with: nurse prior to session.  Patient found HOB elevated with telemetry, peripheral IV upon OT entry to room.    General Precautions: Standard, fall    Orthopedic Precautions:N/A  Braces: N/A  Respiratory Status: Nasal cannula, flow 2 L/min     Occupational Performance:     Bed Mobility:    Patient completed Scooting/Bridging with minimum assistance  Patient completed Supine to Sit with minimum assistance   Performed unsupported sitting EOB with contact guard assistance.    Functional  Mobility/Transfers:  Patient completed Bed <> Chair Transfer using Step Transfer technique with contact guard assistance with rolling walker  Functional Mobility: ambulated 10 feet using rolling walker with contact guard assistance.    Activities of Daily Living:  Lower Body Dressing: contact guard assistance to don/doff sock sitting EOB.      [facility] 6 Click ADL: 19    Treatment & Education:  Patient instructed to sit up in chair for at least 2 hours.    Patient left up in chair with all lines intact, call button in reach, and chair alarm on    GOALS:   Multidisciplinary Problems       Occupational Therapy Goals          Problem: Occupational Therapy    Goal Priority Disciplines Outcome Interventions   Occupational Therapy Goal     OT, PT/OT Progressing    Description: Goals to be met by: 5/23/2024     Patient will increase functional independence with ADLs by performing:    UE Dressing with Modified [place].  LE Dressing with Modified [place].  Grooming while standing at sink with Modified [place].  Toileting from toilet with Modified [place] for hygiene and clothing management.   Supine to sit with Modified [place].  Toilet transfer to toilet with Modified [place].  Upper extremity exercise program x10 reps per handout, with independence.                         Time Tracking:     OT Date of Treatment: 04/25/24  OT Start Time: 1110  OT Stop Time: 1128  OT Total Time (min): 18 min    Billable Minutes:Self Care/Home Management 18    OT/ERIC: OT          4/25/2024

## 2024-04-25 NOTE — PT/OT/SLP PROGRESS
"Physical Therapy Treatment    Patient Name:  Jen Torres   MRN:  [id number]    Recommendations:     Discharge Recommendations: High Intensity Therapy  Discharge Equipment Recommendations: to be determined by next level of care  Barriers to discharge: Decreased caregiver support    Assessment:     Jen Torres is a 73 y.o. female admitted with a medical diagnosis of GI bleed.  She presents with the following impairments/functional limitations: weakness, impaired endurance, impaired self care skills, impaired functional mobility, gait instability, impaired balance, pain, impaired cardiopulmonary response to activity, edema.    Rehab Prognosis: Good and Fair; patient would benefit from acute skilled PT services to address these deficits and reach maximum level of function.    Recent Surgery: Procedure(s) (LRB):  LAPAROTOMY, EXPLORATORY (N/A)  COLECTOMY, RIGHT  LYSIS, ADHESIONS  SURGICAL PROCEDURE, COLONOSCOPIC 3 Days Post-Op    Plan:     During this hospitalization, patient to be seen daily to address the identified rehab impairments via gait training, therapeutic activities, therapeutic exercises and progress toward the following goals:    Plan of Care Expires:  05/23/24    Subjective     Chief Complaint: Nausea  Patient/Family Comments/goals: Return to prior level of function  Pain/Comfort:  Pain Rating 1: 3/10  Location 1: abdomen  Pain Addressed 1: Reposition, Distraction, Cessation of Activity      Objective:     Communicated with DAVID Moise prior to session.  Patient found up in chair with chair check, peripheral IV, telemetry upon PT entry to room.     General Precautions: Standard, diabetic, fall  Orthopedic Precautions: N/A  Braces: N/A  Respiratory Status: Nasal cannula, flow 2 L/min     Functional Mobility:  N/A due to "nausea when I stand up and move."      AM-PAC 6 CLICK MOBILITY          Treatment & Education:  Pt performed B LE there ex sitting x 10 reps with vc for proper execution and joint " protection: ap, laq, marching, hip abd/add, gs/qs.    Pt was educated on the following: call light use, importance of OOB activity and functional mobility to negate the negative effects of prolonged bed rest during this hospitalization, safe transfers/ambulation and discharge planning recommendations/options.        Patient left up in chair with all lines intact, call button in reach, and chair alarm on..    GOALS:   Multidisciplinary Problems       Physical Therapy Goals          Problem: Physical Therapy    Goal Priority Disciplines Outcome Goal Variances Interventions   Physical Therapy Goal     PT, PT/OT      Description: Goals to be met by: 24     Patient will increase functional independence with mobility by performin. Supine to sit with Supervision  2. Sit to stand transfer with Supervision  3. Bed to chair transfer with Supervision using Rolling Walker  4. Gait  x 200 feet with Supervision using Rolling Walker.   5. Ascend & Descend 5 steps w/ rail and minimal Assist                             Time Tracking:     PT Received On: 24  PT Start Time: 1137     PT Stop Time: 1150  PT Total Time (min): 13 min     Billable Minutes: Therapeutic Exercise 13    Treatment Type: Treatment  PT/PTA: PT     Number of PTA visits since last PT visit: 0     2024

## 2024-04-25 NOTE — CARE UPDATE
04/25/24 0734   PRE-TX-O2   Device (Oxygen Therapy) nasal cannula   $ Is the patient on Low Flow Oxygen? Yes   Flow (L/min) (Oxygen Therapy) 2   SpO2 96 %   Pulse Oximetry Type Intermittent   $ Pulse Oximetry - Multiple Charge Pulse Oximetry - Multiple   Pulse 85   Resp 15   Respiratory Evaluation   $ Care Plan Tech Time 15 min

## 2024-04-25 NOTE — PLAN OF CARE
Gabrielle Gutierrez called and stated she did not see any clinicals in the packet that was sent over via [employer]. SW resent a new packet to Matt and NSR. SW to follow.

## 2024-04-25 NOTE — PROGRESS NOTES
[facility]  General Surgery  Progress Note    Subjective:     History of Present Illness:  74 y/o female with found to have bleeding colon mass.  Colonoscopy done today identified.   She is here for further treatment.    Post-Op Info:  Procedure(s) (LRB):  LAPAROTOMY, EXPLORATORY (N/A)  COLECTOMY, RIGHT  LYSIS, ADHESIONS  SURGICAL PROCEDURE, COLONOSCOPIC   3 Days Post-Op     Interval History: no flatus yet  Still feels bloated    Medications:  Continuous Infusions:  Current Facility-Administered Medications   Medication Dose Route Frequency Last Rate Last Admin     Scheduled Meds:  Current Facility-Administered Medications   Medication Dose Route Frequency    apixaban  5 mg Oral BID    diltiaZEM  120 mg Oral Daily    ezetimibe  10 mg Oral Daily    hydroCHLOROthiazide  25 mg Oral Every Mon, Wed, Fri    mupirocin   Nasal BID    pantoprazole  80 mg Intravenous Daily     PRN Meds:  Current Facility-Administered Medications:     0.9%  NaCl infusion (for blood administration), , Intravenous, Q24H PRN    acetaminophen, 650 mg, Oral, Q8H PRN    acetaminophen, 650 mg, Oral, Q4H PRN    aluminum-magnesium hydroxide-simethicone, 30 mL, Oral, QID PRN    dextrose 50%, 12.5 g, Intravenous, PRN    dextrose 50%, 25 g, Intravenous, PRN    glucagon (human recombinant), 1 mg, Intramuscular, PRN    glucose, 16 g, Oral, PRN    glucose, 24 g, Oral, PRN    HYDROcodone-acetaminophen, 1 tablet, Oral, Q4H PRN    insulin aspart U-100, 0-5 Units, Subcutaneous, QID (AC + HS) PRN    magnesium oxide, 800 mg, Oral, PRN    magnesium oxide, 800 mg, Oral, PRN    melatonin, 6 mg, Oral, Nightly PRN    naloxone, 0.02 mg, Intravenous, PRN    ondansetron, 4 mg, Intravenous, Q6H PRN    potassium bicarbonate, 35 mEq, Oral, PRN    potassium bicarbonate, 50 mEq, Oral, PRN    potassium bicarbonate, 60 mEq, Oral, PRN    potassium, sodium phosphates, 2 packet, Oral, PRN    potassium, sodium phosphates, 2 packet, Oral, PRN    potassium, sodium  phosphates, 2 packet, Oral, PRN    senna-docusate 8.6-50 mg, 1 tablet, Oral, BID PRN    sodium chloride 0.9%, 10 mL, Intravenous, Q12H PRN     Review of patient's allergies indicates:   Allergen Reactions    Statins-hmg-coa reductase inhibitors Swelling    Metformin Other (See Comments)     metallic taste , burn of the esophagus     Objective:     Vital Signs (Most Recent):  Temp: 98.1 °F (36.7 °C) (04/25/24 0448)  Pulse: 85 (04/25/24 0734)  Resp: 15 (04/25/24 0734)  BP: (!) 147/69 (04/25/24 0448)  SpO2: 96 % (04/25/24 0734) Vital Signs (24h Range):  Temp:  [97.5 °F (36.4 °C)-98.7 °F (37.1 °C)] 98.1 °F (36.7 °C)  Pulse:  [84-96] 85  Resp:  [15-18] 15  SpO2:  [96 %-99 %] 96 %  BP: (126-169)/(58-77) 147/69     Weight: 97.2 kg (214 lb 4.6 oz)  Body mass index is 32.58 kg/m².    Intake/Output - Last 3 Shifts         04/23 0700 04/24 0659 04/24 0700 04/25 0659 04/25 0700 04/26 0659    P.O. 600 120     I.V. (mL/kg)       Total Intake(mL/kg) 600 (6.2) 120 (1.2)     Urine (mL/kg/hr) 900 (0.4)      Total Output 900      Net -300 +120            Urine Occurrence 3 x 11 x     Emesis Occurrence  1 x              Physical Exam  Abdominal:      General: There is distension.      Palpations: Abdomen is soft.      Tenderness: There is no abdominal tenderness.          Significant Labs:  I have reviewed all pertinent lab results within the past 24 hours.  CBC:   Recent Labs   Lab 04/25/24  0511   WBC 17.82*   RBC 3.62*   HGB 8.8*   HCT 29.2*      MCV 81*   MCH 24.3*   MCHC 30.1*     CMP:   Recent Labs   Lab 04/25/24  0511   *   CALCIUM 8.7   ALBUMIN 3.2*   PROT 6.6   *   K 3.6   CO2 30*   CL 93*   BUN 11   CREATININE 1.0   ALKPHOS 90   ALT 20   AST 14   BILITOT 0.5       Significant Diagnostics:  I have reviewed all pertinent imaging results/findings within the past 24 hours.  Assessment/Plan:     * GI bleed  3 Days Post-Op  Path reviewed with patient  Await gi function return    Colonic mass    Await gi  function  Is use  Ok to anti coagulate        Jaden Barrios MD  General Surgery  [facility]

## 2024-04-25 NOTE — SUBJECTIVE & OBJECTIVE
Interval History:Still has not passed flatus or stool. Having n/v today.    Review of Systems   Constitutional:  Positive for fatigue. Negative for activity change, appetite change, chills and fever.   HENT:  Negative for congestion, ear pain, nosebleeds and sinus pain.    Eyes:  Negative for discharge and itching.   Respiratory:  Positive for shortness of breath. Negative for apnea, cough and chest tightness.    Cardiovascular:  Positive for chest pain. Negative for palpitations and leg swelling.   Gastrointestinal:  Positive for abdominal distention and abdominal pain. Negative for vomiting.   Genitourinary:  Negative for difficulty urinating, dysuria, flank pain and frequency.   Musculoskeletal:  Negative for arthralgias, back pain, joint swelling and myalgias.   Skin:  Negative for color change, pallor and rash.   Neurological:  Positive for weakness. Negative for dizziness, light-headedness and headaches.   Psychiatric/Behavioral:  Negative for agitation, behavioral problems, confusion and suicidal ideas.      Objective:     Vital Signs (Most Recent):  Temp: 97.9 °F (36.6 °C) (04/25/24 0734)  Pulse: 85 (04/25/24 0734)  Resp: 15 (04/25/24 0734)  BP: 131/61 (04/25/24 0734)  SpO2: 96 % (04/25/24 0734) Vital Signs (24h Range):  Temp:  [97.5 °F (36.4 °C)-98.2 °F (36.8 °C)] 97.9 °F (36.6 °C)  Pulse:  [84-95] 85  Resp:  [15-18] 15  SpO2:  [96 %-99 %] 96 %  BP: (131-169)/(61-77) 131/61     Weight: 97.2 kg (214 lb 4.6 oz)  Body mass index is 32.58 kg/m².    Intake/Output Summary (Last 24 hours) at 4/25/2024 1116  Last data filed at 4/25/2024 0521  Gross per 24 hour   Intake 120 ml   Output --   Net 120 ml         Physical Exam  Vitals reviewed.   Constitutional:       General: She is not in acute distress.     Appearance: Normal appearance. She is normal weight. She is not ill-appearing.   HENT:      Head: Normocephalic and atraumatic.      Nose: Nose normal.      Mouth/Throat:      Mouth: Mucous membranes are moist.       Pharynx: Oropharynx is clear.   Eyes:      General: No scleral icterus.     Extraocular Movements: Extraocular movements intact.      Conjunctiva/sclera: Conjunctivae normal.      Pupils: Pupils are equal, round, and reactive to light.   Cardiovascular:      Rate and Rhythm: Normal rate and regular rhythm.      Pulses: Normal pulses.      Heart sounds: Normal heart sounds. No murmur heard.     No gallop.   Pulmonary:      Effort: Pulmonary effort is normal. No respiratory distress.      Breath sounds: Normal breath sounds. No wheezing, rhonchi or rales.   Chest:      Chest wall: No tenderness.   Abdominal:      General: Abdomen is flat. There is no distension.      Palpations: Abdomen is soft.      Tenderness: There is abdominal tenderness.      Comments: Scant bowel sounds   Musculoskeletal:         General: No swelling or tenderness.      Cervical back: Normal range of motion and neck supple. No rigidity or tenderness.      Right lower leg: No edema.      Left lower leg: No edema.   Skin:     General: Skin is warm and dry.   Neurological:      General: No focal deficit present.      Mental Status: She is alert and oriented to person, place, and time. Mental status is at baseline.      Motor: No weakness.   Psychiatric:         Mood and Affect: Mood normal.         Behavior: Behavior normal.         Thought Content: Thought content normal.             Significant Labs: All pertinent labs within the past 24 hours have been reviewed.  BMP:   Recent Labs   Lab 04/25/24  0511   *   *   K 3.6   CL 93*   CO2 30*   BUN 11   CREATININE 1.0   CALCIUM 8.7   MG 1.9     CBC:   Recent Labs   Lab 04/24/24  0515 04/25/24  0511   WBC 21.44* 17.82*   HGB 8.3* 8.8*   HCT 27.7* 29.2*    448     CMP:   Recent Labs   Lab 04/24/24  0515 04/25/24  0511   * 132*   K 3.8 3.6   CL 95 93*   CO2 26 30*   * 248*   BUN 10 11   CREATININE 1.1 1.0   CALCIUM 8.4* 8.7   PROT 6.4 6.6   ALBUMIN 3.3* 3.2*   BILITOT  0.6 0.5   ALKPHOS 95 90   AST 19 14   ALT 28 20   ANIONGAP 11 9       Significant Imaging: I have reviewed all pertinent imaging results/findings within the past 24 hours.

## 2024-04-25 NOTE — PROGRESS NOTES
[facility] Medicine  Progress Note    Patient Name: Jen Torres  MRN: [id number]  Patient Class: IP- Inpatient   Admission Date: 4/19/2024  Length of Stay: 5 days  Attending Physician: Olivia Gamboa MD  Primary Care Provider: Sam Garcia IV, MD        Subjective:     Principal Problem:GI bleed        HPI:  Patient is a 73 old female medical history of anemia, GERD, hyperlipidemia, and migraines.  Patient presents to the ED today with complaints of worsening fatigue shortness for breath worse on exertion, and intermittent chest pain on exertion.  Patient was seen in GI office earlier today, thus sent into the ED for further evaluation.  Patient was found to have hemoglobin of 7.4, hematocrit 24.5.  1 unit blood ordered in the ED for transfusion.  Patient just had recent iron infusion on Wednesday.  Patient denies previous history of blood transfusions in the past.  Patient has had rectal bleeding and melena that has been present and steady for the past 2 months.  Patient reports being scheduled for outpatient stress test next week.  D-dimer elevated in the ED at 0.55, CTA negative for acute PE.  CT of the abdomen without acute abnormalities. Patient has history of fhypercoagulable state, DVT in LLE 2021. Patient does report taking Eliquis BID, last dose this morning. GI consulted.  Pt denies constipation/diarrhea, dysuria, abdominal pain, LE edema, diaphoresis, nausea, or vomiting. Patient is full code.    In the ED:  Chest x-ray negative.  Occult positive.  Troponin 5.0, TSH 2.254, UA negative.  Potassium 3.5, magnesium 1.6, INR 1.0, PTT 11.2.    Overview/Hospital Course:  The patient was transfused one unit PRBC on 04/19. She was seen in consult by GI, EGD done on 04/20. No significant findings. Colonoscopy done for 04/21 and revealed colonic mass in the transverse colon. Surgery was consulted, and resection was done 04/22. Her H/H were monitored for any further decrease.  Patient with expected postop pain. Patient developed n/v. Abdominal xray revealed ileus.     Interval History:Still has not passed flatus or stool. Having n/v today.    Review of Systems   Constitutional:  Positive for fatigue. Negative for activity change, appetite change, chills and fever.   HENT:  Negative for congestion, ear pain, nosebleeds and sinus pain.    Eyes:  Negative for discharge and itching.   Respiratory:  Positive for shortness of breath. Negative for apnea, cough and chest tightness.    Cardiovascular:  Positive for chest pain. Negative for palpitations and leg swelling.   Gastrointestinal:  Positive for abdominal distention and abdominal pain. Negative for vomiting.   Genitourinary:  Negative for difficulty urinating, dysuria, flank pain and frequency.   Musculoskeletal:  Negative for arthralgias, back pain, joint swelling and myalgias.   Skin:  Negative for color change, pallor and rash.   Neurological:  Positive for weakness. Negative for dizziness, light-headedness and headaches.   Psychiatric/Behavioral:  Negative for agitation, behavioral problems, confusion and suicidal ideas.      Objective:     Vital Signs (Most Recent):  Temp: 97.9 °F (36.6 °C) (04/25/24 0734)  Pulse: 85 (04/25/24 0734)  Resp: 15 (04/25/24 0734)  BP: 131/61 (04/25/24 0734)  SpO2: 96 % (04/25/24 0734) Vital Signs (24h Range):  Temp:  [97.5 °F (36.4 °C)-98.2 °F (36.8 °C)] 97.9 °F (36.6 °C)  Pulse:  [84-95] 85  Resp:  [15-18] 15  SpO2:  [96 %-99 %] 96 %  BP: (131-169)/(61-77) 131/61     Weight: 97.2 kg (214 lb 4.6 oz)  Body mass index is 32.58 kg/m².    Intake/Output Summary (Last 24 hours) at 4/25/2024 1116  Last data filed at 4/25/2024 0521  Gross per 24 hour   Intake 120 ml   Output --   Net 120 ml         Physical Exam  Vitals reviewed.   Constitutional:       General: She is not in acute distress.     Appearance: Normal appearance. She is normal weight. She is not ill-appearing.   HENT:      Head: Normocephalic and  atraumatic.      Nose: Nose normal.      Mouth/Throat:      Mouth: Mucous membranes are moist.      Pharynx: Oropharynx is clear.   Eyes:      General: No scleral icterus.     Extraocular Movements: Extraocular movements intact.      Conjunctiva/sclera: Conjunctivae normal.      Pupils: Pupils are equal, round, and reactive to light.   Cardiovascular:      Rate and Rhythm: Normal rate and regular rhythm.      Pulses: Normal pulses.      Heart sounds: Normal heart sounds. No murmur heard.     No gallop.   Pulmonary:      Effort: Pulmonary effort is normal. No respiratory distress.      Breath sounds: Normal breath sounds. No wheezing, rhonchi or rales.   Chest:      Chest wall: No tenderness.   Abdominal:      General: Abdomen is flat. There is no distension.      Palpations: Abdomen is soft.      Tenderness: There is abdominal tenderness.      Comments: Scant bowel sounds   Musculoskeletal:         General: No swelling or tenderness.      Cervical back: Normal range of motion and neck supple. No rigidity or tenderness.      Right lower leg: No edema.      Left lower leg: No edema.   Skin:     General: Skin is warm and dry.   Neurological:      General: No focal deficit present.      Mental Status: She is alert and oriented to person, place, and time. Mental status is at baseline.      Motor: No weakness.   Psychiatric:         Mood and Affect: Mood normal.         Behavior: Behavior normal.         Thought Content: Thought content normal.             Significant Labs: All pertinent labs within the past 24 hours have been reviewed.  BMP:   Recent Labs   Lab 04/25/24  0511   *   *   K 3.6   CL 93*   CO2 30*   BUN 11   CREATININE 1.0   CALCIUM 8.7   MG 1.9     CBC:   Recent Labs   Lab 04/24/24  0515 04/25/24  0511   WBC 21.44* 17.82*   HGB 8.3* 8.8*   HCT 27.7* 29.2*    448     CMP:   Recent Labs   Lab 04/24/24  0515 04/25/24  0511   * 132*   K 3.8 3.6   CL 95 93*   CO2 26 30*   * 248*  "  BUN 10 11   CREATININE 1.1 1.0   CALCIUM 8.4* 8.7   PROT 6.4 6.6   ALBUMIN 3.3* 3.2*   BILITOT 0.6 0.5   ALKPHOS 95 90   AST 19 14   ALT 28 20   ANIONGAP 11 9       Significant Imaging: I have reviewed all pertinent imaging results/findings within the past 24 hours.    Assessment/Plan:      * GI bleed  GI consulted, had unremarkable EGD; colonoscopy revealed colonic mass; s/p resection 04/22  Serial CBC, monitor on telemetry   Hbg dropped from 11.5 to 7.4 in 4 months.       Colonic mass  S/p resection 04/22  Awaiting return of bowel function      Iron deficiency anemia due to chronic blood loss  Last iron infusion 04/17  Symptomatic anemia with SOB, pale, fatigue  Transfused one unit PRBC on 04/19  Serial CBCs, monitor on telemetry     Type 2 diabetes mellitus without complication, without long-term current use of insulin  POCT glucose, insulin sliding scale     Essential hypertension  Chronic, controlled. Latest blood pressure and vitals reviewed-     Temp:  [97.1 °F (36.2 °C)-99.2 °F (37.3 °C)]   Pulse:  []   Resp:  [15-20]   BP: (105-160)/(56-95)   SpO2:  [92 %-100 %] .   Home meds for hypertension were reviewed and noted below.   Hypertension Medications               diltiaZEM (CARDIZEM CD) 120 MG Cp24 Take 1 capsule (120 mg total) by mouth once daily.    hydroCHLOROthiazide (HYDRODIURIL) 25 MG tablet Take 25 mg by mouth every Mon, Wed, Fri.            While in the hospital, will manage blood pressure as follows; Continue home antihypertensive regimen    Will utilize p.r.n. blood pressure medication only if patient's blood pressure greater than 180/110 and she develops symptoms such as worsening chest pain or shortness of breath.      VTE Risk Mitigation (From admission, onward)           Ordered     apixaban tablet 5 mg  2 times daily        Note to Pharmacy: Per Dr. Barrios's note "Ok to restart anticoagulation"    04/24/24 0615     Reason for No Pharmacological VTE Prophylaxis  Once        Question:  " Reasons:  Answer:  Active Bleeding    04/19/24 1641     IP VTE HIGH RISK PATIENT  Once         04/19/24 1641     Place sequential compression device  Until discontinued         04/19/24 1641                    Discharge Planning   KATIE: 4/26/2024     Code Status: Full Code   Is the patient medically ready for discharge?:     Reason for patient still in hospital (select all that apply): Patient trending condition  Discharge Plan A: Rehab   Discharge Delays: None known at this time              Olivia Gamboa MD, MD  Department of Hospital Medicine   [facility]

## 2024-04-25 NOTE — PLAN OF CARE
Problem: Parenteral Nutrition  Goal: Effective Intravenous Nutrition Therapy Delivery  Outcome: Progressing  Intervention: Optimize Intravenous Nutrition Delivery  Flowsheets (Taken 4/25/2024 1217)  Nutrition Support Management: parenteral nutrition initiated

## 2024-04-25 NOTE — CONSULTS
[facility]  Adult Nutrition   Consult Note (Nutrition Support Management)    SUMMARY     Recommendations  Recommendation/Intervention: 1. RD ordered Clinimix E @ 100 ml/hr to be initiated today. (To provide 816 kcal/day, 102 g/day protein). 2. Pt at risk for refeeding.  Recommend continued monitoring and management of blood glucose and electrolytes. 3. Recommend diet initiation and advancement as medically able.  Goals: 1. Pt to meet EPN by follow up. 2. Transition from PPN to oral diet. 3. Blood glucose and electrolytes to trend towards normal limits/ target ranges.  Nutrition Goal Status: new  Communication of RD Recs: reviewed with physician (reviewed with RN)    Nutritional Diagnosis (PES Statement)   Inadequate oral intake related to catabolism energy increases as evidenced by NPO status/CLD x 6 days; s/p colectomy 4/22/24.    Malnutrition Assessment  NFPE completed by RD on 4/22 with no s/s of wasting.     Dietitian Rounds Brief  Pt was on CLD but had N/V today and made NPO again. Awaiting GI function to return per surgery progress notes. Plans for xray today. Will monitor findings/results. RD recommended starting PPN, surgeon agreed. Consult received for PPN management.   LBM: 04/21/24, liquid (some formed)    Reason for Assessment  Reason For Assessment: consult, other (see comments) (PPN)  Diagnosis: gastrointestinal disease  Relevant Medical History: anemia, GERD, hyperlipidemia, DM2, and migraines  General Information Comments: -  Nutrition Discharge Planning: Pending hospital course    Nutrition Risk Screen  Nutrition Risk Screen: no indicators present     MST Score: 0  Have you recently lost weight without trying?: No  Weight loss score: 0  Have you been eating poorly because of a decreased appetite?: No  Appetite score: 0       Nutrition Related Social Determinants of Health: SDOH: Adequate food in home environment    Food Insecurity: No Food Insecurity (8/14/2023)    [facility] from  "[facility]    Hunger Vital Sign     Worried About Running Out of Food in the Last Year: Never true     Ran Out of Food in the Last Year: Never true     Nutrition/Diet History  Spiritual, Cultural Beliefs, Restorationist Practices, Values that Affect Care: no  Food Allergies: NKFA  Factors Affecting Nutritional Intake: NPO, nausea/vomiting    Anthropometrics  Temp: 97.9 °F (36.6 °C)  Height Method: Measured  Height: 5' 8" (172.7 cm)  Height (inches): 68 in  Weight Method: Bed Scale  Weight: 97.2 kg (214 lb 4.6 oz)  Weight (lb): 214.29 lb  Ideal Body Weight (IBW), Female: 140 lb  % Ideal Body Weight, Female (lb): 153.06 %  BMI (Calculated): 32.6  BMI Grade: 30 - 34.9- obesity - grade I       Weight History:  Wt Readings from Last 10 Encounters:   04/19/24 97.2 kg (214 lb 4.6 oz)   04/17/24 96.6 kg (212 lb 15.4 oz)   04/04/24 97.8 kg (215 lb 8 oz)   03/27/24 96.6 kg (213 lb)   03/21/24 97.4 kg (214 lb 11.7 oz)   03/14/24 97.4 kg (214 lb 12.8 oz)   03/07/24 95.8 kg (211 lb 1.6 oz)   03/08/24 95.7 kg (211 lb)   02/26/24 97.1 kg (214 lb 1.1 oz)   02/14/24 97.1 kg (214 lb 1.1 oz)       Lab/Procedures/Meds: Pertinent Labs Reviewed    Clinical Chemistry:  Recent Labs   Lab 04/19/24  1313 04/20/24  0501 04/21/24  0652 04/22/24  0449 04/23/24  0437 04/24/24  0515 04/25/24  0511     137 138 139 139 136 132* 132*   K 3.5  3.5 3.7 3.9 3.7 3.7 3.8 3.6     103 104 105 105 101 95 93*   CO2 22*  22* 25 27 24 25 26 30*   *  255* 146* 166* 170* 245* 242* 248*   BUN 14  14 10 9 8 7* 10 11   CREATININE 1.2  1.2 1.0 1.1 1.0 1.0 1.1 1.0   CALCIUM 8.9  8.9 8.8 8.9 8.6* 8.2* 8.4* 8.7   PROT 6.9  6.9 6.5 6.4 6.3 6.1 6.4 6.6   ALBUMIN 3.7  3.7 3.4* 3.5 3.4* 3.3* 3.3* 3.2*   BILITOT 0.2  0.2 0.3 0.3 0.4 0.4 0.6 0.5   ALKPHOS 107  107 108 98 105 91 95 90   AST 43*  43* 33 60* 42* 27 19 14   ALT 38  38 33 39 39 34 28 20   ANIONGAP 12 "  12 9 7* 10 10 11 9   MG 1.6  1.6 1.8 1.8 1.9 1.8 1.8 1.9   PHOS  --  2.7 2.5* 2.8  --   --   --    LIPASE 59  --   --   --   --   --   --        CBC:   Recent Labs   Lab 04/25/24  0511   WBC 17.82*   RBC 3.62*   HGB 8.8*   HCT 29.2*      MCV 81*   MCH 24.3*   MCHC 30.1*       Cardiac Profile:  Recent Labs   Lab 04/19/24  1313   BNP 37   CPK 34       Diabetes:  Lab Results   Component Value Date    HGBA1C 9.0 (H) 03/08/2024       Thyroid & Parathyroid:  Recent Labs   Lab 04/19/24  1313   TSH 2.254     Medications: Pertinent Medications reviewed    Scheduled Meds:  Current Facility-Administered Medications   Medication Dose Route Frequency    apixaban  5 mg Oral BID    diltiaZEM  120 mg Oral Daily    ezetimibe  10 mg Oral Daily    hydroCHLOROthiazide  25 mg Oral Every Mon, Wed, Fri    mupirocin   Nasal BID    pantoprazole  80 mg Intravenous Daily       Continuous Infusions:  Current Facility-Administered Medications   Medication Dose Route Frequency Last Rate Last Admin    amino acid 4.25% - dextrose 5% (CLINIMIX-E) solution   Intravenous Continuous           PRN Meds:.  Current Facility-Administered Medications:     0.9%  NaCl infusion (for blood administration), , Intravenous, Q24H PRN    acetaminophen, 650 mg, Oral, Q8H PRN    acetaminophen, 650 mg, Oral, Q4H PRN    aluminum-magnesium hydroxide-simethicone, 30 mL, Oral, QID PRN    dextrose 50%, 12.5 g, Intravenous, PRN    dextrose 50%, 25 g, Intravenous, PRN    glucagon (human recombinant), 1 mg, Intramuscular, PRN    glucose, 16 g, Oral, PRN    glucose, 24 g, Oral, PRN    HYDROcodone-acetaminophen, 1 tablet, Oral, Q4H PRN    insulin aspart U-100, 0-5 Units, Subcutaneous, QID (AC + HS) PRN    magnesium oxide, 800 mg, Oral, PRN    magnesium oxide, 800 mg, Oral, PRN    melatonin, 6 mg, Oral, Nightly PRN    naloxone, 0.02 mg, Intravenous, PRN    ondansetron, 4 mg, Intravenous, Q6H PRN    potassium bicarbonate, 35 mEq, Oral, PRN    potassium bicarbonate, 50  mEq, Oral, PRN    potassium bicarbonate, 60 mEq, Oral, PRN    potassium, sodium phosphates, 2 packet, Oral, PRN    potassium, sodium phosphates, 2 packet, Oral, PRN    potassium, sodium phosphates, 2 packet, Oral, PRN    senna-docusate 8.6-50 mg, 1 tablet, Oral, BID PRN    sodium chloride 0.9%, 10 mL, Intravenous, Q12H PRN    Estimated/Assessed Needs  Weight Used For Calorie Calculations: 97.2 kg (214 lb 4.6 oz)  Energy Calorie Requirements (kcal): 7323-1441  Energy Need Method: Kcal/kg (20-25)  Protein Requirements: 78-97 (0.8-1.0)  Weight Used For Protein Calculations: 97.2 kg (214 lb 4.6 oz)  Fluid Requirements (mL): 8280-4489     RDA Method (mL): 1944  CHO Requirement: 291gm CHO per day    Nutrition Prescription Ordered  Current Diet Order: NPO    Evaluation of Received Nutrient/Fluid Intake  Energy Calories Required: not meeting needs  Protein Required: not meeting needs  Fluid Required: not meeting needs  Tolerance: other (see comments) (NPO)     Intake/Output Summary (Last 24 hours) at 4/25/2024 1217  Last data filed at 4/25/2024 0900  Gross per 24 hour   Intake 240 ml   Output --   Net 240 ml      % Intake of Estimated Energy Needs: 0%  % Meal Intake: NPO    Nutrition Risk  Level of Risk/Frequency of Follow-up: high (2x/week)     Monitor and Evaluation  Food and Nutrient Intake: energy intake, parenteral nutrition intake  Food and Nutrient Adminstration: enteral and parenteral nutrition administration  Knowledge/Beliefs/Attitudes: beliefs and attitudes, food and nutrition knowledge/skill  Physical Activity and Function: nutrition-related ADLs and IADLs, factors affecting access to physical activity  Anthropometric Measurements: weight, weight change, body mass index  Biochemical Data, Medical Tests and Procedures: electrolyte and renal panel, lipid profile, gastrointestinal profile, glucose/endocrine profile, inflammatory profile  Nutrition-Focused Physical Findings: overall appearance     Nutrition  Follow-Up  RD Follow-up?: Yes    Bertha Velazquez RD 04/25/2024 12:18 PM

## 2024-04-25 NOTE — PLAN OF CARE
Problem: Occupational Therapy  Goal: Occupational Therapy Goal  Description: Goals to be met by: 5/23/2024     Patient will increase functional independence with ADLs by performing:    UE Dressing with Modified [place].  LE Dressing with Modified [place].  Grooming while standing at sink with Modified [place].  Toileting from toilet with Modified [place] for hygiene and clothing management.   Supine to sit with Modified [place].  Toilet transfer to toilet with Modified [place].  Upper extremity exercise program x10 reps per handout, with independence.    Outcome: Progressing      Refill requested by: Betty DOMINGUEZ  Phone:  [phone]  PCP:  Marina Arthur MD  Med: VITAMIN D2 50,000iu (ERGO) CAP RX  Dosage:   sig:  Take one capsule by mouth one day per week  Allergies:   Quantity requestin  Mail Order: N/A  Pharmacy name:  Pharmacy is marked   Pharmacy phone:  [phone]   Pharmacy fax: [phone]    Comments:

## 2024-04-25 NOTE — SUBJECTIVE & OBJECTIVE
Interval History: no flatus yet  Still feels bloated    Medications:  Continuous Infusions:  Current Facility-Administered Medications   Medication Dose Route Frequency Last Rate Last Admin     Scheduled Meds:  Current Facility-Administered Medications   Medication Dose Route Frequency    apixaban  5 mg Oral BID    diltiaZEM  120 mg Oral Daily    ezetimibe  10 mg Oral Daily    hydroCHLOROthiazide  25 mg Oral Every Mon, Wed, Fri    mupirocin   Nasal BID    pantoprazole  80 mg Intravenous Daily     PRN Meds:  Current Facility-Administered Medications:     0.9%  NaCl infusion (for blood administration), , Intravenous, Q24H PRN    acetaminophen, 650 mg, Oral, Q8H PRN    acetaminophen, 650 mg, Oral, Q4H PRN    aluminum-magnesium hydroxide-simethicone, 30 mL, Oral, QID PRN    dextrose 50%, 12.5 g, Intravenous, PRN    dextrose 50%, 25 g, Intravenous, PRN    glucagon (human recombinant), 1 mg, Intramuscular, PRN    glucose, 16 g, Oral, PRN    glucose, 24 g, Oral, PRN    HYDROcodone-acetaminophen, 1 tablet, Oral, Q4H PRN    insulin aspart U-100, 0-5 Units, Subcutaneous, QID (AC + HS) PRN    magnesium oxide, 800 mg, Oral, PRN    magnesium oxide, 800 mg, Oral, PRN    melatonin, 6 mg, Oral, Nightly PRN    naloxone, 0.02 mg, Intravenous, PRN    ondansetron, 4 mg, Intravenous, Q6H PRN    potassium bicarbonate, 35 mEq, Oral, PRN    potassium bicarbonate, 50 mEq, Oral, PRN    potassium bicarbonate, 60 mEq, Oral, PRN    potassium, sodium phosphates, 2 packet, Oral, PRN    potassium, sodium phosphates, 2 packet, Oral, PRN    potassium, sodium phosphates, 2 packet, Oral, PRN    senna-docusate 8.6-50 mg, 1 tablet, Oral, BID PRN    sodium chloride 0.9%, 10 mL, Intravenous, Q12H PRN     Review of patient's allergies indicates:   Allergen Reactions    Statins-hmg-coa reductase inhibitors Swelling    Metformin Other (See Comments)     metallic taste , burn of the esophagus     Objective:     Vital Signs (Most Recent):  Temp: 98.1 °F (36.7  °C) (04/25/24 0448)  Pulse: 85 (04/25/24 0734)  Resp: 15 (04/25/24 0734)  BP: (!) 147/69 (04/25/24 0448)  SpO2: 96 % (04/25/24 0734) Vital Signs (24h Range):  Temp:  [97.5 °F (36.4 °C)-98.7 °F (37.1 °C)] 98.1 °F (36.7 °C)  Pulse:  [84-96] 85  Resp:  [15-18] 15  SpO2:  [96 %-99 %] 96 %  BP: (126-169)/(58-77) 147/69     Weight: 97.2 kg (214 lb 4.6 oz)  Body mass index is 32.58 kg/m².    Intake/Output - Last 3 Shifts         04/23 0700 04/24 0659 04/24 0700 04/25 0659 04/25 0700 04/26 0659    P.O. 600 120     I.V. (mL/kg)       Total Intake(mL/kg) 600 (6.2) 120 (1.2)     Urine (mL/kg/hr) 900 (0.4)      Total Output 900      Net -300 +120            Urine Occurrence 3 x 11 x     Emesis Occurrence  1 x              Physical Exam  Abdominal:      General: There is distension.      Palpations: Abdomen is soft.      Tenderness: There is no abdominal tenderness.          Significant Labs:  I have reviewed all pertinent lab results within the past 24 hours.  CBC:   Recent Labs   Lab 04/25/24  0511   WBC 17.82*   RBC 3.62*   HGB 8.8*   HCT 29.2*      MCV 81*   MCH 24.3*   MCHC 30.1*     CMP:   Recent Labs   Lab 04/25/24  0511   *   CALCIUM 8.7   ALBUMIN 3.2*   PROT 6.6   *   K 3.6   CO2 30*   CL 93*   BUN 11   CREATININE 1.0   ALKPHOS 90   ALT 20   AST 14   BILITOT 0.5       Significant Diagnostics:  I have reviewed all pertinent imaging results/findings within the past 24 hours.

## 2024-04-25 NOTE — PLAN OF CARE
CHERRY reached out to Rosa with [employer] ([phone]) she stated she has started filling out the paper work with pt, and will come back tomorrow to finish filling out the paperwork. Rosa stated she was still concerned about NSR. CHERRY reached out to NSPABLO and left a voicemail asking for a return phone call. CHERRY to follow.     04/25/24 1605   Post-Acute Status   Post-Acute Authorization Placement   Post-Acute Placement Status Pending payor medical review/second level review   Coverage MEDICARE - MEDICARE PART A & B   Discharge Plan   Discharge Plan A Rehab   Discharge Plan B Rehab

## 2024-04-26 LAB
ALBUMIN SERPL BCP-MCNC: 3.1 G/DL (ref 3.5–5.2)
ALP SERPL-CCNC: 81 U/L (ref 55–135)
ALT SERPL W/O P-5'-P-CCNC: 15 U/L (ref 10–44)
ANION GAP SERPL CALC-SCNC: 8 MMOL/L (ref 8–16)
AST SERPL-CCNC: 11 U/L (ref 10–40)
BASOPHILS # BLD AUTO: 0.03 K/UL (ref 0–0.2)
BASOPHILS NFR BLD: 0.2 % (ref 0–1.9)
BILIRUB SERPL-MCNC: 0.3 MG/DL (ref 0.1–1)
BUN SERPL-MCNC: 19 MG/DL (ref 8–23)
CALCIUM SERPL-MCNC: 8.6 MG/DL (ref 8.7–10.5)
CHLORIDE SERPL-SCNC: 92 MMOL/L (ref 95–110)
CO2 SERPL-SCNC: 30 MMOL/L (ref 23–29)
CREAT SERPL-MCNC: 1 MG/DL (ref 0.5–1.4)
DIFFERENTIAL METHOD BLD: ABNORMAL
EOSINOPHIL # BLD AUTO: 0.3 K/UL (ref 0–0.5)
EOSINOPHIL NFR BLD: 2.2 % (ref 0–8)
ERYTHROCYTE [DISTWIDTH] IN BLOOD BY AUTOMATED COUNT: 22 % (ref 11.5–14.5)
EST. GFR  (NO RACE VARIABLE): >60 ML/MIN/1.73 M^2
GLUCOSE SERPL-MCNC: 238 MG/DL (ref 70–110)
GLUCOSE SERPL-MCNC: 321 MG/DL (ref 70–110)
GLUCOSE SERPL-MCNC: 340 MG/DL (ref 70–110)
GLUCOSE SERPL-MCNC: 365 MG/DL (ref 70–110)
GLUCOSE SERPL-MCNC: 393 MG/DL (ref 70–110)
HCT VFR BLD AUTO: 27.4 % (ref 37–48.5)
HGB BLD-MCNC: 8.3 G/DL (ref 12–16)
IMM GRANULOCYTES # BLD AUTO: 0.11 K/UL (ref 0–0.04)
IMM GRANULOCYTES NFR BLD AUTO: 0.7 % (ref 0–0.5)
LYMPHOCYTES # BLD AUTO: 1.6 K/UL (ref 1–4.8)
LYMPHOCYTES NFR BLD: 10.9 % (ref 18–48)
MAGNESIUM SERPL-MCNC: 1.8 MG/DL (ref 1.6–2.6)
MCH RBC QN AUTO: 24.5 PG (ref 27–31)
MCHC RBC AUTO-ENTMCNC: 30.3 G/DL (ref 32–36)
MCV RBC AUTO: 81 FL (ref 82–98)
MONOCYTES # BLD AUTO: 1.2 K/UL (ref 0.3–1)
MONOCYTES NFR BLD: 8.2 % (ref 4–15)
NEUTROPHILS # BLD AUTO: 11.5 K/UL (ref 1.8–7.7)
NEUTROPHILS NFR BLD: 77.8 % (ref 38–73)
NRBC BLD-RTO: 0 /100 WBC
PHOSPHATE SERPL-MCNC: 2.6 MG/DL (ref 2.7–4.5)
PLATELET # BLD AUTO: 439 K/UL (ref 150–450)
PMV BLD AUTO: 10.2 FL (ref 9.2–12.9)
POTASSIUM SERPL-SCNC: 3.8 MMOL/L (ref 3.5–5.1)
PREALB SERPL-MCNC: 6.3 MG/DL (ref 20–43)
PROT SERPL-MCNC: 6 G/DL (ref 6–8.4)
RBC # BLD AUTO: 3.39 M/UL (ref 4–5.4)
SODIUM SERPL-SCNC: 130 MMOL/L (ref 136–145)
TRIGL SERPL-MCNC: 155 MG/DL (ref 30–150)
WBC # BLD AUTO: 14.84 K/UL (ref 3.9–12.7)

## 2024-04-26 PROCEDURE — 83735 ASSAY OF MAGNESIUM: CPT | Performed by: SURGERY

## 2024-04-26 PROCEDURE — 84478 ASSAY OF TRIGLYCERIDES: CPT | Performed by: SURGERY

## 2024-04-26 PROCEDURE — 25000003 PHARM REV CODE 250: Performed by: SURGERY

## 2024-04-26 PROCEDURE — 84134 ASSAY OF PREALBUMIN: CPT | Performed by: SURGERY

## 2024-04-26 PROCEDURE — 12000002 HC ACUTE/MED SURGE SEMI-PRIVATE ROOM

## 2024-04-26 PROCEDURE — 80053 COMPREHEN METABOLIC PANEL: CPT | Performed by: SURGERY

## 2024-04-26 PROCEDURE — 97110 THERAPEUTIC EXERCISES: CPT

## 2024-04-26 PROCEDURE — 97116 GAIT TRAINING THERAPY: CPT | Mod: CQ

## 2024-04-26 PROCEDURE — 63600175 PHARM REV CODE 636 W HCPCS: Performed by: SURGERY

## 2024-04-26 PROCEDURE — 63600175 PHARM REV CODE 636 W HCPCS: Performed by: INTERNAL MEDICINE

## 2024-04-26 PROCEDURE — 94760 N-INVAS EAR/PLS OXIMETRY 1: CPT

## 2024-04-26 PROCEDURE — 36415 COLL VENOUS BLD VENIPUNCTURE: CPT | Performed by: SURGERY

## 2024-04-26 PROCEDURE — 27000221 HC OXYGEN, UP TO 24 HOURS

## 2024-04-26 PROCEDURE — 84100 ASSAY OF PHOSPHORUS: CPT | Performed by: SURGERY

## 2024-04-26 PROCEDURE — 25000003 PHARM REV CODE 250: Performed by: INTERNAL MEDICINE

## 2024-04-26 PROCEDURE — 85025 COMPLETE CBC W/AUTO DIFF WBC: CPT | Performed by: SURGERY

## 2024-04-26 PROCEDURE — C9113 INJ PANTOPRAZOLE SODIUM, VIA: HCPCS | Performed by: SURGERY

## 2024-04-26 PROCEDURE — 94761 N-INVAS EAR/PLS OXIMETRY MLT: CPT

## 2024-04-26 RX ORDER — METOPROLOL TARTRATE 1 MG/ML
5 INJECTION, SOLUTION INTRAVENOUS EVERY 12 HOURS
Status: DISCONTINUED | OUTPATIENT
Start: 2024-04-26 | End: 2024-04-27

## 2024-04-26 RX ORDER — GLUCAGON 1 MG
1 KIT INJECTION
Status: DISCONTINUED | OUTPATIENT
Start: 2024-04-26 | End: 2024-04-27

## 2024-04-26 RX ORDER — INSULIN ASPART 100 [IU]/ML
0-10 INJECTION, SOLUTION INTRAVENOUS; SUBCUTANEOUS EVERY 6 HOURS PRN
Status: DISCONTINUED | OUTPATIENT
Start: 2024-04-26 | End: 2024-04-27

## 2024-04-26 RX ORDER — ENOXAPARIN SODIUM 100 MG/ML
1 INJECTION SUBCUTANEOUS EVERY 12 HOURS
Status: DISCONTINUED | OUTPATIENT
Start: 2024-04-26 | End: 2024-04-30 | Stop reason: HOSPADM

## 2024-04-26 RX ADMIN — LEUCINE, PHENYLALANINE, LYSINE, METHIONINE, ISOLEUCINE, VALINE, HISTIDINE, THREONINE, TRYPTOPHAN, ALANINE, GLYCINE, ARGININE, PROLINE, SERINE, TYROSINE, SODIUM ACETATE, DIBASIC POTASSIUM PHOSPHATE, MAGNESIUM CHLORIDE, SODIUM CHLORIDE, CALCIUM CHLORIDE, DEXTROSE
311; 238; 247; 170; 255; 247; 204; 179; 77; 880; 438; 489; 289; 213; 17; 297; 261; 51; 77; 33; 5 INJECTION INTRAVENOUS at 09:04

## 2024-04-26 RX ADMIN — INSULIN ASPART 2 UNITS: 100 INJECTION, SOLUTION INTRAVENOUS; SUBCUTANEOUS at 08:04

## 2024-04-26 RX ADMIN — INSULIN ASPART 8 UNITS: 100 INJECTION, SOLUTION INTRAVENOUS; SUBCUTANEOUS at 12:04

## 2024-04-26 RX ADMIN — INSULIN ASPART 8 UNITS: 100 INJECTION, SOLUTION INTRAVENOUS; SUBCUTANEOUS at 04:04

## 2024-04-26 RX ADMIN — LEUCINE, PHENYLALANINE, LYSINE, METHIONINE, ISOLEUCINE, VALINE, HISTIDINE, THREONINE, TRYPTOPHAN, ALANINE, GLYCINE, ARGININE, PROLINE, SERINE, TYROSINE, SODIUM ACETATE, DIBASIC POTASSIUM PHOSPHATE, MAGNESIUM CHLORIDE, SODIUM CHLORIDE, CALCIUM CHLORIDE, DEXTROSE
311; 238; 247; 170; 255; 247; 204; 179; 77; 880; 438; 489; 289; 213; 17; 297; 261; 51; 77; 33; 5 INJECTION INTRAVENOUS at 08:04

## 2024-04-26 RX ADMIN — METOPROLOL TARTRATE 5 MG: 1 INJECTION, SOLUTION INTRAVENOUS at 08:04

## 2024-04-26 RX ADMIN — PANTOPRAZOLE SODIUM 80 MG: 40 INJECTION, POWDER, FOR SOLUTION INTRAVENOUS at 12:04

## 2024-04-26 RX ADMIN — ENOXAPARIN SODIUM 100 MG: 100 INJECTION SUBCUTANEOUS at 08:04

## 2024-04-26 RX ADMIN — ENOXAPARIN SODIUM 100 MG: 100 INJECTION SUBCUTANEOUS at 12:04

## 2024-04-26 RX ADMIN — METOPROLOL TARTRATE 5 MG: 1 INJECTION, SOLUTION INTRAVENOUS at 12:04

## 2024-04-26 RX ADMIN — INSULIN ASPART 5 UNITS: 100 INJECTION, SOLUTION INTRAVENOUS; SUBCUTANEOUS at 07:04

## 2024-04-26 RX ADMIN — SODIUM PHOSPHATE, MONOBASIC, MONOHYDRATE AND SODIUM PHOSPHATE, DIBASIC, ANHYDROUS 15 MMOL: 142; 276 INJECTION, SOLUTION INTRAVENOUS at 02:04

## 2024-04-26 NOTE — SUBJECTIVE & OBJECTIVE
Interval History:Still has not passed flatus or stool. Having n/v today.    Review of Systems   Constitutional:  Positive for fatigue. Negative for activity change, appetite change, chills and fever.   HENT:  Negative for congestion, ear pain, nosebleeds and sinus pain.    Eyes:  Negative for discharge and itching.   Respiratory:  Negative for apnea, cough, chest tightness and shortness of breath.    Cardiovascular:  Negative for chest pain, palpitations and leg swelling.   Gastrointestinal:  Positive for abdominal distention and abdominal pain. Negative for vomiting.   Genitourinary:  Negative for difficulty urinating, dysuria, flank pain and frequency.   Musculoskeletal:  Negative for arthralgias, back pain, joint swelling and myalgias.   Skin:  Negative for color change, pallor and rash.   Neurological:  Positive for weakness. Negative for dizziness, light-headedness and headaches.   Psychiatric/Behavioral:  Negative for agitation, behavioral problems, confusion and suicidal ideas.      Objective:     Vital Signs (Most Recent):  Temp: 98.7 °F (37.1 °C) (04/26/24 0506)  Pulse: 81 (04/26/24 0506)  Resp: 18 (04/26/24 0506)  BP: 116/68 (04/26/24 0506)  SpO2: 96 % (04/26/24 0506) Vital Signs (24h Range):  Temp:  [98.4 °F (36.9 °C)-98.8 °F (37.1 °C)] 98.7 °F (37.1 °C)  Pulse:  [81-92] 81  Resp:  [18] 18  SpO2:  [94 %-98 %] 96 %  BP: (116-158)/(60-72) 116/68     Weight: 97.2 kg (214 lb 4.6 oz)  Body mass index is 32.58 kg/m².    Intake/Output Summary (Last 24 hours) at 4/26/2024 1110  Last data filed at 4/25/2024 1731  Gross per 24 hour   Intake 240 ml   Output --   Net 240 ml         Physical Exam  Vitals reviewed.   Constitutional:       General: She is not in acute distress.     Appearance: Normal appearance. She is normal weight. She is not ill-appearing.   HENT:      Head: Normocephalic and atraumatic.      Nose: Nose normal.      Mouth/Throat:      Mouth: Mucous membranes are moist.      Pharynx: Oropharynx is  clear.   Eyes:      General: No scleral icterus.     Extraocular Movements: Extraocular movements intact.      Conjunctiva/sclera: Conjunctivae normal.      Pupils: Pupils are equal, round, and reactive to light.   Cardiovascular:      Rate and Rhythm: Normal rate and regular rhythm.      Pulses: Normal pulses.      Heart sounds: Normal heart sounds. No murmur heard.     No gallop.   Pulmonary:      Effort: Pulmonary effort is normal. No respiratory distress.      Breath sounds: Normal breath sounds. No wheezing, rhonchi or rales.   Chest:      Chest wall: No tenderness.   Abdominal:      General: Abdomen is flat. There is no distension.      Palpations: Abdomen is soft.      Tenderness: There is abdominal tenderness.      Comments: Scant bowel sounds   Musculoskeletal:         General: No swelling or tenderness.      Cervical back: Normal range of motion and neck supple. No rigidity or tenderness.      Right lower leg: No edema.      Left lower leg: No edema.   Skin:     General: Skin is warm and dry.   Neurological:      General: No focal deficit present.      Mental Status: She is alert and oriented to person, place, and time. Mental status is at baseline.      Motor: No weakness.   Psychiatric:         Mood and Affect: Mood normal.         Behavior: Behavior normal.         Thought Content: Thought content normal.             Significant Labs: All pertinent labs within the past 24 hours have been reviewed.  BMP:   Recent Labs   Lab 04/26/24 0435   *   *   K 3.8   CL 92*   CO2 30*   BUN 19   CREATININE 1.0   CALCIUM 8.6*   MG 1.8     CBC:   Recent Labs   Lab 04/25/24  0511 04/26/24  0435   WBC 17.82* 14.84*   HGB 8.8* 8.3*   HCT 29.2* 27.4*    439     CMP:   Recent Labs   Lab 04/25/24  0511 04/26/24  0435   * 130*   K 3.6 3.8   CL 93* 92*   CO2 30* 30*   * 365*   BUN 11 19   CREATININE 1.0 1.0   CALCIUM 8.7 8.6*   PROT 6.6 6.0   ALBUMIN 3.2* 3.1*   BILITOT 0.5 0.3   ALKPHOS 90 81    AST 14 11   ALT 20 15   ANIONGAP 9 8       Significant Imaging: I have reviewed all pertinent imaging results/findings within the past 24 hours.

## 2024-04-26 NOTE — PLAN OF CARE
Problem: Adult Inpatient Plan of Care  Goal: Plan of Care Review  Outcome: Progressing  Goal: Patient-Specific Goal (Individualized)  Outcome: Progressing  Goal: Absence of Hospital-Acquired Illness or Injury  Outcome: Progressing  Goal: Optimal Comfort and Wellbeing  Outcome: Progressing  Goal: Readiness for Transition of Care  Outcome: Progressing     Problem: Diabetes Comorbidity  Goal: Blood Glucose Level Within Targeted Range  Outcome: Progressing     Problem: Fall Injury Risk  Goal: Absence of Fall and Fall-Related Injury  Outcome: Progressing     Problem: Skin Injury Risk Increased  Goal: Skin Health and Integrity  Outcome: Progressing     Problem: Infection  Goal: Absence of Infection Signs and Symptoms  Outcome: Progressing     Problem: Oral Intake Inadequate  Goal: Improved Oral Intake  Outcome: Progressing     Problem: Wound  Goal: Optimal Coping  Outcome: Progressing  Goal: Optimal Functional Ability  Outcome: Progressing  Goal: Absence of Infection Signs and Symptoms  Outcome: Progressing  Goal: Improved Oral Intake  Outcome: Progressing  Goal: Optimal Pain Control and Function  Outcome: Progressing  Goal: Skin Health and Integrity  Outcome: Progressing  Goal: Optimal Wound Healing  Outcome: Progressing     Problem: Parenteral Nutrition  Goal: Effective Intravenous Nutrition Therapy Delivery  Outcome: Progressing

## 2024-04-26 NOTE — PROGRESS NOTES
[facility]  General Surgery  Progress Note    Subjective:     History of Present Illness:  74 y/o female with found to have bleeding colon mass.  Colonoscopy done today identified.   She is here for further treatment.    Post-Op Info:  Procedure(s) (LRB):  LAPAROTOMY, EXPLORATORY (N/A)  COLECTOMY, RIGHT  LYSIS, ADHESIONS  SURGICAL PROCEDURE, COLONOSCOPIC   4 Days Post-Op     Interval History: no flatus  Nausea overnight  Not ambulating much    Medications:  Continuous Infusions:  Current Facility-Administered Medications   Medication Dose Route Frequency Last Rate Last Admin    amino acid 4.25% - dextrose 5% (CLINIMIX-E) solution   Intravenous Continuous 100 mL/hr at 04/25/24 2333 New Bag at 04/25/24 2333     Scheduled Meds:  Current Facility-Administered Medications   Medication Dose Route Frequency    apixaban  5 mg Oral BID    diltiaZEM  120 mg Oral Daily    ezetimibe  10 mg Oral Daily    hydroCHLOROthiazide  25 mg Oral Every Mon, Wed, Fri    mupirocin   Nasal BID    pantoprazole  80 mg Intravenous Daily     PRN Meds:  Current Facility-Administered Medications:     0.9%  NaCl infusion (for blood administration), , Intravenous, Q24H PRN    acetaminophen, 650 mg, Oral, Q8H PRN    acetaminophen, 650 mg, Oral, Q4H PRN    aluminum-magnesium hydroxide-simethicone, 30 mL, Oral, QID PRN    dextrose 50%, 12.5 g, Intravenous, PRN    dextrose 50%, 25 g, Intravenous, PRN    glucagon (human recombinant), 1 mg, Intramuscular, PRN    glucose, 16 g, Oral, PRN    glucose, 24 g, Oral, PRN    HYDROcodone-acetaminophen, 1 tablet, Oral, Q4H PRN    insulin aspart U-100, 0-5 Units, Subcutaneous, QID (AC + HS) PRN    magnesium oxide, 800 mg, Oral, PRN    magnesium oxide, 800 mg, Oral, PRN    melatonin, 6 mg, Oral, Nightly PRN    naloxone, 0.02 mg, Intravenous, PRN    ondansetron, 4 mg, Intravenous, Q6H PRN    potassium bicarbonate, 35 mEq, Oral, PRN    potassium bicarbonate, 50 mEq, Oral, PRN    potassium bicarbonate, 60  mEq, Oral, PRN    potassium, sodium phosphates, 2 packet, Oral, PRN    potassium, sodium phosphates, 2 packet, Oral, PRN    potassium, sodium phosphates, 2 packet, Oral, PRN    senna-docusate 8.6-50 mg, 1 tablet, Oral, BID PRN    sodium chloride 0.9%, 10 mL, Intravenous, Q12H PRN     Review of patient's allergies indicates:   Allergen Reactions    Statins-hmg-coa reductase inhibitors Swelling    Metformin Other (See Comments)     metallic taste , burn of the esophagus     Objective:     Vital Signs (Most Recent):  Temp: 98.7 °F (37.1 °C) (04/26/24 0506)  Pulse: 81 (04/26/24 0506)  Resp: 18 (04/26/24 0506)  BP: 116/68 (04/26/24 0506)  SpO2: 96 % (04/26/24 0506) Vital Signs (24h Range):  Temp:  [97.9 °F (36.6 °C)-98.8 °F (37.1 °C)] 98.7 °F (37.1 °C)  Pulse:  [81-92] 81  Resp:  [16-18] 18  SpO2:  [94 %-98 %] 96 %  BP: (116-158)/(60-72) 116/68     Weight: 97.2 kg (214 lb 4.6 oz)  Body mass index is 32.58 kg/m².    Intake/Output - Last 3 Shifts         04/24 0700  04/25 0659 04/25 0700  04/26 0659 04/26 0700  04/27 0659    P.O. 120 360     Total Intake(mL/kg) 120 (1.2) 360 (3.7)     Urine (mL/kg/hr)       Total Output       Net +120 +360            Urine Occurrence 11 x 4 x     Emesis Occurrence 1 x               Physical Exam  Abdominal:      General: There is distension.      Palpations: Abdomen is soft.      Tenderness: There is no abdominal tenderness.          Significant Labs:  I have reviewed all pertinent lab results within the past 24 hours.  CBC:   Recent Labs   Lab 04/26/24  0435   WBC 14.84*   RBC 3.39*   HGB 8.3*   HCT 27.4*      MCV 81*   MCH 24.5*   MCHC 30.3*     CMP:   Recent Labs   Lab 04/26/24  0435   *   CALCIUM 8.6*   ALBUMIN 3.1*   PROT 6.0   *   K 3.8   CO2 30*   CL 92*   BUN 19   CREATININE 1.0   ALKPHOS 81   ALT 15   AST 11   BILITOT 0.3       Significant Diagnostics:  I have reviewed all pertinent imaging results/findings within the past 24 hours.  Assessment/Plan:     * GI  bleed  4 Days Post-Op  Path reviewed with patient  Await gi function return    Colonic mass  4 Days Post-Op  -Path reviewed  -post operative ileus  -await gi function return- keep npo  Ok to anticoagulate        Jaden Barrios MD  General Surgery  [facility]

## 2024-04-26 NOTE — PT/OT/SLP PROGRESS
Occupational Therapy   Treatment    Name: Jen Torres  MRN: [id number]  Admitting Diagnosis:  GI bleed  4 Days Post-Op    Recommendations:     Discharge Recommendations: High Intensity Therapy  Discharge Equipment Recommendations:  to be determined by next level of care  Barriers to discharge:  Decreased caregiver support    Assessment:     Jen Torres is a 73 y.o. female with a medical diagnosis of GI bleed.  She presents with general weakness. Patient participated in bed mobility, unsupported sitting EOB and trunk control exercises sitting EOB. Performance deficits affecting function are weakness, impaired endurance, impaired self care skills, impaired functional mobility, gait instability, impaired balance, decreased upper extremity function, decreased lower extremity function, decreased safety awareness.     Rehab Prognosis:  Good; patient would benefit from acute skilled OT services to address these deficits and reach maximum level of function.       Plan:     Patient to be seen 5 x/week to address the above listed problems via self-care/home management, therapeutic activities, therapeutic exercises  Plan of Care Expires: 05/23/24  Plan of Care Reviewed with: patient    Subjective     Chief Complaint: General weakness  Patient/Family Comments/goals: Improved functional mobility and ADL independence.  Pain/Comfort:  Pain Rating 1: 0/10  Pain Rating Post-Intervention 1: 0/10    Objective:     Communicated with: nurse prior to session.  Patient found HOB elevated with oxygen, telemetry, peripheral IV upon OT entry to room.    General Precautions: Standard, fall    Orthopedic Precautions:N/A  Braces: N/A  Respiratory Status: Nasal cannula, flow 3 L/min     Occupational Performance:     Bed Mobility:    Patient completed Scooting/Bridging with contact guard assistance  Patient completed Supine to Sit with contact guard assistance  Patient completed Sit to Supine with contact guard assistance   Performed  unsupported sitting EOB with contact guard assistance.  Performed trunk control exercises x10 reps with contact guard assistance sitting EOB.    [facility] 6 Click ADL:      Treatment & Education:  Patient educated on truck control exercises to stretch lower back and help put pressure on abdomen for potential bowel movement.    Patient left HOB elevated with all lines intact and call button in reach    GOALS:   Multidisciplinary Problems       Occupational Therapy Goals          Problem: Occupational Therapy    Goal Priority Disciplines Outcome Interventions   Occupational Therapy Goal     OT, PT/OT Progressing    Description: Goals to be met by: 5/23/2024     Patient will increase functional independence with ADLs by performing:    UE Dressing with Modified [place].  LE Dressing with Modified [place].  Grooming while standing at sink with Modified [place].  Toileting from toilet with Modified [place] for hygiene and clothing management.   Supine to sit with Modified [place].  Toilet transfer to toilet with Modified [place].  Upper extremity exercise program x10 reps per handout, with independence.                         Time Tracking:     OT Date of Treatment: 04/26/24  OT Start Time: 1203  OT Stop Time: 1219  OT Total Time (min): 16 min    Billable Minutes:Therapeutic Exercise 16    OT/ERIC: OT          4/26/2024

## 2024-04-26 NOTE — CARE UPDATE
04/25/24 1946   Patient Assessment/Suction   Level of Consciousness (AVPU) alert   Respiratory Effort Normal;Unlabored   Expansion/Accessory Muscles/Retractions no use of accessory muscles;no retractions;expansion symmetric   All Lung Fields Breath Sounds clear;equal bilaterally   Rhythm/Pattern, Respiratory pattern regular;unlabored;depth regular;no shortness of breath reported   Cough Frequency no cough   PRE-TX-O2   Device (Oxygen Therapy) nasal cannula   $ Is the patient on Low Flow Oxygen? Yes   Flow (L/min) (Oxygen Therapy) 3   SpO2 98 %   Pulse Oximetry Type Intermittent   $ Pulse Oximetry - Single Charge Pulse Oximetry - Single   Pulse 85   Resp 18        (2) good, crying

## 2024-04-26 NOTE — ASSESSMENT & PLAN NOTE
4 Days Post-Op  -Path reviewed  -post operative ileus  -await gi function return- keep npo  Ok to anticoagulate

## 2024-04-26 NOTE — PLAN OF CARE
Pt still NPO this morning and has been started on Clinimix.  Awaiting bowel function.  SW following up on acute rehab placement for pt for when she is medically stable.         04/26/24 0749   Post-Acute Status   Post-Acute Authorization Placement   Post-Acute Placement Status Pending medical clearance/testing   Discharge Delays None known at this time   Discharge Plan   Discharge Plan A Rehab   Discharge Plan B [facility]

## 2024-04-26 NOTE — PT/OT/SLP PROGRESS
Physical Therapy Treatment    Patient Name:  Jen Torres   MRN:  [id number]    Recommendations:     Discharge Recommendations: High Intensity Therapy  Discharge Equipment Recommendations: to be determined by next level of care  Barriers to discharge:  pain, decreased activity tolerance, increased assist with mobility    Assessment:     Jen Torres is a 73 y.o. female admitted with a medical diagnosis of GI bleed.  She presents with the following impairments/functional limitations: weakness, impaired endurance, impaired self care skills, impaired functional mobility, gait instability, impaired balance, decreased upper extremity function, decreased lower extremity function, decreased safety awareness.    Pt agreeable to visit. Pt required min assist for supine to sit transfer with verbal cuing for sequencing. Pt required min assist for sit to stand transfer with RW.    Pt ambulated 20' with RW and min assist, slow pacing and antalgic gait. Second person for line management.    Pt left up in chair with alarm on and all needs within reach.    Rehab Prognosis: Fair; patient would benefit from acute skilled PT services to address these deficits and reach maximum level of function.    Recent Surgery: Procedure(s) (LRB):  LAPAROTOMY, EXPLORATORY (N/A)  COLECTOMY, RIGHT  LYSIS, ADHESIONS  SURGICAL PROCEDURE, COLONOSCOPIC 4 Days Post-Op    Plan:     During this hospitalization, patient to be seen daily to address the identified rehab impairments via gait training, therapeutic activities, therapeutic exercises and progress toward the following goals:    Plan of Care Expires:  05/23/24    Subjective     Chief Complaint: abdominal pain  Patient/Family Comments/goals: to get better  Pain/Comfort:  Pain Rating 1: other (see comments) (not rated)  Location 1: abdomen  Pain Addressed 1: Reposition, Distraction, Cessation of Activity  Pain Rating Post-Intervention 1: other (see comments) (not rated)      Objective:      Communicated with RN prior to session.  Patient found HOB elevated with bed alarm, telemetry, oxygen, peripheral IV upon PT entry to room.     General Precautions: Standard, diabetic, fall  Orthopedic Precautions: N/A  Braces: N/A  Respiratory Status: Nasal cannula, flow 3 L/min     Functional Mobility:  Bed Mobility:     Supine to Sit: minimum assistance  Transfers:     Sit to Stand:  minimum assistance with rolling walker  Gait: x 20' with RW and Mushtaq      AM-PAC 6 CLICK MOBILITY          Treatment & Education:  Pt educated on importance of time OOB, importance of intermittent mobility, safe techniques for transfers/ambulation, discharge recommendations/options, and use of call light for assistance and fall prevention.      Patient left up in chair with all lines intact, call button in reach, and chair alarm on..    GOALS:   Multidisciplinary Problems       Physical Therapy Goals          Problem: Physical Therapy    Goal Priority Disciplines Outcome Goal Variances Interventions   Physical Therapy Goal     PT, PT/OT      Description: Goals to be met by: 24     Patient will increase functional independence with mobility by performin. Supine to sit with Supervision  2. Sit to stand transfer with Supervision  3. Bed to chair transfer with Supervision using Rolling Walker  4. Gait  x 200 feet with Supervision using Rolling Walker.   5. Ascend & Descend 5 steps w/ rail and minimal Assist                             Time Tracking:     PT Received On: 24  PT Start Time: 1033     PT Stop Time: 1044  PT Total Time (min): 11 min     Billable Minutes: Gait Training 11    Treatment Type: Treatment  PT/PTA: PTA     Number of PTA visits since last PT visit: 2024

## 2024-04-26 NOTE — PROGRESS NOTES
[facility]  Progress Note    Patient Name: Jen Torres  MRN: [id number]  Patient Class: IP- Inpatient   Admission Date: 4/19/2024  Length of Stay: 6 days  Attending Physician: Olivia Gamboa MD  Primary Care Provider: Sam Garcia IV, MD        Subjective:     Principal Problem:GI bleed        HPI:  Patient is a 73 old female medical history of anemia, GERD, hyperlipidemia, and migraines.  Patient presents to the ED today with complaints of worsening fatigue shortness for breath worse on exertion, and intermittent chest pain on exertion.  Patient was seen in GI office earlier today, thus sent into the ED for further evaluation.  Patient was found to have hemoglobin of 7.4, hematocrit 24.5.  1 unit blood ordered in the ED for transfusion.  Patient just had recent iron infusion on Wednesday.  Patient denies previous history of blood transfusions in the past.  Patient has had rectal bleeding and melena that has been present and steady for the past 2 months.  Patient reports being scheduled for outpatient stress test next week.  D-dimer elevated in the ED at 0.55, CTA negative for acute PE.  CT of the abdomen without acute abnormalities. Patient has history of fhypercoagulable state, DVT in LLE 2021. Patient does report taking Eliquis BID, last dose this morning. GI consulted.  Pt denies constipation/diarrhea, dysuria, abdominal pain, LE edema, diaphoresis, nausea, or vomiting. Patient is full code.    In the ED:  Chest x-ray negative.  Occult positive.  Troponin 5.0, TSH 2.254, UA negative.  Potassium 3.5, magnesium 1.6, INR 1.0, PTT 11.2.    Overview/Hospital Course:  The patient was transfused one unit PRBC on 04/19. She was seen in consult by GI, EGD done on 04/20. No significant findings. Colonoscopy done for 04/21 and revealed colonic mass in the transverse colon. Surgery was consulted, and resection was done 04/22. Her H/H were monitored for any further decrease.  Patient with expected postop pain. Patient developed n/v. Abdominal xray revealed ileus. Oral meds were changed to IV form. The patient was held NPO. She was placed on PPN.    Interval History:Still has not passed flatus or stool. Having n/v today.    Review of Systems   Constitutional:  Positive for fatigue. Negative for activity change, appetite change, chills and fever.   HENT:  Negative for congestion, ear pain, nosebleeds and sinus pain.    Eyes:  Negative for discharge and itching.   Respiratory:  Negative for apnea, cough, chest tightness and shortness of breath.    Cardiovascular:  Negative for chest pain, palpitations and leg swelling.   Gastrointestinal:  Positive for abdominal distention and abdominal pain. Negative for vomiting.   Genitourinary:  Negative for difficulty urinating, dysuria, flank pain and frequency.   Musculoskeletal:  Negative for arthralgias, back pain, joint swelling and myalgias.   Skin:  Negative for color change, pallor and rash.   Neurological:  Positive for weakness. Negative for dizziness, light-headedness and headaches.   Psychiatric/Behavioral:  Negative for agitation, behavioral problems, confusion and suicidal ideas.      Objective:     Vital Signs (Most Recent):  Temp: 98.7 °F (37.1 °C) (04/26/24 0506)  Pulse: 81 (04/26/24 0506)  Resp: 18 (04/26/24 0506)  BP: 116/68 (04/26/24 0506)  SpO2: 96 % (04/26/24 0506) Vital Signs (24h Range):  Temp:  [98.4 °F (36.9 °C)-98.8 °F (37.1 °C)] 98.7 °F (37.1 °C)  Pulse:  [81-92] 81  Resp:  [18] 18  SpO2:  [94 %-98 %] 96 %  BP: (116-158)/(60-72) 116/68     Weight: 97.2 kg (214 lb 4.6 oz)  Body mass index is 32.58 kg/m².    Intake/Output Summary (Last 24 hours) at 4/26/2024 1110  Last data filed at 4/25/2024 1731  Gross per 24 hour   Intake 240 ml   Output --   Net 240 ml         Physical Exam  Vitals reviewed.   Constitutional:       General: She is not in acute distress.     Appearance: Normal appearance. She is normal weight. She is not  ill-appearing.   HENT:      Head: Normocephalic and atraumatic.      Nose: Nose normal.      Mouth/Throat:      Mouth: Mucous membranes are moist.      Pharynx: Oropharynx is clear.   Eyes:      General: No scleral icterus.     Extraocular Movements: Extraocular movements intact.      Conjunctiva/sclera: Conjunctivae normal.      Pupils: Pupils are equal, round, and reactive to light.   Cardiovascular:      Rate and Rhythm: Normal rate and regular rhythm.      Pulses: Normal pulses.      Heart sounds: Normal heart sounds. No murmur heard.     No gallop.   Pulmonary:      Effort: Pulmonary effort is normal. No respiratory distress.      Breath sounds: Normal breath sounds. No wheezing, rhonchi or rales.   Chest:      Chest wall: No tenderness.   Abdominal:      General: Abdomen is flat. There is no distension.      Palpations: Abdomen is soft.      Tenderness: There is abdominal tenderness.      Comments: Scant bowel sounds   Musculoskeletal:         General: No swelling or tenderness.      Cervical back: Normal range of motion and neck supple. No rigidity or tenderness.      Right lower leg: No edema.      Left lower leg: No edema.   Skin:     General: Skin is warm and dry.   Neurological:      General: No focal deficit present.      Mental Status: She is alert and oriented to person, place, and time. Mental status is at baseline.      Motor: No weakness.   Psychiatric:         Mood and Affect: Mood normal.         Behavior: Behavior normal.         Thought Content: Thought content normal.             Significant Labs: All pertinent labs within the past 24 hours have been reviewed.  BMP:   Recent Labs   Lab 04/26/24 0435   *   *   K 3.8   CL 92*   CO2 30*   BUN 19   CREATININE 1.0   CALCIUM 8.6*   MG 1.8     CBC:   Recent Labs   Lab 04/25/24  0511 04/26/24 0435   WBC 17.82* 14.84*   HGB 8.8* 8.3*   HCT 29.2* 27.4*    439     CMP:   Recent Labs   Lab 04/25/24  0511 04/26/24 0435   * 130*    K 3.6 3.8   CL 93* 92*   CO2 30* 30*   * 365*   BUN 11 19   CREATININE 1.0 1.0   CALCIUM 8.7 8.6*   PROT 6.6 6.0   ALBUMIN 3.2* 3.1*   BILITOT 0.5 0.3   ALKPHOS 90 81   AST 14 11   ALT 20 15   ANIONGAP 9 8       Significant Imaging: I have reviewed all pertinent imaging results/findings within the past 24 hours.    Assessment/Plan:      * GI bleed  GI consulted, had unremarkable EGD; colonoscopy revealed colonic mass; s/p resection 04/22  Serial CBC, monitor on telemetry   Hbg dropped from 11.5 to 7.4 in 4 months.       Colonic mass  S/p resection 04/22  Awaiting return of bowel function      Iron deficiency anemia due to chronic blood loss  Last iron infusion 04/17  Symptomatic anemia with SOB, pale, fatigue  Transfused one unit PRBC on 04/19  Serial CBCs, monitor on telemetry     Type 2 diabetes mellitus without complication, without long-term current use of insulin  POCT glucose, insulin sliding scale     Essential hypertension  Chronic, controlled. Latest blood pressure and vitals reviewed-     Temp:  [97.1 °F (36.2 °C)-99.2 °F (37.3 °C)]   Pulse:  []   Resp:  [15-20]   BP: (105-160)/(56-95)   SpO2:  [92 %-100 %] .   Home meds for hypertension were reviewed and noted below.   Hypertension Medications               diltiaZEM (CARDIZEM CD) 120 MG Cp24 Take 1 capsule (120 mg total) by mouth once daily.    hydroCHLOROthiazide (HYDRODIURIL) 25 MG tablet Take 25 mg by mouth every Mon, Wed, Fri.            While in the hospital, will manage blood pressure as follows; Continue home antihypertensive regimen    Will utilize p.r.n. blood pressure medication only if patient's blood pressure greater than 180/110 and she develops symptoms such as worsening chest pain or shortness of breath.      VTE Risk Mitigation (From admission, onward)           Ordered     enoxaparin injection 100 mg  Every 12 hours        Note to Pharmacy: Wt: 97.2 kg (214 lb 4.6 oz)  Estimated Creatinine Clearance: 62.9 mL/min (based on  SCr of 1 mg/dL).  Body mass index is 32.58 kg/m².    04/26/24 1100     Reason for No Pharmacological VTE Prophylaxis  Once        Question:  Reasons:  Answer:  Active Bleeding    04/19/24 1641     IP VTE HIGH RISK PATIENT  Once         04/19/24 1641     Place sequential compression device  Until discontinued         04/19/24 1641                    Discharge Planning   KATIE: 4/29/2024     Code Status: Full Code   Is the patient medically ready for discharge?:     Reason for patient still in hospital (select all that apply): Patient trending condition  Discharge Plan A: Rehab   Discharge Delays: None known at this time              Olivia Gamboa MD, MD  Department of Hospital Medicine   [facility]

## 2024-04-26 NOTE — PLAN OF CARE
Problem: Parenteral Nutrition  Goal: Effective Intravenous Nutrition Therapy Delivery  Outcome: Progressing  Intervention: Optimize Intravenous Nutrition Delivery  Flowsheets (Taken 4/26/2024 1007)  Nutrition Support Management: parenteral nutrition continued as ordered

## 2024-04-26 NOTE — SUBJECTIVE & OBJECTIVE
Interval History: no flatus  Nausea overnight  Not ambulating much    Medications:  Continuous Infusions:  Current Facility-Administered Medications   Medication Dose Route Frequency Last Rate Last Admin    amino acid 4.25% - dextrose 5% (CLINIMIX-E) solution   Intravenous Continuous 100 mL/hr at 04/25/24 2333 New Bag at 04/25/24 2333     Scheduled Meds:  Current Facility-Administered Medications   Medication Dose Route Frequency    apixaban  5 mg Oral BID    diltiaZEM  120 mg Oral Daily    ezetimibe  10 mg Oral Daily    hydroCHLOROthiazide  25 mg Oral Every Mon, Wed, Fri    mupirocin   Nasal BID    pantoprazole  80 mg Intravenous Daily     PRN Meds:  Current Facility-Administered Medications:     0.9%  NaCl infusion (for blood administration), , Intravenous, Q24H PRN    acetaminophen, 650 mg, Oral, Q8H PRN    acetaminophen, 650 mg, Oral, Q4H PRN    aluminum-magnesium hydroxide-simethicone, 30 mL, Oral, QID PRN    dextrose 50%, 12.5 g, Intravenous, PRN    dextrose 50%, 25 g, Intravenous, PRN    glucagon (human recombinant), 1 mg, Intramuscular, PRN    glucose, 16 g, Oral, PRN    glucose, 24 g, Oral, PRN    HYDROcodone-acetaminophen, 1 tablet, Oral, Q4H PRN    insulin aspart U-100, 0-5 Units, Subcutaneous, QID (AC + HS) PRN    magnesium oxide, 800 mg, Oral, PRN    magnesium oxide, 800 mg, Oral, PRN    melatonin, 6 mg, Oral, Nightly PRN    naloxone, 0.02 mg, Intravenous, PRN    ondansetron, 4 mg, Intravenous, Q6H PRN    potassium bicarbonate, 35 mEq, Oral, PRN    potassium bicarbonate, 50 mEq, Oral, PRN    potassium bicarbonate, 60 mEq, Oral, PRN    potassium, sodium phosphates, 2 packet, Oral, PRN    potassium, sodium phosphates, 2 packet, Oral, PRN    potassium, sodium phosphates, 2 packet, Oral, PRN    senna-docusate 8.6-50 mg, 1 tablet, Oral, BID PRN    sodium chloride 0.9%, 10 mL, Intravenous, Q12H PRN     Review of patient's allergies indicates:   Allergen Reactions    Statins-hmg-coa reductase inhibitors  Swelling    Metformin Other (See Comments)     metallic taste , burn of the esophagus     Objective:     Vital Signs (Most Recent):  Temp: 98.7 °F (37.1 °C) (04/26/24 0506)  Pulse: 81 (04/26/24 0506)  Resp: 18 (04/26/24 0506)  BP: 116/68 (04/26/24 0506)  SpO2: 96 % (04/26/24 0506) Vital Signs (24h Range):  Temp:  [97.9 °F (36.6 °C)-98.8 °F (37.1 °C)] 98.7 °F (37.1 °C)  Pulse:  [81-92] 81  Resp:  [16-18] 18  SpO2:  [94 %-98 %] 96 %  BP: (116-158)/(60-72) 116/68     Weight: 97.2 kg (214 lb 4.6 oz)  Body mass index is 32.58 kg/m².    Intake/Output - Last 3 Shifts         04/24 0700  04/25 0659 04/25 0700 04/26 0659 04/26 0700  04/27 0659    P.O. 120 360     Total Intake(mL/kg) 120 (1.2) 360 (3.7)     Urine (mL/kg/hr)       Total Output       Net +120 +360            Urine Occurrence 11 x 4 x     Emesis Occurrence 1 x               Physical Exam  Abdominal:      General: There is distension.      Palpations: Abdomen is soft.      Tenderness: There is no abdominal tenderness.          Significant Labs:  I have reviewed all pertinent lab results within the past 24 hours.  CBC:   Recent Labs   Lab 04/26/24  0435   WBC 14.84*   RBC 3.39*   HGB 8.3*   HCT 27.4*      MCV 81*   MCH 24.5*   MCHC 30.3*     CMP:   Recent Labs   Lab 04/26/24  0435   *   CALCIUM 8.6*   ALBUMIN 3.1*   PROT 6.0   *   K 3.8   CO2 30*   CL 92*   BUN 19   CREATININE 1.0   ALKPHOS 81   ALT 15   AST 11   BILITOT 0.3       Significant Diagnostics:  I have reviewed all pertinent imaging results/findings within the past 24 hours.

## 2024-04-27 PROBLEM — C18.9 COLON ADENOCARCINOMA: Status: ACTIVE | Noted: 2024-04-27

## 2024-04-27 PROBLEM — K63.89 COLONIC MASS: Status: RESOLVED | Noted: 2024-04-21 | Resolved: 2024-04-27

## 2024-04-27 LAB
ALBUMIN SERPL BCP-MCNC: 3 G/DL (ref 3.5–5.2)
ALP SERPL-CCNC: 83 U/L (ref 55–135)
ALT SERPL W/O P-5'-P-CCNC: 23 U/L (ref 10–44)
ANION GAP SERPL CALC-SCNC: 9 MMOL/L (ref 8–16)
AST SERPL-CCNC: 27 U/L (ref 10–40)
BASOPHILS # BLD AUTO: 0.06 K/UL (ref 0–0.2)
BASOPHILS NFR BLD: 0.5 % (ref 0–1.9)
BILIRUB SERPL-MCNC: 0.3 MG/DL (ref 0.1–1)
BUN SERPL-MCNC: 20 MG/DL (ref 8–23)
CALCIUM SERPL-MCNC: 8.6 MG/DL (ref 8.7–10.5)
CHLORIDE SERPL-SCNC: 93 MMOL/L (ref 95–110)
CO2 SERPL-SCNC: 30 MMOL/L (ref 23–29)
CREAT SERPL-MCNC: 0.9 MG/DL (ref 0.5–1.4)
DIFFERENTIAL METHOD BLD: ABNORMAL
EOSINOPHIL # BLD AUTO: 0.3 K/UL (ref 0–0.5)
EOSINOPHIL NFR BLD: 2.6 % (ref 0–8)
ERYTHROCYTE [DISTWIDTH] IN BLOOD BY AUTOMATED COUNT: 22.2 % (ref 11.5–14.5)
EST. GFR  (NO RACE VARIABLE): >60 ML/MIN/1.73 M^2
GLUCOSE SERPL-MCNC: 234 MG/DL (ref 70–110)
GLUCOSE SERPL-MCNC: 266 MG/DL (ref 70–110)
GLUCOSE SERPL-MCNC: 334 MG/DL (ref 70–110)
GLUCOSE SERPL-MCNC: 350 MG/DL (ref 70–110)
HCT VFR BLD AUTO: 27.9 % (ref 37–48.5)
HGB BLD-MCNC: 8.5 G/DL (ref 12–16)
IMM GRANULOCYTES # BLD AUTO: 0.09 K/UL (ref 0–0.04)
IMM GRANULOCYTES NFR BLD AUTO: 0.7 % (ref 0–0.5)
LYMPHOCYTES # BLD AUTO: 2.3 K/UL (ref 1–4.8)
LYMPHOCYTES NFR BLD: 17.9 % (ref 18–48)
MAGNESIUM SERPL-MCNC: 1.8 MG/DL (ref 1.6–2.6)
MCH RBC QN AUTO: 24.7 PG (ref 27–31)
MCHC RBC AUTO-ENTMCNC: 30.5 G/DL (ref 32–36)
MCV RBC AUTO: 81 FL (ref 82–98)
MONOCYTES # BLD AUTO: 1.1 K/UL (ref 0.3–1)
MONOCYTES NFR BLD: 8.4 % (ref 4–15)
NEUTROPHILS # BLD AUTO: 8.8 K/UL (ref 1.8–7.7)
NEUTROPHILS NFR BLD: 69.9 % (ref 38–73)
NRBC BLD-RTO: 0 /100 WBC
PHOSPHATE SERPL-MCNC: 3 MG/DL (ref 2.7–4.5)
PLATELET # BLD AUTO: 440 K/UL (ref 150–450)
PMV BLD AUTO: 10.6 FL (ref 9.2–12.9)
POTASSIUM SERPL-SCNC: 3.7 MMOL/L (ref 3.5–5.1)
PROT SERPL-MCNC: 6.1 G/DL (ref 6–8.4)
RBC # BLD AUTO: 3.44 M/UL (ref 4–5.4)
SODIUM SERPL-SCNC: 132 MMOL/L (ref 136–145)
WBC # BLD AUTO: 12.61 K/UL (ref 3.9–12.7)

## 2024-04-27 PROCEDURE — 25000003 PHARM REV CODE 250: Performed by: HOSPITALIST

## 2024-04-27 PROCEDURE — 63600175 PHARM REV CODE 636 W HCPCS: Performed by: HOSPITALIST

## 2024-04-27 PROCEDURE — 84100 ASSAY OF PHOSPHORUS: CPT | Performed by: SURGERY

## 2024-04-27 PROCEDURE — 63600175 PHARM REV CODE 636 W HCPCS: Performed by: INTERNAL MEDICINE

## 2024-04-27 PROCEDURE — 85025 COMPLETE CBC W/AUTO DIFF WBC: CPT | Performed by: SURGERY

## 2024-04-27 PROCEDURE — 80053 COMPREHEN METABOLIC PANEL: CPT | Performed by: SURGERY

## 2024-04-27 PROCEDURE — 97116 GAIT TRAINING THERAPY: CPT | Mod: CQ

## 2024-04-27 PROCEDURE — 94761 N-INVAS EAR/PLS OXIMETRY MLT: CPT

## 2024-04-27 PROCEDURE — 12000002 HC ACUTE/MED SURGE SEMI-PRIVATE ROOM

## 2024-04-27 PROCEDURE — 99900031 HC PATIENT EDUCATION (STAT)

## 2024-04-27 PROCEDURE — 25000003 PHARM REV CODE 250: Performed by: INTERNAL MEDICINE

## 2024-04-27 PROCEDURE — 27000221 HC OXYGEN, UP TO 24 HOURS

## 2024-04-27 PROCEDURE — C9113 INJ PANTOPRAZOLE SODIUM, VIA: HCPCS | Performed by: SURGERY

## 2024-04-27 PROCEDURE — 63600175 PHARM REV CODE 636 W HCPCS: Performed by: SURGERY

## 2024-04-27 PROCEDURE — 83735 ASSAY OF MAGNESIUM: CPT | Performed by: SURGERY

## 2024-04-27 PROCEDURE — 36415 COLL VENOUS BLD VENIPUNCTURE: CPT | Performed by: SURGERY

## 2024-04-27 RX ORDER — IBUPROFEN 200 MG
16 TABLET ORAL
Status: DISCONTINUED | OUTPATIENT
Start: 2024-04-27 | End: 2024-04-30 | Stop reason: HOSPADM

## 2024-04-27 RX ORDER — GLUCAGON 1 MG
1 KIT INJECTION
Status: DISCONTINUED | OUTPATIENT
Start: 2024-04-27 | End: 2024-04-30 | Stop reason: HOSPADM

## 2024-04-27 RX ORDER — PANTOPRAZOLE SODIUM 40 MG/1
40 TABLET, DELAYED RELEASE ORAL DAILY
Status: DISCONTINUED | OUTPATIENT
Start: 2024-04-28 | End: 2024-04-30 | Stop reason: HOSPADM

## 2024-04-27 RX ORDER — INSULIN ASPART 100 [IU]/ML
0-15 INJECTION, SOLUTION INTRAVENOUS; SUBCUTANEOUS
Status: DISCONTINUED | OUTPATIENT
Start: 2024-04-27 | End: 2024-04-30 | Stop reason: HOSPADM

## 2024-04-27 RX ORDER — IBUPROFEN 200 MG
24 TABLET ORAL
Status: DISCONTINUED | OUTPATIENT
Start: 2024-04-27 | End: 2024-04-30 | Stop reason: HOSPADM

## 2024-04-27 RX ORDER — FUROSEMIDE 10 MG/ML
40 INJECTION INTRAMUSCULAR; INTRAVENOUS ONCE
Status: COMPLETED | OUTPATIENT
Start: 2024-04-27 | End: 2024-04-27

## 2024-04-27 RX ADMIN — PANTOPRAZOLE SODIUM 80 MG: 40 INJECTION, POWDER, FOR SOLUTION INTRAVENOUS at 01:04

## 2024-04-27 RX ADMIN — METOPROLOL TARTRATE 5 MG: 1 INJECTION, SOLUTION INTRAVENOUS at 08:04

## 2024-04-27 RX ADMIN — INSULIN ASPART 6 UNITS: 100 INJECTION, SOLUTION INTRAVENOUS; SUBCUTANEOUS at 10:04

## 2024-04-27 RX ADMIN — LEUCINE, PHENYLALANINE, LYSINE, METHIONINE, ISOLEUCINE, VALINE, HISTIDINE, THREONINE, TRYPTOPHAN, ALANINE, GLYCINE, ARGININE, PROLINE, SERINE, TYROSINE, SODIUM ACETATE, DIBASIC POTASSIUM PHOSPHATE, MAGNESIUM CHLORIDE, SODIUM CHLORIDE, CALCIUM CHLORIDE, DEXTROSE
311; 238; 247; 170; 255; 247; 204; 179; 77; 880; 438; 489; 289; 213; 17; 297; 261; 51; 77; 33; 5 INJECTION INTRAVENOUS at 06:04

## 2024-04-27 RX ADMIN — MUPIROCIN 1 G: 20 OINTMENT TOPICAL at 09:04

## 2024-04-27 RX ADMIN — INSULIN DETEMIR 15 UNITS: 100 INJECTION, SOLUTION SUBCUTANEOUS at 09:04

## 2024-04-27 RX ADMIN — ONDANSETRON 4 MG: 2 INJECTION INTRAMUSCULAR; INTRAVENOUS at 04:04

## 2024-04-27 RX ADMIN — INSULIN ASPART 6 UNITS: 100 INJECTION, SOLUTION INTRAVENOUS; SUBCUTANEOUS at 05:04

## 2024-04-27 RX ADMIN — INSULIN ASPART 10 UNITS: 100 INJECTION, SOLUTION INTRAVENOUS; SUBCUTANEOUS at 09:04

## 2024-04-27 RX ADMIN — ENOXAPARIN SODIUM 100 MG: 100 INJECTION SUBCUTANEOUS at 09:04

## 2024-04-27 RX ADMIN — FUROSEMIDE 40 MG: 10 INJECTION, SOLUTION INTRAVENOUS at 04:04

## 2024-04-27 RX ADMIN — LEUCINE, PHENYLALANINE, LYSINE, METHIONINE, ISOLEUCINE, VALINE, HISTIDINE, THREONINE, TRYPTOPHAN, ALANINE, GLYCINE, ARGININE, PROLINE, SERINE, TYROSINE, SODIUM ACETATE, DIBASIC POTASSIUM PHOSPHATE, MAGNESIUM CHLORIDE, SODIUM CHLORIDE, CALCIUM CHLORIDE, DEXTROSE
311; 238; 247; 170; 255; 247; 204; 179; 77; 880; 438; 489; 289; 213; 17; 297; 261; 51; 77; 33; 5 INJECTION INTRAVENOUS at 05:04

## 2024-04-27 NOTE — PROGRESS NOTES
[facility] Medicine  Progress Note    Patient Name: Jen Torres  MRN: [id number]  Patient Class: IP- Inpatient   Admission Date: 4/19/2024  Length of Stay: 7 days  Attending Physician: Ronal Mcnulty MD  Primary Care Provider: Sam Garcia IV, MD        Subjective:     Principal Problem:Colon adenocarcinoma        HPI:  Patient is a 73 old female medical history of anemia, GERD, hyperlipidemia, and migraines.  Patient presents to the ED today with complaints of worsening fatigue shortness for breath worse on exertion, and intermittent chest pain on exertion.  Patient was seen in GI office earlier today, thus sent into the ED for further evaluation.  Patient was found to have hemoglobin of 7.4, hematocrit 24.5.  1 unit blood ordered in the ED for transfusion.  Patient just had recent iron infusion on Wednesday.  Patient denies previous history of blood transfusions in the past.  Patient has had rectal bleeding and melena that has been present and steady for the past 2 months.  Patient reports being scheduled for outpatient stress test next week.  D-dimer elevated in the ED at 0.55, CTA negative for acute PE.  CT of the abdomen without acute abnormalities. Patient has history of fhypercoagulable state, DVT in LLE 2021. Patient does report taking Eliquis BID, last dose this morning. GI consulted.  Pt denies constipation/diarrhea, dysuria, abdominal pain, LE edema, diaphoresis, nausea, or vomiting. Patient is full code.    In the ED:  Chest x-ray negative.  Occult positive.  Troponin 5.0, TSH 2.254, UA negative.  Potassium 3.5, magnesium 1.6, INR 1.0, PTT 11.2.    Overview/Hospital Course:  The patient was transfused one unit PRBC on 04/19. She was seen in consult by GI, EGD done on 04/20. No significant findings. Colonoscopy done for 04/21 and revealed colonic mass in the transverse colon. Surgery was consulted, and resection was done 04/22. Her H/H were monitored for any further decrease.  Patient with expected postop pain. Patient developed n/v. Abdominal xray revealed ileus. Oral meds were changed to IV form. The patient was held NPO. She was placed on PPN.    Interval History:  Seen in the multidisciplinary rounds, passing gas, has small bowel movements, no nausea, afebrile, had mild trace pedal edema, still on TPN.  Pathology reviewed with the patient.     Review of Systems   Constitutional:  Positive for fatigue. Negative for activity change, appetite change, chills and fever.   HENT:  Negative for congestion, ear pain, nosebleeds and sinus pain.    Eyes:  Negative for discharge and itching.   Respiratory:  Negative for apnea, cough, chest tightness and shortness of breath.    Cardiovascular:  Negative for chest pain, palpitations and leg swelling.   Gastrointestinal:  Positive for abdominal distention and abdominal pain. Negative for vomiting.   Genitourinary:  Negative for difficulty urinating, dysuria, flank pain and frequency.   Musculoskeletal:  Negative for arthralgias, back pain, joint swelling and myalgias.   Skin:  Negative for color change, pallor and rash.   Neurological:  Positive for weakness. Negative for dizziness, light-headedness and headaches.   Psychiatric/Behavioral:  Negative for agitation, behavioral problems, confusion and suicidal ideas.      Objective:     Vital Signs (Most Recent):  Temp: 98.2 °F (36.8 °C) (04/27/24 1108)  Pulse: 82 (04/27/24 1108)  Resp: 18 (04/27/24 1108)  BP: 135/60 (04/27/24 1108)  SpO2: 97 % (04/27/24 1108) Vital Signs (24h Range):  Temp:  [97.3 °F (36.3 °C)-98.6 °F (37 °C)] 98.2 °F (36.8 °C)  Pulse:  [80-90] 82  Resp:  [18] 18  SpO2:  [93 %-97 %] 97 %  BP: (127-140)/(60-77) 135/60     Weight: 97.2 kg (214 lb 4.6 oz)  Body mass index is 32.58 kg/m².    Intake/Output Summary (Last 24 hours) at 4/27/2024 1403  Last data filed at 4/27/2024 1252  Gross per 24 hour   Intake 998.33 ml   Output --   Net 998.33 ml         Physical Exam  Vitals reviewed.    Constitutional:       General: She is not in acute distress.     Appearance: Normal appearance. She is normal weight. She is not ill-appearing.   HENT:      Head: Normocephalic and atraumatic.      Nose: Nose normal.      Mouth/Throat:      Mouth: Mucous membranes are moist.      Pharynx: Oropharynx is clear.   Eyes:      General: No scleral icterus.     Extraocular Movements: Extraocular movements intact.      Conjunctiva/sclera: Conjunctivae normal.      Pupils: Pupils are equal, round, and reactive to light.   Cardiovascular:      Rate and Rhythm: Normal rate and regular rhythm.      Pulses: Normal pulses.      Heart sounds: Normal heart sounds. No murmur heard.     No gallop.   Pulmonary:      Effort: Pulmonary effort is normal. No respiratory distress.      Breath sounds: Normal breath sounds. No wheezing, rhonchi or rales.   Chest:      Chest wall: No tenderness.   Abdominal:      General: Abdomen is flat. There is no distension.      Palpations: Abdomen is soft.      Tenderness: There is abdominal tenderness.      Comments: Scant bowel sounds   Musculoskeletal:         General: No swelling or tenderness.      Cervical back: Normal range of motion and neck supple. No rigidity or tenderness.      Right lower leg: No edema.      Left lower leg: No edema.   Skin:     General: Skin is warm and dry.   Neurological:      General: No focal deficit present.      Mental Status: She is alert and oriented to person, place, and time. Mental status is at baseline.      Motor: No weakness.   Psychiatric:         Mood and Affect: Mood normal.         Behavior: Behavior normal.         Thought Content: Thought content normal.             Significant Labs: All pertinent labs within the past 24 hours have been reviewed.  BMP:   Recent Labs   Lab 04/27/24 0435   *   *   K 3.7   CL 93*   CO2 30*   BUN 20   CREATININE 0.9   CALCIUM 8.6*   MG 1.8     CBC:   Recent Labs   Lab 04/26/24 0435 04/27/24  0435   WBC  14.84* 12.61   HGB 8.3* 8.5*   HCT 27.4* 27.9*    440     CMP:   Recent Labs   Lab 04/26/24  0435 04/27/24  0435   * 132*   K 3.8 3.7   CL 92* 93*   CO2 30* 30*   * 334*   BUN 19 20   CREATININE 1.0 0.9   CALCIUM 8.6* 8.6*   PROT 6.0 6.1   ALBUMIN 3.1* 3.0*   BILITOT 0.3 0.3   ALKPHOS 81 83   AST 11 27   ALT 15 23   ANIONGAP 8 9       Significant Imaging: I have reviewed all pertinent imaging results/findings within the past 24 hours.    Assessment/Plan:      * Colon adenocarcinoma    Status post right hemicolectomy  Surgical pathology:   - INVASIVE MODERATELY DIFFERENTIATED ADENOCARCINOMA.   - SURGICAL MARGINS ARE NEGATIVE FOR INVASIVE CARCINOMA   - MULTIPLE (3) TUBULAR ADENOMAS, NEGATIVE FOR HIGH GRADE DYSPLASIA OR    MALIGNANCY.   - METASTATIC CARCINOMA IN ONE OF THIRTY-FIVE REGIONAL LYMPH NODES.     Patient has been evaluated by oncologist  Most likely patient will need chemotherapy outpatient  CT chest upon admission is negative for metastatic disease  Now patient passing gas, we will try clear liquid diet    Patient was taking Eliquis at home, for unknown reason?  May consider resume Eliquis if okay with General surgery      GI bleed  GI consulted, had unremarkable EGD; colonoscopy revealed colonic mass; s/p resection 04/22  Serial CBC, monitor on telemetry   Hbg dropped from 11.5 to 7.4 in 4 months.       Iron deficiency anemia due to chronic blood loss  Last iron infusion 04/17  Symptomatic anemia with SOB, pale, fatigue  Transfused one unit PRBC on 04/19  Serial CBCs, monitor on telemetry     Type 2 diabetes mellitus without complication, without long-term current use of insulin  Uncontrolled  Check A1c  Add on Levemir 15 units QNS  High-dose sliding scale    Essential hypertension  Chronic, controlled. Latest blood pressure and vitals reviewed-     Temp:  [97.1 °F (36.2 °C)-99.2 °F (37.3 °C)]   Pulse:  []   Resp:  [15-20]   BP: (105-160)/(56-95)   SpO2:  [92 %-100 %] .   Home meds  for hypertension were reviewed and noted below.   Hypertension Medications               diltiaZEM (CARDIZEM CD) 120 MG Cp24 Take 1 capsule (120 mg total) by mouth once daily.    hydroCHLOROthiazide (HYDRODIURIL) 25 MG tablet Take 25 mg by mouth every Mon, Wed, Fri.            While in the hospital, will manage blood pressure as follows; Continue home antihypertensive regimen    Will utilize p.r.n. blood pressure medication only if patient's blood pressure greater than 180/110 and she develops symptoms such as worsening chest pain or shortness of breath.      VTE Risk Mitigation (From admission, onward)           Ordered     enoxaparin injection 100 mg  Every 12 hours        Note to Pharmacy: Wt: 97.2 kg (214 lb 4.6 oz)  Estimated Creatinine Clearance: 62.9 mL/min (based on SCr of 1 mg/dL).  Body mass index is 32.58 kg/m².    04/26/24 1100     Reason for No Pharmacological VTE Prophylaxis  Once        Question:  Reasons:  Answer:  Active Bleeding    04/19/24 1641     IP VTE HIGH RISK PATIENT  Once         04/19/24 1641     Place sequential compression device  Until discontinued         04/19/24 1641                    Discharge Planning   KATIE: 4/29/2024     Code Status: Full Code   Is the patient medically ready for discharge?:     Reason for patient still in hospital (select all that apply): Patient trending condition and Treatment  Discharge Plan A: Rehab   Discharge Delays: None known at this time              Ronal Mcnulty MD  Department of Hospital Medicine   [facility]

## 2024-04-27 NOTE — CARE UPDATE
04/26/24 2130   Patient Assessment/Suction   Level of Consciousness (AVPU) alert   Respiratory Effort Normal;Unlabored   Expansion/Accessory Muscles/Retractions no use of accessory muscles;no retractions;expansion symmetric   All Lung Fields Breath Sounds clear   Rhythm/Pattern, Respiratory pattern regular;unlabored;depth regular   Cough Frequency no cough   PRE-TX-O2   Device (Oxygen Therapy) nasal cannula   $ Is the patient on Low Flow Oxygen? Yes   Flow (L/min) (Oxygen Therapy) 2   SpO2 96 %   Pulse Oximetry Type Intermittent   $ Pulse Oximetry - Single Charge Pulse Oximetry - Single   Pulse 82   Resp 18

## 2024-04-27 NOTE — ASSESSMENT & PLAN NOTE
Status post right hemicolectomy  Surgical pathology:   - INVASIVE MODERATELY DIFFERENTIATED ADENOCARCINOMA.   - SURGICAL MARGINS ARE NEGATIVE FOR INVASIVE CARCINOMA   - MULTIPLE (3) TUBULAR ADENOMAS, NEGATIVE FOR HIGH GRADE DYSPLASIA OR    MALIGNANCY.   - METASTATIC CARCINOMA IN ONE OF THIRTY-FIVE REGIONAL LYMPH NODES.     Patient has been evaluated by oncologist  Most likely patient will need chemotherapy outpatient  CT chest upon admission is negative for metastatic disease  Now patient passing gas, we will try clear liquid diet    Patient was taking Eliquis at home, for unknown reason?  May consider resume Eliquis if okay with General surgery

## 2024-04-27 NOTE — PLAN OF CARE
Problem: Adult Inpatient Plan of Care  Goal: Plan of Care Review  Outcome: Progressing  Goal: Readiness for Transition of Care  Outcome: Progressing     Problem: Fall Injury Risk  Goal: Absence of Fall and Fall-Related Injury  Outcome: Progressing     Problem: Skin Injury Risk Increased  Goal: Skin Health and Integrity  Outcome: Progressing     Problem: Wound  Goal: Optimal Coping  Outcome: Progressing

## 2024-04-27 NOTE — RESPIRATORY THERAPY
04/27/24 0915   Patient Assessment/Suction   Level of Consciousness (AVPU) alert   Respiratory Effort Normal;Unlabored   PRE-TX-O2   Device (Oxygen Therapy) nasal cannula   $ Is the patient on Low Flow Oxygen? Yes   Flow (L/min) (Oxygen Therapy) 2   SpO2 95 %   Pulse Oximetry Type Intermittent   $ Pulse Oximetry - Multiple Charge Pulse Oximetry - Multiple   Pulse 87   Resp 18   Education   $ Education 15 min

## 2024-04-27 NOTE — PLAN OF CARE
Problem: Adult Inpatient Plan of Care  Goal: Patient-Specific Goal (Individualized)  Outcome: Progressing  Goal: Absence of Hospital-Acquired Illness or Injury  Outcome: Progressing  Goal: Optimal Comfort and Wellbeing  Outcome: Progressing     Problem: Fall Injury Risk  Goal: Absence of Fall and Fall-Related Injury  Outcome: Progressing     Problem: Skin Injury Risk Increased  Goal: Skin Health and Integrity  Outcome: Adequate for Care Transition

## 2024-04-27 NOTE — SUBJECTIVE & OBJECTIVE
Interval History:  Seen in the multidisciplinary rounds, passing gas, has small bowel movements, no nausea, afebrile, had mild trace pedal edema, still on TPN.  Pathology reviewed with the patient.     Review of Systems   Constitutional:  Positive for fatigue. Negative for activity change, appetite change, chills and fever.   HENT:  Negative for congestion, ear pain, nosebleeds and sinus pain.    Eyes:  Negative for discharge and itching.   Respiratory:  Negative for apnea, cough, chest tightness and shortness of breath.    Cardiovascular:  Negative for chest pain, palpitations and leg swelling.   Gastrointestinal:  Positive for abdominal distention and abdominal pain. Negative for vomiting.   Genitourinary:  Negative for difficulty urinating, dysuria, flank pain and frequency.   Musculoskeletal:  Negative for arthralgias, back pain, joint swelling and myalgias.   Skin:  Negative for color change, pallor and rash.   Neurological:  Positive for weakness. Negative for dizziness, light-headedness and headaches.   Psychiatric/Behavioral:  Negative for agitation, behavioral problems, confusion and suicidal ideas.      Objective:     Vital Signs (Most Recent):  Temp: 98.2 °F (36.8 °C) (04/27/24 1108)  Pulse: 82 (04/27/24 1108)  Resp: 18 (04/27/24 1108)  BP: 135/60 (04/27/24 1108)  SpO2: 97 % (04/27/24 1108) Vital Signs (24h Range):  Temp:  [97.3 °F (36.3 °C)-98.6 °F (37 °C)] 98.2 °F (36.8 °C)  Pulse:  [80-90] 82  Resp:  [18] 18  SpO2:  [93 %-97 %] 97 %  BP: (127-140)/(60-77) 135/60     Weight: 97.2 kg (214 lb 4.6 oz)  Body mass index is 32.58 kg/m².    Intake/Output Summary (Last 24 hours) at 4/27/2024 1403  Last data filed at 4/27/2024 1252  Gross per 24 hour   Intake 998.33 ml   Output --   Net 998.33 ml         Physical Exam  Vitals reviewed.   Constitutional:       General: She is not in acute distress.     Appearance: Normal appearance. She is normal weight. She is not ill-appearing.   HENT:      Head: Normocephalic  and atraumatic.      Nose: Nose normal.      Mouth/Throat:      Mouth: Mucous membranes are moist.      Pharynx: Oropharynx is clear.   Eyes:      General: No scleral icterus.     Extraocular Movements: Extraocular movements intact.      Conjunctiva/sclera: Conjunctivae normal.      Pupils: Pupils are equal, round, and reactive to light.   Cardiovascular:      Rate and Rhythm: Normal rate and regular rhythm.      Pulses: Normal pulses.      Heart sounds: Normal heart sounds. No murmur heard.     No gallop.   Pulmonary:      Effort: Pulmonary effort is normal. No respiratory distress.      Breath sounds: Normal breath sounds. No wheezing, rhonchi or rales.   Chest:      Chest wall: No tenderness.   Abdominal:      General: Abdomen is flat. There is no distension.      Palpations: Abdomen is soft.      Tenderness: There is abdominal tenderness.      Comments: Scant bowel sounds   Musculoskeletal:         General: No swelling or tenderness.      Cervical back: Normal range of motion and neck supple. No rigidity or tenderness.      Right lower leg: No edema.      Left lower leg: No edema.   Skin:     General: Skin is warm and dry.   Neurological:      General: No focal deficit present.      Mental Status: She is alert and oriented to person, place, and time. Mental status is at baseline.      Motor: No weakness.   Psychiatric:         Mood and Affect: Mood normal.         Behavior: Behavior normal.         Thought Content: Thought content normal.             Significant Labs: All pertinent labs within the past 24 hours have been reviewed.  BMP:   Recent Labs   Lab 04/27/24 0435   *   *   K 3.7   CL 93*   CO2 30*   BUN 20   CREATININE 0.9   CALCIUM 8.6*   MG 1.8     CBC:   Recent Labs   Lab 04/26/24 0435 04/27/24 0435   WBC 14.84* 12.61   HGB 8.3* 8.5*   HCT 27.4* 27.9*    440     CMP:   Recent Labs   Lab 04/26/24 0435 04/27/24 0435   * 132*   K 3.8 3.7   CL 92* 93*   CO2 30* 30*   *  334*   BUN 19 20   CREATININE 1.0 0.9   CALCIUM 8.6* 8.6*   PROT 6.0 6.1   ALBUMIN 3.1* 3.0*   BILITOT 0.3 0.3   ALKPHOS 81 83   AST 11 27   ALT 15 23   ANIONGAP 8 9       Significant Imaging: I have reviewed all pertinent imaging results/findings within the past 24 hours.

## 2024-04-27 NOTE — PT/OT/SLP PROGRESS
Physical Therapy Treatment    Patient Name:  Jen Torres   MRN:  [id number]    Recommendations:     Discharge Recommendations: High Intensity Therapy  Discharge Equipment Recommendations: to be determined by next level of care  Barriers to discharge:  Medical status    Assessment:     Jen Torres is a 73 y.o. female admitted with a medical diagnosis of GI bleed.  She presents with the following impairments/functional limitations: weakness, impaired endurance, impaired self care skills, impaired functional mobility, gait instability, pain, impaired skin . Pt with HOB elevated and agreeable to PT. Pt mobilized to eob requiring min a with cues for technique/sequencing. Pt required extra time seated eob due to c/o abdominal pain. Pt completed one gait trial ambulating 80' CGA with rw, requiring cues for increased step length and posture.     Rehab Prognosis: Fair; patient would benefit from acute skilled PT services to address these deficits and reach maximum level of function.    Recent Surgery: Procedure(s) (LRB):  LAPAROTOMY, EXPLORATORY (N/A)  COLECTOMY, RIGHT  LYSIS, ADHESIONS  SURGICAL PROCEDURE, COLONOSCOPIC 5 Days Post-Op    Plan:     During this hospitalization, patient to be seen daily to address the identified rehab impairments via gait training, therapeutic activities, therapeutic exercises and progress toward the following goals:    Plan of Care Expires:  05/23/24    Subjective     Chief Complaint: Abdominal pain  Patient/Family Comments/goals: Pt agreeable to PT  Pain/Comfort:  Pain Rating 1: other (see comments) (dnq)  Location - Orientation 1: generalized  Location 1: abdomen  Pain Addressed 1: Cessation of Activity  Pain Rating Post-Intervention 1: other (see comments) (dnq)      Objective:     Communicated with RN prior to session.  Patient found HOB elevated with bed alarm, peripheral IV, telemetry upon PT entry to room.     General Precautions: Standard, diabetic, fall  Orthopedic Precautions:  N/A  Braces: N/A  Respiratory Status: Room air     Functional Mobility:  Bed Mobility:     Supine to Sit: minimum assistance  Transfers:     Sit to Stand:  contact guard assistance with rolling walker  Gait: 80' CGA rw      AM-PAC 6 CLICK MOBILITY          Treatment & Education:  Pt was educated on the following: call light use, importance of OOB activity and functional mobility to negate the negative effects of prolonged bed rest during this hospitalization, safe transfers/ambulation and discharge planning recommendations/options.     Patient left up in chair with all lines intact, call button in reach, and chair alarm on..    GOALS:   Multidisciplinary Problems       Physical Therapy Goals          Problem: Physical Therapy    Goal Priority Disciplines Outcome Goal Variances Interventions   Physical Therapy Goal     PT, PT/OT      Description: Goals to be met by: 24     Patient will increase functional independence with mobility by performin. Supine to sit with Supervision  2. Sit to stand transfer with Supervision  3. Bed to chair transfer with Supervision using Rolling Walker  4. Gait  x 200 feet with Supervision using Rolling Walker.   5. Ascend & Descend 5 steps w/ rail and minimal Assist                             Time Tracking:     PT Received On: 24  PT Start Time: 1052     PT Stop Time: 1104  PT Total Time (min): 12 min     Billable Minutes: Gait Training 12    Treatment Type: Treatment  PT/PTA: PTA     Number of PTA visits since last PT visit: 2     2024

## 2024-04-27 NOTE — PROGRESS NOTES
Patient seen and examined.  Started having bowel function.      Vitals are stable   Afebrile  Abdomen is mildly distended but soft.  Appropriately tender    Labs reviewed.    Okay to start clear liquids   Ambulate   Following

## 2024-04-28 PROBLEM — Z15.89 MTHFR MUTATION: Status: ACTIVE | Noted: 2024-04-28

## 2024-04-28 LAB
ALBUMIN SERPL BCP-MCNC: 3.1 G/DL (ref 3.5–5.2)
ALP SERPL-CCNC: 86 U/L (ref 55–135)
ALT SERPL W/O P-5'-P-CCNC: 35 U/L (ref 10–44)
ANION GAP SERPL CALC-SCNC: 11 MMOL/L (ref 8–16)
AST SERPL-CCNC: 35 U/L (ref 10–40)
BASOPHILS # BLD AUTO: 0.04 K/UL (ref 0–0.2)
BASOPHILS NFR BLD: 0.3 % (ref 0–1.9)
BILIRUB SERPL-MCNC: 0.3 MG/DL (ref 0.1–1)
BUN SERPL-MCNC: 22 MG/DL (ref 8–23)
CALCIUM SERPL-MCNC: 8.3 MG/DL (ref 8.7–10.5)
CEA SERPL-MCNC: 2.3 NG/ML (ref 0–5)
CHLORIDE SERPL-SCNC: 95 MMOL/L (ref 95–110)
CO2 SERPL-SCNC: 29 MMOL/L (ref 23–29)
CREAT SERPL-MCNC: 1 MG/DL (ref 0.5–1.4)
DIFFERENTIAL METHOD BLD: ABNORMAL
EOSINOPHIL # BLD AUTO: 0.4 K/UL (ref 0–0.5)
EOSINOPHIL NFR BLD: 2.8 % (ref 0–8)
ERYTHROCYTE [DISTWIDTH] IN BLOOD BY AUTOMATED COUNT: 22.6 % (ref 11.5–14.5)
EST. GFR  (NO RACE VARIABLE): >60 ML/MIN/1.73 M^2
ESTIMATED AVG GLUCOSE: 166 MG/DL (ref 68–131)
GLUCOSE SERPL-MCNC: 229 MG/DL (ref 70–110)
GLUCOSE SERPL-MCNC: 235 MG/DL (ref 70–110)
GLUCOSE SERPL-MCNC: 241 MG/DL (ref 70–110)
GLUCOSE SERPL-MCNC: 261 MG/DL (ref 70–110)
GLUCOSE SERPL-MCNC: 271 MG/DL (ref 70–110)
HBA1C MFR BLD: 7.4 % (ref 4.5–6.2)
HCT VFR BLD AUTO: 27.8 % (ref 37–48.5)
HGB BLD-MCNC: 8.4 G/DL (ref 12–16)
IMM GRANULOCYTES # BLD AUTO: 0.16 K/UL (ref 0–0.04)
IMM GRANULOCYTES NFR BLD AUTO: 1.2 % (ref 0–0.5)
LYMPHOCYTES # BLD AUTO: 2.4 K/UL (ref 1–4.8)
LYMPHOCYTES NFR BLD: 17.6 % (ref 18–48)
MAGNESIUM SERPL-MCNC: 1.8 MG/DL (ref 1.6–2.6)
MCH RBC QN AUTO: 24.5 PG (ref 27–31)
MCHC RBC AUTO-ENTMCNC: 30.2 G/DL (ref 32–36)
MCV RBC AUTO: 81 FL (ref 82–98)
MONOCYTES # BLD AUTO: 1.3 K/UL (ref 0.3–1)
MONOCYTES NFR BLD: 9.2 % (ref 4–15)
NEUTROPHILS # BLD AUTO: 9.4 K/UL (ref 1.8–7.7)
NEUTROPHILS NFR BLD: 68.9 % (ref 38–73)
NRBC BLD-RTO: 0 /100 WBC
PHOSPHATE SERPL-MCNC: 3.5 MG/DL (ref 2.7–4.5)
PLATELET # BLD AUTO: 466 K/UL (ref 150–450)
PMV BLD AUTO: 11.2 FL (ref 9.2–12.9)
POTASSIUM SERPL-SCNC: 3.3 MMOL/L (ref 3.5–5.1)
PROT SERPL-MCNC: 6.2 G/DL (ref 6–8.4)
RBC # BLD AUTO: 3.43 M/UL (ref 4–5.4)
SODIUM SERPL-SCNC: 135 MMOL/L (ref 136–145)
WBC # BLD AUTO: 13.6 K/UL (ref 3.9–12.7)

## 2024-04-28 PROCEDURE — 94761 N-INVAS EAR/PLS OXIMETRY MLT: CPT

## 2024-04-28 PROCEDURE — 84100 ASSAY OF PHOSPHORUS: CPT | Performed by: SURGERY

## 2024-04-28 PROCEDURE — 36415 COLL VENOUS BLD VENIPUNCTURE: CPT | Performed by: HOSPITALIST

## 2024-04-28 PROCEDURE — 99900031 HC PATIENT EDUCATION (STAT)

## 2024-04-28 PROCEDURE — 82378 CARCINOEMBRYONIC ANTIGEN: CPT | Performed by: HOSPITALIST

## 2024-04-28 PROCEDURE — 83735 ASSAY OF MAGNESIUM: CPT | Performed by: SURGERY

## 2024-04-28 PROCEDURE — 83036 HEMOGLOBIN GLYCOSYLATED A1C: CPT | Performed by: SURGERY

## 2024-04-28 PROCEDURE — 84145 PROCALCITONIN (PCT): CPT | Performed by: HOSPITALIST

## 2024-04-28 PROCEDURE — 25000003 PHARM REV CODE 250: Performed by: HOSPITALIST

## 2024-04-28 PROCEDURE — 36415 COLL VENOUS BLD VENIPUNCTURE: CPT | Performed by: SURGERY

## 2024-04-28 PROCEDURE — 97116 GAIT TRAINING THERAPY: CPT | Mod: CQ

## 2024-04-28 PROCEDURE — 12000002 HC ACUTE/MED SURGE SEMI-PRIVATE ROOM

## 2024-04-28 PROCEDURE — 85025 COMPLETE CBC W/AUTO DIFF WBC: CPT | Performed by: SURGERY

## 2024-04-28 PROCEDURE — 63600175 PHARM REV CODE 636 W HCPCS: Performed by: HOSPITALIST

## 2024-04-28 PROCEDURE — 25000003 PHARM REV CODE 250: Performed by: INTERNAL MEDICINE

## 2024-04-28 PROCEDURE — 63600175 PHARM REV CODE 636 W HCPCS: Performed by: INTERNAL MEDICINE

## 2024-04-28 PROCEDURE — 80053 COMPREHEN METABOLIC PANEL: CPT | Performed by: SURGERY

## 2024-04-28 RX ORDER — POTASSIUM CHLORIDE 20 MEQ/1
40 TABLET, EXTENDED RELEASE ORAL ONCE
Status: COMPLETED | OUTPATIENT
Start: 2024-04-28 | End: 2024-04-28

## 2024-04-28 RX ORDER — FOLIC ACID 1 MG/1
1 TABLET ORAL DAILY
Status: DISCONTINUED | OUTPATIENT
Start: 2024-04-28 | End: 2024-04-30 | Stop reason: HOSPADM

## 2024-04-28 RX ORDER — DILTIAZEM HYDROCHLORIDE 120 MG/1
120 CAPSULE, COATED, EXTENDED RELEASE ORAL DAILY
Status: DISCONTINUED | OUTPATIENT
Start: 2024-04-28 | End: 2024-04-30 | Stop reason: HOSPADM

## 2024-04-28 RX ORDER — FUROSEMIDE 10 MG/ML
40 INJECTION INTRAMUSCULAR; INTRAVENOUS ONCE
Status: COMPLETED | OUTPATIENT
Start: 2024-04-28 | End: 2024-04-28

## 2024-04-28 RX ADMIN — INSULIN ASPART 4 UNITS: 100 INJECTION, SOLUTION INTRAVENOUS; SUBCUTANEOUS at 09:04

## 2024-04-28 RX ADMIN — POTASSIUM CHLORIDE 40 MEQ: 1500 TABLET, EXTENDED RELEASE ORAL at 08:04

## 2024-04-28 RX ADMIN — FUROSEMIDE 40 MG: 10 INJECTION, SOLUTION INTRAVENOUS at 09:04

## 2024-04-28 RX ADMIN — LEUCINE, PHENYLALANINE, LYSINE, METHIONINE, ISOLEUCINE, VALINE, HISTIDINE, THREONINE, TRYPTOPHAN, ALANINE, GLYCINE, ARGININE, PROLINE, SERINE, TYROSINE, SODIUM ACETATE, DIBASIC POTASSIUM PHOSPHATE, MAGNESIUM CHLORIDE, SODIUM CHLORIDE, CALCIUM CHLORIDE, DEXTROSE
311; 238; 247; 170; 255; 247; 204; 179; 77; 880; 438; 489; 289; 213; 17; 297; 261; 51; 77; 33; 5 INJECTION INTRAVENOUS at 08:04

## 2024-04-28 RX ADMIN — PANTOPRAZOLE SODIUM 40 MG: 40 TABLET, DELAYED RELEASE ORAL at 06:04

## 2024-04-28 RX ADMIN — LEUCINE, PHENYLALANINE, LYSINE, METHIONINE, ISOLEUCINE, VALINE, HISTIDINE, THREONINE, TRYPTOPHAN, ALANINE, GLYCINE, ARGININE, PROLINE, SERINE, TYROSINE, SODIUM ACETATE, DIBASIC POTASSIUM PHOSPHATE, MAGNESIUM CHLORIDE, SODIUM CHLORIDE, CALCIUM CHLORIDE, DEXTROSE
311; 238; 247; 170; 255; 247; 204; 179; 77; 880; 438; 489; 289; 213; 17; 297; 261; 51; 77; 33; 5 INJECTION INTRAVENOUS at 11:04

## 2024-04-28 RX ADMIN — DILTIAZEM HYDROCHLORIDE 120 MG: 120 CAPSULE, EXTENDED RELEASE ORAL at 08:04

## 2024-04-28 RX ADMIN — INSULIN ASPART 6 UNITS: 100 INJECTION, SOLUTION INTRAVENOUS; SUBCUTANEOUS at 01:04

## 2024-04-28 RX ADMIN — ENOXAPARIN SODIUM 100 MG: 100 INJECTION SUBCUTANEOUS at 09:04

## 2024-04-28 RX ADMIN — POTASSIUM CHLORIDE 40 MEQ: 1500 TABLET, EXTENDED RELEASE ORAL at 03:04

## 2024-04-28 RX ADMIN — HYDROCODONE BITARTRATE AND ACETAMINOPHEN 1 TABLET: 10; 325 TABLET ORAL at 09:04

## 2024-04-28 RX ADMIN — FOLIC ACID 1 MG: 1 TABLET ORAL at 09:04

## 2024-04-28 RX ADMIN — ENOXAPARIN SODIUM 100 MG: 100 INJECTION SUBCUTANEOUS at 08:04

## 2024-04-28 RX ADMIN — INSULIN ASPART 9 UNITS: 100 INJECTION, SOLUTION INTRAVENOUS; SUBCUTANEOUS at 08:04

## 2024-04-28 RX ADMIN — INSULIN ASPART 6 UNITS: 100 INJECTION, SOLUTION INTRAVENOUS; SUBCUTANEOUS at 05:04

## 2024-04-28 NOTE — SUBJECTIVE & OBJECTIVE
Interval History:  Seen in the multidisciplinary rounds, feel better, tolerated clear liquid diet well, has small bowel movements, no nausea.       Patient also has a history of DVT and MTHFR +- mutation, was on Eliquis prior to admission, follow with Dr. Felder.  Spoke with Dr. Soria 4/28, okay to start full-dose Lovenox now and continue Eliquis upon discharge.     Review of Systems   Constitutional:  Positive for fatigue. Negative for activity change, appetite change, chills and fever.   HENT:  Negative for congestion, ear pain, nosebleeds and sinus pain.    Eyes:  Negative for discharge and itching.   Respiratory:  Negative for apnea, cough, chest tightness and shortness of breath.    Cardiovascular:  Negative for chest pain, palpitations and leg swelling.   Gastrointestinal:  Positive for abdominal distention and abdominal pain. Negative for vomiting.   Genitourinary:  Negative for difficulty urinating, dysuria, flank pain and frequency.   Musculoskeletal:  Negative for arthralgias, back pain, joint swelling and myalgias.   Skin:  Negative for color change, pallor and rash.   Neurological:  Positive for weakness. Negative for dizziness, light-headedness and headaches.   Psychiatric/Behavioral:  Negative for agitation, behavioral problems, confusion and suicidal ideas.      Objective:     Vital Signs (Most Recent):  Temp: 98.6 °F (37 °C) (04/28/24 0752)  Pulse: 86 (04/28/24 0800)  Resp: 18 (04/28/24 0800)  BP: 115/65 (04/28/24 0752)  SpO2: 96 % (04/28/24 0800) Vital Signs (24h Range):  Temp:  [97.3 °F (36.3 °C)-98.6 °F (37 °C)] 98.6 °F (37 °C)  Pulse:  [81-88] 86  Resp:  [18] 18  SpO2:  [94 %-98 %] 96 %  BP: (115-149)/(60-77) 115/65     Weight: 97.2 kg (214 lb 4.6 oz)  Body mass index is 32.58 kg/m².    Intake/Output Summary (Last 24 hours) at 4/28/2024 1039  Last data filed at 4/28/2024 1029  Gross per 24 hour   Intake 480 ml   Output --   Net 480 ml         Physical Exam  Vitals reviewed.   Constitutional:        General: She is not in acute distress.     Appearance: Normal appearance. She is normal weight. She is not ill-appearing.   HENT:      Head: Normocephalic and atraumatic.      Nose: Nose normal.      Mouth/Throat:      Mouth: Mucous membranes are moist.      Pharynx: Oropharynx is clear.   Eyes:      General: No scleral icterus.     Extraocular Movements: Extraocular movements intact.      Conjunctiva/sclera: Conjunctivae normal.      Pupils: Pupils are equal, round, and reactive to light.   Cardiovascular:      Rate and Rhythm: Normal rate and regular rhythm.      Pulses: Normal pulses.      Heart sounds: Normal heart sounds. No murmur heard.     No gallop.   Pulmonary:      Effort: Pulmonary effort is normal. No respiratory distress.      Breath sounds: Normal breath sounds. No wheezing, rhonchi or rales.   Chest:      Chest wall: No tenderness.   Abdominal:      General: Abdomen is flat. There is no distension.      Palpations: Abdomen is soft.      Tenderness: There is abdominal tenderness.      Comments: Scant bowel sounds   Musculoskeletal:         General: No swelling or tenderness.      Cervical back: Normal range of motion and neck supple. No rigidity or tenderness.      Right lower leg: No edema.      Left lower leg: No edema.   Skin:     General: Skin is warm and dry.   Neurological:      General: No focal deficit present.      Mental Status: She is alert and oriented to person, place, and time. Mental status is at baseline.      Motor: No weakness.   Psychiatric:         Mood and Affect: Mood normal.         Behavior: Behavior normal.         Thought Content: Thought content normal.             Significant Labs: All pertinent labs within the past 24 hours have been reviewed.  BMP:   Recent Labs   Lab 04/28/24  0440   *   *   K 3.3*   CL 95   CO2 29   BUN 22   CREATININE 1.0   CALCIUM 8.3*   MG 1.8     CBC:   Recent Labs   Lab 04/27/24  0435 04/28/24  0440   WBC 12.61 13.60*   HGB 8.5*  8.4*   HCT 27.9* 27.8*    466*     CMP:   Recent Labs   Lab 04/27/24  0435 04/28/24  0440   * 135*   K 3.7 3.3*   CL 93* 95   CO2 30* 29   * 261*   BUN 20 22   CREATININE 0.9 1.0   CALCIUM 8.6* 8.3*   PROT 6.1 6.2   ALBUMIN 3.0* 3.1*   BILITOT 0.3 0.3   ALKPHOS 83 86   AST 27 35   ALT 23 35   ANIONGAP 9 11       Significant Imaging: I have reviewed all pertinent imaging results/findings within the past 24 hours.

## 2024-04-28 NOTE — RESPIRATORY THERAPY
04/28/24 0800   Patient Assessment/Suction   Level of Consciousness (AVPU) alert   Respiratory Effort Normal;Unlabored   PRE-TX-O2   Device (Oxygen Therapy) room air   SpO2 96 %   Pulse Oximetry Type Intermittent   $ Pulse Oximetry - Multiple Charge Pulse Oximetry - Multiple   Pulse 86   Resp 18   Education   $ Education 15 min

## 2024-04-28 NOTE — PROGRESS NOTES
Patient seen and examined.  Tolerating full liquids.  Still having bowel function.      Vitals are stable   Afebrile  Abdomen is appropriately tender    Labs reviewed stable    Okay to advance to low residue diet today as tolerated.

## 2024-04-28 NOTE — ASSESSMENT & PLAN NOTE
Status post right hemicolectomy  Surgical pathology:   - INVASIVE MODERATELY DIFFERENTIATED ADENOCARCINOMA.   - SURGICAL MARGINS ARE NEGATIVE FOR INVASIVE CARCINOMA   - MULTIPLE (3) TUBULAR ADENOMAS, NEGATIVE FOR HIGH GRADE DYSPLASIA OR    MALIGNANCY.   - METASTATIC CARCINOMA IN ONE OF THIRTY-FIVE REGIONAL LYMPH NODES.     Patient has been evaluated by oncologist  Most likely patient will need chemotherapy outpatient  CT chest upon admission is negative for metastatic disease  Now patient passing gas, we will try clear liquid diet  Get a baseline CEA level.  Advanced to full liquid diet.  We will wean off TPN in 1-2 days

## 2024-04-28 NOTE — PROGRESS NOTES
[facility]  Progress Note    Patient Name: Jen Torres  MRN: [id number]  Patient Class: IP- Inpatient   Admission Date: 4/19/2024  Length of Stay: 8 days  Attending Physician: Ronal Mcnulty MD  Primary Care Provider: Sam Garcia IV, MD        Subjective:     Principal Problem:Colon adenocarcinoma        HPI:  Patient is a 73 old female medical history of anemia, GERD, hyperlipidemia, and migraines.  Patient presents to the ED today with complaints of worsening fatigue shortness for breath worse on exertion, and intermittent chest pain on exertion.  Patient was seen in GI office earlier today, thus sent into the ED for further evaluation.  Patient was found to have hemoglobin of 7.4, hematocrit 24.5.  1 unit blood ordered in the ED for transfusion.  Patient just had recent iron infusion on Wednesday.  Patient denies previous history of blood transfusions in the past.  Patient has had rectal bleeding and melena that has been present and steady for the past 2 months.  Patient reports being scheduled for outpatient stress test next week.  D-dimer elevated in the ED at 0.55, CTA negative for acute PE.  CT of the abdomen without acute abnormalities. Patient has history of fhypercoagulable state, DVT in LLE 2021. Patient does report taking Eliquis BID, last dose this morning. GI consulted.  Pt denies constipation/diarrhea, dysuria, abdominal pain, LE edema, diaphoresis, nausea, or vomiting. Patient is full code.    In the ED:  Chest x-ray negative.  Occult positive.  Troponin 5.0, TSH 2.254, UA negative.  Potassium 3.5, magnesium 1.6, INR 1.0, PTT 11.2.    Overview/Hospital Course:  The patient was transfused one unit PRBC on 04/19. She was seen in consult by GI, EGD done on 04/20. No significant findings. Colonoscopy done for 04/21 and revealed colonic mass in the transverse colon. Surgery was consulted, and resection was done 04/22. Her H/H were monitored for any further decrease.  Patient with expected postop pain. Patient developed n/v. Abdominal xray revealed ileus. Oral meds were changed to IV form. The patient was held NPO. She was placed on PPN.    Interval History:  Seen in the multidisciplinary rounds, feel better, tolerated clear liquid diet well, has small bowel movements, no nausea.       Patient also has a history of DVT and MTHFR +- mutation, was on Eliquis prior to admission, follow with Dr. Felder.  Spoke with Dr. Soria 4/28ugo to start full-dose Lovenox now and continue Eliquis upon discharge.     Review of Systems   Constitutional:  Positive for fatigue. Negative for activity change, appetite change, chills and fever.   HENT:  Negative for congestion, ear pain, nosebleeds and sinus pain.    Eyes:  Negative for discharge and itching.   Respiratory:  Negative for apnea, cough, chest tightness and shortness of breath.    Cardiovascular:  Negative for chest pain, palpitations and leg swelling.   Gastrointestinal:  Positive for abdominal distention and abdominal pain. Negative for vomiting.   Genitourinary:  Negative for difficulty urinating, dysuria, flank pain and frequency.   Musculoskeletal:  Negative for arthralgias, back pain, joint swelling and myalgias.   Skin:  Negative for color change, pallor and rash.   Neurological:  Positive for weakness. Negative for dizziness, light-headedness and headaches.   Psychiatric/Behavioral:  Negative for agitation, behavioral problems, confusion and suicidal ideas.      Objective:     Vital Signs (Most Recent):  Temp: 98.6 °F (37 °C) (04/28/24 0752)  Pulse: 86 (04/28/24 0800)  Resp: 18 (04/28/24 0800)  BP: 115/65 (04/28/24 0752)  SpO2: 96 % (04/28/24 0800) Vital Signs (24h Range):  Temp:  [97.3 °F (36.3 °C)-98.6 °F (37 °C)] 98.6 °F (37 °C)  Pulse:  [81-88] 86  Resp:  [18] 18  SpO2:  [94 %-98 %] 96 %  BP: (115-149)/(60-77) 115/65     Weight: 97.2 kg (214 lb 4.6 oz)  Body mass index is 32.58 kg/m².    Intake/Output Summary (Last 24  hours) at 4/28/2024 1039  Last data filed at 4/28/2024 1029  Gross per 24 hour   Intake 480 ml   Output --   Net 480 ml         Physical Exam  Vitals reviewed.   Constitutional:       General: She is not in acute distress.     Appearance: Normal appearance. She is normal weight. She is not ill-appearing.   HENT:      Head: Normocephalic and atraumatic.      Nose: Nose normal.      Mouth/Throat:      Mouth: Mucous membranes are moist.      Pharynx: Oropharynx is clear.   Eyes:      General: No scleral icterus.     Extraocular Movements: Extraocular movements intact.      Conjunctiva/sclera: Conjunctivae normal.      Pupils: Pupils are equal, round, and reactive to light.   Cardiovascular:      Rate and Rhythm: Normal rate and regular rhythm.      Pulses: Normal pulses.      Heart sounds: Normal heart sounds. No murmur heard.     No gallop.   Pulmonary:      Effort: Pulmonary effort is normal. No respiratory distress.      Breath sounds: Normal breath sounds. No wheezing, rhonchi or rales.   Chest:      Chest wall: No tenderness.   Abdominal:      General: Abdomen is flat. There is no distension.      Palpations: Abdomen is soft.      Tenderness: There is abdominal tenderness.      Comments: Scant bowel sounds   Musculoskeletal:         General: No swelling or tenderness.      Cervical back: Normal range of motion and neck supple. No rigidity or tenderness.      Right lower leg: No edema.      Left lower leg: No edema.   Skin:     General: Skin is warm and dry.   Neurological:      General: No focal deficit present.      Mental Status: She is alert and oriented to person, place, and time. Mental status is at baseline.      Motor: No weakness.   Psychiatric:         Mood and Affect: Mood normal.         Behavior: Behavior normal.         Thought Content: Thought content normal.             Significant Labs: All pertinent labs within the past 24 hours have been reviewed.  BMP:   Recent Labs   Lab 04/28/24  7058   GLU  261*   *   K 3.3*   CL 95   CO2 29   BUN 22   CREATININE 1.0   CALCIUM 8.3*   MG 1.8     CBC:   Recent Labs   Lab 04/27/24  0435 04/28/24  0440   WBC 12.61 13.60*   HGB 8.5* 8.4*   HCT 27.9* 27.8*    466*     CMP:   Recent Labs   Lab 04/27/24  0435 04/28/24  0440   * 135*   K 3.7 3.3*   CL 93* 95   CO2 30* 29   * 261*   BUN 20 22   CREATININE 0.9 1.0   CALCIUM 8.6* 8.3*   PROT 6.1 6.2   ALBUMIN 3.0* 3.1*   BILITOT 0.3 0.3   ALKPHOS 83 86   AST 27 35   ALT 23 35   ANIONGAP 9 11       Significant Imaging: I have reviewed all pertinent imaging results/findings within the past 24 hours.    Assessment/Plan:      * Colon adenocarcinoma    Status post right hemicolectomy  Surgical pathology:   - INVASIVE MODERATELY DIFFERENTIATED ADENOCARCINOMA.   - SURGICAL MARGINS ARE NEGATIVE FOR INVASIVE CARCINOMA   - MULTIPLE (3) TUBULAR ADENOMAS, NEGATIVE FOR HIGH GRADE DYSPLASIA OR    MALIGNANCY.   - METASTATIC CARCINOMA IN ONE OF THIRTY-FIVE REGIONAL LYMPH NODES.     Patient has been evaluated by oncologist  Most likely patient will need chemotherapy outpatient  CT chest upon admission is negative for metastatic disease  Now patient passing gas, we will try clear liquid diet  Get a baseline CEA level.  Advanced to full liquid diet.  We will wean off TPN in 1-2 days      MTHFR mutation    Patient also has a history of DVT, was taking Eliquis prior to admission.  Follow with Dr. Felder.  Spoke with General surgery Dr. Soria  We will start on full-dose Lovenox 4/28  We will continue Eliquis upon discharge    GI bleed  GI consulted, had unremarkable EGD; colonoscopy revealed colonic mass; s/p resection 04/22  Serial CBC, monitor on telemetry   Hbg dropped from 11.5 to 7.4 in 4 months.       Iron deficiency anemia due to chronic blood loss  Last iron infusion 04/17  Symptomatic anemia with SOB, pale, fatigue  Transfused one unit PRBC on 04/19  Serial CBCs, monitor on telemetry     Type 2 diabetes mellitus  without complication, without long-term current use of insulin  Uncontrolled  Increase Levemir to 15 units b.i.d.  Pending A1c  High-dose sliding scale    Essential hypertension  Chronic, controlled. Latest blood pressure and vitals reviewed-     Temp:  [97.1 °F (36.2 °C)-99.2 °F (37.3 °C)]   Pulse:  []   Resp:  [15-20]   BP: (105-160)/(56-95)   SpO2:  [92 %-100 %] .   Home meds for hypertension were reviewed and noted below.   Hypertension Medications               diltiaZEM (CARDIZEM CD) 120 MG Cp24 Take 1 capsule (120 mg total) by mouth once daily.    hydroCHLOROthiazide (HYDRODIURIL) 25 MG tablet Take 25 mg by mouth every Mon, Wed, Fri.            While in the hospital, will manage blood pressure as follows; Continue home antihypertensive regimen    Will utilize p.r.n. blood pressure medication only if patient's blood pressure greater than 180/110 and she develops symptoms such as worsening chest pain or shortness of breath.      VTE Risk Mitigation (From admission, onward)           Ordered     enoxaparin injection 100 mg  Every 12 hours        Note to Pharmacy: Wt: 97.2 kg (214 lb 4.6 oz)  Estimated Creatinine Clearance: 62.9 mL/min (based on SCr of 1 mg/dL).  Body mass index is 32.58 kg/m².    04/26/24 1100     Reason for No Pharmacological VTE Prophylaxis  Once        Question:  Reasons:  Answer:  Active Bleeding    04/19/24 1641     IP VTE HIGH RISK PATIENT  Once         04/19/24 1641     Place sequential compression device  Until discontinued         04/19/24 1641                    Discharge Planning   KATIE: 4/29/2024     Code Status: Full Code   Is the patient medically ready for discharge?:     Reason for patient still in hospital (select all that apply): Patient trending condition and Treatment  Discharge Plan A: Rehab   Discharge Delays: None known at this time              Ronal Mcnulty MD  Department of Hospital Medicine   [facility]

## 2024-04-28 NOTE — ASSESSMENT & PLAN NOTE
Patient also has a history of DVT, was taking Eliquis prior to admission.  Follow with Dr. Felder.  Spoke with General surgery Dr. Soria  We will start on full-dose Lovenox 4/28  We will continue Eliquis upon discharge

## 2024-04-28 NOTE — PT/OT/SLP PROGRESS
Physical Therapy Treatment    Patient Name:  Jen Torres   MRN:  [id number]    Recommendations:     Discharge Recommendations: High Intensity Therapy  Discharge Equipment Recommendations: to be determined by next level of care  Barriers to discharge:  weakness, impaired endurance, impaired self care skills, impaired functional mobility, pain, gait instability.     Assessment:     Jen Torres is a 73 y.o. female admitted with a medical diagnosis of Colon adenocarcinoma.  She presents with the following impairments/functional limitations: weakness, impaired endurance, impaired self care skills, impaired functional mobility, pain, gait instability, impaired skin.    Pt found resting with HOB elevated, agreeable to skilled physical therapy session today.     Mushtaq given to transfer from supine to sitting EOB. Once EOB, pt required short sitting rest break secondary to SOB and fatigue.     She required CGA to transfer from sitting to standing from EOB with RW.     She ambulated 30ft with RW and CGA demonstrating slow theresa, forward flexed posture, and inconsistent AD placement.     She ambulated back to her room where she agreed to sit up in bed side chair.     She was left sitting up in chair with chair alarm on, call light within reach, all needs met, and RN notified.     Rehab Prognosis: Fair; patient would benefit from acute skilled PT services to address these deficits and reach maximum level of function.    Recent Surgery: Procedure(s) (LRB):  LAPAROTOMY, EXPLORATORY (N/A)  COLECTOMY, RIGHT  LYSIS, ADHESIONS  SURGICAL PROCEDURE, COLONOSCOPIC 6 Days Post-Op    Plan:     During this hospitalization, patient to be seen daily to address the identified rehab impairments via gait training, therapeutic activities, therapeutic exercises and progress toward the following goals:    Plan of Care Expires:  05/23/24    Subjective     Chief Complaint: SOB and fatigue  Patient/Family Comments/goals: to get  better  Pain/Comfort:  Location - Orientation 1: generalized  Location 1: abdomen      Objective:     Communicated with RN prior to session.  Patient found HOB elevated with bed alarm, peripheral IV, telemetry upon PT entry to room.     General Precautions: Standard, fall  Orthopedic Precautions: N/A  Braces: N/A  Respiratory Status: Room air     Functional Mobility:  Bed Mobility:     Supine to Sit: minimum assistance  Transfers:     Sit to Stand:  contact guard assistance with rolling walker  Gait: 30ft with RW and CGA.       AM-PAC 6 CLICK MOBILITY          Treatment & Education:  Pt educated on importance of time OOB, importance of intermittent mobility, safe techniques for transfers/ambulation, discharge recommendations/options, and use of call light for assistance and fall prevention.      Patient left up in chair with all lines intact, call button in reach, chair alarm on, and RN notified..    GOALS:   Multidisciplinary Problems       Physical Therapy Goals          Problem: Physical Therapy    Goal Priority Disciplines Outcome Goal Variances Interventions   Physical Therapy Goal     PT, PT/OT      Description: Goals to be met by: 24     Patient will increase functional independence with mobility by performin. Supine to sit with Supervision  2. Sit to stand transfer with Supervision  3. Bed to chair transfer with Supervision using Rolling Walker  4. Gait  x 200 feet with Supervision using Rolling Walker.   5. Ascend & Descend 5 steps w/ rail and minimal Assist                             Time Tracking:     PT Received On: 24  PT Start Time: 1144     PT Stop Time: 1156  PT Total Time (min): 12 min     Billable Minutes: Gait Training 12    Treatment Type: Treatment  PT/PTA: PTA     Number of PTA visits since last PT visit: 3     2024

## 2024-04-29 PROBLEM — D72.829 LEUKOCYTOSIS: Status: ACTIVE | Noted: 2024-04-29

## 2024-04-29 LAB
ANION GAP SERPL CALC-SCNC: 7 MMOL/L (ref 8–16)
BASOPHILS # BLD AUTO: 0.03 K/UL (ref 0–0.2)
BASOPHILS NFR BLD: 0.2 % (ref 0–1.9)
BUN SERPL-MCNC: 22 MG/DL (ref 8–23)
CALCIUM SERPL-MCNC: 8.4 MG/DL (ref 8.7–10.5)
CHLORIDE SERPL-SCNC: 95 MMOL/L (ref 95–110)
CO2 SERPL-SCNC: 29 MMOL/L (ref 23–29)
CREAT SERPL-MCNC: 1 MG/DL (ref 0.5–1.4)
DIFFERENTIAL METHOD BLD: ABNORMAL
EOSINOPHIL # BLD AUTO: 0.1 K/UL (ref 0–0.5)
EOSINOPHIL NFR BLD: 0.3 % (ref 0–8)
ERYTHROCYTE [DISTWIDTH] IN BLOOD BY AUTOMATED COUNT: 23.6 % (ref 11.5–14.5)
EST. GFR  (NO RACE VARIABLE): >60 ML/MIN/1.73 M^2
GLUCOSE SERPL-MCNC: 207 MG/DL (ref 70–110)
GLUCOSE SERPL-MCNC: 219 MG/DL (ref 70–110)
GLUCOSE SERPL-MCNC: 235 MG/DL (ref 70–110)
GLUCOSE SERPL-MCNC: 244 MG/DL (ref 70–110)
GLUCOSE SERPL-MCNC: 340 MG/DL (ref 70–110)
GLUCOSE SERPL-MCNC: 342 MG/DL (ref 70–110)
HCT VFR BLD AUTO: 29.6 % (ref 37–48.5)
HGB BLD-MCNC: 9 G/DL (ref 12–16)
IMM GRANULOCYTES # BLD AUTO: 0.28 K/UL (ref 0–0.04)
IMM GRANULOCYTES NFR BLD AUTO: 1.4 % (ref 0–0.5)
LYMPHOCYTES # BLD AUTO: 1.6 K/UL (ref 1–4.8)
LYMPHOCYTES NFR BLD: 8.1 % (ref 18–48)
MAGNESIUM SERPL-MCNC: 1.7 MG/DL (ref 1.6–2.6)
MCH RBC QN AUTO: 24.6 PG (ref 27–31)
MCHC RBC AUTO-ENTMCNC: 30.4 G/DL (ref 32–36)
MCV RBC AUTO: 81 FL (ref 82–98)
MONOCYTES # BLD AUTO: 1.2 K/UL (ref 0.3–1)
MONOCYTES NFR BLD: 5.8 % (ref 4–15)
NEUTROPHILS # BLD AUTO: 16.8 K/UL (ref 1.8–7.7)
NEUTROPHILS NFR BLD: 84.2 % (ref 38–73)
NRBC BLD-RTO: 0 /100 WBC
PHOSPHATE SERPL-MCNC: 2.9 MG/DL (ref 2.7–4.5)
PLATELET # BLD AUTO: 495 K/UL (ref 150–450)
PMV BLD AUTO: 10.6 FL (ref 9.2–12.9)
POTASSIUM SERPL-SCNC: 4.3 MMOL/L (ref 3.5–5.1)
PROCALCITONIN SERPL IA-MCNC: 0.46 NG/ML (ref 0–0.5)
RBC # BLD AUTO: 3.66 M/UL (ref 4–5.4)
SODIUM SERPL-SCNC: 131 MMOL/L (ref 136–145)
WBC # BLD AUTO: 19.94 K/UL (ref 3.9–12.7)

## 2024-04-29 PROCEDURE — 25000003 PHARM REV CODE 250: Performed by: HOSPITALIST

## 2024-04-29 PROCEDURE — 80048 BASIC METABOLIC PNL TOTAL CA: CPT | Performed by: HOSPITALIST

## 2024-04-29 PROCEDURE — 36415 COLL VENOUS BLD VENIPUNCTURE: CPT | Performed by: SURGERY

## 2024-04-29 PROCEDURE — 36415 COLL VENOUS BLD VENIPUNCTURE: CPT | Performed by: HOSPITALIST

## 2024-04-29 PROCEDURE — 63600175 PHARM REV CODE 636 W HCPCS: Performed by: SURGERY

## 2024-04-29 PROCEDURE — 85025 COMPLETE CBC W/AUTO DIFF WBC: CPT | Performed by: HOSPITALIST

## 2024-04-29 PROCEDURE — 99900035 HC TECH TIME PER 15 MIN (STAT)

## 2024-04-29 PROCEDURE — 83735 ASSAY OF MAGNESIUM: CPT | Performed by: SURGERY

## 2024-04-29 PROCEDURE — 97116 GAIT TRAINING THERAPY: CPT

## 2024-04-29 PROCEDURE — 12000002 HC ACUTE/MED SURGE SEMI-PRIVATE ROOM

## 2024-04-29 PROCEDURE — 63600175 PHARM REV CODE 636 W HCPCS: Performed by: INTERNAL MEDICINE

## 2024-04-29 PROCEDURE — 97535 SELF CARE MNGMENT TRAINING: CPT

## 2024-04-29 PROCEDURE — 25000003 PHARM REV CODE 250: Performed by: SURGERY

## 2024-04-29 PROCEDURE — 84100 ASSAY OF PHOSPHORUS: CPT | Performed by: HOSPITALIST

## 2024-04-29 PROCEDURE — 94761 N-INVAS EAR/PLS OXIMETRY MLT: CPT

## 2024-04-29 PROCEDURE — 25000003 PHARM REV CODE 250: Performed by: INTERNAL MEDICINE

## 2024-04-29 RX ORDER — HYDROCODONE BITARTRATE AND ACETAMINOPHEN 10; 325 MG/1; MG/1
1 TABLET ORAL EVERY 6 HOURS PRN
Qty: 15 TABLET | Refills: 0 | Status: SHIPPED | OUTPATIENT
Start: 2024-04-29 | End: 2024-05-04

## 2024-04-29 RX ORDER — BISACODYL 5 MG
10 TABLET, DELAYED RELEASE (ENTERIC COATED) ORAL ONCE
Status: COMPLETED | OUTPATIENT
Start: 2024-04-29 | End: 2024-04-29

## 2024-04-29 RX ADMIN — SENNOSIDES AND DOCUSATE SODIUM 1 TABLET: 8.6; 5 TABLET ORAL at 08:04

## 2024-04-29 RX ADMIN — DILTIAZEM HYDROCHLORIDE 120 MG: 120 CAPSULE, EXTENDED RELEASE ORAL at 08:04

## 2024-04-29 RX ADMIN — PANTOPRAZOLE SODIUM 40 MG: 40 TABLET, DELAYED RELEASE ORAL at 05:04

## 2024-04-29 RX ADMIN — ENOXAPARIN SODIUM 100 MG: 100 INJECTION SUBCUTANEOUS at 08:04

## 2024-04-29 RX ADMIN — INSULIN ASPART 12 UNITS: 100 INJECTION, SOLUTION INTRAVENOUS; SUBCUTANEOUS at 08:04

## 2024-04-29 RX ADMIN — HYDROCODONE BITARTRATE AND ACETAMINOPHEN 1 TABLET: 10; 325 TABLET ORAL at 05:04

## 2024-04-29 RX ADMIN — INSULIN ASPART 6 UNITS: 100 INJECTION, SOLUTION INTRAVENOUS; SUBCUTANEOUS at 11:04

## 2024-04-29 RX ADMIN — INSULIN ASPART 6 UNITS: 100 INJECTION, SOLUTION INTRAVENOUS; SUBCUTANEOUS at 05:04

## 2024-04-29 RX ADMIN — FOLIC ACID 1 MG: 1 TABLET ORAL at 08:04

## 2024-04-29 RX ADMIN — Medication 800 MG: at 08:04

## 2024-04-29 RX ADMIN — BISACODYL 10 MG: 5 TABLET, COATED ORAL at 11:04

## 2024-04-29 RX ADMIN — ONDANSETRON 4 MG: 2 INJECTION INTRAMUSCULAR; INTRAVENOUS at 05:04

## 2024-04-29 NOTE — PLAN OF CARE
Received notice from Viktoria with [facility] that she is coming to evaluate pt today. Pt advancing diet.  Clinimix stopped this am.       04/29/24 0805   Post-Acute Status   Post-Acute Authorization Placement   Post-Acute Placement Status Pending post-acute provider review/more information requested

## 2024-04-29 NOTE — PT/OT/SLP PROGRESS
Occupational Therapy   Treatment    Name: Jen Torres  MRN: [id number]  Admitting Diagnosis:  Colon adenocarcinoma  7 Days Post-Op    Recommendations:     Discharge Recommendations: High Intensity Therapy  Discharge Equipment Recommendations:  to be determined by next level of care  Barriers to discharge:       Assessment:     Jen Torres is a 73 y.o. female with a medical diagnosis of Colon adenocarcinoma. Pt agreeable to OT therapy session this AM. Performance deficits affecting function are weakness, impaired endurance, impaired self care skills, impaired functional mobility, gait instability, impaired balance, decreased safety awareness, pain, impaired cardiopulmonary response to activity, impaired skin.     Rehab Prognosis:  Good; patient would benefit from acute skilled OT services to address these deficits and reach maximum level of function.       Plan:     Patient to be seen 5 x/week to address the above listed problems via self-care/home management, therapeutic activities, therapeutic exercises  Plan of Care Expires: 05/23/24  Plan of Care Reviewed with: patient    Subjective     Chief Complaint: abdominal pain  Patient/Family Comments/goals: none stated  Pain/Comfort:  Pain Rating 1:  (not rated)  Location - Side 1: Right  Location 1: abdomen  Pain Addressed 1: Reposition, Distraction, Cessation of Activity    Objective:     Communicated with: nursing prior to session.  Patient found HOB elevated with bed alarm, peripheral IV, telemetry upon OT entry to room.    General Precautions: Standard, fall    Orthopedic Precautions:N/A  Braces: N/A  Respiratory Status: Room air     Occupational Performance:     Bed Mobility:    Patient completed Supine to Sit with stand by assistance and with side rail and HOB raised    Functional Mobility/Transfers:  Patient completed Sit <> Stand Transfer with contact guard assistance  with  rolling walker   Patient completed Bed <> Chair Transfer using Step Transfer  technique with contact guard assistance with rolling walker  Patient completed Toilet Transfer Stand Pivot technique with contact guard assistance with  no AD to BSC  Functional Mobility: pt amb around bed to chair with RW with CGA with no LOB or SOB; slow pace    Activities of Daily Living:  Grooming: setup assistance seated in chair to brush teeth and to wash face and hands    Toileting: stand by assistance while seated on BSC for hygiene    Treatment & Education:  Pt educated on role of OT/POC, importance of OOB/EOB activity, use of call bell, and safety during ADLs, transfers, and functional mobility.    Patient left up in chair with all lines intact, call button in reach, and chair alarm on    GOALS:   Multidisciplinary Problems       Occupational Therapy Goals          Problem: Occupational Therapy    Goal Priority Disciplines Outcome Interventions   Occupational Therapy Goal     OT, PT/OT Progressing    Description: Goals to be met by: 5/23/2024     Patient will increase functional independence with ADLs by performing:    UE Dressing with Modified [place].  LE Dressing with Modified [place].  Grooming while standing at sink with Modified [place].  Toileting from toilet with Modified [place] for hygiene and clothing management.   Supine to sit with Modified [place].  Toilet transfer to toilet with Modified [place].  Upper extremity exercise program x10 reps per handout, with independence.                         Time Tracking:     OT Date of Treatment: 04/29/24  OT Start Time: 1041  OT Stop Time: 1055  OT Total Time (min): 14 min    Billable Minutes:Self Care/Home Management 14    OT/ERIC: OT          4/29/2024

## 2024-04-29 NOTE — ASSESSMENT & PLAN NOTE
Patient looks comfortable, no abd pain   Will get ct abd/pelvis with iv contrast.    Check procalctionin level.

## 2024-04-29 NOTE — PLAN OF CARE
Recommendations  1. Continue current Low fiber diet as tolerated.  2. Pt at risk for refeeding.  Recommend continued monitoring and management of blood glucose and electrolytes.   3. Upgrade diet to Diabetic diet as soon as medically appropriate.   4. Consider Ensure Clear if intake <50%.

## 2024-04-29 NOTE — CONSULTS
"[facility]  Adult Nutrition   Consult Note (Nutrition Education)     SUMMARY     Recommendations  1. Continue current Low fiber diet as tolerated.  2. Pt at risk for refeeding.  Recommend continued monitoring and management of blood glucose and electrolytes.   3. Upgrade diet to Diabetic diet as soon as medically appropriate.   4. Consider Ensure Clear if intake <50%.     Goals:   1. Pt to meet EPN by follow up.   2. Blood glucose and electrolytes to trend towards normal limits/ target ranges.  Nutrition Goal Status: new  Communication of RD Recs:  (plan of care)    Nutrition Diagnosis PES Statement:   Inadequate oral intake related to catabolism energy increases as evidenced by intake <50% >7 days; s/p colectomy 4/22/24.     Dietitian Rounds Brief  General Information Comments: Visited with patient and granddaughter in room. Pt reports consuming 1/2 turkey sandwich and juice for lunch. Denies any nausea or vomiting. Provided pt educational handout on "Clean Eating". Encouraged pt to make healthy food choices once she goes home. Pt denies any significant wt changes. [facility].     Nutrition Related Social Determinants of Health: SDOH: Adequate food in home environment    Malnutrition Assessment   Skin (Micronutrient): other (see comments) (surgical incision)       Energy Intake (Malnutrition): less than 75% for greater than 7 days   Orbital Region (Subcutaneous Fat Loss): well nourished  Upper Arm Region (Subcutaneous Fat Loss): well nourished  Thoracic and Lumbar Region: well nourished   [place] Region (Muscle Loss): well nourished  Clavicle Bone Region (Muscle Loss): well nourished  Clavicle and Acromion Bone Region (Muscle Loss): well nourished  Scapular Bone Region (Muscle Loss): well nourished  Dorsal Hand (Muscle Loss): well nourished  Patellar Region (Muscle Loss): well nourished  Anterior Thigh Region (Muscle Loss): well nourished  Posterior Calf " "Region (Muscle Loss): well nourished     Diet order:   Current Diet Order: Low Fiber/Residue     Evaluation of Received Nutrient/Fluid Intake  % Intake of Estimated Energy Needs: 0 - 25 %  % Meal Intake: 0 - 25 %  No intake or output data in the 24 hours ending 04/29/24 1533     Anthropometrics  Temp: 98 °F (36.7 °C)  Height Method: Measured  Height: 5' 8" (172.7 cm)  Height (inches): 68 in  Weight Method: Bed Scale  Weight: 97.2 kg (214 lb 4.6 oz)  Weight (lb): 214.29 lb  Ideal Body Weight (IBW), Female: 140 lb  % Ideal Body Weight, Female (lb): 153.06 %  BMI (Calculated): 32.6  BMI Grade: 30 - 34.9- obesity - grade I    Estimated/Assessed Needs  Weight Used For Calorie Calculations: 97.2 kg (214 lb 4.6 oz)  Energy Calorie Requirements (kcal): 7692-5207  Energy Need Method: Kcal/kg (20-25)  Protein Requirements: 78-97 (0.8-1.0)  Weight Used For Protein Calculations: 97.2 kg (214 lb 4.6 oz)  Fluid Requirements (mL): 2178-6121     RDA Method (mL): 1944  CHO Requirement: 291gm CHO per day    Reason for Assessment  Reason For Assessment: consult (education)  Diagnosis: gastrointestinal disease  Relevant Medical History: anemia, GERD, hyperlipidemia, DM2, and migraines  Nutrition Discharge Planning: Pending hospital course    Nutrition/Diet History  Spiritual, Cultural Beliefs, Faith Practices, Values that Affect Care: no  Food Allergies: NKFA  Factors Affecting Nutritional Intake: nausea/vomiting, constipation    Nutrition Risk Screen  Nutrition Risk Screen: large or nonhealing wound, burn or pressure injury     MST Score: 0  Have you recently lost weight without trying?: No  Weight loss score: 0  Have you been eating poorly because of a decreased appetite?: No  Appetite score: 0       Weight History:  Wt Readings from Last 10 Encounters:   04/19/24 97.2 kg (214 lb 4.6 oz)   04/17/24 96.6 kg (212 lb 15.4 oz)   04/04/24 97.8 kg (215 lb 8 oz)   03/27/24 96.6 kg (213 lb)   03/21/24 97.4 kg (214 lb 11.7 oz)   03/14/24 " 97.4 kg (214 lb 12.8 oz)   03/07/24 95.8 kg (211 lb 1.6 oz)   03/08/24 95.7 kg (211 lb)   02/26/24 97.1 kg (214 lb 1.1 oz)   02/14/24 97.1 kg (214 lb 1.1 oz)        Lab/Procedures/Meds: Pertinent Labs/Meds Reviewed    Medications:Pertinent Medications Reviewed  Scheduled Meds:  Current Facility-Administered Medications   Medication Dose Route Frequency    diltiaZEM  120 mg Oral Daily    enoxparin  1 mg/kg Subcutaneous Q12H (prophylaxis, 0900/2100)    folic acid  1 mg Oral Daily    insulin detemir U-100  15 Units Subcutaneous BID    pantoprazole  40 mg Oral Daily     Continuous Infusions:  Current Facility-Administered Medications   Medication Dose Route Frequency Last Rate Last Admin     PRN Meds:.  Current Facility-Administered Medications:     0.9%  NaCl infusion (for blood administration), , Intravenous, Q24H PRN    acetaminophen, 650 mg, Oral, Q8H PRN    acetaminophen, 650 mg, Oral, Q4H PRN    aluminum-magnesium hydroxide-simethicone, 30 mL, Oral, QID PRN    dextrose 50%, 12.5 g, Intravenous, PRN    dextrose 50%, 12.5 g, Intravenous, PRN    dextrose 50%, 25 g, Intravenous, PRN    dextrose 50%, 25 g, Intravenous, PRN    glucagon (human recombinant), 1 mg, Intramuscular, PRN    glucagon (human recombinant), 1 mg, Intramuscular, PRN    glucose, 16 g, Oral, PRN    glucose, 16 g, Oral, PRN    glucose, 24 g, Oral, PRN    glucose, 24 g, Oral, PRN    HYDROcodone-acetaminophen, 1 tablet, Oral, Q4H PRN    insulin aspart U-100, 0-15 Units, Subcutaneous, QID (AC + HS) PRN    magnesium oxide, 800 mg, Oral, PRN    magnesium oxide, 800 mg, Oral, PRN    melatonin, 6 mg, Oral, Nightly PRN    naloxone, 0.02 mg, Intravenous, PRN    ondansetron, 4 mg, Intravenous, Q6H PRN    potassium bicarbonate, 35 mEq, Oral, PRN    potassium bicarbonate, 50 mEq, Oral, PRN    potassium bicarbonate, 60 mEq, Oral, PRN    potassium, sodium phosphates, 2 packet, Oral, PRN    potassium, sodium phosphates, 2 packet, Oral, PRN    senna-docusate 8.6-50  mg, 1 tablet, Oral, BID PRN    sodium chloride 0.9%, 10 mL, Intravenous, Q12H PRN    Labs: Pertinent Labs Reviewed  Clinical Chemistry:  Recent Labs   Lab 04/26/24  0435 04/27/24  0435 04/28/24  0440 04/29/24  0510   * 132* 135* 131*   K 3.8 3.7 3.3* 4.3   CL 92* 93* 95 95   CO2 30* 30* 29 29   * 334* 261* 340*   BUN 19 20 22 22   CREATININE 1.0 0.9 1.0 1.0   CALCIUM 8.6* 8.6* 8.3* 8.4*   PROT 6.0 6.1 6.2  --    ALBUMIN 3.1* 3.0* 3.1*  --    BILITOT 0.3 0.3 0.3  --    ALKPHOS 81 83 86  --    AST 11 27 35  --    ALT 15 23 35  --    ANIONGAP 8 9 11 7*   MG 1.8 1.8 1.8 1.7   PHOS 2.6* 3.0 3.5 2.9     CBC:   Recent Labs   Lab 04/29/24  0510   WBC 19.94*   RBC 3.66*   HGB 9.0*   HCT 29.6*   *   MCV 81*   MCH 24.6*   MCHC 30.4*     Lipid Panel:  Recent Labs   Lab 04/26/24  0435   TRIG 155*     Diabetes:  Recent Labs   Lab 04/28/24  0440   HGBA1C 7.4*     Monitor and Evaluation  Food and Nutrient Intake: energy intake, parenteral nutrition intake  Food and Nutrient Adminstration: enteral and parenteral nutrition administration  Knowledge/Beliefs/Attitudes: beliefs and attitudes, food and nutrition knowledge/skill  Physical Activity and Function: nutrition-related ADLs and IADLs, factors affecting access to physical activity  Anthropometric Measurements: weight, weight change, body mass index  Biochemical Data, Medical Tests and Procedures: electrolyte and renal panel, lipid profile, gastrointestinal profile, glucose/endocrine profile, inflammatory profile  Nutrition-Focused Physical Findings: overall appearance     Nutrition Risk  Level of Risk/Frequency of Follow-up: high (2x/week)     Nutrition Follow-Up  RD Follow-up?: Yes      Manuel Sherman, LILIYA 04/29/2024 3:33 PM

## 2024-04-29 NOTE — PROGRESS NOTES
[facility]  General Surgery  Progress Note    Subjective:     History of Present Illness:  72 y/o female with found to have bleeding colon mass.  Colonoscopy done today identified.   She is here for further treatment.    Post-Op Info:  Procedure(s) (LRB):  LAPAROTOMY, EXPLORATORY (N/A)  COLECTOMY, RIGHT  LYSIS, ADHESIONS  SURGICAL PROCEDURE, COLONOSCOPIC   7 Days Post-Op     Interval History: some cramping  But had flatus and bm    Medications:  Continuous Infusions:  Current Facility-Administered Medications   Medication Dose Route Frequency Last Rate Last Admin     Scheduled Meds:  Current Facility-Administered Medications   Medication Dose Route Frequency    diltiaZEM  120 mg Oral Daily    enoxparin  1 mg/kg Subcutaneous Q12H (prophylaxis, 0900/2100)    folic acid  1 mg Oral Daily    insulin detemir U-100  15 Units Subcutaneous BID    pantoprazole  40 mg Oral Daily     PRN Meds:  Current Facility-Administered Medications:     0.9%  NaCl infusion (for blood administration), , Intravenous, Q24H PRN    acetaminophen, 650 mg, Oral, Q8H PRN    acetaminophen, 650 mg, Oral, Q4H PRN    aluminum-magnesium hydroxide-simethicone, 30 mL, Oral, QID PRN    dextrose 50%, 12.5 g, Intravenous, PRN    dextrose 50%, 12.5 g, Intravenous, PRN    dextrose 50%, 25 g, Intravenous, PRN    dextrose 50%, 25 g, Intravenous, PRN    glucagon (human recombinant), 1 mg, Intramuscular, PRN    glucagon (human recombinant), 1 mg, Intramuscular, PRN    glucose, 16 g, Oral, PRN    glucose, 16 g, Oral, PRN    glucose, 24 g, Oral, PRN    glucose, 24 g, Oral, PRN    HYDROcodone-acetaminophen, 1 tablet, Oral, Q4H PRN    insulin aspart U-100, 0-15 Units, Subcutaneous, QID (AC + HS) PRN    magnesium oxide, 800 mg, Oral, PRN    magnesium oxide, 800 mg, Oral, PRN    melatonin, 6 mg, Oral, Nightly PRN    naloxone, 0.02 mg, Intravenous, PRN    ondansetron, 4 mg, Intravenous, Q6H PRN    potassium bicarbonate, 35 mEq, Oral, PRN    potassium  bicarbonate, 50 mEq, Oral, PRN    potassium bicarbonate, 60 mEq, Oral, PRN    potassium, sodium phosphates, 2 packet, Oral, PRN    potassium, sodium phosphates, 2 packet, Oral, PRN    senna-docusate 8.6-50 mg, 1 tablet, Oral, BID PRN    sodium chloride 0.9%, 10 mL, Intravenous, Q12H PRN     Review of patient's allergies indicates:   Allergen Reactions    Statins-hmg-coa reductase inhibitors Swelling    Metformin Other (See Comments)     metallic taste , burn of the esophagus     Objective:     Vital Signs (Most Recent):  Temp: 98 °F (36.7 °C) (04/29/24 1035)  Pulse: 85 (04/29/24 1035)  Resp: 16 (04/29/24 1035)  BP: 127/61 (04/29/24 1035)  SpO2: 98 % (04/29/24 1035) Vital Signs (24h Range):  Temp:  [97.7 °F (36.5 °C)-98.7 °F (37.1 °C)] 98 °F (36.7 °C)  Pulse:  [84-94] 85  Resp:  [15-20] 16  SpO2:  [93 %-98 %] 98 %  BP: (123-148)/(59-74) 127/61     Weight: 97.2 kg (214 lb 4.6 oz)  Body mass index is 32.58 kg/m².    Intake/Output - Last 3 Shifts         04/27 0700  04/28 0659 04/28 0700  04/29 0659 04/29 0700  04/30 0659    P.O. 120 720     I.V. (mL/kg)       Total Intake(mL/kg) 120 (1.2) 720 (7.4)     Net +120 +720            Urine Occurrence 8 x 6 x 1 x    Stool Occurrence 6 x 2 x              Physical Exam  Abdominal:      General: There is no distension.      Palpations: Abdomen is soft.      Tenderness: There is no abdominal tenderness.          Significant Labs:  I have reviewed all pertinent lab results within the past 24 hours.  CBC:   Recent Labs   Lab 04/29/24  0510   WBC 19.94*   RBC 3.66*   HGB 9.0*   HCT 29.6*   *   MCV 81*   MCH 24.6*   MCHC 30.4*     CMP:   Recent Labs   Lab 04/28/24  0440 04/29/24  0510   * 340*   CALCIUM 8.3* 8.4*   ALBUMIN 3.1*  --    PROT 6.2  --    * 131*   K 3.3* 4.3   CO2 29 29   CL 95 95   BUN 22 22   CREATININE 1.0 1.0   ALKPHOS 86  --    ALT 35  --    AST 35  --    BILITOT 0.3  --        Significant Diagnostics:  I have reviewed all pertinent imaging  results/findings within the past 24 hours.  Assessment/Plan:     * Colon adenocarcinoma  7 Days Post-Op  Bowel resection  Doing well  Ok to dc    GI bleed  resolved    Jaden Barrios MD  General Surgery  [facility]

## 2024-04-29 NOTE — PLAN OF CARE
Met with pt and called spouse regarding discharge plan, both in agreement with pt going to [facility].       04/29/24 1122   Post-Acute Status   Post-Acute Authorization Placement   Discharge Plan   Discharge Plan A Rehab   Discharge Plan B Rehab

## 2024-04-29 NOTE — SUBJECTIVE & OBJECTIVE
Interval History: some cramping  But had flatus and bm    Medications:  Continuous Infusions:  Current Facility-Administered Medications   Medication Dose Route Frequency Last Rate Last Admin     Scheduled Meds:  Current Facility-Administered Medications   Medication Dose Route Frequency    diltiaZEM  120 mg Oral Daily    enoxparin  1 mg/kg Subcutaneous Q12H (prophylaxis, 0900/2100)    folic acid  1 mg Oral Daily    insulin detemir U-100  15 Units Subcutaneous BID    pantoprazole  40 mg Oral Daily     PRN Meds:  Current Facility-Administered Medications:     0.9%  NaCl infusion (for blood administration), , Intravenous, Q24H PRN    acetaminophen, 650 mg, Oral, Q8H PRN    acetaminophen, 650 mg, Oral, Q4H PRN    aluminum-magnesium hydroxide-simethicone, 30 mL, Oral, QID PRN    dextrose 50%, 12.5 g, Intravenous, PRN    dextrose 50%, 12.5 g, Intravenous, PRN    dextrose 50%, 25 g, Intravenous, PRN    dextrose 50%, 25 g, Intravenous, PRN    glucagon (human recombinant), 1 mg, Intramuscular, PRN    glucagon (human recombinant), 1 mg, Intramuscular, PRN    glucose, 16 g, Oral, PRN    glucose, 16 g, Oral, PRN    glucose, 24 g, Oral, PRN    glucose, 24 g, Oral, PRN    HYDROcodone-acetaminophen, 1 tablet, Oral, Q4H PRN    insulin aspart U-100, 0-15 Units, Subcutaneous, QID (AC + HS) PRN    magnesium oxide, 800 mg, Oral, PRN    magnesium oxide, 800 mg, Oral, PRN    melatonin, 6 mg, Oral, Nightly PRN    naloxone, 0.02 mg, Intravenous, PRN    ondansetron, 4 mg, Intravenous, Q6H PRN    potassium bicarbonate, 35 mEq, Oral, PRN    potassium bicarbonate, 50 mEq, Oral, PRN    potassium bicarbonate, 60 mEq, Oral, PRN    potassium, sodium phosphates, 2 packet, Oral, PRN    potassium, sodium phosphates, 2 packet, Oral, PRN    senna-docusate 8.6-50 mg, 1 tablet, Oral, BID PRN    sodium chloride 0.9%, 10 mL, Intravenous, Q12H PRN     Review of patient's allergies indicates:   Allergen Reactions    Statins-hmg-coa reductase inhibitors  Swelling    Metformin Other (See Comments)     metallic taste , burn of the esophagus     Objective:     Vital Signs (Most Recent):  Temp: 98 °F (36.7 °C) (04/29/24 1035)  Pulse: 85 (04/29/24 1035)  Resp: 16 (04/29/24 1035)  BP: 127/61 (04/29/24 1035)  SpO2: 98 % (04/29/24 1035) Vital Signs (24h Range):  Temp:  [97.7 °F (36.5 °C)-98.7 °F (37.1 °C)] 98 °F (36.7 °C)  Pulse:  [84-94] 85  Resp:  [15-20] 16  SpO2:  [93 %-98 %] 98 %  BP: (123-148)/(59-74) 127/61     Weight: 97.2 kg (214 lb 4.6 oz)  Body mass index is 32.58 kg/m².    Intake/Output - Last 3 Shifts         04/27 0700 04/28 0659 04/28 0700 04/29 0659 04/29 0700  04/30 0659    P.O. 120 720     I.V. (mL/kg)       Total Intake(mL/kg) 120 (1.2) 720 (7.4)     Net +120 +720            Urine Occurrence 8 x 6 x 1 x    Stool Occurrence 6 x 2 x              Physical Exam  Abdominal:      General: There is no distension.      Palpations: Abdomen is soft.      Tenderness: There is no abdominal tenderness.          Significant Labs:  I have reviewed all pertinent lab results within the past 24 hours.  CBC:   Recent Labs   Lab 04/29/24  0510   WBC 19.94*   RBC 3.66*   HGB 9.0*   HCT 29.6*   *   MCV 81*   MCH 24.6*   MCHC 30.4*     CMP:   Recent Labs   Lab 04/28/24  0440 04/29/24  0510   * 340*   CALCIUM 8.3* 8.4*   ALBUMIN 3.1*  --    PROT 6.2  --    * 131*   K 3.3* 4.3   CO2 29 29   CL 95 95   BUN 22 22   CREATININE 1.0 1.0   ALKPHOS 86  --    ALT 35  --    AST 35  --    BILITOT 0.3  --        Significant Diagnostics:  I have reviewed all pertinent imaging results/findings within the past 24 hours.

## 2024-04-29 NOTE — PT/OT/SLP PROGRESS
Physical Therapy Treatment    Patient Name:  Jen Torres   MRN:  [id number]    Recommendations:     Discharge Recommendations: High Intensity Therapy  Discharge Equipment Recommendations:  (TBD at next level of care)  Barriers to discharge:  weakness, impaired endurance, impaired self care skills, impaired functional mobility, pain, gait instability.     Assessment:     Jen Torres is a 73 y.o. female admitted with a medical diagnosis of Colon adenocarcinoma.  She presents with the following impairments/functional limitations: weakness, impaired endurance, impaired self care skills, impaired functional mobility, pain, gait instability, impaired skin.Pt found up in BS chair. Agreeable to PT. Ambulated 2 x 45 ft with RW min A for walker management to increase theresa. Reports fatigued following activity. Returned to BS chair.      Rehab Prognosis: Fair; patient would benefit from acute skilled PT services to address these deficits and reach maximum level of function.    Recent Surgery: Procedure(s) (LRB):  LAPAROTOMY, EXPLORATORY (N/A)  COLECTOMY, RIGHT  LYSIS, ADHESIONS  SURGICAL PROCEDURE, COLONOSCOPIC 7 Days Post-Op    Plan:     During this hospitalization, patient to be seen daily to address the identified rehab impairments via gait training, therapeutic activities, therapeutic exercises and progress toward the following goals:    Plan of Care Expires:  05/23/24    Subjective     Chief Complaint: pain and fatigue  Patient/Family Comments/goals: to get better  Pain/Comfort:  Pain Rating 1: 3/10  Location - Side 1: Right  Location 1: abdomen  Pain Addressed 1: Pre-medicate for activity, Distraction  Pain Rating Post-Intervention 1: 3/10      Objective:     Communicated with RN prior to session.  Patient found up in chair with peripheral IV, telemetry, chair check upon PT entry to room.     General Precautions: Standard, fall  Orthopedic Precautions: N/A  Braces: N/A  Respiratory Status: Room air     Functional  Mobility:  Transfers:     Sit to Stand:  stand by assistance and contact guard assistance with rolling walker  Gait: 2 x 45ft, one standing rest period with RW and CGA to min A for walker theresa, to discourage, slow and guarded gait.       AM-PAC 6 CLICK MOBILITY          Treatment & Education:  Pt educated on importance of time OOB, importance of intermittent mobility, safe techniques for transfers/ambulation, discharge recommendations/options, and use of call light for assistance and fall prevention.      Patient left up in chair with all lines intact, call button in reach, chair alarm on, and RN notified..    GOALS:   Multidisciplinary Problems       Physical Therapy Goals          Problem: Physical Therapy    Goal Priority Disciplines Outcome Goal Variances Interventions   Physical Therapy Goal     PT, PT/OT      Description: Goals to be met by: 24     Patient will increase functional independence with mobility by performin. Supine to sit with Supervision  2. Sit to stand transfer with Supervision  3. Bed to chair transfer with Supervision using Rolling Walker  4. Gait  x 200 feet with Supervision using Rolling Walker.   5. Ascend & Descend 5 steps w/ rail and minimal Assist                             Time Tracking:     PT Received On: 24  PT Start Time: 1115     PT Stop Time: 1128  PT Total Time (min): 13 min     Billable Minutes: Gait Training 13    Treatment Type: Treatment  PT/PTA: PT     Number of PTA visits since last PT visit: 3     2024

## 2024-04-29 NOTE — CARE UPDATE
04/29/24 0804   PRE-TX-O2   Device (Oxygen Therapy) room air   SpO2 98 %   Pulse Oximetry Type Intermittent   $ Pulse Oximetry - Multiple Charge Pulse Oximetry - Multiple   Pulse 88   Resp 15   Respiratory Evaluation   $ Care Plan Tech Time 15 min

## 2024-04-29 NOTE — SUBJECTIVE & OBJECTIVE
Interval History:  Seen in the multidisciplinary rounds, feel better, tolerated low-fat diet.  WBC trending up but patient has been afebrile.  Looks comfortable.       Patient also has a history of DVT and MTHFR +- mutation, was on Eliquis prior to admission, follow with Dr. Felder.  Spoke with Dr. Soria 4/28, okay to start full-dose Lovenox now and continue Eliquis upon discharge.     Review of Systems   Constitutional:  Positive for fatigue. Negative for activity change, appetite change, chills and fever.   HENT:  Negative for congestion, ear pain, nosebleeds and sinus pain.    Eyes:  Negative for discharge and itching.   Respiratory:  Negative for apnea, cough, chest tightness and shortness of breath.    Cardiovascular:  Negative for chest pain, palpitations and leg swelling.   Gastrointestinal:  Positive for abdominal distention and abdominal pain. Negative for vomiting.   Genitourinary:  Negative for difficulty urinating, dysuria, flank pain and frequency.   Musculoskeletal:  Negative for arthralgias, back pain, joint swelling and myalgias.   Skin:  Negative for color change, pallor and rash.   Neurological:  Positive for weakness. Negative for dizziness, light-headedness and headaches.   Psychiatric/Behavioral:  Negative for agitation, behavioral problems, confusion and suicidal ideas.      Objective:     Vital Signs (Most Recent):  Temp: 98 °F (36.7 °C) (04/29/24 1035)  Pulse: 85 (04/29/24 1035)  Resp: 16 (04/29/24 1035)  BP: 127/61 (04/29/24 1035)  SpO2: 98 % (04/29/24 1035) Vital Signs (24h Range):  Temp:  [97.7 °F (36.5 °C)-98.7 °F (37.1 °C)] 98 °F (36.7 °C)  Pulse:  [84-94] 85  Resp:  [15-20] 16  SpO2:  [93 %-98 %] 98 %  BP: (123-148)/(59-74) 127/61     Weight: 97.2 kg (214 lb 4.6 oz)  Body mass index is 32.58 kg/m².    Intake/Output Summary (Last 24 hours) at 4/29/2024 1450  Last data filed at 4/28/2024 1514  Gross per 24 hour   Intake 360 ml   Output --   Net 360 ml         Physical Exam  Vitals  reviewed.   Constitutional:       General: She is not in acute distress.     Appearance: Normal appearance. She is normal weight. She is not ill-appearing.   HENT:      Head: Normocephalic and atraumatic.      Nose: Nose normal.      Mouth/Throat:      Mouth: Mucous membranes are moist.      Pharynx: Oropharynx is clear.   Eyes:      General: No scleral icterus.     Extraocular Movements: Extraocular movements intact.      Conjunctiva/sclera: Conjunctivae normal.      Pupils: Pupils are equal, round, and reactive to light.   Cardiovascular:      Rate and Rhythm: Normal rate and regular rhythm.      Pulses: Normal pulses.      Heart sounds: Normal heart sounds. No murmur heard.     No gallop.   Pulmonary:      Effort: Pulmonary effort is normal. No respiratory distress.      Breath sounds: Normal breath sounds. No wheezing, rhonchi or rales.   Chest:      Chest wall: No tenderness.   Abdominal:      General: Abdomen is flat. There is no distension.      Palpations: Abdomen is soft.      Tenderness: There is abdominal tenderness.      Comments: Scant bowel sounds   Musculoskeletal:         General: No swelling or tenderness.      Cervical back: Normal range of motion and neck supple. No rigidity or tenderness.      Right lower leg: No edema.      Left lower leg: No edema.   Skin:     General: Skin is warm and dry.   Neurological:      General: No focal deficit present.      Mental Status: She is alert and oriented to person, place, and time. Mental status is at baseline.      Motor: No weakness.   Psychiatric:         Mood and Affect: Mood normal.         Behavior: Behavior normal.         Thought Content: Thought content normal.             Significant Labs: All pertinent labs within the past 24 hours have been reviewed.  BMP:   Recent Labs   Lab 04/29/24  0510   *   *   K 4.3   CL 95   CO2 29   BUN 22   CREATININE 1.0   CALCIUM 8.4*   MG 1.7     CBC:   Recent Labs   Lab 04/28/24  0440 04/29/24  0510    WBC 13.60* 19.94*   HGB 8.4* 9.0*   HCT 27.8* 29.6*   * 495*     CMP:   Recent Labs   Lab 04/28/24  0440 04/29/24  0510   * 131*   K 3.3* 4.3   CL 95 95   CO2 29 29   * 340*   BUN 22 22   CREATININE 1.0 1.0   CALCIUM 8.3* 8.4*   PROT 6.2  --    ALBUMIN 3.1*  --    BILITOT 0.3  --    ALKPHOS 86  --    AST 35  --    ALT 35  --    ANIONGAP 11 7*       Significant Imaging: I have reviewed all pertinent imaging results/findings within the past 24 hours.

## 2024-04-30 ENCOUNTER — TELEPHONE (OUTPATIENT)
Dept: HEMATOLOGY/ONCOLOGY | Facility: CLINIC | Age: 74
End: 2024-04-30

## 2024-04-30 VITALS
TEMPERATURE: 98 F | WEIGHT: 214.31 LBS | HEART RATE: 95 BPM | RESPIRATION RATE: 18 BRPM | SYSTOLIC BLOOD PRESSURE: 114 MMHG | OXYGEN SATURATION: 95 % | HEIGHT: 68 IN | BODY MASS INDEX: 32.48 KG/M2 | DIASTOLIC BLOOD PRESSURE: 50 MMHG

## 2024-04-30 LAB
ALBUMIN SERPL BCP-MCNC: 3 G/DL (ref 3.5–5.2)
ALP SERPL-CCNC: 94 U/L (ref 55–135)
ALT SERPL W/O P-5'-P-CCNC: 29 U/L (ref 10–44)
ANION GAP SERPL CALC-SCNC: 7 MMOL/L (ref 8–16)
AST SERPL-CCNC: 22 U/L (ref 10–40)
BASOPHILS # BLD AUTO: 0.03 K/UL (ref 0–0.2)
BASOPHILS NFR BLD: 0.2 % (ref 0–1.9)
BILIRUB SERPL-MCNC: 0.5 MG/DL (ref 0.1–1)
BUN SERPL-MCNC: 16 MG/DL (ref 8–23)
CALCIUM SERPL-MCNC: 8.2 MG/DL (ref 8.7–10.5)
CHLORIDE SERPL-SCNC: 94 MMOL/L (ref 95–110)
CO2 SERPL-SCNC: 29 MMOL/L (ref 23–29)
CREAT SERPL-MCNC: 1.1 MG/DL (ref 0.5–1.4)
DIFFERENTIAL METHOD BLD: ABNORMAL
EOSINOPHIL # BLD AUTO: 0.2 K/UL (ref 0–0.5)
EOSINOPHIL NFR BLD: 1.4 % (ref 0–8)
ERYTHROCYTE [DISTWIDTH] IN BLOOD BY AUTOMATED COUNT: 23.9 % (ref 11.5–14.5)
EST. GFR  (NO RACE VARIABLE): 53.7 ML/MIN/1.73 M^2
GLUCOSE SERPL-MCNC: 162 MG/DL (ref 70–110)
GLUCOSE SERPL-MCNC: 164 MG/DL (ref 70–110)
GLUCOSE SERPL-MCNC: 176 MG/DL (ref 70–110)
HCT VFR BLD AUTO: 28.2 % (ref 37–48.5)
HGB BLD-MCNC: 8.8 G/DL (ref 12–16)
IMM GRANULOCYTES # BLD AUTO: 0.2 K/UL (ref 0–0.04)
IMM GRANULOCYTES NFR BLD AUTO: 1.2 % (ref 0–0.5)
LYMPHOCYTES # BLD AUTO: 2.5 K/UL (ref 1–4.8)
LYMPHOCYTES NFR BLD: 14.3 % (ref 18–48)
MCH RBC QN AUTO: 25.1 PG (ref 27–31)
MCHC RBC AUTO-ENTMCNC: 31.2 G/DL (ref 32–36)
MCV RBC AUTO: 81 FL (ref 82–98)
MONOCYTES # BLD AUTO: 1.3 K/UL (ref 0.3–1)
MONOCYTES NFR BLD: 7.2 % (ref 4–15)
NEUTROPHILS # BLD AUTO: 13.1 K/UL (ref 1.8–7.7)
NEUTROPHILS NFR BLD: 75.7 % (ref 38–73)
NRBC BLD-RTO: 0 /100 WBC
PLATELET # BLD AUTO: 495 K/UL (ref 150–450)
PMV BLD AUTO: 10.3 FL (ref 9.2–12.9)
POTASSIUM SERPL-SCNC: 3.7 MMOL/L (ref 3.5–5.1)
PROT SERPL-MCNC: 6.2 G/DL (ref 6–8.4)
RBC # BLD AUTO: 3.5 M/UL (ref 4–5.4)
SODIUM SERPL-SCNC: 130 MMOL/L (ref 136–145)
WBC # BLD AUTO: 17.31 K/UL (ref 3.9–12.7)

## 2024-04-30 PROCEDURE — 25000003 PHARM REV CODE 250: Performed by: SURGERY

## 2024-04-30 PROCEDURE — 36415 COLL VENOUS BLD VENIPUNCTURE: CPT | Performed by: HOSPITALIST

## 2024-04-30 PROCEDURE — 25000003 PHARM REV CODE 250: Performed by: HOSPITALIST

## 2024-04-30 PROCEDURE — 85025 COMPLETE CBC W/AUTO DIFF WBC: CPT | Performed by: HOSPITALIST

## 2024-04-30 PROCEDURE — 99222 1ST HOSP IP/OBS MODERATE 55: CPT | Mod: ,,, | Performed by: PHYSICAL MEDICINE & REHABILITATION

## 2024-04-30 PROCEDURE — 80053 COMPREHEN METABOLIC PANEL: CPT | Performed by: HOSPITALIST

## 2024-04-30 PROCEDURE — 63600175 PHARM REV CODE 636 W HCPCS: Performed by: INTERNAL MEDICINE

## 2024-04-30 RX ORDER — BISACODYL 10 MG/1
10 SUPPOSITORY RECTAL ONCE
Status: COMPLETED | OUTPATIENT
Start: 2024-04-30 | End: 2024-04-30

## 2024-04-30 RX ORDER — AMOXICILLIN AND CLAVULANATE POTASSIUM 875; 125 MG/1; MG/1
1 TABLET, FILM COATED ORAL 2 TIMES DAILY
Qty: 10 TABLET | Refills: 0 | Status: SHIPPED | OUTPATIENT
Start: 2024-04-30 | End: 2024-05-05

## 2024-04-30 RX ADMIN — PANTOPRAZOLE SODIUM 40 MG: 40 TABLET, DELAYED RELEASE ORAL at 05:04

## 2024-04-30 RX ADMIN — ENOXAPARIN SODIUM 100 MG: 100 INJECTION SUBCUTANEOUS at 11:04

## 2024-04-30 RX ADMIN — FOLIC ACID 1 MG: 1 TABLET ORAL at 11:04

## 2024-04-30 RX ADMIN — DILTIAZEM HYDROCHLORIDE 120 MG: 120 CAPSULE, EXTENDED RELEASE ORAL at 11:04

## 2024-04-30 RX ADMIN — BISACODYL 10 MG: 10 SUPPOSITORY RECTAL at 11:04

## 2024-04-30 NOTE — DISCHARGE INSTRUCTIONS
[facility]  Facility Transfer Orders        Admit to: rehab     Diagnoses:   Active Hospital Problems    Diagnosis  POA    *Colon adenocarcinoma [C18.9]  Yes    Leukocytosis [D72.829]  Yes    MTHFR mutation [Z15.89]  Not Applicable    GI bleed [K92.2]  Yes    Iron deficiency anemia due to chronic blood loss [D50.0]  Yes    Type 2 diabetes mellitus without complication, without long-term current use of insulin [E11.9]  Yes    Essential hypertension [I10]  Yes      Resolved Hospital Problems    Diagnosis Date Resolved POA    Colonic mass [K63.89] 04/27/2024 Yes    Factor 5 Leiden mutation, heterozygous [D68.51] 04/21/2024 Yes     Allergies:   Review of patient's allergies indicates:   Allergen Reactions    Statins-hmg-coa reductase inhibitors Swelling    Metformin Other (See Comments)     metallic taste , burn of the esophagus       Code Status:  full code     Vitals: Routine       Diet: low fat, low cholesterol diet    Activity: Activity as tolerated    Nursing Precautions: Aspiration , Fall, and Pressure ulcer prevention    Bed/Surface: Low Air Loss    Consults: PT to evaluate and treat- 5 times a week, OT to evaluate and treat- 5 times a week, and Wound Care    Oxygen: room air    Dialysis: Patient is not on dialysis.     Labs:   Pending Diagnostic Studies:       Procedure Component Value Units Date/Time    EKG 12-lead [[id number]] Collected: 04/23/24 1217    Order Status: Sent Lab Status: In process Updated: 04/23/24 1313     QRS Duration 84 ms      [facility] QTC Calculation 449 ms     Narrative:      Test Reason : R07.9,    Vent. Rate : 085 BPM     Atrial Rate : 085 BPM     P-R Int : 156 ms          QRS Dur : 084 ms      QT Int : 378 ms       P-R-T Axes : 060 050 058 degrees     QTc Int : 449 ms    Normal sinus rhythm with sinus arrhythmia  Normal ECG  When compared with ECG of 19-APR-2024 13:23,  Sinus rhythm has replaced Ectopic atrial rhythm  Left posterior fascicular block is no longer  Present  Criteria for Septal infarct are no longer Present  Nonspecific T wave abnormality no longer evident in Anterior leads  QT has lengthened    Referred By: [email or web address]   SELF           Confirmed By:           Imaging:     Miscellaneous Care:     IV Access:      Medications: Discontinue all previous medication orders, if any. See new list below.  Current Discharge Medication List        START taking these medications    Details   amoxicillin-clavulanate 875-125mg (AUGMENTIN) 875-125 mg per tablet Take 1 tablet by mouth 2 (two) times daily. for 5 days  Qty: 10 tablet, Refills: 0      HYDROcodone-acetaminophen (NORCO)  mg per tablet Take 1 tablet by mouth every 6 (six) hours as needed for Pain.  Qty: 15 tablet, Refills: 0    Comments: n/a            CONTINUE these medications which have NOT CHANGED    Details   acetaminophen (TYLENOL) 500 MG tablet Take 2 tablets (1,000 mg total) by mouth every 8 (eight) hours as needed for Pain.  Qty: 30 tablet, Refills: 0    Comments: Procedure to be done @ [facility] 12/29/2023.      aspirin 81 MG Chew Take 81 mg by mouth once daily.      diltiaZEM (CARDIZEM CD) 120 MG Cp24 Take 1 capsule (120 mg total) by mouth once daily.  Qty: 30 capsule, Refills: 11    Comments: .      ELIQUIS 5 mg Tab TAKE 1 TABLET BY MOUTH TWICE A DAY  Qty: 60 tablet, Refills: 4    Associated Diagnoses: Thrombophlebitis of superficial veins of left lower extremity; High plasma homocystine; Heterozygous MTHFR mutation [id number]      ergocalciferol (ERGOCALCIFEROL) 50,000 unit Cap Take 50,000 Units by mouth every 7 days. Fridays      ezetimibe (ZETIA) 10 mg tablet Take 10 mg by mouth once daily.      glimepiride (AMARYL) 2 MG tablet Take 2 mg by mouth every morning.      hydroCHLOROthiazide (HYDRODIURIL) 25 MG tablet Take 25 mg by mouth every Mon, Wed, Fri.      pantoprazole (PROTONIX) 40 MG tablet Take 40 mg by mouth every morning.      potassium chloride (MICRO-K) 10 MEQ CpSR TAKE 1 CAPSULE BY MOUTH EVERY  DAY  Qty: 90 capsule, Refills: 3      WESTAB MAX 2.5-25-2 mg Tab TAKE 1 TABLET BY MOUTH EVERY DAY  Qty: 90 tablet, Refills: 4    Associated Diagnoses: Phlebitis and thrombophlebitis of superficial vessels of left lower extremity; Other specified abnormal findings of blood chemistry           Follow up:    Follow-up Information       Sam Garcia IV, MD. Schedule an appointment as soon as possible for a visit in 1 week(s).    Specialty: Family Medicine  Contact information:  [address]  [place] [address]  [phone]               Chandler Gorman MD. Schedule an appointment as soon as possible for a visit in 1 week(s).    Specialties: Hematology, Oncology, Hematology and Oncology  Contact information:  [address] [facility] [address]  [place] [address]  [phone]               Jaden Barrios MD. Schedule an appointment as soon as possible for a visit in 1 week(s).    Specialties: General Surgery, Bariatrics, Surgery  Contact information:  [address] [facility] [facility] [address]  [place] [address]  [phone]               [facility] Follow up.    Specialties: Physical Therapy, Occupational Therapy  Contact information:  [address] [address] [id number] [phone]                               Immunizations Administered as of 4/30/2024       No immunizations on file.                Some patients may experience side effects after vaccination.  These may include fever, headache, muscle or joint aches.  Most symptoms resolve with 24-48 hours and do not require urgent medical evaluation unless they persist for more than 72 hours or symptoms are concerning for an unrelated medical condition.          Ronal Mcnulty MD

## 2024-04-30 NOTE — SUBJECTIVE & OBJECTIVE
Interval History: feels good  Having gi function  Ct was not done yesterday    Medications:  Continuous Infusions:  Current Facility-Administered Medications   Medication Dose Route Frequency Last Rate Last Admin     Scheduled Meds:  Current Facility-Administered Medications   Medication Dose Route Frequency    bisacodyL  10 mg Rectal Once    diltiaZEM  120 mg Oral Daily    enoxparin  1 mg/kg Subcutaneous Q12H (prophylaxis, 0900/2100)    folic acid  1 mg Oral Daily    insulin detemir U-100  15 Units Subcutaneous BID    pantoprazole  40 mg Oral Daily     PRN Meds:  Current Facility-Administered Medications:     0.9%  NaCl infusion (for blood administration), , Intravenous, Q24H PRN    acetaminophen, 650 mg, Oral, Q8H PRN    acetaminophen, 650 mg, Oral, Q4H PRN    aluminum-magnesium hydroxide-simethicone, 30 mL, Oral, QID PRN    dextrose 50%, 12.5 g, Intravenous, PRN    dextrose 50%, 12.5 g, Intravenous, PRN    dextrose 50%, 25 g, Intravenous, PRN    dextrose 50%, 25 g, Intravenous, PRN    glucagon (human recombinant), 1 mg, Intramuscular, PRN    glucagon (human recombinant), 1 mg, Intramuscular, PRN    glucose, 16 g, Oral, PRN    glucose, 16 g, Oral, PRN    glucose, 24 g, Oral, PRN    glucose, 24 g, Oral, PRN    HYDROcodone-acetaminophen, 1 tablet, Oral, Q4H PRN    insulin aspart U-100, 0-15 Units, Subcutaneous, QID (AC + HS) PRN    magnesium oxide, 800 mg, Oral, PRN    magnesium oxide, 800 mg, Oral, PRN    melatonin, 6 mg, Oral, Nightly PRN    naloxone, 0.02 mg, Intravenous, PRN    ondansetron, 4 mg, Intravenous, Q6H PRN    potassium bicarbonate, 35 mEq, Oral, PRN    potassium bicarbonate, 50 mEq, Oral, PRN    potassium bicarbonate, 60 mEq, Oral, PRN    potassium, sodium phosphates, 2 packet, Oral, PRN    potassium, sodium phosphates, 2 packet, Oral, PRN    senna-docusate 8.6-50 mg, 1 tablet, Oral, BID PRN    sodium chloride 0.9%, 10 mL, Intravenous, Q12H PRN     Review of patient's allergies indicates:   Allergen  Reactions    Statins-hmg-coa reductase inhibitors Swelling    Metformin Other (See Comments)     metallic taste , burn of the esophagus     Objective:     Vital Signs (Most Recent):  Temp: 97.7 °F (36.5 °C) (04/30/24 0701)  Pulse: 87 (04/30/24 0701)  Resp: 18 (04/30/24 0701)  BP: (!) 120/57 (notified nurse) (04/30/24 0701)  SpO2: 95 % (04/30/24 0701) Vital Signs (24h Range):  Temp:  [97.7 °F (36.5 °C)-98.4 °F (36.9 °C)] 97.7 °F (36.5 °C)  Pulse:  [85-95] 87  Resp:  [16-18] 18  SpO2:  [95 %-98 %] 95 %  BP: (120-157)/(57-82) 120/57     Weight: 97.2 kg (214 lb 4.6 oz)  Body mass index is 32.58 kg/m².    Intake/Output - Last 3 Shifts         04/28 0700 04/29 0659 04/29 0700 04/30 0659 04/30 0700 05/01 0659    P.O. 720      Total Intake(mL/kg) 720 (7.4)      Net +720             Urine Occurrence 6 x 4 x     Stool Occurrence 2 x               Physical Exam  Abdominal:      General: There is no distension.      Palpations: Abdomen is soft.      Tenderness: There is no abdominal tenderness.      Comments: Incision healing well  No infection          Significant Labs:  I have reviewed all pertinent lab results within the past 24 hours.  CBC:   Recent Labs   Lab 04/30/24 0437   WBC 17.31*   RBC 3.50*   HGB 8.8*   HCT 28.2*   *   MCV 81*   MCH 25.1*   MCHC 31.2*     CMP:   Recent Labs   Lab 04/30/24 0437   *   CALCIUM 8.2*   ALBUMIN 3.0*   PROT 6.2   *   K 3.7   CO2 29   CL 94*   BUN 16   CREATININE 1.1   ALKPHOS 94   ALT 29   AST 22   BILITOT 0.5       Significant Diagnostics:  I have reviewed all pertinent imaging results/findings within the past 24 hours.

## 2024-04-30 NOTE — PROGRESS NOTES
[facility]  General Surgery  Progress Note    Subjective:     History of Present Illness:  74 y/o female with found to have bleeding colon mass.  Colonoscopy done today identified.   She is here for further treatment.    Post-Op Info:  Procedure(s) (LRB):  LAPAROTOMY, EXPLORATORY (N/A)  COLECTOMY, RIGHT  LYSIS, ADHESIONS  SURGICAL PROCEDURE, COLONOSCOPIC   8 Days Post-Op     Interval History: feels good  Having gi function  Ct was not done yesterday    Medications:  Continuous Infusions:  Current Facility-Administered Medications   Medication Dose Route Frequency Last Rate Last Admin     Scheduled Meds:  Current Facility-Administered Medications   Medication Dose Route Frequency    bisacodyL  10 mg Rectal Once    diltiaZEM  120 mg Oral Daily    enoxparin  1 mg/kg Subcutaneous Q12H (prophylaxis, 0900/2100)    folic acid  1 mg Oral Daily    insulin detemir U-100  15 Units Subcutaneous BID    pantoprazole  40 mg Oral Daily     PRN Meds:  Current Facility-Administered Medications:     0.9%  NaCl infusion (for blood administration), , Intravenous, Q24H PRN    acetaminophen, 650 mg, Oral, Q8H PRN    acetaminophen, 650 mg, Oral, Q4H PRN    aluminum-magnesium hydroxide-simethicone, 30 mL, Oral, QID PRN    dextrose 50%, 12.5 g, Intravenous, PRN    dextrose 50%, 12.5 g, Intravenous, PRN    dextrose 50%, 25 g, Intravenous, PRN    dextrose 50%, 25 g, Intravenous, PRN    glucagon (human recombinant), 1 mg, Intramuscular, PRN    glucagon (human recombinant), 1 mg, Intramuscular, PRN    glucose, 16 g, Oral, PRN    glucose, 16 g, Oral, PRN    glucose, 24 g, Oral, PRN    glucose, 24 g, Oral, PRN    HYDROcodone-acetaminophen, 1 tablet, Oral, Q4H PRN    insulin aspart U-100, 0-15 Units, Subcutaneous, QID (AC + HS) PRN    magnesium oxide, 800 mg, Oral, PRN    magnesium oxide, 800 mg, Oral, PRN    melatonin, 6 mg, Oral, Nightly PRN    naloxone, 0.02 mg, Intravenous, PRN    ondansetron, 4 mg, Intravenous, Q6H PRN     potassium bicarbonate, 35 mEq, Oral, PRN    potassium bicarbonate, 50 mEq, Oral, PRN    potassium bicarbonate, 60 mEq, Oral, PRN    potassium, sodium phosphates, 2 packet, Oral, PRN    potassium, sodium phosphates, 2 packet, Oral, PRN    senna-docusate 8.6-50 mg, 1 tablet, Oral, BID PRN    sodium chloride 0.9%, 10 mL, Intravenous, Q12H PRN     Review of patient's allergies indicates:   Allergen Reactions    Statins-hmg-coa reductase inhibitors Swelling    Metformin Other (See Comments)     metallic taste , burn of the esophagus     Objective:     Vital Signs (Most Recent):  Temp: 97.7 °F (36.5 °C) (04/30/24 0701)  Pulse: 87 (04/30/24 0701)  Resp: 18 (04/30/24 0701)  BP: (!) 120/57 (notified nurse) (04/30/24 0701)  SpO2: 95 % (04/30/24 0701) Vital Signs (24h Range):  Temp:  [97.7 °F (36.5 °C)-98.4 °F (36.9 °C)] 97.7 °F (36.5 °C)  Pulse:  [85-95] 87  Resp:  [16-18] 18  SpO2:  [95 %-98 %] 95 %  BP: (120-157)/(57-82) 120/57     Weight: 97.2 kg (214 lb 4.6 oz)  Body mass index is 32.58 kg/m².    Intake/Output - Last 3 Shifts         04/28 0700 04/29 0659 04/29 0700 04/30 0659 04/30 0700 05/01 0659    P.O. 720      Total Intake(mL/kg) 720 (7.4)      Net +720             Urine Occurrence 6 x 4 x     Stool Occurrence 2 x               Physical Exam  Abdominal:      General: There is no distension.      Palpations: Abdomen is soft.      Tenderness: There is no abdominal tenderness.      Comments: Incision healing well  No infection          Significant Labs:  I have reviewed all pertinent lab results within the past 24 hours.  CBC:   Recent Labs   Lab 04/30/24 0437   WBC 17.31*   RBC 3.50*   HGB 8.8*   HCT 28.2*   *   MCV 81*   MCH 25.1*   MCHC 31.2*     CMP:   Recent Labs   Lab 04/30/24  0437   *   CALCIUM 8.2*   ALBUMIN 3.0*   PROT 6.2   *   K 3.7   CO2 29   CL 94*   BUN 16   CREATININE 1.1   ALKPHOS 94   ALT 29   AST 22   BILITOT 0.5       Significant Diagnostics:  I have reviewed all pertinent  imaging results/findings within the past 24 hours.  Assessment/Plan:     * Colon adenocarcinoma  8 Days Post-Op    Ct today  If no cause for leukocytosis   Ok to dc    GI bleed  8 Days Post-Op  Doing well        Jaden Barrios MD  General Surgery  [facility]

## 2024-04-30 NOTE — PT/OT/SLP PROGRESS
Physical Therapy      Patient Name:  Jen Torres   MRN:  [id number]    Patient not seen today secondary to Other (Comment) (Pt prepping for discharge.). Will follow-up next service date if discharge falls through.

## 2024-04-30 NOTE — PLAN OF CARE
AVS sent to [facility] for review.  Viktoria with [facility] to review with their provider and will f/u with CM when clear for admission

## 2024-04-30 NOTE — ASSESSMENT & PLAN NOTE
Status post right hemicolectomy  Surgical pathology:   - INVASIVE MODERATELY DIFFERENTIATED ADENOCARCINOMA.   - SURGICAL MARGINS ARE NEGATIVE FOR INVASIVE CARCINOMA   - MULTIPLE (3) TUBULAR ADENOMAS, NEGATIVE FOR HIGH GRADE DYSPLASIA OR    MALIGNANCY.   - METASTATIC CARCINOMA IN ONE OF THIRTY-FIVE REGIONAL LYMPH NODES.     Patient has been evaluated by oncologist  Most likely patient will need chemotherapy outpatient  CT chest upon admission is negative for metastatic disease

## 2024-04-30 NOTE — DISCHARGE SUMMARY
[facility] Medicine  Discharge Summary      Patient Name: Jen Torres  MRN: [id number]  VALENCIA: [id number]  Patient Class: IP- Inpatient  Admission Date: 4/19/2024  Hospital Length of Stay: 10 days  Discharge Date and Time: 4/30/2024  2:08 PM  Attending Physician: No att. providers found   Discharging Provider: Ronal Mcnulty MD  Primary Care Provider: Sam Garcia IV, MD    Primary Care Team: Networked reference to record PCT     HPI:   Patient is a 73 old female medical history of anemia, GERD, hyperlipidemia, and migraines.  Patient presents to the ED today with complaints of worsening fatigue shortness for breath worse on exertion, and intermittent chest pain on exertion.  Patient was seen in GI office earlier today, thus sent into the ED for further evaluation.  Patient was found to have hemoglobin of 7.4, hematocrit 24.5.  1 unit blood ordered in the ED for transfusion.  Patient just had recent iron infusion on Wednesday.  Patient denies previous history of blood transfusions in the past.  Patient has had rectal bleeding and melena that has been present and steady for the past 2 months.  Patient reports being scheduled for outpatient stress test next week.  D-dimer elevated in the ED at 0.55, CTA negative for acute PE.  CT of the abdomen without acute abnormalities. Patient has history of fhypercoagulable state, DVT in LLE 2021. Patient does report taking Eliquis BID, last dose this morning. GI consulted.  Pt denies constipation/diarrhea, dysuria, abdominal pain, LE edema, diaphoresis, nausea, or vomiting. Patient is full code.    In the ED:  Chest x-ray negative.  Occult positive.  Troponin 5.0, TSH 2.254, UA negative.  Potassium 3.5, magnesium 1.6, INR 1.0, PTT 11.2.    Procedure(s) (LRB):  LAPAROTOMY, EXPLORATORY (N/A)  COLECTOMY, RIGHT  LYSIS, ADHESIONS  SURGICAL PROCEDURE, COLONOSCOPIC      Hospital Course:   The patient was transfused one unit PRBC on 04/19. She was seen in  consult by GI, EGD done on 04/20. No significant findings. Colonoscopy done for 04/21 and revealed colonic mass in the transverse colon. Surgery was consulted, and resection was done 04/22. Her H/H were monitored for any further decrease. Patient with expected postop pain. Patient developed n/v. Abdominal xray revealed ileus. Oral meds were changed to IV form. The patient was held NPO. She was placed on PPN.  Patient apparently has some postoperative ileus, this improved with conservative management, patient tolerated diet well, we weaned off PPN.  Patient had multiple bowel movements.  Surgical path come back shows colorectal adenocarcinoma, with margin negative, patient had CT chest prior to admission which is negative for metastatic disease.  Patient also has some leukocytosis upon discharge, procalcitonin within normal, CT abdomen and pelvis is negative for acute changes, patient cleared for discharge by General surgery.  Patient will follow up oncologist outpatient for possible chemotherapy.      Goals of Care Treatment Preferences:  Code Status: Full Code      Consults:   Consults (From admission, onward)          Status Ordering Provider     Inpatient consult to Registered Dietitian/Nutritionist  Once        Provider:  (Not yet assigned)    Completed IMMANUEL HILL     Inpatient consult to Registered Dietitian/Nutritionist  Once        Provider:  (Not yet assigned)    Completed LORNE CABRERA     Inpatient consult to General Surgery  Once        Provider:  Lorne Cabrera MD    Completed LALITA DORATNES     Inpatient consult to General Surgery  Once        Provider:  Lorne Cabrera MD    Completed CEASAR ARIAS III     Inpatient consult to Gastroenterology  Once        Provider:  Ceasar Arias III, MD    Completed JENNIE FARRAR            Oncology  * Colon adenocarcinoma    Status post right hemicolectomy  Surgical pathology:   - INVASIVE MODERATELY DIFFERENTIATED  ADENOCARCINOMA.   - SURGICAL MARGINS ARE NEGATIVE FOR INVASIVE CARCINOMA   - MULTIPLE (3) TUBULAR ADENOMAS, NEGATIVE FOR HIGH GRADE DYSPLASIA OR    MALIGNANCY.   - METASTATIC CARCINOMA IN ONE OF THIRTY-FIVE REGIONAL LYMPH NODES.     Patient has been evaluated by oncologist  Most likely patient will need chemotherapy outpatient  CT chest upon admission is negative for metastatic disease        Other  MTHFR mutation    Patient also has a history of DVT, was taking Eliquis prior to admission.  Follow with Dr. Felder.  Spoke with General surgery Dr. Soria  We will start on full-dose Lovenox 4/28  We will continue Eliquis upon discharge      Final Active Diagnoses:    Diagnosis Date Noted POA    PRINCIPAL PROBLEM:  Colon adenocarcinoma [C18.9] 04/27/2024 Yes    Leukocytosis [D72.829] 04/29/2024 Yes    MTHFR mutation [Z15.89] 04/28/2024 Not Applicable    GI bleed [K92.2] 04/19/2024 Yes    Iron deficiency anemia due to chronic blood loss [D50.0] 04/04/2024 Yes    Type 2 diabetes mellitus without complication, without long-term current use of insulin [E11.9] 01/18/2024 Yes    Essential hypertension [I10] 01/18/2024 Yes      Problems Resolved During this Admission:    Diagnosis Date Noted Date Resolved POA    Colonic mass [K63.89] 04/21/2024 04/27/2024 Yes    Factor 5 Leiden mutation, heterozygous [D68.51] 03/07/2024 04/21/2024 Yes       Discharged Condition: stable    Disposition: Rehab Facility    Follow Up:   Follow-up Information       Sam Garcia IV, MD. Schedule an appointment as soon as possible for a visit in 1 week(s).    Specialty: Family Medicine  Contact information:  [address]   [place] [address]  [phone]               Chandler Gorman MD. Schedule an appointment as soon as possible for a visit in 1 week(s).    Specialties: Hematology, Oncology, Hematology and Oncology  Contact information:  [address] [address]  [phone]               Jaden Barrios,  MD. Schedule an appointment as soon as possible for a visit in 1 week(s).    Specialties: General Surgery, Bariatrics, Surgery  Contact information:  [address]  [address]  [place] [address] [phone]               [facility] Follow up.    Specialties: Physical Therapy, Occupational Therapy  Contact information:  [address] [facility] [place] [address]  [phone]                           Patient Instructions:   No discharge procedures on file.    Significant Diagnostic Studies: Labs: BMP:   Recent Labs   Lab 04/29/24  0510 04/30/24  0437   * 164*   * 130*   K 4.3 3.7   CL 95 94*   CO2 29 29   BUN 22 16   CREATININE 1.0 1.1   CALCIUM 8.4* 8.2*   MG 1.7  --     and CBC   Recent Labs   Lab 04/29/24  0510 04/30/24  0437   WBC 19.94* 17.31*   HGB 9.0* 8.8*   HCT 29.6* 28.2*   * 495*       Pending Diagnostic Studies:       Procedure Component Value Units Date/Time    EKG 12-lead [[id number]] Collected: 04/23/24 1217    Order Status: Sent Lab Status: In process Updated: 04/23/24 1313     QRS Duration 84 ms      OHS QTC Calculation 449 ms     Narrative:      Test Reason : R07.9,    Vent. Rate : 085 BPM     Atrial Rate : 085 BPM     P-R Int : 156 ms          QRS Dur : 084 ms      QT Int : 378 ms       P-R-T Axes : 060 050 058 degrees     QTc Int : 449 ms    Normal sinus rhythm with sinus arrhythmia  Normal ECG  When compared with ECG of 19-APR-2024 13:23,  Sinus rhythm has replaced Ectopic atrial rhythm  Left posterior fascicular block is no longer Present  Criteria for Septal infarct are no longer Present  Nonspecific T wave abnormality no longer evident in Anterior leads  QT has lengthened    Referred By: [email or web address]   SELF           Confirmed By:            Medications:  Reconciled Home Medications:      Medication List        START taking these medications      amoxicillin-clavulanate 875-125mg 875-125 mg per tablet  Commonly known as: AUGMENTIN  Take 1 tablet by  mouth 2 (two) times daily. for 5 days     HYDROcodone-acetaminophen  mg per tablet  Commonly known as: NORCO  Take 1 tablet by mouth every 6 (six) hours as needed for Pain.            CHANGE how you take these medications      ELIQUIS 5 mg Tab  Generic drug: apixaban  TAKE 1 TABLET BY MOUTH TWICE A DAY  What changed: how much to take     potassium chloride 10 MEQ Cpsr  Commonly known as: MICRO-K  TAKE 1 CAPSULE BY MOUTH EVERY DAY  What changed: when to take this            CONTINUE taking these medications      aspirin 81 MG Chew  Take 81 mg by mouth once daily.     diltiaZEM 120 MG Cp24  Commonly known as: CARDIZEM CD  Take 1 capsule (120 mg total) by mouth once daily.     ergocalciferol 50,000 unit Cap  Commonly known as: ERGOCALCIFEROL  Take 50,000 Units by mouth every 7 days. Fridays     ezetimibe 10 mg tablet  Commonly known as: ZETIA  Take 10 mg by mouth once daily.     glimepiride 2 MG tablet  Commonly known as: AMARYL  Take 2 mg by mouth every morning.     hydroCHLOROthiazide 25 MG tablet  Commonly known as: HYDRODIURIL  Take 25 mg by mouth every Mon, Wed, Fri.     PAIN RELIEVER ES(ACETAMINOPHN) 500 mg tablet  Generic drug: acetaminophen  Take 2 tablets (1,000 mg total) by mouth every 8 (eight) hours as needed for Pain.     pantoprazole 40 MG tablet  Commonly known as: PROTONIX  Take 40 mg by mouth every morning.     WESTAB MAX 2.5-25-2 mg Tab  Generic drug: folic acid-vit B6-vit B12 2.5-25-2 mg  TAKE 1 TABLET BY MOUTH EVERY DAY            CT Abdomen Pelvis  Without Contrast    Result Date: 4/30/2024  CMS MANDATED QUALITY DATA - CT RADIATION - 436 All CT scans at this facility utilize dose modulation, iterative reconstruction, and/or weight based dosing when appropriate to reduce radiation dose to as low as reasonably achievable. EXAMINATION: CT ABDOMEN PELVIS WITHOUT CONTRAST CLINICAL HISTORY: Abdominal abscess/infection suspected; TECHNIQUE: CT abdomen and pelvis without IV or oral contrast. Study is  tailored for detection of urinary tract calculi and evaluation of solid organs, hollow viscera, and vascular structures is limited. COMPARISON: 04/19/2024 FINDINGS: CT Abdomen: Atelectasis in the lung bases. The liver, spleen and pancreas have a normal noncontrast appearance.  There is no focal mass or biliary duct dilatation Gallbladder is absent Adrenal glands are normal Kidneys are symmetric in size with bilateral parapelvic cysts.  There is no hydronephrosis. Bowel: Patient has had interval right hemicolectomy.  There is minimal free fluid in the  right-sided the abdomen.  There are no thick-walled or dilated bowel loops. There is no mesenteric or retroperitoneal adenopathy. Aorta is normal in caliber with diffuse vascular calcification CT Pelvis: The uterus and ovaries and bladder are normal.  There is no adenopathy.  There are no acute osseous abnormalities.     Status post right hemicolectomy with minimal free fluid in the right-sided the abdomen. Atelectasis lung bases Prior cholecystectomy Parapelvic renal cysts Electronically signed by: Monika Vance Date:    04/30/2024 Time:    10:31    X-Ray Abdomen AP 1 View    Result Date: 4/25/2024  EXAMINATION: XR ABDOMEN AP 1 VIEW CLINICAL HISTORY: post op ileus; FINDINGS: Three abdominal radiographs at 09:36 hours compared to multiple prior exams show moderate fairly diffuse gaseous distension of the small bowel and colon, with no evidence of intraperitoneal free air. There are right upper quadrant cholecystectomy clips, was no suspicious calcifications overlying the kidneys or the ureters.  No acute fractures or destructive osseous lesions.     Findings suggestive of postoperative ileus, with no intraperitoneal free air. Electronically signed by: Artemio Ray Date:    04/25/2024 Time:    11:06    CT Abdomen Pelvis With IV Contrast NO Oral Contrast    Result Date: 4/19/2024  CMS MANDATED QUALITY DATA - CT RADIATION - 436 All CT scans at this facility utilize dose  modulation, iterative reconstruction, and/or weight based dosing when appropriate to reduce radiation dose to as low as reasonably achievable. EXAMINATION: CT ABDOMEN PELVIS WITH IV CONTRAST CLINICAL HISTORY: GI bleed;LLQ abdominal pain; TECHNIQUE: CT abdomen and pelvis with 100 mL Omnipaque 350. COMPARISON: CT abdomen pelvis 09/30/2014. FINDINGS: There is bilateral dependent subsegmental atelectasis.  Heart size is normal. Liver is normal size.  No enhancing hepatic lesions identified.  Gallbladder has been removed.  The pancreas, spleen and adrenal glands are unremarkable.  The kidneys are normal size.  Bilateral parapelvic renal cysts again noted.  No hydronephrosis or nephrolithiasis.  The ureters are normal caliber without obstructions.  The bladder is fluid filled with no focal wall abnormalities.  The uterus and adnexal structures are unremarkable.  No free fluid in the pelvis. The large and small bowel are normal caliber.  The appendix is not identified.  There is no bowel wall thickening or inflammatory changes.  The stomach is mostly decompressed and grossly unremarkable. The abdominal aorta is normal caliber with atherosclerosis.  There are no enlarged intra-abdominal lymph nodes.  No mesenteric fat stranding and free fluid.  The ventral abdominal wall is unremarkable.  There is no acute osseous abnormality.     No acute intra-abdominal abnormality observed. Electronically signed by: Gary Cam Date:    04/19/2024 Time:    16:07    CTA Chest Non-Coronary (PE Studies)    Result Date: 4/19/2024  CMS MANDATED QUALITY DATA - CT RADIATION - 436 All CT scans at this facility utilize dose modulation, iterative reconstruction, and/or weight based dosing when appropriate to reduce radiation dose to as low as reasonably achievable. EXAMINATION: CTA CHEST NON CORONARY (PE STUDIES) CLINICAL HISTORY: Pulmonary embolism (PE) suspected, high prob; TECHNIQUE: CT angiography of the chest with 100 mL Omnipaque 350.  Maximum intensity projection coronal reformations were created at a separate workstation and stored in the patient's permanent medical record. COMPARISON: None FINDINGS: No pulmonary embolism identified.  Heart size is normal.  The thoracic aorta is normal caliber with mild atherosclerosis. The central tracheobronchial tree is patent.  There is bilateral dependent subsegmental atelectasis.  No pleural effusion or consolidation. The visualized abdominal viscera are unremarkable.  There are degenerative changes of the spine.  There is no acute osseous abnormality.     No acute cardiopulmonary abnormality observed.  No pulmonary embolism. Electronically signed by: Gary Cam Date:    04/19/2024 Time:    15:59    X-Ray Chest AP Portable    Result Date: 4/19/2024  EXAMINATION: XR CHEST AP PORTABLE CLINICAL HISTORY: GI bleed; FINDINGS: Portable chest radiograph at 13:10 hours compared to prior exams shows the cardiomediastinal silhouette and pulmonary vasculature are within normal limits. The lungs are normally expanded, with no consolidation, pleural effusion, or evidence of pulmonary edema. No confluent infiltrates or pneumothorax. There are no significant osseous abnormalities.     No evidence of active cardiopulmonary disease. Electronically signed by: Artemio Ray Date:    04/19/2024 Time:    13:32    Mammo Digital Diagnostic Right with Hill    Result Date: 4/3/2024  Result: Mammo Digital Diagnostic Right with Hill History: Patient is 73 y.o. and is seen for diagnostic imaging. Films Compared: Compared to: 08/14/2023 Mammo Digital Diagnostic Bilat with Hill, 08/14/2023 US Breast Right Limited, and 03/09/2018 Mammo Digital Screening Bilat with Tomosynthesis_CAD Findings: This procedure was performed using tomosynthesis. Computer-aided detection was utilized in the interpretation of this examination. The right breast has scattered areas of fibroglandular density. There is no evidence of suspicious masses,  microcalcifications or architectural distortion. The previously seen mass is not longer found.      No mammographic evidence of malignancy. BI-RADS Category 1: Negative Recommendation: Routine screening mammogram in August 2024  is recommended. Your estimated lifetime risk of breast cancer (to age 85) based on Tyrer-Cuzick risk assessment model is 6.25 %.  According to the [employer] Society, patients with a lifetime breast cancer risk of 20% or higher might benefit from supplemental screening tests, such as screening breast MRI.   - pulls last radiology orders    Indwelling Lines/Drains at time of discharge:   Lines/Drains/Airways       None                   Time spent on the discharge of patient: 35 minutes         Ronal Mcnulty MD  Department of Hospital Medicine  [facility]

## 2024-04-30 NOTE — PLAN OF CARE
Pt accepted to [facility] and they will send transportation to pick pt up at 1300. Nursing sent number for report via secure chat by Viktoria with [facility]. CM spoke with pt and also spouse via phone regarding discharge this afternoon and he is fine with this. Discharge orders and chart reviewed with no further post-acute discharge needs identified at this time.  At this time, patient is cleared for discharge from Case Management standpoint.        04/30/24 1136   Final Note   Assessment Type Final Discharge Note   Anticipated Discharge Disposition Rehab   Post-Acute Status   Post-Acute Authorization Placement   Post-Acute Placement Status Set-up Complete/Auth obtained   Discharge Delays None known at this time

## 2024-05-09 ENCOUNTER — INFUSION (OUTPATIENT)
Dept: INFUSION THERAPY | Facility: HOSPITAL | Age: 74
End: 2024-05-09
Attending: INTERNAL MEDICINE
Payer: MEDICARE

## 2024-05-09 ENCOUNTER — OFFICE VISIT (OUTPATIENT)
Dept: SURGERY | Facility: CLINIC | Age: 74
End: 2024-05-09
Payer: MEDICARE

## 2024-05-09 VITALS
SYSTOLIC BLOOD PRESSURE: 119 MMHG | HEIGHT: 68 IN | RESPIRATION RATE: 16 BRPM | WEIGHT: 195 LBS | DIASTOLIC BLOOD PRESSURE: 59 MMHG | BODY MASS INDEX: 29.55 KG/M2 | TEMPERATURE: 98 F | HEART RATE: 87 BPM

## 2024-05-09 VITALS
BODY MASS INDEX: 29.4 KG/M2 | DIASTOLIC BLOOD PRESSURE: 63 MMHG | SYSTOLIC BLOOD PRESSURE: 131 MMHG | HEIGHT: 68 IN | RESPIRATION RATE: 18 BRPM | WEIGHT: 194 LBS | OXYGEN SATURATION: 99 % | HEART RATE: 77 BPM | TEMPERATURE: 97 F

## 2024-05-09 DIAGNOSIS — C18.9 COLON ADENOCARCINOMA: Primary | ICD-10-CM

## 2024-05-09 DIAGNOSIS — D50.0 IRON DEFICIENCY ANEMIA DUE TO CHRONIC BLOOD LOSS: Primary | ICD-10-CM

## 2024-05-09 PROCEDURE — 99999 PR PBB SHADOW E&M-EST. PATIENT-LVL III: CPT | Mod: PBBFAC,,, | Performed by: SURGERY

## 2024-05-09 PROCEDURE — 25000003 PHARM REV CODE 250: Performed by: INTERNAL MEDICINE

## 2024-05-09 PROCEDURE — 96365 THER/PROPH/DIAG IV INF INIT: CPT

## 2024-05-09 PROCEDURE — 63600175 PHARM REV CODE 636 W HCPCS: Mod: JZ | Performed by: INTERNAL MEDICINE

## 2024-05-09 PROCEDURE — 99024 POSTOP FOLLOW-UP VISIT: CPT | Mod: POP,,, | Performed by: SURGERY

## 2024-05-09 PROCEDURE — 99213 OFFICE O/P EST LOW 20 MIN: CPT | Mod: PBBFAC,PN | Performed by: SURGERY

## 2024-05-09 RX ORDER — SODIUM CHLORIDE 0.9 % (FLUSH) 0.9 %
10 SYRINGE (ML) INJECTION
Status: CANCELLED | OUTPATIENT
Start: 2024-05-15

## 2024-05-09 RX ORDER — SODIUM CHLORIDE 0.9 % (FLUSH) 0.9 %
10 SYRINGE (ML) INJECTION
Status: DISCONTINUED | OUTPATIENT
Start: 2024-05-09 | End: 2024-05-09 | Stop reason: HOSPADM

## 2024-05-09 RX ORDER — HEPARIN 100 UNIT/ML
5 SYRINGE INTRAVENOUS
OUTPATIENT
Start: 2024-05-15

## 2024-05-09 RX ORDER — DIPHENHYDRAMINE HYDROCHLORIDE 50 MG/ML
50 INJECTION INTRAMUSCULAR; INTRAVENOUS ONCE AS NEEDED
OUTPATIENT
Start: 2024-05-15

## 2024-05-09 RX ORDER — EPINEPHRINE 0.3 MG/.3ML
0.3 INJECTION SUBCUTANEOUS ONCE AS NEEDED
OUTPATIENT
Start: 2024-05-15

## 2024-05-09 RX ORDER — SODIUM CHLORIDE 9 MG/ML
INJECTION, SOLUTION INTRAVENOUS CONTINUOUS
Status: CANCELLED | OUTPATIENT
Start: 2024-05-15

## 2024-05-09 RX ORDER — SODIUM CHLORIDE 9 MG/ML
INJECTION, SOLUTION INTRAVENOUS CONTINUOUS
Status: DISCONTINUED | OUTPATIENT
Start: 2024-05-09 | End: 2024-05-09 | Stop reason: HOSPADM

## 2024-05-09 RX ORDER — HEPARIN 100 UNIT/ML
5 SYRINGE INTRAVENOUS
Status: DISCONTINUED | OUTPATIENT
Start: 2024-05-09 | End: 2024-05-09 | Stop reason: HOSPADM

## 2024-05-09 RX ADMIN — SODIUM CHLORIDE: 9 INJECTION, SOLUTION INTRAVENOUS at 03:05

## 2024-05-09 RX ADMIN — FERRIC CARBOXYMALTOSE INJECTION 750 MG: 50 INJECTION, SOLUTION INTRAVENOUS at 03:05

## 2024-05-09 NOTE — PROGRESS NOTES
Having some drainage from wound mainly yellow  Having bm and gas  Eating cautiously  Vitals:    05/09/24 1322   BP: (!) 119/59   Pulse: 87   Resp: 16   Temp: 97.6 °F (36.4 °C)     Abdomen with mild distension, soft, serous drainage from lower wound no signs of infection    A/P  Sp extended right for bleeding mass  Path reviewed  Needs to see oncology  Probable port for chemo- will wait for oncology  Follow up in 1 month.

## 2024-05-10 ENCOUNTER — TELEPHONE (OUTPATIENT)
Dept: INFUSION THERAPY | Facility: HOSPITAL | Age: 74
End: 2024-05-10

## 2024-05-10 ENCOUNTER — OFFICE VISIT (OUTPATIENT)
Dept: HEMATOLOGY/ONCOLOGY | Facility: CLINIC | Age: 74
End: 2024-05-10
Payer: MEDICARE

## 2024-05-10 ENCOUNTER — TELEPHONE (OUTPATIENT)
Dept: HEMATOLOGY/ONCOLOGY | Facility: CLINIC | Age: 74
End: 2024-05-10

## 2024-05-10 VITALS
HEART RATE: 90 BPM | TEMPERATURE: 97 F | WEIGHT: 198 LBS | HEIGHT: 68 IN | RESPIRATION RATE: 20 BRPM | SYSTOLIC BLOOD PRESSURE: 145 MMHG | BODY MASS INDEX: 30.01 KG/M2 | DIASTOLIC BLOOD PRESSURE: 73 MMHG

## 2024-05-10 DIAGNOSIS — C18.2 MALIGNANT NEOPLASM OF ASCENDING COLON: Primary | ICD-10-CM

## 2024-05-10 DIAGNOSIS — C77.2 PRIMARY COLON CANCER WITH METASTASIS TO 1 REGIONAL LYMPH NODE (N1A): ICD-10-CM

## 2024-05-10 DIAGNOSIS — Z15.89 MTHFR MUTATION: ICD-10-CM

## 2024-05-10 DIAGNOSIS — I82.402 DEEP VEIN THROMBOSIS (DVT) OF LEFT LOWER EXTREMITY, UNSPECIFIED CHRONICITY, UNSPECIFIED VEIN: ICD-10-CM

## 2024-05-10 DIAGNOSIS — D50.0 IRON DEFICIENCY ANEMIA DUE TO CHRONIC BLOOD LOSS: ICD-10-CM

## 2024-05-10 DIAGNOSIS — C18.9 PRIMARY COLON CANCER WITH METASTASIS TO 1 REGIONAL LYMPH NODE (N1A): ICD-10-CM

## 2024-05-10 LAB
OHS QRS DURATION: 80 MS
OHS QTC CALCULATION: 387 MS

## 2024-05-10 PROCEDURE — 99215 OFFICE O/P EST HI 40 MIN: CPT | Mod: S$GLB,,, | Performed by: INTERNAL MEDICINE

## 2024-05-10 RX ORDER — BIOTIN 5000 MCG
TABLET,DISINTEGRATING ORAL
COMMUNITY

## 2024-05-10 NOTE — LETTER
May 12, 2024        Sam Garcia IV, MD  [address]   [place] [address]             [facility] - Hematology Oncology  [address] [facility] [address]  Phone: [phone]  Fax: [phone]   Patient: Jen Torres   MR Number: [id number]   YOB: 1950   Date of Visit: 5/10/2024       Dear Dr. Garcia:    Thank you for referring Jen Melissa to me for evaluation. Below are the relevant portions of my assessment and plan of care.            If you have questions, please do not hesitate to call me. I look forward to following Jen along with you.    Sincerely,      PABLO Moreland MD           CC    No Recipients

## 2024-05-10 NOTE — TELEPHONE ENCOUNTER
Per Dr. Moreland's verbal orders patient will need ctDNA done. Patient made aware lab is closed today so she will have to come back next week for testing. Verbalized understanding. Akanksha HERNANDEZ made aware of above.

## 2024-05-10 NOTE — PROGRESS NOTES
[facility] hematology Oncology in office Subsequent encounter note    5/10/24    Subjective:      Patient ID:   Jen Torres  73 y.o. female  1950  MD JESSICA Martinez MD, MD      Chief Complaint:   Recurrent leg clots    HPI:  73 y.o. female with 1 or 2 episodes of superficial venous clots involving the left leg.    Patient has history of falling off a porch, she has lumbar spine disease and low back pain symptoms with ski attic a. She is to see a chiropractor.  Other history includes a twisted left knee and torn meniscus.  She hit L leg, it was black and blue.  Check U/S of L leg now 12/2022.  Seeing Dr. Raz Jimenes for L knee pain.  Sample orders for lovenox bridge written and reviewed with her.  Await a definite surgical date, to decide on lovenox bridge dates.  She had L knee surgery 2/2023, now on eliquis 5 mg BID.    L rotator cuff surgery 12/29/23.  3/7/24 TIA.  Had Vertigo sx, SOB, L facial numbness and justa white in appearance, increased BP.  Now on Eliquis 5 mg 1 BID and ASA 81 mg 1 daily.  Expressive aphasia x's 2 months.    She has history of cancer of the cervix, a cyst on her ovary, and calcium deposits in the left breast.    She has history of asthma, type 2 diabetes, high cholesterol, hypertension, sleep apnea syndrome, GERD and dysphagia symptoms history of ulcer per Dr. Ritchie, she has a small bladder and arthritic symptoms in the fingers and knee.    Significantly around March of 2021 her medial lower knee area was swollen at the left leg and she was found to have a superficial clot.  Recently she had left leg fullness with increased edema and ultrasound showed superficial vein clot.  Will discuss with radiologist if the event of 2021 and 2022 likely represent the same clot or different clot events.    She has seen Dr. Guerrero of vascular surgery for venous evaluation.  I do not believe she has had any specific treatment or intervention as of yet.    She does  not smoke, she does not drink alcohol, she is allergic to statins with swelling and metformin with metallic taste and burning of the esophagus.    She has history of abnormal Pap smear with cryo procedure at age 21.  Other history includes GERD, hyperlipidemia, migraine headaches and biliary colic.    Medications include Trulicity, Amaryl, hydrochlorothiazide, Medrol Dosepak, Starlix, and Micro-K.    Her mother had colon cancer, and had been hit in the ankle area of the left leg and later developed a DVT and pulmonary embolus.  She had venous disease and venous stripping procedure.  Her dad had Alzheimer's disease.  Her aunt had colon cancer.  A paternal grandmother had breast cancer.  Another aunt had colitis, colon cancer irritable bowel syndrome.  A brother  of suicide, a brother  in MVA and another brother is alive and well.    Her daughter had Crohn's disease and ovarian cancer.  Another daughter had uterine cancer at age 20.    She has hx of L knee injury, needed elective arthroscopy.  On Elliquis 5 mg 1 BID.  On folbic.  Re check F8, fibrinogen, homocystine at [facility].  Recent U/S residual superficial clot seen in L leg veins.  She had surgery 25, with lovenox bridge.  Dr. Hatfield.    Repeat U/S of leg no DVT seen.  22.    She had UTI, now on nitrofurantoin antibiotic.    Hgb 14.1 to 11.5 to 10.6 to 9,8.  Hx of PUD, and black BM 1 month ago.  Eats 2 meals daily.  Check mamm,  check lab for anemia eval.  Ferritin 9.  EGD and colonoscopy 1 year ago.  Injectafer x's 2.  She did receive 1 of the injected for infusions and then was hospitalized with bright red blood per rectum     Hemoglobin had been 8.4 and ferritin had been 11.  She had GI evaluation and was found to have a mass in the colon she had partial colectomy and was found to have adenocarcinoma of the colon with 1 of 35 positive lymph nodes.  Dr. Stevenson was the surgeon of record.  She had been in rehab since the surgery and is now  returned home.  She is eating well and BM is beginning to firm up.  She does have a daughter with Crohn's disease and another child with IBS it her weight has gone from 194-198 lb she is 5 ft 8 in tall.    At this point she would appear to have clinical stage III disease.  CT scan did not show liver nodules or lymphadenopathy.  PET scan will be done at approximately 4 weeks postop.  CEA is 2.0.  Monitoring with CT DNA will be done at 4-6 weeks postop per Akanksha HERNANDEZ.     Estimated recurrence risk is 45-50% over the next 4-5 years.  Adjuvant treatment with FOLFOX or CAPOX would reduce her recurrence risk down to 20-25% over the next 4-5 years..  I reviewed the pathology report and this significance the extent of the tumor into or through the wall of the colon and the significance of positive lymphadenopathy in her stage and in assessment of recurrence risk.    I discussed with her the rationale of adjuvant treatment and reviewed with her the schema of FOLFOX and CAPOX over the next 3-6 months.    She tells me that her home situation is very severe, she has an invalid with her  and a son that is being treated for medical conditions, she describes it as a hoarder situation of the house.  She plans to move to her daughter's in [place] and have treatment if needed at [facility] and then return here after therapy has been completed.    ROS:   GEN: normal without any fever, night sweats or weight loss  HEENT: normal with no HA's, sore throat, stiff neck, changes in vision  CV: See HPI  PULM: See HPI  GI: See HPI  : normal with no hematuria, dysuria  BREAST: normal with no mass, discharge, pain  SKIN: See HPI     Past Medical History:   Diagnosis Date    Abnormal Pap smear     s/p cryo at 22 y/o    Anticoagulant long-term use     GERD (gastroesophageal reflux disease)     Hyperlipidemia     Migraine     Migraines     Sleep apnea     Can not use c-pap     Past Surgical History:   Procedure Laterality Date     ARTHROSCOPIC REPAIR OF ROTATOR CUFF OF SHOULDER Left 12/29/2023    Procedure: REPAIR, ROTATOR CUFF, ARTHROSCOPIC;  Surgeon: Gary Tony MD;  Location: [facility] OR;  Service: Orthopedics;  Laterality: Left;  arthrex    ARTHROSCOPY OF SHOULDER WITH DECOMPRESSION OF SUBACROMIAL SPACE Left 12/29/2023    Procedure: ARTHROSCOPY, SHOULDER, WITH SUBACROMIAL SPACE DECOMPRESSION;  Surgeon: Gary Tony MD;  Location: [facility] OR;  Service: Orthopedics;  Laterality: Left;    CATARACT EXTRACTION, BILATERAL      CHOLECYSTECTOMY  10/01/2014    COLECTOMY, RIGHT  4/22/2024    Procedure: COLECTOMY, RIGHT;  Surgeon: Jaden Barrios MD;  Location: [facility] OR;  Service: General;;    COLONOSCOPIC SURGICAL PROCEDURE  4/22/2024    Procedure: SURGICAL PROCEDURE, COLONOSCOPIC;  Surgeon: Jaden Barrios MD;  Location: [facility];  Service: General;;    COLONOSCOPY N/A 4/21/2024    Procedure: COLONOSCOPY;  Surgeon: Ceasar Arias III, MD;  Location: [facility];  Service: Endoscopy;  Laterality: N/A;    ESOPHAGOGASTRODUODENOSCOPY N/A 4/20/2024    Procedure: EGD (ESOPHAGOGASTRODUODENOSCOPY);  Surgeon: Ceasar Arias III, MD;  Location: [facility] ENDO;  Service: Endoscopy;  Laterality: N/A;    EYE SURGERY      KNEE ARTHROSCOPY Left     LAPAROTOMY, EXPLORATORY N/A 4/22/2024    Procedure: LAPAROTOMY, EXPLORATORY;  Surgeon: Jaden Barrios MD;  Location: [facility] OR;  Service: General;  Laterality: N/A;    LYSIS OF ADHESIONS  4/22/2024    Procedure: LYSIS, ADHESIONS;  Surgeon: Jaden Barrios MD;  Location: [facility];  Service: General;;    TONSILLECTOMY         Review of patient's allergies indicates:   Allergen Reactions    Statins-hmg-coa reductase inhibitors Swelling    Metformin Other (See Comments)     metallic taste , burn of the esophagus     Social History     Socioeconomic History    Marital status:    Tobacco Use    Smoking status: Former    Smokeless tobacco: Never   Substance and Sexual Activity     Alcohol use: No    Drug use: No    Sexual activity: Never     Social Determinants of Health     Financial Resource Strain: Low Risk  (8/14/2023)    [facility]    Overall Financial Resource Strain (CARDIA)     Difficulty of Paying Living Expenses: Not hard at all   Food Insecurity: No Food Insecurity (8/14/2023)    [facility]    Hunger Vital Sign     Worried About Running Out of Food in the Last Year: Never true     Ran Out of Food in the Last Year: Never true   Transportation Needs: No Transportation Needs (8/14/2023)    [facility]    PRAPARE - Transportation     Lack of Transportation (Medical): No     Lack of Transportation (Non-Medical): No   Physical Activity: Insufficiently Active (8/14/2023)    [facility]    Exercise Vital Sign     Days of Exercise per Week: 7 days     Minutes of Exercise per Session: 10 min   Stress: No Stress Concern Present (8/14/2023)    [facility]    [employer] - Occupational Stress Questionnaire     Feeling of Stress : Only a little   Housing Stability: Low Risk  (8/14/2023)    [facility]    Housing Stability Vital Sign     Unable to Pay for Housing in the Last Year: No     Number of Places Lived in the Last Year: 1     In the last 12 months, was there a time when you did not have a steady place to sleep or slept in a shelter (including now)?: No         Current  Outpatient Medications:     acetaminophen (TYLENOL) 500 MG tablet, Take 2 tablets (1,000 mg total) by mouth every 8 (eight) hours as needed for Pain., Disp: 30 tablet, Rfl: 0    aspirin 81 MG Chew, Take 81 mg by mouth once daily., Disp: , Rfl:     diltiaZEM (CARDIZEM CD) 120 MG Cp24, Take 1 capsule (120 mg total) by mouth once daily., Disp: 30 capsule, Rfl: 11    ELIQUIS 5 mg Tab, TAKE 1 TABLET BY MOUTH TWICE A DAY (Patient taking differently: Take 5 mg by mouth 2 (two) times daily.), Disp: 60 tablet, Rfl: 4    ergocalciferol (ERGOCALCIFEROL) 50,000 unit Cap, Take 50,000 Units by mouth every 7 days. Fridays, Disp: , Rfl:     ezetimibe (ZETIA) 10 mg tablet, Take 10 mg by mouth once daily., Disp: , Rfl:     glimepiride (AMARYL) 2 MG tablet, Take 2 mg by mouth every morning. Taking 2 tabs in am, Disp: , Rfl:     hydroCHLOROthiazide (HYDRODIURIL) 25 MG tablet, Take 25 mg by mouth every Mon, Wed, Fri., Disp: , Rfl:     Lactobacillus rhamnosus GG (CULTURELLE) 10 billion cell capsule, Take 1 capsule by mouth once daily., Disp: , Rfl:     melatonin 1 mg TbDL, Take by mouth., Disp: , Rfl:     pantoprazole (PROTONIX) 40 MG tablet, Take 40 mg by mouth every morning., Disp: , Rfl:     potassium chloride (MICRO-K) 10 MEQ CpSR, TAKE 1 CAPSULE BY MOUTH EVERY DAY (Patient taking differently: Take 10 mEq by mouth once daily.), Disp: 90 capsule, Rfl: 3    WESTAB MAX 2.5-25-2 mg Tab, TAKE 1 TABLET BY MOUTH EVERY DAY, Disp: 90 tablet, Rfl: 4  No current facility-administered medications for this visit.    Facility-Administered Medications Ordered in Other Visits:     electrolyte-S (ISOLYTE), , Intravenous, Continuous, Blade Packer MD    electrolyte-S (ISOLYTE), , Intravenous, Continuous, Blade Packer MD, Last Rate: 200 mL/hr at 12/29/23 1159, Rate Change at 12/29/23 1159    LIDOcaine (PF) 10 mg/ml (1%) injection 10 mg, 1 mL, Intradermal, Once, Blade Packer MD    midazolam (VERSED) 1 mg/mL injection 2 mg, 2 mg,  "Intravenous, PRN, Cousin, Blade CIFUENTES MD, 2 mg at 12/29/23 0955          Objective:   Vitals:  Blood pressure (!) 145/73, pulse 90, temperature 97.3 °F (36.3 °C), resp. rate 20, height 5' 8" (1.727 m), weight 89.8 kg (198 lb).    Physical Examination:   GEN: no apparent distress, comfortable  HEAD: atraumatic and normocephalic  EYES: no pallor, no icterus  ENT:  no pharyngeal erythema, external ears WNL; no nasal discharge  NECK: no masses, thyroid normal, trachea midline, no LAD/LN's, supple  CV: RRR with no murmur; normal pulse; normal S1 and S2  CHEST: Normal respiratory effort; CTAB; normal breath sounds; no wheeze or crackles  ABDOM: nontender and nondistended; soft; L/S NP, no rebound/guarding  MUSC/Skeletal: ROM normal; no crepitus; joints normal  EXTREM: no clubbing, cyanosis, inflammation   SKIN: no rashes, lesions, ulcers, petechiae.  The left leg has chronic edema changes with prominent varicose veins, without palpable venous cord and is nontender.  The right leg is without edema, without prominent varicose veins and without palpable venous cord and is nontender.  She has qusdricepts weakness at L leg, Calf NT at L or R leg.    The midline incision site at the abdomen is healing.  Liver and spleen are not palpable, abdomen is nontender.  : no CVAT  NEURO: grossly intact; motor/sensory WNL;  no tremors  PSYCH: normal mood, affect and behavior  LYMPH: normal cervical, supraclavicular, axillary and groin LN's    CBC, CMP, TSH Negative    Ultrasound July 11, 2022 shows superficial venous thrombus in the mid and proximal greater saphenous vein, DVT is not seen.  Left leg.  Ultrasound Sept. 15, 2022 shows persistent superficial venous thrombus at calf of L leg.    Ultrasound report from March 4, 2020 shows no evidence of DVT.  Thrombosed superficial lower extremity varices are noted, there is reflux from the origin of the greater saphenous vein to the knee.    Hypercoag. Eval. MTHFR mutation + -  -  -, " [id number]  Homocystine 12.3.  Homocystine Now Nl. At 6.81.  Factor 8 increased 221.  Anticardiolipid AB increased IgG 25.    Mamm shows microcalcifications.  Stable since 2021.  Re check 1 year.    Assessment:    1. This individual has 1 or 2 episodes of thrombosis of the superficial veins of the left leg.      2. Hypercoag. Eval. Is with Positive results as above.    3. Will discuss with Dr. Guerrero to see if impedance plethismography  studies are available to evaluate her for venous insufficiency and to see if any procedure can be done to reduce the of varicosities at the L leg.?    4.On a trial of Folbic daily.  Continue Eliquis 5 mg BID.  Had TIA, now on added ASA 81 mg daily.    5.Homocystine level has now normalized.  She proceeded with knee surgery  with lovenox bridge and Eliquis management. 1/18/23.    6.Anemia eval.  PUD hx, black BM 1 month ago.  DURAN,+, Fatigue+  4/10 energy.  Fe deficiency ferritin 9.  Injectafer x's2.    7. Moderately differentiated adenocarcinoma of the ascending colon, status post right colectomy.  Grade 2 disease.  Primary tumor extended into but not through the muscular wall of the colon.  One of 35 lymph nodes were positive for metastatic disease.  MMR studies were intact.  Clinically this would be stage III disease.    8. Plan for PET scan at [facility] imaging to complete her staging.  CT DNA studies will be collected when appropriate and followed Q 3-6 months thereafter.  She will meet with Sharmin in 2 weeks.  [facility] referral will be work done now to try and facilitate this.  Anticipate she will go to [facility] for evaluation and treatment adjuvantly in follow-up here after that has been completed on her return to [place].

## 2024-05-10 NOTE — TELEPHONE ENCOUNTER
[facility] POST INFUSION phone call:  Very pleased with care given.  All nurses were kind and supportive and kept me comfortable.  They answered my questions and addressed my concerns.  Good experience - felt very comfortable.

## 2024-05-12 ENCOUNTER — TELEPHONE (OUTPATIENT)
Dept: HEMATOLOGY/ONCOLOGY | Facility: CLINIC | Age: 74
End: 2024-05-12

## 2024-05-12 DIAGNOSIS — C18.2 MALIGNANT NEOPLASM OF ASCENDING COLON: Primary | ICD-10-CM

## 2024-05-12 DIAGNOSIS — C18.9 PRIMARY COLON CANCER WITH METASTASIS TO 1 REGIONAL LYMPH NODE (N1A): ICD-10-CM

## 2024-05-12 DIAGNOSIS — C77.2 PRIMARY COLON CANCER WITH METASTASIS TO 1 REGIONAL LYMPH NODE (N1A): ICD-10-CM

## 2024-05-13 ENCOUNTER — TELEPHONE (OUTPATIENT)
Dept: HEMATOLOGY/ONCOLOGY | Facility: CLINIC | Age: 74
End: 2024-05-13

## 2024-05-13 NOTE — TELEPHONE ENCOUNTER
She has Stage 3 ascening colon cancer.    Orders in Epic, need this in 2 weeks.    I want to get ctDNA studies to monitor her for cancer recurrence, [place].    She needs referral to [facility], she plans to live with her daughter there while getting adjuvant Rx.    See if you can get her an appt at [facility] in 3 weeks.

## 2024-05-13 NOTE — TELEPHONE ENCOUNTER
Called [facility] [phone] and they said that the patient had initiated a referral back in April. Because the patient is in a facility at this time, they had to close the referral. This was discussed with the daughter in the beginning of the month. Pt would need to call [facility] when she gets discharged from the facility to reopen the referral to get set up with treatment.

## 2024-05-14 LAB
OHS QRS DURATION: 84 MS
OHS QTC CALCULATION: 449 MS

## 2024-05-16 ENCOUNTER — TELEPHONE (OUTPATIENT)
Facility: CLINIC | Age: 74
End: 2024-05-16
Payer: MEDICARE

## 2024-05-16 NOTE — TELEPHONE ENCOUNTER
Dr. Moreland ordered CTDNA testing through [employer] for recurrence monitoring for patient. Writer met with patient and educated them on the purpose of CTDNA monitoring and schedule for CTDNA monitoring. Patient educated that [employer] is a highly sensitive and personalized molecular residual disease assay (MRD) using circulating tumor DNA (ctDNA), custom designed for each patient to help identify relapse earlier than standard of care tools (per [employer]'s website).    Patient educated that blood would be drawn every 3 months, or sooner as ordered by the provider, at the [facility] and a staff nurse from the office will call with results when results have been received from the lab and reviewed by the provider.     Patient educated that first test will take approximately 3-4 weeks to result, and then subsequent testing will take up to 1 week but may take longer if a redraw is needed. Patient made aware that if a positive result is received, that at that time the provider may request to have additional testing ordered including but not limited to additional blood testing and imaging studies.     Patient also notified that a staff nurse will call with a reminder when due for their next CTDNA blood draw. Patient instructed that they will need to stop by the office prior to each lab draw to allow the staff nurses to fill out the test tubes and ensure forms are signed.     Patient verbalized understanding of above. All questions answered to the best of writer's ability. Patient agreeable to CTDNA recurrence monitoring.

## 2024-05-17 ENCOUNTER — TELEPHONE (OUTPATIENT)
Dept: BARIATRICS | Facility: CLINIC | Age: 74
End: 2024-05-17
Payer: MEDICARE

## 2024-05-17 NOTE — TELEPHONE ENCOUNTER
How Severe Is It?: severe called and scheduled an appt    Is This A New Presentation, Or A Follow-Up?: Aging Face

## 2024-05-17 NOTE — TELEPHONE ENCOUNTER
----- Message from Sarahy Nichols sent at 5/17/2024  9:44 AM CDT -----  Type:  Sooner Appointment Request    Caller is requesting a sooner appointment.  Caller declined first available appointment listed below.  Caller will not accept being placed on the waitlist and is requesting a message be sent to doctor.    Name of Caller:  Pt    When is the first available appointment?  06/27/2024    Symptoms:  1 month f/u    Would the patient rather a call back or a response via [facility]?  Call back    Best Call Back Number:  [phone]    Additional Information:   Pt is needing to set up some type of appt.  Please call back to advise. Thanks!

## 2024-05-17 NOTE — TELEPHONE ENCOUNTER
----- Message from Sarahy Nichols sent at 5/17/2024  9:44 AM CDT -----  Type:  Sooner Appointment Request    Caller is requesting a sooner appointment.  Caller declined first available appointment listed below.  Caller will not accept being placed on the waitlist and is requesting a message be sent to doctor.    Name of Caller:  Pt    When is the first available appointment?  06/27/2024    Symptoms:  1 month f/u    Would the patient rather a call back or a response via [facility]?  Call back    Best Call Back Number:  [phone]    Additional Information:   Pt is needing to set up some type of appt.  Please call back to advise. Thanks!

## 2024-05-21 LAB
OHS QRS DURATION: 90 MS
OHS QTC CALCULATION: 477 MS

## 2024-05-22 ENCOUNTER — OFFICE VISIT (OUTPATIENT)
Facility: CLINIC | Age: 74
End: 2024-05-22
Payer: MEDICARE

## 2024-05-22 VITALS
HEART RATE: 75 BPM | TEMPERATURE: 97 F | WEIGHT: 208.31 LBS | HEIGHT: 68 IN | DIASTOLIC BLOOD PRESSURE: 67 MMHG | BODY MASS INDEX: 31.57 KG/M2 | SYSTOLIC BLOOD PRESSURE: 146 MMHG | RESPIRATION RATE: 16 BRPM

## 2024-05-22 DIAGNOSIS — C18.9 COLON ADENOCARCINOMA: Primary | ICD-10-CM

## 2024-05-22 DIAGNOSIS — I82.402 DEEP VEIN THROMBOSIS (DVT) OF LEFT LOWER EXTREMITY, UNSPECIFIED CHRONICITY, UNSPECIFIED VEIN: ICD-10-CM

## 2024-05-22 DIAGNOSIS — Z15.89 MTHFR MUTATION: ICD-10-CM

## 2024-05-22 DIAGNOSIS — G47.01 INSOMNIA DUE TO MEDICAL CONDITION: ICD-10-CM

## 2024-05-22 PROCEDURE — 99214 OFFICE O/P EST MOD 30 MIN: CPT | Mod: PBBFAC,PN | Performed by: NURSE PRACTITIONER

## 2024-05-22 PROCEDURE — G2211 COMPLEX E/M VISIT ADD ON: HCPCS | Mod: S$PBB,,, | Performed by: NURSE PRACTITIONER

## 2024-05-22 PROCEDURE — 99215 OFFICE O/P EST HI 40 MIN: CPT | Mod: S$PBB,,, | Performed by: NURSE PRACTITIONER

## 2024-05-22 PROCEDURE — 99999 PR PBB SHADOW E&M-EST. PATIENT-LVL IV: CPT | Mod: PBBFAC,,, | Performed by: NURSE PRACTITIONER

## 2024-05-22 RX ORDER — RAMELTEON 8 MG/1
8 TABLET ORAL NIGHTLY
Qty: 15 TABLET | Refills: 0 | Status: SHIPPED | OUTPATIENT
Start: 2024-05-22 | End: 2024-06-03

## 2024-05-22 NOTE — PROGRESS NOTES
[facility] HEMATOLGY ONCOLOGY CONSULTATION    Subjective:       Patient ID: Jen Torres is a 73 y.o. female returning today for a regularly scheduled follow-up visit.    Chief Complaint: Colon Cancer and DVT     HPI  The patient is here today by herself. She was recently found to have colon cancer, no lymph nodes seen on CT scan. We are waiting on PET scan for final imaging.     She had colon surgery on 4/22/2024.   She was found to have a 3.5 cm tumor with 1 lymph node positive out of 35 lymph nodes.      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     PET scan is due next week.       Past Medical History:   Diagnosis Date    Abnormal Pap smear     s/p cryo at 20 y/o    Anticoagulant long-term use     GERD (gastroesophageal reflux disease)     Hyperlipidemia     Migraine     Migraines     Sleep apnea     Can not use c-pap       Past Surgical History:   Procedure Laterality Date    ARTHROSCOPIC REPAIR OF ROTATOR CUFF OF SHOULDER Left 12/29/2023    Procedure: REPAIR, ROTATOR CUFF, ARTHROSCOPIC;  Surgeon: Gary Tony MD;  Location: [facility];  Service: Orthopedics;  Laterality: Left;  arthrex    ARTHROSCOPY OF SHOULDER WITH DECOMPRESSION OF SUBACROMIAL SPACE Left 12/29/2023    Procedure: ARTHROSCOPY, SHOULDER, WITH SUBACROMIAL SPACE DECOMPRESSION;  Surgeon: Gary Tony MD;  Location: [facility];  Service: Orthopedics;  Laterality: Left;    CATARACT EXTRACTION, BILATERAL      CHOLECYSTECTOMY  10/01/2014    COLECTOMY, RIGHT  4/22/2024    Procedure: COLECTOMY, RIGHT;  Surgeon: Jaden Barrios MD;  Location: [facility] OR;  Service: General;;    COLONOSCOPIC SURGICAL PROCEDURE  4/22/2024    Procedure: SURGICAL PROCEDURE, COLONOSCOPIC;  Surgeon: Jaden Barrios MD;  Location: [facility];  Service: General;;    COLONOSCOPY N/A 4/21/2024    Procedure: COLONOSCOPY;  Surgeon: Ceasar Arias III, MD;   Location: [facility] ENDO;  Service: Endoscopy;  Laterality: N/A;    ESOPHAGOGASTRODUODENOSCOPY N/A 4/20/2024    Procedure: EGD (ESOPHAGOGASTRODUODENOSCOPY);  Surgeon: Ceasar Arias III, MD;  Location: [facility] ENDO;  Service: Endoscopy;  Laterality: N/A;    EYE SURGERY      KNEE ARTHROSCOPY Left     LAPAROTOMY, EXPLORATORY N/A 4/22/2024    Procedure: LAPAROTOMY, EXPLORATORY;  Surgeon: Jaden Barrios MD;  Location: [facility] OR;  Service: General;  Laterality: N/A;    LYSIS OF ADHESIONS  4/22/2024    Procedure: LYSIS, ADHESIONS;  Surgeon: Jaden Barrios MD;  Location: [facility] OR;  Service: General;;    TONSILLECTOMY         Social History     Socioeconomic History    Marital status:    Tobacco Use    Smoking status: Former    Smokeless tobacco: Never   Substance and Sexual Activity    Alcohol use: No    Drug use: No    Sexual activity: Never     Social Determinants of Health     Financial Resource Strain: Low Risk  (8/14/2023)    [facility]    Overall Financial Resource Strain (CARDIA)     Difficulty of Paying Living Expenses: Not hard at all   Food Insecurity: No Food Insecurity (8/14/2023)    [facility]    Hunger Vital Sign     Worried About Running Out of Food in the Last Year: Never true     Ran Out of Food in the Last Year: Never true   Transportation Needs: No Transportation Needs (8/14/2023)    [facility]    PRAPARE - Transportation     Lack of Transportation (Medical): No     Lack of Transportation (Non-Medical): No   Physical Activity: Insufficiently Active (8/14/2023)    [facility]    Exercise Vital Sign      Days of Exercise per Week: 7 days     Minutes of Exercise per Session: 10 min   Stress: No Stress Concern Present (2023)    Received from [facility] and [facility]    [employer] - Occupational Stress Questionnaire     Feeling of Stress : Only a little   Housing Stability: Low Risk  (2023)    Received from [facility], [facility] and [facility]    Housing Stability Vital Sign     Unable to Pay for Housing in the Last Year: No     Number of Places Lived in the Last Year: 1     In the last 12 months, was there a time when you did not have a steady place to sleep or slept in a shelter (including now)?: No       Family History   Problem Relation Name Age of Onset    Breast cancer Paternal Grandmother  70    Colon cancer Mother              Cancer Daughter          crohns    Ovarian cancer Daughter      Cancer Daughter  20        uterine cancer with mets s/p chemo/rad/surgery; ?genetic mutation; now with hip tumors    Breast cancer Maternal Grandmother         Review of patient's allergies indicates:   Allergen Reactions    Statins-hmg-coa reductase inhibitors Swelling    Metformin Other (See Comments)     metallic taste , burn of the esophagus       Current Outpatient Medications:     acetaminophen (TYLENOL) 500 MG tablet, Take 2 tablets (1,000 mg total) by mouth every 8 (eight) hours as needed for Pain., Disp: 30 tablet, Rfl: 0    aspirin 81 MG Chew, Take 81 mg by mouth once daily., Disp: , Rfl:     diltiaZEM (CARDIZEM CD) 120 MG Cp24, Take 1 capsule (120 mg total) by mouth once daily., Disp: 30 capsule, Rfl: 11    ELIQUIS 5 mg Tab, TAKE 1 TABLET BY MOUTH TWICE A DAY (Patient taking differently: Take 5 mg by mouth 2 (two) times daily.), Disp: 60 tablet, Rfl: 4    ergocalciferol (ERGOCALCIFEROL) 50,000 unit Cap, Take 50,000 Units by mouth every 7  days. Fridays, Disp: , Rfl:     ezetimibe (ZETIA) 10 mg tablet, Take 10 mg by mouth once daily., Disp: , Rfl:     glimepiride (AMARYL) 2 MG tablet, Take 2 mg by mouth every morning. Taking 2 tabs in am, Disp: , Rfl:     hydroCHLOROthiazide (HYDRODIURIL) 25 MG tablet, Take 25 mg by mouth every Mon, Wed, Fri., Disp: , Rfl:     Lactobacillus rhamnosus GG (CULTURELLE) 10 billion cell capsule, Take 1 capsule by mouth once daily., Disp: , Rfl:     melatonin 1 mg TbDL, Take by mouth., Disp: , Rfl:     pantoprazole (PROTONIX) 40 MG tablet, Take 40 mg by mouth every morning., Disp: , Rfl:     potassium chloride (MICRO-K) 10 MEQ CpSR, TAKE 1 CAPSULE BY MOUTH EVERY DAY (Patient taking differently: Take 10 mEq by mouth once daily.), Disp: 90 capsule, Rfl: 3    WESTAB MAX 2.5-25-2 mg Tab, TAKE 1 TABLET BY MOUTH EVERY DAY, Disp: 90 tablet, Rfl: 4    ramelteon (ROZEREM) 8 mg tablet, Take 1 tablet (8 mg total) by mouth every evening., Disp: 15 tablet, Rfl: 0  No current facility-administered medications for this visit.    Facility-Administered Medications Ordered in Other Visits:     electrolyte-S (ISOLYTE), , Intravenous, Continuous, Blade Packer MD    electrolyte-S (ISOLYTE), , Intravenous, Continuous, Blade Packer MD, Last Rate: 200 mL/hr at 12/29/23 1159, Rate Change at 12/29/23 1159    LIDOcaine (PF) 10 mg/ml (1%) injection 10 mg, 1 mL, Intradermal, Once, Blade Packer MD    midazolam (VERSED) 1 mg/mL injection 2 mg, 2 mg, Intravenous, PRN, Blade Packer MD, 2 mg at 12/29/23 0955    All medications and past history have been reviewed.    Review of Systems   Constitutional:  Negative for activity change, appetite change, fatigue and fever.   HENT:  Negative for congestion, mouth sores, postnasal drip, rhinorrhea, sinus pressure, sore throat and trouble swallowing.    Eyes:  Negative for photophobia and visual disturbance.   Respiratory:  Negative for cough, chest tightness, shortness of breath and  "wheezing.    Cardiovascular:  Negative for chest pain and leg swelling.   Gastrointestinal:  Positive for abdominal pain, constipation, diarrhea and nausea. Negative for abdominal distention and vomiting.   Endocrine: Negative for cold intolerance.   Genitourinary:  Negative for decreased urine volume, dysuria and frequency.   Musculoskeletal:  Negative for arthralgias and myalgias.   Skin:  Negative for pallor and rash.   Allergic/Immunologic: Negative for immunocompromised state.   Neurological:  Negative for dizziness, syncope, weakness, light-headedness, numbness and headaches.   Hematological:  Negative for adenopathy. Does not bruise/bleed easily.   Psychiatric/Behavioral:  Negative for sleep disturbance. The patient is not nervous/anxious.        Objective:        BP (!) 146/67   Pulse 75   Temp 97.2 °F (36.2 °C)   Resp 16   Ht 5' 8" (1.727 m)   Wt 94.5 kg (208 lb 4.8 oz)   BMI 31.67 kg/m²     Physical Exam  Constitutional:       Appearance: Normal appearance.   HENT:      Head: Normocephalic and atraumatic.      Mouth/Throat:      Mouth: Mucous membranes are moist.   Cardiovascular:      Rate and Rhythm: Normal rate and regular rhythm.      Pulses: Normal pulses.      Heart sounds: Normal heart sounds.   Pulmonary:      Effort: Pulmonary effort is normal. No respiratory distress.      Breath sounds: Normal breath sounds. No wheezing.   Abdominal:      General: There is no distension.      Palpations: Abdomen is soft. There is no mass.      Tenderness: There is abdominal tenderness.      Comments: Midline incision healing well.      Musculoskeletal:         General: No swelling. Normal range of motion.      Right lower leg: No edema.      Left lower leg: No edema.   Skin:     General: Skin is warm and dry.      Capillary Refill: Capillary refill takes 2 to 3 seconds.      Findings: No bruising or rash.   Neurological:      Mental Status: She is alert and oriented to person, place, and time. Mental " status is at baseline.      Motor: No weakness.   Psychiatric:         Mood and Affect: Mood normal.         Behavior: Behavior normal.                   Lab:  No results found for this or any previous visit (from the past 336 hour(s)).  CMP  Sodium   Date Value Ref Range Status   2024 130 (L) 136 - 145 mmol/L Final     Potassium   Date Value Ref Range Status   2024 3.7 3.5 - 5.1 mmol/L Final     Chloride   Date Value Ref Range Status   2024 94 (L) 95 - 110 mmol/L Final     CO2   Date Value Ref Range Status   2024 29 23 - 29 mmol/L Final     Glucose   Date Value Ref Range Status   2024 164 (H) 70 - 110 mg/dL Final     BUN   Date Value Ref Range Status   2024 16 8 - 23 mg/dL Final     Creatinine   Date Value Ref Range Status   2024 1.1 0.5 - 1.4 mg/dL Final     Calcium   Date Value Ref Range Status   2024 8.2 (L) 8.7 - 10.5 mg/dL Final     Total Protein   Date Value Ref Range Status   2024 6.2 6.0 - 8.4 g/dL Final     Albumin   Date Value Ref Range Status   2024 3.0 (L) 3.5 - 5.2 g/dL Final     Total Bilirubin   Date Value Ref Range Status   2024 0.5 0.1 - 1.0 mg/dL Final     Comment:     For infants and newborns, interpretation of results should be based  on gestational age, weight and in agreement with clinical  observations.    Premature Infant recommended reference ranges:  Up to 24 hours.............<8.0 mg/dL  Up to 48 hours............<12.0 mg/dL  3-5 days..................<15.0 mg/dL  6-29 days.................<15.0 mg/dL       Alkaline Phosphatase   Date Value Ref Range Status   2024 94 55 - 135 U/L Final     AST   Date Value Ref Range Status   2024 22 10 - 40 U/L Final     ALT   Date Value Ref Range Status   2024 29 10 - 44 U/L Final     Anion Gap   Date Value Ref Range Status   2024 7 (L) 8 - 16 mmol/L Final       Pathology:   Patient - LASHON ROSE                      - 1950 Sex - F   Med Rec # -  [id number]                            Dr Lilian Barrios, Jaden ROSARIO MD   The following is an electronic copy of report # [id number] from:   [facility]   [address] [place] [address]   Phone [phone]   DIAGNOSIS:   04/24/2024 RDC/tml   SEGMENTAL RIGHT COLON RESECTION:   - INVASIVE MODERATELY DIFFERENTIATED ADENOCARCINOMA.   - SURGICAL MARGINS ARE NEGATIVE FOR INVASIVE CARCINOMA   - MULTIPLE (3) TUBULAR ADENOMAS, NEGATIVE FOR HIGH GRADE DYSPLASIA OR    MALIGNANCY.   - METASTATIC CARCINOMA IN ONE OF THIRTY-FIVE REGIONAL LYMPH NODES.   - VERMIFORM APPENDIX WITH FIBROUS OBLITERATION OF LUMEN. Note: A    representative sample of the tumor will be submitted for    Immunohistochemistry (IHC) testing for Mismatch Repair (MMR) Proteins.    Addendum report will follow.   CASE SUMMARY FOR INVASIVE CARCINOMA OF THE COLON AND RECTUM, RESECTION    SPECIMEN:     PROCEDURE: Right hemicolectomy.   TUMOR SITE: Ascending colon.   HISTOLOGIC TYPE: Adenocarcinoma.   HISTOLOGIC GRADE: G2, moderately differentiated.   TUMOR SIZE: 3.5 cm.   MULTIPLE PRIMARY SITES: Not applicable.   TUMOR EXTENT: Invades into the muscularis propria.   MACROSCOPIC TUMOR PERFORATION: Not identified.   LYMPHATIC AND/OR VASCULAR INVASION: Present.   PERINEURAL INVASION: Not identified.   TUMOR BUDDING SCORE: Low (0-4).   TREATMENT EFFECT: No known presurgical therapy.   MARGIN STATUS FOR INVASIVE CARCINOMA: All margins negative for invasive    carcinoma.   DISTANCE FROM INVASIVE CARCINOMA TO RADIAL MARGIN: 4.0 cm.   MARGIN STATUS FOR NON-INVASIVE TUMOR: All margins negative for high    grade dysplasia / intramucosal carcinoma and low grade dysplasia.   REGIONAL LYMPH NODE STATUS: Tumor present in regional lymph nodes.   NUMBER OF LYMPH NODES WITH TUMOR: One.   NUMBER OF LYMPH NODES EXAMINED: Thirty-five.   TUMOR DEPOSITS: Not identified.   DISTANT METASTASIS: Not applicable.   PATHOLOGIC STAGE CLASSIFICATION BY AJCC 8TH EDITION: pT2:  "Tumor invades    the muscularis propria.    pN1a: One regional lymph node is positive.    pM: Not applicable.   ADDITIONAL STUDIES: Pending.   _______________________________________________________________________________________________________     SPECIMEN AND SOURCE:   Colon Tattoo Marks Tumor     CLINICAL INFORMATION:   Pre-op Diagnosis: GI bleed [K92.2]   Symptomatic anemia [D64.9]   Colonic mass [K63.89]     GROSS EXAMINATION:   04/23/2024 KL/WPL   Received in a formalin filled container labeled with the patient's name,    MRN, and designated "1. Colon tattoo marks tumor" is a partial    ileectomy with partial colectomy, and appendectomy consisting of a    segment of small bowel measuring approximately 5.5 cm in length by 2.7    cm in diameter, a segment of large bowel measuring approximately 35.3    cm in length by 5.6 cm in diameter, and an attached appendix measuring    approximately 6.1 cm in length by 0.5 cm in diameter. Both the proximal    and distal resection margins are sealed with single surgical staple    lines. The serosa of the small and large bowel is tan-pink smooth and    glistening with adherent tan-yellow lobular mesenteric tissue extending    up to 8.3 cm from the serosal surface and measuring up to 9.9 cm in    thickness. The small bowel mucosa is tan-pink with regular rugal folds    with no masses, lesions, or ulcers noted grossly. The large bowel    mucosa is noted to have a 3.5 x 3.0 x 0.8 cm tan-pink mass with heaping    serpiginous adjacent to an area of tattoo ink and located approximately    18.1 cm from proximal margin, 8.3 cm from distal margin, and 4.6 cm    from mesenteric margin. Sectioning reveals involvement of mucosa,    submucosa, muscularis propria involvement of the adjacent pericolonic    fat. Additionally, there are 2 firm tan-brown sessile polyps within the    ascending colon measuring 0.7 cm and 1.0 cm in greatest dimension    grossly extending approximately 2 cm " apart, 9.2 cm from the proximal    resection margin, 8.7 cm from the primary lesion, and 19.8 cm from the    distal margin. Lastly, there are 2 additional sessile polyp adjacent to    the distal margin measuring 0.3 cm and 0.4 cm in greatest dimension,    grossly 2.2 cm apart, 5.7 cm from the primary lesion, and approximately    1.5 cm from the distal resection margin. The remainder of the large    bowel mucosa is tan-pink with regular rugal folds and otherwise    unremarkable. The appendix has a smooth and glistening serosa with no    perforations or exudate noted grossly. Sectioning of the appendix shows    a tan-white parenchyma surrounding a lumen measuring less than 0.1 cm    in greatest dimension. The mesenteric tissue is thoroughly palpated to    reveal 46 possible lymph nodes measuring from 0.2 to 1.7 cm in greatest    dimension.     Representative sections are submitted as follows:     1A. Proximal margin, en face   1B. and 1C. Distal margin, en face   1D. Mesenteric margin, en face   1E. and 1F. Lesion with greatest extent of invasion   1G. Lesion with adjacent mucosa   1H. Ascending polyps, submitted entirely   1I. Distal polyps, submitted entirely   1J. Mucosa between polyps and primary lesion   1K. Grossly unremarkable mucosa   1L. Appendix   1M. 9 possible lymph nodes   1N. 8 possible lymph nodes   1O. 6 possible lymph nodes   1P. 6 possible lymph nodes   1Q. 5 possible lymph nodes   1R. 5 possible lymph node   1S. 2 possible lymph nodes, bisected, 1 inked   1T. 2 possible lymph nodes, bisected, 1 inked   1U. 1 possible lymph node, bisected   1V. 1 possible lymph node, bisected   1W. 1 possible grossly positive lymph node, trisected     MICROSCOPIC DESCRIPTION:   The microscopic examination was done, and the findings support the final    diagnosis.     CODE: 0     Pathologist: Beverly Nova MD, FCAP (Electronic Signature)    04/24/2024 03:48 PM .     [facility], [facility] * [address] [place] [address]  [facility]. * [place] [address].   Technical services performed at the following location(s): [facility] [facility], [facility] * [address] [place] [address] * [place] [address].   Gross examination performed at: [facility] [employer] [facility], [facility] * [address] [place] [address] * [place] [address].     Immunohistochemical stains for mismatch repair proteins are intended for    use as prognostic markers. They have not been validated as predictive    markers and should not be considered  diagnostic testing.     End of Report   Patient - LASHON ROSE                      - 1950 Sex - F   Med Rec # - [id number]                            Dr Lilian Barrios, Jaden ROSARIO MD   The following is an electronic copy of report # [id number] from:   [facility] [employer] [place] [address] [place] [address]   Phone [phone]   Addendum Report   2024 RDC/kb     Immunohistochemistry (IHC) Testing for Mismatch Repair (MMR) Proteins:     Results:     MLH1: Loss of nuclear expression   MSH2: Intact nuclear expression   MSH6: Intact nuclear expression   PMS2: Loss of nuclear expression   Background non-neoplastic tissue/internal control with intact nuclear    expression.     IHC Interpretation: Loss of nuclear expression of MLH1 and PMS2.     Testing for Methylation of the MLH1 Promotor and/or Mutation of BRAF is    indicated (the presence of a BRAF V600E mutation and/or MLH1    methylation suggests that the tumor is sporadic and germline evaluation    is probably not indicated; absence of both MLH1 methylation and of BRAF    V600E mutation suggests the possibility of Canseco Syndrome and    sequencing and/or large deletion/duplication testing of germline MLH1    may be indicated).     The specimen will be submitted for additional BRAF V600E and MLH1    methylation studies. Addendum report will follow.     Addendum Report Verified By Beverly Nova MD, FCAP on 2024    11:58 AM     [facility] [employer], [facility] * [address] * [place] [address]       THE ORIGINAL REPORT BELOW WAS VERIFIED BY Beverly Nova MD, FCAP ON    04/24/2024 03:48 PM     DIAGNOSIS:   04/24/2024 RDC/tml   SEGMENTAL RIGHT COLON RESECTION:   - INVASIVE MODERATELY DIFFERENTIATED ADENOCARCINOMA.   - SURGICAL MARGINS ARE NEGATIVE FOR INVASIVE CARCINOMA   - MULTIPLE (3) TUBULAR ADENOMAS, NEGATIVE FOR HIGH GRADE DYSPLASIA OR    MALIGNANCY.   - METASTATIC CARCINOMA IN ONE OF THIRTY-FIVE REGIONAL LYMPH NODES.   - VERMIFORM APPENDIX WITH FIBROUS OBLITERATION OF LUMEN.   Note: A representative sample of the tumor will be submitted for    Immunohistochemistry (IHC) testing for Mismatch Repair (MMR) Proteins.    Addendum report will follow.   CASE SUMMARY FOR INVASIVE CARCINOMA OF THE COLON AND RECTUM, RESECTION    SPECIMEN:   PROCEDURE: Right hemicolectomy.   TUMOR SITE: Ascending colon.   HISTOLOGIC TYPE: Adenocarcinoma.   HISTOLOGIC GRADE: G2, moderately differentiated.   TUMOR SIZE: 3.5 cm.   MULTIPLE PRIMARY SITES: Not applicable.   TUMOR EXTENT: Invades into the muscularis propria.   MACROSCOPIC TUMOR PERFORATION: Not identified.   LYMPHATIC AND/OR VASCULAR INVASION: Present.   PERINEURAL INVASION: Not identified.   TUMOR BUDDING SCORE: Low (0-4).   TREATMENT EFFECT: No known presurgical therapy.   MARGIN STATUS FOR INVASIVE CARCINOMA: All margins negative for invasive    carcinoma.   DISTANCE FROM INVASIVE CARCINOMA TO RADIAL MARGIN: 4.0 cm.   MARGIN STATUS FOR NON-INVASIVE TUMOR: All margins negative for high    grade dysplasia / intramucosal carcinoma and low grade dysplasia.   REGIONAL LYMPH NODE STATUS: Tumor present in regional lymph nodes.   NUMBER OF LYMPH NODES WITH TUMOR: One.   NUMBER OF LYMPH NODES EXAMINED: Thirty-five.   TUMOR DEPOSITS: Not identified.   DISTANT METASTASIS: Not applicable.   PATHOLOGIC STAGE CLASSIFICATION BY AJCC 8TH EDITION: pT2: Tumor invades    the muscularis propria.    pN1a: One regional lymph node is positive.    pM:  "Not applicable.   ADDITIONAL STUDIES: Pending.   _______________________________________________________________________________________________________     SPECIMEN AND SOURCE:   Colon Tattoo Marks Tumor     CLINICAL INFORMATION:   Pre-op Diagnosis: GI bleed [K92.2]   Symptomatic anemia [D64.9]   Colonic mass [K63.89]     GROSS EXAMINATION:   04/23/2024 KL/PARAML   Received in a formalin filled container labeled with the patient's name,    MRN, and designated "1. Colon tattoo marks tumor" is a partial    ileectomy with partial colectomy, and appendectomy consisting of a    segment of small bowel measuring approximately 5.5 cm in length by 2.7    cm in diameter, a segment of large bowel measuring approximately 35.3    cm in length by 5.6 cm in diameter, and an attached appendix measuring    approximately 6.1 cm in length by 0.5 cm in diameter. Both the proximal    and distal resection margins are sealed with single surgical staple    lines. The serosa of the small and large bowel is tan-pink smooth and    glistening with adherent tan-yellow lobular mesenteric tissue extending    up to 8.3 cm from the serosal surface and measuring up to 9.9 cm in    thickness. The small bowel mucosa is tan-pink with regular rugal folds    with no masses, lesions, or ulcers noted grossly. The large bowel    mucosa is noted to have a 3.5 x 3.0 x 0.8 cm tan-pink mass with heaping    serpiginous adjacent to an area of tattoo ink and located approximately    18.1 cm from proximal margin, 8.3 cm from distal margin, and 4.6 cm    from mesenteric margin. Sectioning reveals involvement of mucosa,    submucosa, muscularis propria involvement of the adjacent pericolonic    fat. Additionally, there are 2 firm tan-brown sessile polyps within the    ascending colon measuring 0.7 cm and 1.0 cm in greatest dimension    grossly extending approximately 2 cm apart, 9.2 cm from the proximal    resection margin, 8.7 cm from the primary lesion, and 19.8 cm " from the    distal margin. Lastly, there are 2 additional sessile polyp adjacent to    the distal margin measuring 0.3 cm and 0.4 cm in greatest dimension,    grossly 2.2 cm apart, 5.7 cm from the primary lesion, and approximately    1.5 cm from the distal resection margin. The remainder of the large    bowel mucosa is tan-pink with regular rugal folds and otherwise    unremarkable. The appendix has a smooth and glistening serosa with no    perforations or exudate noted grossly. Sectioning of the appendix shows    a tan-white parenchyma surrounding a lumen measuring less than 0.1 cm    in greatest dimension. The mesenteric tissue is thoroughly palpated to    reveal 46 possible lymph nodes measuring from 0.2 to 1.7 cm in greatest    dimension.     Representative sections are submitted as follows:     1A. Proximal margin, en face   1B. and 1C. Distal margin, en face   1D. Mesenteric margin, en face   1E. and 1F. Lesion with greatest extent of invasion   1G. Lesion with adjacent mucosa   1H. Ascending polyps, submitted entirely   1I. Distal polyps, submitted entirely   1J. Mucosa between polyps and primary lesion   1K. Grossly unremarkable mucosa   1L. Appendix   1M. 9 possible lymph nodes   1N. 8 possible lymph nodes   1O. 6 possible lymph nodes   1P. 6 possible lymph nodes   1Q. 5 possible lymph nodes   1R. 5 possible lymph node   1S. 2 possible lymph nodes, bisected, 1 inked   1T. 2 possible lymph nodes, bisected, 1 inked   1U. 1 possible lymph node, bisected   1V. 1 possible lymph node, bisected   1W. 1 possible grossly positive lymph node, trisected     MICROSCOPIC DESCRIPTION:   The microscopic examination was done, and the findings support the final    diagnosis.     CODE: 0     Pathologist: Beverly Nova MD, FCAP (Electronic Signature)    04/24/2024 03:48 PM .     [facility], [facility] * [address] [place] [address]. * [place] [address].   Technical services performed at the following location(s): [facility], [facility] * [address] * [place] [address].   Gross examination performed at: [facility], [facility] * [address] * [place] [address].     Immunohistochemical stains for mismatch repair proteins are intended for    use as prognostic markers. They have not been validated as predictive    markers and should not be considered  diagnostic testing.       End of Report   Patient - LASHON ROSE - 1950 Sex - F   Med Rec # - [id number]                            [place] Drake MD   The following is an electronic copy of report # [id number] from:   [facility]   [address] [place] [address]   Phone [phone]   Addendum Report   2024 RDC:aa     Please see MLH1 Promoter Methylation Analysis and BRAF Mutation Analysis    by PCR results at the bottom of this report.     Addendum Report Verified for Beverly Nova MD, FCAP by Joe Pena MD on 2024 02:21 PM     [facility], [facility] * [address] [facility]. * [place] [address]         Addendum Report   2024 RDC/kb     Immunohistochemistry (IHC) Testing for Mismatch Repair (MMR) Proteins:     Results:     MLH1: Loss of nuclear expression   MSH2: Intact nuclear expression   MSH6: Intact nuclear expression   PMS2: Loss of nuclear expression   Background non-neoplastic tissue/internal control with intact nuclear    expression.     IHC Interpretation: Loss of nuclear expression of MLH1 and PMS2.     Testing for Methylation of the MLH1 Promotor and/or Mutation of BRAF is    indicated (the presence of a BRAF V600E mutation and/or MLH1    methylation suggests that the tumor is sporadic and germline evaluation    is probably not indicated; absence of both MLH1 methylation and of BRAF    V600E mutation suggests the possibility of Canseco Syndrome and    sequencing and/or large deletion/duplication testing of germline MLH1    may be indicated).      The specimen will be submitted for additional BRAF V600E and MLH1    methylation studies. Addendum report will follow.     Addendum Report Verified by Beverly Nova MD, EMERSON on 04/25/2024    11:59 AM     [facility], [facility] * [address]. * [place] [address]        THE ORIGINAL REPORT BELOW WAS VERIFIED BY Beverly Nova MD, EMERSON ON    04/24/2024 03:48 PM     DIAGNOSIS:   04/24/2024 RDC/tml   SEGMENTAL RIGHT COLON RESECTION:   - INVASIVE MODERATELY DIFFERENTIATED ADENOCARCINOMA.   - SURGICAL MARGINS ARE NEGATIVE FOR INVASIVE CARCINOMA   - MULTIPLE (3) TUBULAR ADENOMAS, NEGATIVE FOR HIGH GRADE DYSPLASIA OR    MALIGNANCY.   - METASTATIC CARCINOMA IN ONE OF THIRTY-FIVE REGIONAL LYMPH NODES.   - VERMIFORM APPENDIX WITH FIBROUS OBLITERATION OF LUMEN.   Note: A representative sample of the tumor will be submitted for    Immunohistochemistry (IHC) testing for Mismatch Repair (MMR) Proteins.    Addendum report will follow.   CASE SUMMARY FOR INVASIVE CARCINOMA OF THE COLON AND RECTUM, RESECTION    SPECIMEN:   PROCEDURE: Right hemicolectomy.   TUMOR SITE: Ascending colon.   HISTOLOGIC TYPE: Adenocarcinoma.   HISTOLOGIC GRADE: G2, moderately differentiated.   TUMOR SIZE: 3.5 cm.   MULTIPLE PRIMARY SITES: Not applicable.   TUMOR EXTENT: Invades into the muscularis propria.   MACROSCOPIC TUMOR PERFORATION: Not identified.   LYMPHATIC AND/OR VASCULAR INVASION: Present.   PERINEURAL INVASION: Not identified.   TUMOR BUDDING SCORE: Low (0-4).   TREATMENT EFFECT: No known presurgical therapy.   MARGIN STATUS FOR INVASIVE CARCINOMA: All margins negative for invasive    carcinoma.   DISTANCE FROM INVASIVE CARCINOMA TO RADIAL MARGIN: 4.0 cm.   MARGIN STATUS FOR NON-INVASIVE TUMOR: All margins negative for high    grade dysplasia / intramucosal carcinoma and low grade dysplasia.   REGIONAL LYMPH NODE STATUS: Tumor present in regional lymph nodes.   NUMBER OF LYMPH NODES WITH TUMOR: One.   NUMBER OF LYMPH NODES  "EXAMINED: Thirty-five.   TUMOR DEPOSITS: Not identified.   DISTANT METASTASIS: Not applicable.   PATHOLOGIC STAGE CLASSIFICATION BY AJCC 8TH EDITION: pT2: Tumor invades    the muscularis propria.    pN1a: One regional lymph node is positive.    pM: Not applicable.   ADDITIONAL STUDIES: Pending.   _______________________________________________________________________________________________________     SPECIMEN AND SOURCE:   Colon Tattoo Marks Tumor     CLINICAL INFORMATION:   Pre-op Diagnosis: GI bleed [K92.2]   Symptomatic anemia [D64.9]   Colonic mass [K63.89]     GROSS EXAMINATION:   04/23/2024 KL/WPL   Received in a formalin filled container labeled with the patient's name,    MRN, and designated "1. Colon tattoo marks tumor" is a partial    ileectomy with partial colectomy, and appendectomy consisting of a    segment of small bowel measuring approximately 5.5 cm in length by 2.7    cm in diameter, a segment of large bowel measuring approximately 35.3    cm in length by 5.6 cm in diameter, and an attached appendix measuring    approximately 6.1 cm in length by 0.5 cm in diameter. Both the proximal    and distal resection margins are sealed with single surgical staple    lines. The serosa of the small and large bowel is tan-pink smooth and    glistening with adherent tan-yellow lobular mesenteric tissue extending    up to 8.3 cm from the serosal surface and measuring up to 9.9 cm in    thickness. The small bowel mucosa is tan-pink with regular rugal folds    with no masses, lesions, or ulcers noted grossly. The large bowel    mucosa is noted to have a 3.5 x 3.0 x 0.8 cm tan-pink mass with heaping    serpiginous adjacent to an area of tattoo ink and located approximately    18.1 cm from proximal margin, 8.3 cm from distal margin, and 4.6 cm    from mesenteric margin. Sectioning reveals involvement of mucosa,    submucosa, muscularis propria involvement of the adjacent pericolonic    fat. Additionally, there are 2 " firm tan-brown sessile polyps within the    ascending colon measuring 0.7 cm and 1.0 cm in greatest dimension    grossly extending approximately 2 cm apart, 9.2 cm from the proximal    resection margin, 8.7 cm from the primary lesion, and 19.8 cm from the    distal margin. Lastly, there are 2 additional sessile polyp adjacent to    the distal margin measuring 0.3 cm and 0.4 cm in greatest dimension,    grossly 2.2 cm apart, 5.7 cm from the primary lesion, and approximately    1.5 cm from the distal resection margin. The remainder of the large    bowel mucosa is tan-pink with regular rugal folds and otherwise    unremarkable. The appendix has a smooth and glistening serosa with no    perforations or exudate noted grossly. Sectioning of the appendix shows    a tan-white parenchyma surrounding a lumen measuring less than 0.1 cm    in greatest dimension. The mesenteric tissue is thoroughly palpated to    reveal 46 possible lymph nodes measuring from 0.2 to 1.7 cm in greatest    dimension.     Representative sections are submitted as follows:     1A. Proximal margin, en face   1B. and 1C. Distal margin, en face   1D. Mesenteric margin, en face   1E. and 1F. Lesion with greatest extent of invasion   1G. Lesion with adjacent mucosa   1H. Ascending polyps, submitted entirely   1I. Distal polyps, submitted entirely   1J. Mucosa between polyps and primary lesion   1K. Grossly unremarkable mucosa   1L. Appendix   1M. 9 possible lymph nodes   1N. 8 possible lymph nodes   1O. 6 possible lymph nodes   1P. 6 possible lymph nodes   1Q. 5 possible lymph nodes   1R. 5 possible lymph node   1S. 2 possible lymph nodes, bisected, 1 inked   1T. 2 possible lymph nodes, bisected, 1 inked   1U. 1 possible lymph node, bisected   1V. 1 possible lymph node, bisected   1W. 1 possible grossly positive lymph node, trisected     MICROSCOPIC DESCRIPTION:   The microscopic examination was done, and the findings support the final    diagnosis.      CODE: 0     Pathologist: Beverly Nova MD, FCAP (Electronic Signature)    04/24/2024 03:48 PM .     [facility], [facility] * [address] [facility]. * [place] [address].   Technical services performed at the following location(s): [facility], [facility] * [address] [facility] [address] * [place] [address].   Gross examination performed at: [facility], [facility] * [address] [place] [address] * [place] [address].     Immunohistochemical stains for mismatch repair proteins are intended for    use as prognostic markers. They have not been validated as predictive    markers and should not be considered  diagnostic testing.         ADDENDUM:   05/04/2024 C:aa     MLH1 Promoter Methylation Analysis: Detected   MLH1 Methylation (%): 66.6     Clinical Significance:   The MLH1 gene, which is frequently mutated in Canseco syndrome (hereditary    nonpolyposis colon cancer), can also be methylated (hypermethylated) in    its promoter region in sporadic cancers of colon, endometrium and other    sites. Methylation of the promoter of the MLH1 gene results in    silencing of its expression and effects similar to those seen when it    is mutated.     Positivity for methylation of CpG island of MLH1 promoter has been    detected in the majority of sporadic colorectal and endometrial cancers    that show high microsatellite instability or loss of MLH1 and PMS2 by    immunohistochemistry.     MLH1 promoter methylation analysis is recommended for patients with    microsatellite instability (MSI) or abnormal mismatch repair IHC    results. The significance of promoter methylation in microsatellite    stable/MLH1 expressing tumor is not known. MLH1 promoter methylation    has been reported in 20% of colon cancer and about 15% of endometrial    cancers. Literature suggests that the presence of MLH1 promoter    methylation in colon or endometrial cancer suggests that the tumor is    less likely to be due to Canseco  syndrome and more likely to be a    sporadic cancer. However, there is no consensus as to the lowest level    of methylation necessary to cause inactivation of MLH1. If there is    high clinical suspicion for Canseco Syndrome and the tumor has low level    methylation ( and lt;10%), then consideration of germline testing for    MLH1 mutation is suggested.     The Accessioning Component of this test was performed at [facility] ,    [address] [place], [place] / [address] / [phone] / CLIA #    [id number] / (s): Dinorah Darby MD. The Technical Component Processing, Analysis and    Professional Component of this test was completed at [facility]    [address] / [address] / [phone] / CLIA   # [id number] / (s): Joel Stratton M.D.     BRAF Mutation Analysis by PCR     Test Result   BRAF Mutation    BRAF V600E Detected    BRAF V600K Not Detected    BRAF V600D Not Detected    BRAF V600R Not Detected    BRAF V600G Not Detected     The NCCN panel recommends BRAF genotyping of tumor tissue to guide    management decisions in metastatic colorectal cancer. Combination    therapy with BRAF and MEK inhibitors has been approved by FDA for    melanoma with BRAF V600 mutation, non-small cell lung cancer, and    anaplastic thyroid cancer with BRAF V600E mutation. More recently, the    combination of the BRAF inhibitor, dabrafenib, and the MEK inhibitor,    trametinib, has been approved as a tissue agnostic therapy for BRAF    V600E positive unresectable and metastatic solid tumors with the    exception of colorectal cancer.     The Accessioning Component of this test was performed at [facility] ,    [address] [place] [place], [place] / [address] / [phone] / CLIA #    [id number] / (s): Dinorah Darby MD. The Technical    Component Processing, Analysis and Professional Component of this test    was completed at  [facility], [address] [place] [address], [place], [place] /    [address] / [phone] / IA # [id number] / (s):    Joel Stratton M.D. Interpretation Code(s): [id number]     End of Report         Radiology/Diagnostic Studies:  No results found for this or any previous visit (from the past 2160 hour(s)).  Results for orders placed or performed during the hospital encounter of 04/19/24 (from the past 2160 hour(s))   CT Abdomen Pelvis  Without Contrast    Narrative    CMS MANDATED QUALITY DATA - CT RADIATION - 436    All CT scans at this facility utilize dose modulation, iterative reconstruction, and/or weight based dosing when appropriate to reduce radiation dose to as low as reasonably achievable.    EXAMINATION:  CT ABDOMEN PELVIS WITHOUT CONTRAST    CLINICAL HISTORY:  Abdominal abscess/infection suspected;    TECHNIQUE:  CT abdomen and pelvis without IV or oral contrast. Study is tailored for detection of urinary tract calculi and evaluation of solid organs, hollow viscera, and vascular structures is limited.    COMPARISON:  04/19/2024    FINDINGS:  CT Abdomen:    Atelectasis in the lung bases.    The liver, spleen and pancreas have a normal noncontrast appearance.  There is no focal mass or biliary duct dilatation    Gallbladder is absent    Adrenal glands are normal    Kidneys are symmetric in size with bilateral parapelvic cysts.  There is no hydronephrosis.    Bowel: Patient has had interval right hemicolectomy.  There is minimal free fluid in the  right-sided the abdomen.  There are no thick-walled or dilated bowel loops.    There is no mesenteric or retroperitoneal adenopathy.    Aorta is normal in caliber with diffuse vascular calcification    CT Pelvis:    The uterus and ovaries and bladder are normal.  There is no adenopathy.  There are no acute osseous abnormalities.      Impression    Status post right hemicolectomy with minimal free fluid in the right-sided the  abdomen.    Atelectasis lung bases    Prior cholecystectomy    Parapelvic renal cysts      Electronically signed by: Monika Vance  Date:    04/30/2024  Time:    10:31   CT Abdomen Pelvis With IV Contrast NO Oral Contrast    Narrative    CMS MANDATED QUALITY DATA - CT RADIATION - 436    All CT scans at this facility utilize dose modulation, iterative reconstruction, and/or weight based dosing when appropriate to reduce radiation dose to as low as reasonably achievable.    EXAMINATION:  CT ABDOMEN PELVIS WITH IV CONTRAST    CLINICAL HISTORY:  GI bleed;LLQ abdominal pain;    TECHNIQUE:  CT abdomen and pelvis with 100 mL Omnipaque 350.    COMPARISON:  CT abdomen pelvis 09/30/2014.    FINDINGS:  There is bilateral dependent subsegmental atelectasis.  Heart size is normal.    Liver is normal size.  No enhancing hepatic lesions identified.  Gallbladder has been removed.  The pancreas, spleen and adrenal glands are unremarkable.  The kidneys are normal size.  Bilateral parapelvic renal cysts again noted.  No hydronephrosis or nephrolithiasis.  The ureters are normal caliber without obstructions.  The bladder is fluid filled with no focal wall abnormalities.  The uterus and adnexal structures are unremarkable.  No free fluid in the pelvis.    The large and small bowel are normal caliber.  The appendix is not identified.  There is no bowel wall thickening or inflammatory changes.  The stomach is mostly decompressed and grossly unremarkable.    The abdominal aorta is normal caliber with atherosclerosis.  There are no enlarged intra-abdominal lymph nodes.  No mesenteric fat stranding and free fluid.  The ventral abdominal wall is unremarkable.  There is no acute osseous abnormality.      Impression    No acute intra-abdominal abnormality observed.      Electronically signed by: Gary Cam  Date:    04/19/2024  Time:    16:07   CTA Chest Non-Coronary (PE Studies)    Narrative    CMS MANDATED QUALITY DATA - CT RADIATION -  436    All CT scans at this facility utilize dose modulation, iterative reconstruction, and/or weight based dosing when appropriate to reduce radiation dose to as low as reasonably achievable.    EXAMINATION:  CTA CHEST NON CORONARY (PE STUDIES)    CLINICAL HISTORY:  Pulmonary embolism (PE) suspected, high prob;    TECHNIQUE:  CT angiography of the chest with 100 mL Omnipaque 350. Maximum intensity projection coronal reformations were created at a separate workstation and stored in the patient's permanent medical record.    COMPARISON:  None    FINDINGS:  No pulmonary embolism identified.  Heart size is normal.  The thoracic aorta is normal caliber with mild atherosclerosis.    The central tracheobronchial tree is patent.  There is bilateral dependent subsegmental atelectasis.  No pleural effusion or consolidation.    The visualized abdominal viscera are unremarkable.  There are degenerative changes of the spine.  There is no acute osseous abnormality.      Impression    No acute cardiopulmonary abnormality observed.  No pulmonary embolism.      Electronically signed by: Gary Cam  Date:    04/19/2024  Time:    15:59   Results for orders placed or performed during the hospital encounter of 03/07/24 (from the past 2160 hour(s))   CTA Head and Neck (xpd)    Narrative    CMS MANDATED QUALITY DATA-CT RADIATION DOSE-436, CAROTID-195  All CT scans at this facility use dose modulation, iterative reconstruction, and or weight based dosing when appropriate, to reduce radiation dose to as low as reasonably achievable. NASCET criteria were utilized for evaluation of carotid arterial stenosis.    HISTORY: Neuro deficit, acute, stroke suspected    FINDINGS: Thin axial imaging through the head and neck was performed with 100 mL Omnipaque 350 IV contrast, with sagittal and coronal reformatted images and MIP reconstructions performed, and images stored in the patient's permanent electronic medical record. No prior studies for  comparison.    CTA HEAD: The distal cervical, petrous, cavernous and supraclinoid segments of the internal carotid arteries are widely patent, with mild calcified plaque of the carotid siphons. The intracranial segment of diminutive distal right vertebral artery is not well visualized and probably terminates in PICA, with dominant left vertebral artery and basilar artery widely patent.    The bilateral anterior, middle and posterior cerebral arteries are widely patent and taper appropriately, with no intracranial aneurysm or vascular malformation. The visualized dural venous sinuses and cortical veins enhance normally.    CTA NECK: The aortic arch and arch vessels are widely patent, with mild calcified plaque. There is incidental bovine arch variant anatomy, with the visualized subclavian arteries widely patent. The bilateral common carotid arteries are widely patent.    There is calcified and soft plaque of the carotid bulbs and ICA origins, with the internal carotid arteries widely patent. The external carotid arteries and branches are patent. The vertebral arteries arise normally from the subclavian arteries, and are widely patent, with the left vertebral artery dominant and the right vertebral artery severely developed mentally diminutive. There is no arterial dissection or aneurysm.    The cervical soft tissues enhance normally. There are multiple thyroid nodules, including 16 mm heterogeneously enhancing nodule arising from the left thyroid lobe. The superior mediastinum enhances normally, with the visualized upper lungs clear. No acute fractures or destructive osseous lesions.    IMPRESSION:  1. No significant abnormality of the intracranial arterial vasculature.  2. Atheromatous plaque of the carotid bulbs and ICA origins, with no carotid arterial stenosis or occlusion.  3. Widely patent dominant left vertebral artery, with severely developmentally diminutive right vertebral artery.  4. Multiple thyroid  nodules, the largest in the left thyroid lobe 16 mm. Further evaluation with nonemergent outpatient thyroid ultrasound is recommended.    Electronically signed by:  Artemio Ray MD  03/07/2024 02:21 PM CST Workstation: [id number]   CT HEAD FOR STROKE    Narrative    CT HEAD WITHOUT CONTRAST    CMS MANDATED QUALITY DATA - CT RADIATION  436    All CT scans at this facility utilize dose modulation, iterative reconstruction, and/or weight based dosing when appropriate to reduce radiation dose to as low as reasonably achievable    Clinical data: Neurologic deficit, stroke suspected. Left arm weakness.    FINDINGS: Noninfusion images were obtained from the skull base to the vertex. There is no intracranial mass, hemorrhage, or midline shift. Ventricles and sulci are normal. There are no pathologic extra-axial fluid collections. There is no evidence of ischemic change or edema. Cerebellum and brainstem are normal.    Mild atherosclerotic calcification of the intracranial internal carotid arteries is noted.    The calvarium is intact.  There is mild mucoperiosteal thickening in the left sphenoid sinus.    IMPRESSION:    1. No acute intracranial abnormalities.    Findings were called to Dr. Buenrostro at 1:57 PM on March 7, 2024.    Electronically signed by:  Angelito Allen MD  03/07/2024 01:57 PM CST Workstation: [id number]         All lab results and imaging results have been reviewed and discussed with the patient.   Assessment/Plan:       1. Colon adenocarcinoma    2. MTHFR mutation    3. Deep vein thrombosis (DVT) of left lower extremity, unspecified chronicity, unspecified vein    4. Insomnia due to medical condition      Colon adenocarcinoma    MTHFR mutation    Deep vein thrombosis (DVT) of left lower extremity, unspecified chronicity, unspecified vein    Insomnia due to medical condition  -     ramelteon (ROZEREM) 8 mg tablet; Take 1 tablet (8 mg total) by mouth every evening.  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 Felder. And 3 weeks with me.     I have explained and the patient understands all of  the current recommendation(s). I have answered all of their questions to the best of my ability and to their complete satisfaction.   The patient is to continue with the current management plan.        [facility] phone number for the patient to call to schedule an appt.     [phone] option 1    Electronically signed by: Sharmin Ortega, RD,APRN,AGNP-C

## 2024-05-30 ENCOUNTER — HOSPITAL ENCOUNTER (OUTPATIENT)
Dept: RADIOLOGY | Facility: HOSPITAL | Age: 74
Discharge: HOME | End: 2024-05-30
Attending: INTERNAL MEDICINE
Payer: MEDICARE

## 2024-05-30 ENCOUNTER — OFFICE VISIT (OUTPATIENT)
Facility: CLINIC | Age: 74
End: 2024-05-30
Payer: MEDICARE

## 2024-05-30 VITALS
RESPIRATION RATE: 18 BRPM | WEIGHT: 207.69 LBS | DIASTOLIC BLOOD PRESSURE: 70 MMHG | TEMPERATURE: 97 F | SYSTOLIC BLOOD PRESSURE: 125 MMHG | HEART RATE: 77 BPM | HEIGHT: 68 IN | BODY MASS INDEX: 31.48 KG/M2

## 2024-05-30 VITALS — WEIGHT: 209 LBS | HEIGHT: 68 IN | BODY MASS INDEX: 31.67 KG/M2

## 2024-05-30 DIAGNOSIS — I82.402 DEEP VEIN THROMBOSIS (DVT) OF LEFT LOWER EXTREMITY, UNSPECIFIED CHRONICITY, UNSPECIFIED VEIN: Primary | ICD-10-CM

## 2024-05-30 DIAGNOSIS — C18.2 MALIGNANT NEOPLASM OF ASCENDING COLON: ICD-10-CM

## 2024-05-30 DIAGNOSIS — C18.9 PRIMARY COLON CANCER WITH METASTASIS TO 1 REGIONAL LYMPH NODE (N1A): ICD-10-CM

## 2024-05-30 DIAGNOSIS — C77.2 PRIMARY COLON CANCER WITH METASTASIS TO 1 REGIONAL LYMPH NODE (N1A): ICD-10-CM

## 2024-05-30 LAB — GLUCOSE SERPL-MCNC: 82 MG/DL (ref 70–110)

## 2024-05-30 PROCEDURE — 99214 OFFICE O/P EST MOD 30 MIN: CPT | Mod: PBBFAC,PN | Performed by: INTERNAL MEDICINE

## 2024-05-30 PROCEDURE — 99999 PR PBB SHADOW E&M-EST. PATIENT-LVL IV: CPT | Mod: PBBFAC,,, | Performed by: INTERNAL MEDICINE

## 2024-05-30 PROCEDURE — 78815 PET IMAGE W/CT SKULL-THIGH: CPT | Mod: 26,PI,, | Performed by: RADIOLOGY

## 2024-05-30 PROCEDURE — 82962 GLUCOSE BLOOD TEST: CPT | Mod: PO

## 2024-05-30 PROCEDURE — A9552 F18 FDG: HCPCS | Mod: PO | Performed by: INTERNAL MEDICINE

## 2024-05-30 PROCEDURE — G2211 COMPLEX E/M VISIT ADD ON: HCPCS | Mod: S$PBB,,, | Performed by: INTERNAL MEDICINE

## 2024-05-30 PROCEDURE — 99214 OFFICE O/P EST MOD 30 MIN: CPT | Mod: S$PBB,,, | Performed by: INTERNAL MEDICINE

## 2024-05-30 PROCEDURE — 78815 PET IMAGE W/CT SKULL-THIGH: CPT | Mod: TC,PO

## 2024-05-30 RX ORDER — FLUDEOXYGLUCOSE F18 500 MCI/ML
12.1 INJECTION INTRAVENOUS
Status: COMPLETED | OUTPATIENT
Start: 2024-05-30 | End: 2024-05-30

## 2024-05-30 RX ADMIN — FLUDEOXYGLUCOSE F-18 12.1 MILLICURIE: 500 INJECTION INTRAVENOUS at 11:05

## 2024-05-30 NOTE — PROGRESS NOTES
[facility] [address] Hematology Oncology In Office Subsequent Encounter Note    5/30/24          Patient ID:   Jen Torres  73 y.o. female  1950  MD JESSICA Martinez MD, MD      Chief Complaint:   Recurrent leg clots    HPI:  73 y.o. female with 1 or 2 episodes of superficial venous clots involving the left leg.    Patient has history of falling off a porch, she has lumbar spine disease and low back pain symptoms with ski attic a. She is to see a chiropractor.  Other history includes a twisted left knee and torn meniscus.  She hit L leg, it was black and blue.  Check U/S of L leg now 12/2022.  Seeing Dr. Raz Jimenes for L knee pain.  Sample orders for lovenox bridge written and reviewed with her.  Await a definite surgical date, to decide on lovenox bridge dates.  She had L knee surgery 2/2023, now on eliquis 5 mg BID.    L rotator cuff surgery 12/29/23.  3/7/24 TIA.  Had Vertigo sx, SOB, L facial numbness and justa white in appearance, increased BP.  Now on Eliquis 5 mg 1 BID and ASA 81 mg 1 daily.  Expressive aphasia x's 2 months.    She has history of cancer of the cervix, a cyst on her ovary, and calcium deposits in the left breast.    She has history of asthma, type 2 diabetes, high cholesterol, hypertension, sleep apnea syndrome, GERD and dysphagia symptoms history of ulcer per Dr. Ritchie, she has a small bladder and arthritic symptoms in the fingers and knee.    Significantly around March of 2021 her medial lower knee area was swollen at the left leg and she was found to have a superficial clot.  Recently she had left leg fullness with increased edema and ultrasound showed superficial vein clot.  Will discuss with radiologist if the event of 2021 and 2022 likely represent the same clot or different clot events.    She has seen Dr. Guerrero of vascular surgery for venous evaluation.  I do not believe she has had any specific treatment or intervention as of yet.    She does not  smoke, she does not drink alcohol, she is allergic to statins with swelling and metformin with metallic taste and burning of the esophagus.    She has history of abnormal Pap smear with cryo procedure at age 21.  Other history includes GERD, hyperlipidemia, migraine headaches and biliary colic.    Medications include Trulicity, Amaryl, hydrochlorothiazide, Medrol Dosepak, Starlix, and Micro-K.    Her mother had colon cancer, and had been hit in the ankle area of the left leg and later developed a DVT and pulmonary embolus.  She had venous disease and venous stripping procedure.  Her dad had Alzheimer's disease.  Her aunt had colon cancer.  A paternal grandmother had breast cancer.  Another aunt had colitis, colon cancer irritable bowel syndrome.  A brother  of suicide, a brother  in MVA and another brother is alive and well.    Her daughter had Crohn's disease and ovarian cancer.  Another daughter had uterine cancer at age 20.    She has hx of L knee injury, needed elective arthroscopy.  On Elliquis 5 mg 1 BID.  On folbic.  Re check F8, fibrinogen, homocystine at [facility].  Recent U/S residual superficial clot seen in L leg veins.  She had surgery 25, with lovenox bridge.  Dr. Hatfield.    Repeat U/S of leg no DVT seen.  22.    She had UTI, now on nitrofurantoin antibiotic.    Hgb 14.1 to 11.5 to 10.6 to 9,8.  Hx of PUD, and black BM 1 month ago.  Eats 2 meals daily.  Check mamm,  check lab for anemia eval.  Ferritin 9.  EGD and colonoscopy 1 year ago.  Injectafer x's 2.  She did receive 1 of the injected for infusions and then was hospitalized with bright red blood per rectum     Hemoglobin had been 8.4 and ferritin had been 11.  She had GI evaluation and was found to have a mass in the colon she had partial colectomy and was found to have adenocarcinoma of the colon with 1 of 35 positive lymph nodes.  Dr. Stevenson was the surgeon of record.  She had been in rehab since the surgery and is now  returned home.  She is eating well and BM is beginning to firm up.  She does have a daughter with Crohn's disease and another child with IBS it her weight has gone from 194-198 lb she is 5 ft 8 in tall.    At this point she would appear to have clinical stage III disease.  CT scan did not show liver nodules or lymphadenopathy.  PET scan will be done at approximately 4 weeks postop.  CEA is 2.0.  Monitoring with CT DNA will be done at 4-6 weeks postop per Akanksha HERNANDEZ.     Estimated recurrence risk is 45-50% over the next 4-5 years.  Adjuvant treatment with FOLFOX or CAPOX would reduce her recurrence risk down to 20-25% over the next 4-5 years..  I reviewed the pathology report and this significance the extent of the tumor into or through the wall of the colon and the significance of positive lymphadenopathy in her stage and in assessment of recurrence risk.    I discussed with her the rationale of adjuvant treatment and reviewed with her the schema of FOLFOX and CAPOX over the next 3-6 months.    She tells me that her home situation is very severe, she has an invalid with her  and a son that is being treated for medical conditions, she describes it as a hoarder situation of the house.  She plans to move to her daughter's in [place] and have treatment if needed at [facility] and then return here after therapy has been completed.    PET Scan AUTUMN 5/30/24  Hgb 8.9 to 9.5, eGFR 54.  To [place].    ROS:   GEN: normal without any fever, night sweats or weight loss  HEENT: normal with no HA's, sore throat, stiff neck, changes in vision  CV: See HPI  PULM: See HPI  GI: See HPI  : normal with no hematuria, dysuria  BREAST: normal with no mass, discharge, pain  SKIN: See HPI     Past Medical History:   Diagnosis Date    Abnormal Pap smear     s/p cryo at 22 y/o    Anticoagulant long-term use     GERD (gastroesophageal reflux disease)     Hyperlipidemia     Migraine     Migraines     Sleep apnea     Can not use c-pap      Past Surgical History:   Procedure Laterality Date    ARTHROSCOPIC REPAIR OF ROTATOR CUFF OF SHOULDER Left 12/29/2023    Procedure: REPAIR, ROTATOR CUFF, ARTHROSCOPIC;  Surgeon: Gary Tony MD;  Location: [facility];  Service: Orthopedics;  Laterality: Left;  arthrex    ARTHROSCOPY OF SHOULDER WITH DECOMPRESSION OF SUBACROMIAL SPACE Left 12/29/2023    Procedure: ARTHROSCOPY, SHOULDER, WITH SUBACROMIAL SPACE DECOMPRESSION;  Surgeon: Gayr Tony MD;  Location: [facility];  Service: Orthopedics;  Laterality: Left;    CATARACT EXTRACTION, BILATERAL      CHOLECYSTECTOMY  10/01/2014    COLECTOMY, RIGHT  4/22/2024    Procedure: COLECTOMY, RIGHT;  Surgeon: Jaden Barrios MD;  Location: [facility];  Service: General;;    COLONOSCOPIC SURGICAL PROCEDURE  4/22/2024    Procedure: SURGICAL PROCEDURE, COLONOSCOPIC;  Surgeon: Jaden Barrios MD;  Location: [facility];  Service: General;;    COLONOSCOPY N/A 4/21/2024    Procedure: COLONOSCOPY;  Surgeon: Ceasar Arias III, MD;  Location: [facility];  Service: Endoscopy;  Laterality: N/A;    ESOPHAGOGASTRODUODENOSCOPY N/A 4/20/2024    Procedure: EGD (ESOPHAGOGASTRODUODENOSCOPY);  Surgeon: Ceasar Arias III, MD;  Location: [facility];  Service: Endoscopy;  Laterality: N/A;    EYE SURGERY      KNEE ARTHROSCOPY Left     LAPAROTOMY, EXPLORATORY N/A 4/22/2024    Procedure: LAPAROTOMY, EXPLORATORY;  Surgeon: Jaden Barrios MD;  Location: [facility];  Service: General;  Laterality: N/A;    LYSIS OF ADHESIONS  4/22/2024    Procedure: LYSIS, ADHESIONS;  Surgeon: Jaden Barrios MD;  Location: [facility];  Service: General;;    TONSILLECTOMY         Review of patient's allergies indicates:   Allergen Reactions    Statins-hmg-coa reductase inhibitors Swelling    Metformin Other (See Comments)     metallic taste , burn of the esophagus     Social History     Socioeconomic History    Marital status:    Tobacco Use    Smoking status: Former     Smokeless tobacco: Never   Substance and Sexual Activity    Alcohol use: No    Drug use: No    Sexual activity: Never     Social Determinants of Health     Financial Resource Strain: Low Risk  (8/14/2023)    [facility]    Overall Financial Resource Strain (CARDIA)     Difficulty of Paying Living Expenses: Not hard at all   Food Insecurity: No Food Insecurity (8/14/2023)    [facility]    Hunger Vital Sign     Worried About Running Out of Food in the Last Year: Never true     Ran Out of Food in the Last Year: Never true   Transportation Needs: No Transportation Needs (8/14/2023)    [facility]    PRAPARE - Transportation     Lack of Transportation (Medical): No     Lack of Transportation (Non-Medical): No   Physical Activity: Insufficiently Active (8/14/2023)    [facility]    Exercise Vital Sign     Days of Exercise per Week: 7 days     Minutes of Exercise per Session: 10 min   Stress: No Stress Concern Present (8/14/2023)    [facility]    [employer] [place] [employer] - Occupational Stress Questionnaire     Feeling of Stress : Only a little   Housing Stability: Low Risk  (8/14/2023)    [facility]    Housing Stability Vital Sign     Unable to Pay for Housing in the Last Year: No     Number of Places Lived in the Last Year: 1     In the last 12 months, was there a time when you did not have a steady place to sleep or  slept in a shelter (including now)?: No         Current Outpatient Medications:     acetaminophen (TYLENOL) 500 MG tablet, Take 2 tablets (1,000 mg total) by mouth every 8 (eight) hours as needed for Pain., Disp: 30 tablet, Rfl: 0    aspirin 81 MG Chew, Take 81 mg by mouth once daily., Disp: , Rfl:     diltiaZEM (CARDIZEM CD) 120 MG Cp24, Take 1 capsule (120 mg total) by mouth once daily., Disp: 30 capsule, Rfl: 11    ELIQUIS 5 mg Tab, TAKE 1 TABLET BY MOUTH TWICE A DAY (Patient taking differently: Take 5 mg by mouth 2 (two) times daily.), Disp: 60 tablet, Rfl: 4    ergocalciferol (ERGOCALCIFEROL) 50,000 unit Cap, Take 50,000 Units by mouth every 7 days. Fridays, Disp: , Rfl:     ezetimibe (ZETIA) 10 mg tablet, Take 10 mg by mouth once daily., Disp: , Rfl:     glimepiride (AMARYL) 2 MG tablet, Take 2 mg by mouth every morning. Taking 2 tabs in am, Disp: , Rfl:     hydroCHLOROthiazide (HYDRODIURIL) 25 MG tablet, Take 25 mg by mouth every Mon, Wed, Fri., Disp: , Rfl:     Lactobacillus rhamnosus GG (CULTURELLE) 10 billion cell capsule, Take 1 capsule by mouth once daily., Disp: , Rfl:     melatonin 1 mg TbDL, Take by mouth., Disp: , Rfl:     pantoprazole (PROTONIX) 40 MG tablet, Take 40 mg by mouth every morning., Disp: , Rfl:     potassium chloride (MICRO-K) 10 MEQ CpSR, TAKE 1 CAPSULE BY MOUTH EVERY DAY (Patient taking differently: Take 10 mEq by mouth once daily.), Disp: 90 capsule, Rfl: 3    WESTAB MAX 2.5-25-2 mg Tab, TAKE 1 TABLET BY MOUTH EVERY DAY, Disp: 90 tablet, Rfl: 4    ramelteon (ROZEREM) 8 mg tablet, TAKE 1 TABLET (8 MG TOTAL) BY MOUTH EVERY EVENING., Disp: 90 tablet, Rfl: 1  No current facility-administered medications for this visit.    Facility-Administered Medications Ordered in Other Visits:     electrolyte-S (ISOLYTE), , Intravenous, Continuous, Blade Packer MD    electrolyte-S (ISOLYTE), , Intravenous, Continuous, Blade Packer MD, Last Rate: 200 mL/hr at 12/29/23 1159, Rate Change at  "12/29/23 1159    LIDOcaine (PF) 10 mg/ml (1%) injection 10 mg, 1 mL, Intradermal, Once, Bldae Packer MD    midazolam (VERSED) 1 mg/mL injection 2 mg, 2 mg, Intravenous, PRN, Blade Packer MD, 2 mg at 12/29/23 0955          Objective:   Vitals:  Blood pressure 125/70, pulse 77, temperature 97.2 °F (36.2 °C), resp. rate 18, height 5' 8" (1.727 m), weight 94.2 kg (207 lb 11.2 oz).    Physical Examination:   GEN: no apparent distress, comfortable  HEAD: atraumatic and normocephalic  EYES: no pallor, no icterus  ENT:  no pharyngeal erythema, external ears WNL; no nasal discharge  NECK: no masses, thyroid normal, trachea midline, no LAD/LN's, supple  CV: RRR with no murmur; normal pulse; normal S1 and S2  CHEST: Normal respiratory effort; CTAB; normal breath sounds; no wheeze or crackles  ABDOM: nontender and nondistended; soft; L/S NP, no rebound/guarding  MUSC/Skeletal: ROM normal; no crepitus; joints normal  EXTREM: no clubbing, cyanosis, inflammation   SKIN: no rashes, lesions, ulcers, petechiae.  The left leg has chronic edema changes with prominent varicose veins, without palpable venous cord and is nontender.  The right leg is without edema, without prominent varicose veins and without palpable venous cord and is nontender.  She has qusdricepts weakness at L leg, Calf NT at L or R leg.    The midline incision site at the abdomen is healing.  Liver and spleen are not palpable, abdomen is nontender.  : no CVAT  NEURO: grossly intact; motor/sensory WNL;  no tremors  PSYCH: normal mood, affect and behavior  LYMPH: normal cervical, supraclavicular, axillary and groin LN's    CBC, CMP, TSH Negative    Ultrasound July 11, 2022 shows superficial venous thrombus in the mid and proximal greater saphenous vein, DVT is not seen.  Left leg.  Ultrasound Sept. 15, 2022 shows persistent superficial venous thrombus at calf of L leg.    Ultrasound report from March 4, 2020 shows no evidence of DVT.  Thrombosed " superficial lower extremity varices are noted, there is reflux from the origin of the greater saphenous vein to the knee.    Hypercoag. Eval. MTHFR mutation + -  -  -, [id number]  Homocystine 12.3.  Homocystine Now Nl. At 6.81.  Factor 8 increased 221.  Anticardiolipid AB increased IgG 25.    Mamm shows microcalcifications.  Stable since 2021.  Re check 1 year.    Assessment:    1. This individual has 1 or 2 episodes of thrombosis of the superficial veins of the left leg.      2. Hypercoag. Eval. Is with Positive results as above.    3. Will discuss with Dr. Guerrero to see if impedance plethismography  studies are available to evaluate her for venous insufficiency and to see if any procedure can be done to reduce the of varicosities at the L leg.?    4.On a trial of Folbic daily.  Continue Eliquis 5 mg BID.  Had TIA, now on added ASA 81 mg daily.    5.Homocystine level has now normalized.  She proceeded with knee surgery  with lovenox bridge and Eliquis management. 1/18/23.    6.Anemia eval.  PUD hx, black BM 1 month ago.  DURAN,+, Fatigue+  4/10 energy.  Fe deficiency ferritin 9.  Injectafer x's2.    7. Moderately differentiated adenocarcinoma of the ascending colon, status post right colectomy.  Grade 2 disease.  Primary tumor extended into but not through the muscular wall of the colon.  One of 35 lymph nodes were positive for metastatic disease.  MMR studies were intact.  Clinically this would be stage III disease.    8. PET scan AUTUMN.  Stage 3 dx.  To [facility] for adjuvant Rx and evaluation.    RTC here on return to [place].

## 2024-06-02 DIAGNOSIS — G47.01 INSOMNIA DUE TO MEDICAL CONDITION: ICD-10-CM

## 2024-06-03 RX ORDER — RAMELTEON 8 MG/1
8 TABLET ORAL NIGHTLY
Qty: 90 TABLET | Refills: 1 | Status: SHIPPED | OUTPATIENT
Start: 2024-06-03

## 2024-06-06 ENCOUNTER — DOCUMENTATION ONLY (OUTPATIENT)
Dept: REHABILITATION | Facility: HOSPITAL | Age: 74
End: 2024-06-06
Payer: MEDICARE

## 2024-06-06 NOTE — PROGRESS NOTES
[facility]  Physical Therapy Discharge Note    Name: Jen Torres  Clinic Number: [id number]    Therapy Diagnosis: No diagnosis found.  Physician: No ref. provider found    Physician Orders: physical therapy evaluation and treat; vestibular rehab therapy   Medical Diagnosis from Referral: balance disorder  Evaluation Date: 4/5/2024    Date of Last visit: 04/08/2024  Total Visits Received: 2      ASSESSMENT      Discharge reason: Patient has not attended therapy since 04/08/2024.    Goals:     Short Term Goals (3 Weeks):   Maintain patient's complaints of dizziness to less than 5/10 during performance of activities of daily living for independence of self care activities.  Patient to tolerate x 45 seconds full Romberg stance, eyes closed to improve upright tolerance.  Patient to begin adaptation home exercise program.     Long Term Goals (6 Weeks):   Patient to demonstrate competence with home exercise program to maintain therapeutic gains.  2.   Patient to ambulate 20 feet in less than 7 seconds to improve theresa/symmetry.  3.   Patient to perform floor ladder high stepping without loss of balance.      PLAN     This patient is discharged from Physical Therapy.      Delta Jett, PT

## 2024-06-17 ENCOUNTER — TELEPHONE (OUTPATIENT)
Facility: CLINIC | Age: 74
End: 2024-06-17
Payer: MEDICARE

## 2024-06-17 NOTE — TELEPHONE ENCOUNTER
Sharmin St Sabrina, NP-C reviewed patient's CTDNA results and per their verbal order, patient notified that results were Negative. Patient next due August 14, 2024. Patient verbalized understanding of above.

## 2024-07-29 PROBLEM — K92.2 GI BLEED: Status: RESOLVED | Noted: 2024-04-19 | Resolved: 2024-07-29

## 2024-08-06 ENCOUNTER — TELEPHONE (OUTPATIENT)
Dept: HEMATOLOGY/ONCOLOGY | Facility: CLINIC | Age: 74
End: 2024-08-06
Payer: MEDICARE

## 2024-08-08 NOTE — PROGRESS NOTES
[facility] Medicine  Progress Note    Patient Name: Jen Torres  MRN: [id number]  Patient Class: IP- Inpatient   Admission Date: 4/19/2024  Length of Stay: 9 days  Attending Physician: Ronal Mcnulty MD  Primary Care Provider: Sam Garcia IV, MD        Subjective:     Principal Problem:Colon adenocarcinoma        HPI:  Patient is a 73 old female medical history of anemia, GERD, hyperlipidemia, and migraines.  Patient presents to the ED today with complaints of worsening fatigue shortness for breath worse on exertion, and intermittent chest pain on exertion.  Patient was seen in GI office earlier today, thus sent into the ED for further evaluation.  Patient was found to have hemoglobin of 7.4, hematocrit 24.5.  1 unit blood ordered in the ED for transfusion.  Patient just had recent iron infusion on Wednesday.  Patient denies previous history of blood transfusions in the past.  Patient has had rectal bleeding and melena that has been present and steady for the past 2 months.  Patient reports being scheduled for outpatient stress test next week.  D-dimer elevated in the ED at 0.55, CTA negative for acute PE.  CT of the abdomen without acute abnormalities. Patient has history of fhypercoagulable state, DVT in LLE 2021. Patient does report taking Eliquis BID, last dose this morning. GI consulted.  Pt denies constipation/diarrhea, dysuria, abdominal pain, LE edema, diaphoresis, nausea, or vomiting. Patient is full code.    In the ED:  Chest x-ray negative.  Occult positive.  Troponin 5.0, TSH 2.254, UA negative.  Potassium 3.5, magnesium 1.6, INR 1.0, PTT 11.2.    Overview/Hospital Course:  The patient was transfused one unit PRBC on 04/19. She was seen in consult by GI, EGD done on 04/20. No significant findings. Colonoscopy done for 04/21 and revealed colonic mass in the transverse colon. Surgery was consulted, and resection was done 04/22. Her H/H were monitored for any further decrease.  Patient with expected postop pain. Patient developed n/v. Abdominal xray revealed ileus. Oral meds were changed to IV form. The patient was held NPO. She was placed on PPN.    Interval History:  Seen in the multidisciplinary rounds, feel better, tolerated low-fat diet.  WBC trending up but patient has been afebrile.  Looks comfortable.       Patient also has a history of DVT and MTHFR +- mutation, was on Eliquis prior to admission, follow with Dr. Felder.  Spoke with Dr. Soria 4/28ugo to start full-dose Lovenox now and continue Eliquis upon discharge.     Review of Systems   Constitutional:  Positive for fatigue. Negative for activity change, appetite change, chills and fever.   HENT:  Negative for congestion, ear pain, nosebleeds and sinus pain.    Eyes:  Negative for discharge and itching.   Respiratory:  Negative for apnea, cough, chest tightness and shortness of breath.    Cardiovascular:  Negative for chest pain, palpitations and leg swelling.   Gastrointestinal:  Positive for abdominal distention and abdominal pain. Negative for vomiting.   Genitourinary:  Negative for difficulty urinating, dysuria, flank pain and frequency.   Musculoskeletal:  Negative for arthralgias, back pain, joint swelling and myalgias.   Skin:  Negative for color change, pallor and rash.   Neurological:  Positive for weakness. Negative for dizziness, light-headedness and headaches.   Psychiatric/Behavioral:  Negative for agitation, behavioral problems, confusion and suicidal ideas.      Objective:     Vital Signs (Most Recent):  Temp: 98 °F (36.7 °C) (04/29/24 1035)  Pulse: 85 (04/29/24 1035)  Resp: 16 (04/29/24 1035)  BP: 127/61 (04/29/24 1035)  SpO2: 98 % (04/29/24 1035) Vital Signs (24h Range):  Temp:  [97.7 °F (36.5 °C)-98.7 °F (37.1 °C)] 98 °F (36.7 °C)  Pulse:  [84-94] 85  Resp:  [15-20] 16  SpO2:  [93 %-98 %] 98 %  BP: (123-148)/(59-74) 127/61     Weight: 97.2 kg (214 lb 4.6 oz)  Body mass index is 32.58  kg/m².    Intake/Output Summary (Last 24 hours) at 4/29/2024 1450  Last data filed at 4/28/2024 1514  Gross per 24 hour   Intake 360 ml   Output --   Net 360 ml         Physical Exam  Vitals reviewed.   Constitutional:       General: She is not in acute distress.     Appearance: Normal appearance. She is normal weight. She is not ill-appearing.   HENT:      Head: Normocephalic and atraumatic.      Nose: Nose normal.      Mouth/Throat:      Mouth: Mucous membranes are moist.      Pharynx: Oropharynx is clear.   Eyes:      General: No scleral icterus.     Extraocular Movements: Extraocular movements intact.      Conjunctiva/sclera: Conjunctivae normal.      Pupils: Pupils are equal, round, and reactive to light.   Cardiovascular:      Rate and Rhythm: Normal rate and regular rhythm.      Pulses: Normal pulses.      Heart sounds: Normal heart sounds. No murmur heard.     No gallop.   Pulmonary:      Effort: Pulmonary effort is normal. No respiratory distress.      Breath sounds: Normal breath sounds. No wheezing, rhonchi or rales.   Chest:      Chest wall: No tenderness.   Abdominal:      General: Abdomen is flat. There is no distension.      Palpations: Abdomen is soft.      Tenderness: There is abdominal tenderness.      Comments: Scant bowel sounds   Musculoskeletal:         General: No swelling or tenderness.      Cervical back: Normal range of motion and neck supple. No rigidity or tenderness.      Right lower leg: No edema.      Left lower leg: No edema.   Skin:     General: Skin is warm and dry.   Neurological:      General: No focal deficit present.      Mental Status: She is alert and oriented to person, place, and time. Mental status is at baseline.      Motor: No weakness.   Psychiatric:         Mood and Affect: Mood normal.         Behavior: Behavior normal.         Thought Content: Thought content normal.             Significant Labs: All pertinent labs within the past 24 hours have been reviewed.  BMP:    Recent Labs   Lab 04/29/24  0510   *   *   K 4.3   CL 95   CO2 29   BUN 22   CREATININE 1.0   CALCIUM 8.4*   MG 1.7     CBC:   Recent Labs   Lab 04/28/24  0440 04/29/24  0510   WBC 13.60* 19.94*   HGB 8.4* 9.0*   HCT 27.8* 29.6*   * 495*     CMP:   Recent Labs   Lab 04/28/24  0440 04/29/24  0510   * 131*   K 3.3* 4.3   CL 95 95   CO2 29 29   * 340*   BUN 22 22   CREATININE 1.0 1.0   CALCIUM 8.3* 8.4*   PROT 6.2  --    ALBUMIN 3.1*  --    BILITOT 0.3  --    ALKPHOS 86  --    AST 35  --    ALT 35  --    ANIONGAP 11 7*       Significant Imaging: I have reviewed all pertinent imaging results/findings within the past 24 hours.    Assessment/Plan:      * Colon adenocarcinoma    Status post right hemicolectomy  Surgical pathology:   - INVASIVE MODERATELY DIFFERENTIATED ADENOCARCINOMA.   - SURGICAL MARGINS ARE NEGATIVE FOR INVASIVE CARCINOMA   - MULTIPLE (3) TUBULAR ADENOMAS, NEGATIVE FOR HIGH GRADE DYSPLASIA OR    MALIGNANCY.   - METASTATIC CARCINOMA IN ONE OF THIRTY-FIVE REGIONAL LYMPH NODES.     Patient has been evaluated by oncologist  Most likely patient will need chemotherapy outpatient  CT chest upon admission is negative for metastatic disease  Now patient passing gas, we will try clear liquid diet  Get a baseline CEA level.  Advanced to full liquid diet.  We will wean off TPN in 1-2 days      Leukocytosis    Patient looks comfortable, no abd pain   Will get ct abd/pelvis with iv contrast.    Check procalctionin level.     MTHFR mutation    Patient also has a history of DVT, was taking Eliquis prior to admission.  Follow with Dr. Felder.  Spoke with General surgery Dr. Soria  We will start on full-dose Lovenox 4/28  We will continue Eliquis upon discharge    GI bleed  GI consulted, had unremarkable EGD; colonoscopy revealed colonic mass; s/p resection 04/22  Serial CBC, monitor on telemetry   Hbg dropped from 11.5 to 7.4 in 4 months.       Iron deficiency anemia due to chronic  blood loss  Last iron infusion 04/17  Symptomatic anemia with SOB, pale, fatigue  Transfused one unit PRBC on 04/19  Serial CBCs, monitor on telemetry     Type 2 diabetes mellitus without complication, without long-term current use of insulin  Uncontrolled  Increase Levemir to 15 units b.i.d.  Pending A1c  High-dose sliding scale    Essential hypertension  Chronic, controlled. Latest blood pressure and vitals reviewed-     Temp:  [97.1 °F (36.2 °C)-99.2 °F (37.3 °C)]   Pulse:  []   Resp:  [15-20]   BP: (105-160)/(56-95)   SpO2:  [92 %-100 %] .   Home meds for hypertension were reviewed and noted below.   Hypertension Medications               diltiaZEM (CARDIZEM CD) 120 MG Cp24 Take 1 capsule (120 mg total) by mouth once daily.    hydroCHLOROthiazide (HYDRODIURIL) 25 MG tablet Take 25 mg by mouth every Mon, Wed, Fri.            While in the hospital, will manage blood pressure as follows; Continue home antihypertensive regimen    Will utilize p.r.n. blood pressure medication only if patient's blood pressure greater than 180/110 and she develops symptoms such as worsening chest pain or shortness of breath.      VTE Risk Mitigation (From admission, onward)           Ordered     enoxaparin injection 100 mg  Every 12 hours        Note to Pharmacy: Wt: 97.2 kg (214 lb 4.6 oz)  Estimated Creatinine Clearance: 62.9 mL/min (based on SCr of 1 mg/dL).  Body mass index is 32.58 kg/m².    04/26/24 1100     Reason for No Pharmacological VTE Prophylaxis  Once        Question:  Reasons:  Answer:  Active Bleeding    04/19/24 1641     IP VTE HIGH RISK PATIENT  Once         04/19/24 1641     Place sequential compression device  Until discontinued         04/19/24 1641                    Discharge Planning   KATIE: 4/29/2024     Code Status: Full Code   Is the patient medically ready for discharge?:     Reason for patient still in hospital (select all that apply): Patient trending condition and Treatment  Discharge Plan A: Rehab    Discharge Delays: None known at this time              Ronal Mcnulty MD  Department of Hospital Medicine   [facility]     yes

## 2024-09-19 ENCOUNTER — TELEPHONE (OUTPATIENT)
Facility: CLINIC | Age: 74
End: 2024-09-19
Payer: MEDICARE

## 2024-09-19 DIAGNOSIS — C77.2 PRIMARY COLON CANCER WITH METASTASIS TO 1 REGIONAL LYMPH NODE (N1A): Primary | ICD-10-CM

## 2024-09-19 DIAGNOSIS — C18.9 PRIMARY COLON CANCER WITH METASTASIS TO 1 REGIONAL LYMPH NODE (N1A): Primary | ICD-10-CM

## 2024-09-24 ENCOUNTER — TELEPHONE (OUTPATIENT)
Facility: CLINIC | Age: 74
End: 2024-09-24
Payer: MEDICARE

## 2024-09-24 RX ORDER — HEPARIN 100 UNIT/ML
100 SYRINGE INTRAVENOUS
Status: CANCELLED | OUTPATIENT
Start: 2024-09-27

## 2024-09-24 NOTE — TELEPHONE ENCOUNTER
Spoke with Patient. Confirmed her 9/26/24 appt. with Dr. Felder.  She explained that she had all of her records from [facility] on her phone and would share them with Dr. Felder during her appointment.  I added that a port flush order had been placed in [facility] and Dr. Felder needed to review it.  They could discuss on 9/26/24.  Patient verbally demonstrated understanding.

## 2024-09-26 ENCOUNTER — TELEPHONE (OUTPATIENT)
Facility: CLINIC | Age: 74
End: 2024-09-26
Payer: MEDICARE

## 2024-09-26 ENCOUNTER — OFFICE VISIT (OUTPATIENT)
Facility: CLINIC | Age: 74
End: 2024-09-26
Payer: MEDICARE

## 2024-09-26 VITALS
SYSTOLIC BLOOD PRESSURE: 119 MMHG | BODY MASS INDEX: 31 KG/M2 | HEIGHT: 67 IN | RESPIRATION RATE: 16 BRPM | WEIGHT: 197.5 LBS | DIASTOLIC BLOOD PRESSURE: 73 MMHG | HEART RATE: 70 BPM | TEMPERATURE: 97 F

## 2024-09-26 DIAGNOSIS — C77.2 PRIMARY COLON CANCER WITH METASTASIS TO 1 REGIONAL LYMPH NODE (N1A): Primary | ICD-10-CM

## 2024-09-26 DIAGNOSIS — C18.9 PRIMARY COLON CANCER WITH METASTASIS TO 1 REGIONAL LYMPH NODE (N1A): Primary | ICD-10-CM

## 2024-09-26 DIAGNOSIS — I82.402 DEEP VEIN THROMBOSIS (DVT) OF LEFT LOWER EXTREMITY, UNSPECIFIED CHRONICITY, UNSPECIFIED VEIN: ICD-10-CM

## 2024-09-26 DIAGNOSIS — C18.2 MALIGNANT NEOPLASM OF ASCENDING COLON: Primary | ICD-10-CM

## 2024-09-26 PROCEDURE — 99999 PR PBB SHADOW E&M-EST. PATIENT-LVL IV: CPT | Mod: PBBFAC,,, | Performed by: INTERNAL MEDICINE

## 2024-09-26 PROCEDURE — G2211 COMPLEX E/M VISIT ADD ON: HCPCS | Mod: S$PBB,,, | Performed by: INTERNAL MEDICINE

## 2024-09-26 PROCEDURE — 99215 OFFICE O/P EST HI 40 MIN: CPT | Mod: S$PBB,,, | Performed by: INTERNAL MEDICINE

## 2024-09-26 PROCEDURE — 99214 OFFICE O/P EST MOD 30 MIN: CPT | Mod: PBBFAC,PN | Performed by: INTERNAL MEDICINE

## 2024-09-26 RX ORDER — HEPARIN 100 UNIT/ML
500 SYRINGE INTRAVENOUS
OUTPATIENT
Start: 2024-09-27

## 2024-09-26 RX ORDER — SODIUM CHLORIDE 0.9 % (FLUSH) 0.9 %
10 SYRINGE (ML) INJECTION
OUTPATIENT
Start: 2024-09-27

## 2024-10-01 ENCOUNTER — TELEPHONE (OUTPATIENT)
Dept: HEMATOLOGY/ONCOLOGY | Facility: CLINIC | Age: 74
End: 2024-10-01
Payer: MEDICARE

## 2024-10-01 NOTE — TELEPHONE ENCOUNTER
Called patient to notify her that she was due for CTDNA testing. Patient reports she just recently had CTDNA testing done at [facility] and will have it done again in December. [facility].

## 2024-10-02 ENCOUNTER — OFFICE VISIT (OUTPATIENT)
Dept: SURGERY | Facility: CLINIC | Age: 74
End: 2024-10-02
Payer: MEDICARE

## 2024-10-02 VITALS — HEART RATE: 64 BPM | TEMPERATURE: 97 F | SYSTOLIC BLOOD PRESSURE: 125 MMHG | DIASTOLIC BLOOD PRESSURE: 58 MMHG

## 2024-10-02 DIAGNOSIS — R19.00 ABDOMINAL WALL BULGE: ICD-10-CM

## 2024-10-02 DIAGNOSIS — C18.9 COLON ADENOCARCINOMA: Primary | ICD-10-CM

## 2024-10-02 PROCEDURE — 99999 PR PBB SHADOW E&M-EST. PATIENT-LVL II: CPT | Mod: PBBFAC,,, | Performed by: SURGERY

## 2024-10-02 PROCEDURE — 99213 OFFICE O/P EST LOW 20 MIN: CPT | Mod: S$PBB,,, | Performed by: SURGERY

## 2024-10-02 PROCEDURE — 99212 OFFICE O/P EST SF 10 MIN: CPT | Mod: PBBFAC,PN | Performed by: SURGERY

## 2024-10-02 RX ORDER — DIPHENOXYLATE HYDROCHLORIDE AND ATROPINE SULFATE 2.5; .025 MG/1; MG/1
1 TABLET ORAL
COMMUNITY
Start: 2024-07-10

## 2024-10-02 RX ORDER — LORATADINE 10 MG/1
10 TABLET ORAL
COMMUNITY

## 2024-10-02 RX ORDER — LOPERAMIDE HYDROCHLORIDE 2 MG/1
2 CAPSULE ORAL
COMMUNITY
Start: 2024-07-10

## 2024-10-02 RX ORDER — POLYETHYLENE GLYCOL 3350 17 G/17G
17 POWDER, FOR SOLUTION ORAL
COMMUNITY
Start: 2024-05-09

## 2024-10-02 RX ORDER — APIXABAN 2.5 MG/1
2.5 TABLET, FILM COATED ORAL EVERY 12 HOURS
COMMUNITY
Start: 2024-07-24

## 2024-10-02 RX ORDER — GLIMEPIRIDE 1 MG/1
1 TABLET ORAL
COMMUNITY
Start: 2024-09-12

## 2024-10-02 RX ORDER — ONDANSETRON 4 MG/1
4 TABLET, FILM COATED ORAL
COMMUNITY
Start: 2023-12-28

## 2024-10-02 RX ORDER — LIDOCAINE AND PRILOCAINE 25; 25 MG/G; MG/G
CREAM TOPICAL
COMMUNITY
Start: 2024-06-27

## 2024-10-02 NOTE — H&P
GENERAL SURGERY  OUTPATIENT H&P    REASON FOR VISIT/CC: Abdominal wall bulge    HPI: Jen Torres is a 74 y.o. 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  Currently under surveillance.  Patient reports after episodes of diarrhea following chemotherapy she developed abdominal wall bulging.  This has worse when she was stands and reduces when she lays flat.  No obstructive symptoms.  No severe pain.    I have reviewed the patient's chart including prior progress notes, procedures and testing.     ROS:   Review of Systems    PROBLEM LIST:  Patient Active Problem List   Diagnosis    Biliary colic    Ovarian cyst    Mixed incontinence urge and stress    Chronic embolism and thrombosis of other specified deep vein of left lower extremity    Deep vein thrombosis (DVT) of left lower extremity, unspecified chronicity, unspecified vein    Essential hypertension    Dyslipidemia    Statin intolerance    Type 2 diabetes mellitus without complication, without long-term current use of insulin    Vertigo    Syncope    Shortness of breath    Iron deficiency anemia due to chronic blood loss    Dysequilibrium    Lightheadedness    Colon adenocarcinoma    MTHFR mutation    Leukocytosis         HISTORY  Past Medical History:   Diagnosis Date    Abnormal Pap smear     s/p cryo at 20 y/o    Anticoagulant long-term use     GERD (gastroesophageal reflux disease)     Hyperlipidemia     Migraine     Migraines     Sleep apnea     Can not use c-pap       Past Surgical History:   Procedure Laterality Date    ARTHROSCOPIC REPAIR OF ROTATOR CUFF OF SHOULDER Left 12/29/2023    Procedure: REPAIR, ROTATOR CUFF, ARTHROSCOPIC;  Surgeon: Gary Tony MD;  [facility]: [facility];  Service: Orthopedics;  Laterality: Left;  arthrex    ARTHROSCOPY OF SHOULDER WITH DECOMPRESSION OF  SUBACROMIAL SPACE Left 2023    Procedure: ARTHROSCOPY, SHOULDER, WITH SUBACROMIAL SPACE DECOMPRESSION;  Surgeon: Gary Tony MD;  Location: [facility];  Service: Orthopedics;  Laterality: Left;    CATARACT EXTRACTION, BILATERAL      CHOLECYSTECTOMY  10/01/2014    COLECTOMY, RIGHT  2024    Procedure: COLECTOMY, RIGHT;  Surgeon: Jaden Barrios MD;  Location: [facility] OR;  Service: General;;    COLONOSCOPIC SURGICAL PROCEDURE  2024    Procedure: SURGICAL PROCEDURE, COLONOSCOPIC;  Surgeon: Jaden Barrios MD;  Location: [facility] OR;  Service: General;;    COLONOSCOPY N/A 2024    Procedure: COLONOSCOPY;  Surgeon: Ceasar Arias III, MD;  Location: [facility];  Service: Endoscopy;  Laterality: N/A;    ESOPHAGOGASTRODUODENOSCOPY N/A 2024    Procedure: EGD (ESOPHAGOGASTRODUODENOSCOPY);  Surgeon: Ceasar Arias III, MD;  Location: [facility];  Service: Endoscopy;  Laterality: N/A;    EYE SURGERY      KNEE ARTHROSCOPY Left     LAPAROTOMY, EXPLORATORY N/A 2024    Procedure: LAPAROTOMY, EXPLORATORY;  Surgeon: Jaden Barrios MD;  Location: [facility];  Service: General;  Laterality: N/A;    LYSIS OF ADHESIONS  2024    Procedure: LYSIS, ADHESIONS;  Surgeon: Jaden Barrios MD;  Location: [facility];  Service: General;;    TONSILLECTOMY         Social History     Tobacco Use    Smoking status: Former    Smokeless tobacco: Never   Substance Use Topics    Alcohol use: No    Drug use: No       Family History   Problem Relation Name Age of Onset    Breast cancer Paternal Grandmother  70    Colon cancer Mother              Cancer Daughter          crohns    Ovarian cancer Daughter      Cancer Daughter  20        uterine cancer with mets s/p chemo/rad/surgery; ?genetic mutation; now with hip tumors    Breast cancer Maternal Grandmother           MEDS:  Current Outpatient Medications on File Prior to Visit   Medication Sig Dispense Refill    acetaminophen (TYLENOL) 500 MG  tablet Take 2 tablets (1,000 mg total) by mouth every 8 (eight) hours as needed for Pain. 30 tablet 0    aspirin 81 MG Chew Take 81 mg by mouth once daily.      diltiaZEM (CARDIZEM CD) 120 MG Cp24 Take 1 capsule (120 mg total) by mouth once daily. 30 capsule 11    ELIQUIS 5 mg Tab TAKE 1 TABLET BY MOUTH TWICE A DAY (Patient taking differently: Take 2.5 mg by mouth 2 (two) times daily.) 60 tablet 4    empagliflozin (JARDIANCE) 25 mg tablet Take 25 mg by mouth once daily.      ergocalciferol (ERGOCALCIFEROL) 50,000 unit Cap Take 50,000 Units by mouth every 7 days. Fridays      ezetimibe (ZETIA) 10 mg tablet Take 10 mg by mouth once daily.      glimepiride (AMARYL) 2 MG tablet Take 2 mg by mouth every morning.      hydroCHLOROthiazide (HYDRODIURIL) 25 MG tablet Take 25 mg by mouth every Mon, Wed, Fri. (Patient not taking: Reported on 9/26/2024)      Lactobacillus rhamnosus GG (CULTURELLE) 10 billion cell capsule Take 1 capsule by mouth once daily.      melatonin 1 mg TbDL Take by mouth. (Patient not taking: Reported on 10/2/2024)      pantoprazole (PROTONIX) 40 MG tablet Take 40 mg by mouth every morning.      potassium chloride (MICRO-K) 10 MEQ CpSR TAKE 1 CAPSULE BY MOUTH EVERY DAY (Patient taking differently: Take 10 mEq by mouth once daily.) 90 capsule 3    ramelteon (ROZEREM) 8 mg tablet TAKE 1 TABLET (8 MG TOTAL) BY MOUTH EVERY EVENING. (Patient not taking: Reported on 10/2/2024) 90 tablet 1    WESTAB MAX 2.5-25-2 mg Tab TAKE 1 TABLET BY MOUTH EVERY DAY 90 tablet 4     Current Facility-Administered Medications on File Prior to Visit   Medication Dose Route Frequency Provider Last Rate Last Admin    electrolyte-S (ISOLYTE)   Intravenous Continuous Blade Packer MD        electrolyte-S (ISOLYTE)   Intravenous Continuous Blade Packer  mL/hr at 12/29/23 1159 Rate Change at 12/29/23 1159    LIDOcaine (PF) 10 mg/ml (1%) injection 10 mg  1 mL Intradermal Once Blade Packer MD        midazolam  (VERSED) 1 mg/mL injection 2 mg  2 mg Intravenous PRN Cousin, Blade CIFUENTES MD   2 mg at 12/29/23 0959       ALLERGIES:  Review of patient's allergies indicates:   Allergen Reactions    Statins-hmg-coa reductase inhibitors Swelling    Metformin Other (See Comments)     metallic taste , burn of the esophagus    Oxaliplatin Itching     Throat itchiness, twitching of both eyelids,tingling of hands, lips, head and feet. Cramping of toes.         VITALS:  Vitals:    10/02/24 0911   BP: (!) 125/58   Pulse: 64   Temp: 97.2 °F (36.2 °C)         PHYSICAL EXAM:  Physical Exam  Vitals reviewed.   Constitutional:       General: She is not in acute distress.     Appearance: Normal appearance. She is well-developed.   HENT:      Head: Normocephalic and atraumatic.      Nose: Nose normal.   Eyes:      General: No scleral icterus.     Conjunctiva/sclera: Conjunctivae normal.   Neck:      Trachea: No tracheal tenderness or tracheal deviation.   Cardiovascular:      Rate and Rhythm: Normal rate and regular rhythm.      Pulses: Normal pulses.   Pulmonary:      Effort: Pulmonary effort is normal. No accessory muscle usage or respiratory distress.      Breath sounds: Normal breath sounds.   Abdominal:      General: There is no distension.      Palpations: Abdomen is soft.      Tenderness: There is no abdominal tenderness.          Comments: Well-healed midline laparotomy except for small ulceration in the upper portion, no surrounding erythema or evidence of infection. There was prominent bulging with the patient was stands though unable to appreciate fascial defect.  When she lays flat the bulging reduces.  There is some concern for a smaller fascial defect however I feel most the bulge is likely related to diastasis.   Musculoskeletal:         General: No swelling or tenderness. Normal range of motion.      Cervical back: Normal range of motion and neck supple. No rigidity.   Skin:     General: Skin is warm and dry.      Coloration: Skin  is not jaundiced.      Findings: No erythema.   Neurological:      General: No focal deficit present.      Mental Status: She is alert and oriented to person, place, and time.      Motor: No weakness or abnormal muscle tone.   Psychiatric:         Mood and Affect: Mood normal.         Behavior: Behavior normal.         Thought Content: Thought content normal.         Judgment: Judgment normal.           LABS:  Lab Results   Component Value Date    WBC 17.31 (H) 04/30/2024    RBC 3.50 (L) 04/30/2024    HGB 8.8 (L) 04/30/2024    HCT 28.2 (L) 04/30/2024     (H) 04/30/2024     Lab Results   Component Value Date     (H) 04/30/2024     (L) 04/30/2024    K 3.7 04/30/2024    CL 94 (L) 04/30/2024    CO2 29 04/30/2024    BUN 16 04/30/2024    CREATININE 1.1 04/30/2024    CALCIUM 8.2 (L) 04/30/2024     Lab Results   Component Value Date    ALT 29 04/30/2024    AST 22 04/30/2024    ALKPHOS 94 04/30/2024    BILITOT 0.5 04/30/2024     Lab Results   Component Value Date    MG 1.7 04/29/2024    PHOS 2.9 04/29/2024       STUDIES:  Images and reports were personally reviewed.  CT imaging from April and May reviewed, post surgical changes present but no obvious hernia. Report from recent CT scan from [facility] reviewed, no report of abdominal wall hernia however unable to personally review these images.      ASSESSMENT & PLAN:  74 y.o. female with colon cancer status post laparotomy with abdominal wall bulge  -patient has concern for possible hernia  -based off exam I feel the bulge most likely represents diastasis recti  -I will request imaging from [facility] to review and evaluate for actual fascial defect  -if fascial defect is identified we can discuss surgical treatment options depending on the size of the defect, if no defect it was appreciated in all the bulge is related to diastasis and repair would be considered cosmetic  -patient instructed to go to the emergency room for redness, hardness of the  abdominal wall, obstructive symptoms

## 2024-10-07 ENCOUNTER — INFUSION (OUTPATIENT)
Dept: INFUSION THERAPY | Facility: HOSPITAL | Age: 74
End: 2024-10-07
Attending: INTERNAL MEDICINE
Payer: MEDICARE

## 2024-10-07 VITALS
HEIGHT: 67 IN | WEIGHT: 197 LBS | RESPIRATION RATE: 17 BRPM | BODY MASS INDEX: 30.92 KG/M2 | SYSTOLIC BLOOD PRESSURE: 125 MMHG | DIASTOLIC BLOOD PRESSURE: 58 MMHG | HEART RATE: 64 BPM | TEMPERATURE: 97 F

## 2024-10-07 DIAGNOSIS — C18.2 MALIGNANT NEOPLASM OF ASCENDING COLON: Primary | ICD-10-CM

## 2024-10-07 DIAGNOSIS — C18.9 COLON ADENOCARCINOMA: Primary | ICD-10-CM

## 2024-10-07 DIAGNOSIS — C18.2 MALIGNANT NEOPLASM OF ASCENDING COLON: ICD-10-CM

## 2024-10-07 LAB
ALBUMIN SERPL BCP-MCNC: 4.1 G/DL (ref 3.5–5.2)
ALP SERPL-CCNC: 108 U/L (ref 55–135)
ALT SERPL W/O P-5'-P-CCNC: 24 U/L (ref 10–44)
ANION GAP SERPL CALC-SCNC: 9 MMOL/L (ref 8–16)
AST SERPL-CCNC: 27 U/L (ref 10–40)
BASOPHILS # BLD AUTO: 0.04 K/UL (ref 0–0.2)
BASOPHILS NFR BLD: 0.6 % (ref 0–1.9)
BILIRUB SERPL-MCNC: 0.3 MG/DL (ref 0.1–1)
BUN SERPL-MCNC: 16 MG/DL (ref 8–23)
CALCIUM SERPL-MCNC: 9.1 MG/DL (ref 8.7–10.5)
CEA SERPL-MCNC: 3.8 NG/ML (ref 0–5)
CHLORIDE SERPL-SCNC: 106 MMOL/L (ref 95–110)
CO2 SERPL-SCNC: 26 MMOL/L (ref 23–29)
CREAT SERPL-MCNC: 1 MG/DL (ref 0.5–1.4)
DIFFERENTIAL METHOD BLD: ABNORMAL
EOSINOPHIL # BLD AUTO: 0.2 K/UL (ref 0–0.5)
EOSINOPHIL NFR BLD: 2.1 % (ref 0–8)
ERYTHROCYTE [DISTWIDTH] IN BLOOD BY AUTOMATED COUNT: 13.4 % (ref 11.5–14.5)
EST. GFR  (NO RACE VARIABLE): 59.9 ML/MIN/1.73 M^2
GLUCOSE SERPL-MCNC: 99 MG/DL (ref 70–110)
HCT VFR BLD AUTO: 38.8 % (ref 37–48.5)
HGB BLD-MCNC: 13 G/DL (ref 12–16)
IMM GRANULOCYTES # BLD AUTO: 0.01 K/UL (ref 0–0.04)
IMM GRANULOCYTES NFR BLD AUTO: 0.1 % (ref 0–0.5)
LYMPHOCYTES # BLD AUTO: 2.7 K/UL (ref 1–4.8)
LYMPHOCYTES NFR BLD: 37.3 % (ref 18–48)
MCH RBC QN AUTO: 31.5 PG (ref 27–31)
MCHC RBC AUTO-ENTMCNC: 33.5 G/DL (ref 32–36)
MCV RBC AUTO: 94 FL (ref 82–98)
MONOCYTES # BLD AUTO: 0.6 K/UL (ref 0.3–1)
MONOCYTES NFR BLD: 7.8 % (ref 4–15)
NEUTROPHILS # BLD AUTO: 3.8 K/UL (ref 1.8–7.7)
NEUTROPHILS NFR BLD: 52.1 % (ref 38–73)
NRBC BLD-RTO: 0 /100 WBC
PLATELET # BLD AUTO: 299 K/UL (ref 150–450)
PMV BLD AUTO: 10.1 FL (ref 9.2–12.9)
POTASSIUM SERPL-SCNC: 3.7 MMOL/L (ref 3.5–5.1)
PROT SERPL-MCNC: 7.2 G/DL (ref 6–8.4)
RBC # BLD AUTO: 4.13 M/UL (ref 4–5.4)
SODIUM SERPL-SCNC: 141 MMOL/L (ref 136–145)
WBC # BLD AUTO: 7.27 K/UL (ref 3.9–12.7)

## 2024-10-07 PROCEDURE — 82378 CARCINOEMBRYONIC ANTIGEN: CPT | Performed by: NURSE PRACTITIONER

## 2024-10-07 PROCEDURE — 63600175 PHARM REV CODE 636 W HCPCS: Performed by: INTERNAL MEDICINE

## 2024-10-07 PROCEDURE — 85025 COMPLETE CBC W/AUTO DIFF WBC: CPT | Performed by: NURSE PRACTITIONER

## 2024-10-07 PROCEDURE — A4216 STERILE WATER/SALINE, 10 ML: HCPCS | Performed by: INTERNAL MEDICINE

## 2024-10-07 PROCEDURE — 25000003 PHARM REV CODE 250: Performed by: INTERNAL MEDICINE

## 2024-10-07 PROCEDURE — 80053 COMPREHEN METABOLIC PANEL: CPT | Performed by: NURSE PRACTITIONER

## 2024-10-07 PROCEDURE — 36591 DRAW BLOOD OFF VENOUS DEVICE: CPT

## 2024-10-07 RX ORDER — HEPARIN 100 UNIT/ML
500 SYRINGE INTRAVENOUS
Status: DISCONTINUED | OUTPATIENT
Start: 2024-10-07 | End: 2024-10-07 | Stop reason: HOSPADM

## 2024-10-07 RX ORDER — SODIUM CHLORIDE 0.9 % (FLUSH) 0.9 %
10 SYRINGE (ML) INJECTION
OUTPATIENT
Start: 2024-10-07

## 2024-10-07 RX ORDER — HEPARIN 100 UNIT/ML
500 SYRINGE INTRAVENOUS
OUTPATIENT
Start: 2024-10-07

## 2024-10-07 RX ORDER — SODIUM CHLORIDE 0.9 % (FLUSH) 0.9 %
10 SYRINGE (ML) INJECTION
Status: DISCONTINUED | OUTPATIENT
Start: 2024-10-07 | End: 2024-10-07 | Stop reason: HOSPADM

## 2024-10-07 RX ADMIN — SODIUM CHLORIDE, PRESERVATIVE FREE 10 ML: 5 INJECTION INTRAVENOUS at 02:10

## 2024-10-07 RX ADMIN — HEPARIN 500 UNITS: 100 SYRINGE at 02:10

## 2024-10-13 NOTE — PROGRESS NOTES
[facility] [address] Hematology Oncology In Office Subsequent Encounter Note    9/26/24          Patient ID:   Jen Torres  74 y.o. female  1950  MD JESSICA Martinez MD, MD      Chief Complaint:   Recurrent leg clots    HPI:  74 y.o. female with 1 or 2 episodes of superficial venous clots involving the left leg.    Patient has history of falling off a porch, she has lumbar spine disease and low back pain symptoms with ski attic a. She is to see a chiropractor.  Other history includes a twisted left knee and torn meniscus.  She hit L leg, it was black and blue.  Check U/S of L leg now 12/2022.  Seeing Dr. Raz Jimenes for L knee pain.  Sample orders for lovenox bridge written and reviewed with her.  Await a definite surgical date, to decide on lovenox bridge dates.  She had L knee surgery 2/2023, now on eliquis 5 mg BID.    L rotator cuff surgery 12/29/23.  3/7/24 TIA.  Had Vertigo sx, SOB, L facial numbness and justa white in appearance, increased BP.  Now on Eliquis 5 mg 1 BID and ASA 81 mg 1 daily.  Expressive aphasia x's 2 months.    She has history of cancer of the cervix, a cyst on her ovary, and calcium deposits in the left breast.    She has history of asthma, type 2 diabetes, high cholesterol, hypertension, sleep apnea syndrome, GERD and dysphagia symptoms history of ulcer per Dr. Ritchie, she has a small bladder and arthritic symptoms in the fingers and knee.    Significantly around March of 2021 her medial lower knee area was swollen at the left leg and she was found to have a superficial clot.  Recently she had left leg fullness with increased edema and ultrasound showed superficial vein clot.  Will discuss with radiologist if the event of 2021 and 2022 likely represent the same clot or different clot events.    She has seen Dr. Guerrero of vascular surgery for venous evaluation.  I do not believe she has had any specific treatment or intervention as of yet.    She does not  smoke, she does not drink alcohol, she is allergic to statins with swelling and metformin with metallic taste and burning of the esophagus.    She has history of abnormal Pap smear with cryo procedure at age 21.  Other history includes GERD, hyperlipidemia, migraine headaches and biliary colic.    Medications include Trulicity, Amaryl, hydrochlorothiazide, Medrol Dosepak, Starlix, and Micro-K.    Her mother had colon cancer, and had been hit in the ankle area of the left leg and later developed a DVT and pulmonary embolus.  She had venous disease and venous stripping procedure.  Her dad had Alzheimer's disease.  Her aunt had colon cancer.  A paternal grandmother had breast cancer.  Another aunt had colitis, colon cancer irritable bowel syndrome.  A brother  of suicide, a brother  in MVA and another brother is alive and well.    Her daughter had Crohn's disease and ovarian cancer.  Another daughter had uterine cancer at age 20.    She has hx of L knee injury, needed elective arthroscopy.  On Elliquis 5 mg 1 BID.  On folbic.  Re check F8, fibrinogen, homocystine at [facility].  Recent U/S residual superficial clot seen in L leg veins.  She had surgery 25, with lovenox bridge.  Dr. Hatfield.    Repeat U/S of leg no DVT seen.  22.    She had UTI, now on nitrofurantoin antibiotic.    Hgb 14.1 to 11.5 to 10.6 to 9,8.  Hx of PUD, and black BM 1 month ago.  Eats 2 meals daily.  Check mamm,  check lab for anemia eval.  Ferritin 9.  EGD and colonoscopy 1 year ago.  Injectafer x's 2.  She did receive 1 of the injected for infusions and then was hospitalized with bright red blood per rectum     Hemoglobin had been 8.4 and ferritin had been 11.  She had GI evaluation and was found to have a mass in the colon she had partial colectomy and was found to have adenocarcinoma of the colon with 1 of 35 positive lymph nodes.  Dr. Stevenson was the surgeon of record.  She had been in rehab since the surgery and is now  returned home.  She is eating well and BM is beginning to firm up.  She does have a daughter with Crohn's disease and another child with IBS it her weight has gone from 194-198 lb she is 5 ft 8 in tall.    At this point she would appear to have clinical stage III disease.  CT scan did not show liver nodules or lymphadenopathy.  PET scan will be done at approximately 4 weeks postop.  CEA is 2.0.  Monitoring with CT DNA will be done at 4-6 weeks postop per Akanksha HERNANDEZ.     Estimated recurrence risk is 45-50% over the next 4-5 years.  Adjuvant treatment with FOLFOX or CAPOX would reduce her recurrence risk down to 20-25% over the next 4-5 years..  I reviewed the pathology report and this significance the extent of the tumor into or through the wall of the colon and the significance of positive lymphadenopathy in her stage and in assessment of recurrence risk.    I discussed with her the rationale of adjuvant treatment and reviewed with her the schema of FOLFOX and CAPOX over the next 3-6 months.    She tells me that her home situation is very severe, she has an invalid with her  and a son that is being treated for medical conditions, she describes it as a hoarder situation of the house.  She plans to move to her daughter's in [place] and have treatment if needed at [facility] and then return here after therapy has been completed.    PET Scan AUTUMN 5/30/24  Hgb 8.9 to 9.5, eGFR 54.  To [place].    She returned to this area.  She was treated with CAPOX but tolerated it poorly with cough, muscle spasms, diarrhea, even when treated with steroids and Benadryl.    She is to be observed.  If she develops recurrent or metastatic disease, then FOLFOX or FOLFIRI can be reconsidered.  She follows up here December 9, 2024.  Port flushes q.6 weeks.  She returns to [facility] December 2, 2024 for thyroid biopsy and CT scan of abdomen.  Mammogram [facility] of the right breast screening.    ROS:   GEN: normal without any fever, night  sweats or weight loss  HEENT: normal with no HA's, sore throat, stiff neck, changes in vision  CV: See HPI  PULM: See HPI  GI: See HPI  : normal with no hematuria, dysuria  BREAST: normal with no mass, discharge, pain  SKIN: See HPI     Past Medical History:   Diagnosis Date    Abnormal Pap smear     s/p cryo at 22 y/o    Anticoagulant long-term use     GERD (gastroesophageal reflux disease)     Hyperlipidemia     Migraine     Migraines     Sleep apnea     Can not use c-pap     Past Surgical History:   Procedure Laterality Date    ARTHROSCOPIC REPAIR OF ROTATOR CUFF OF SHOULDER Left 12/29/2023    Procedure: REPAIR, ROTATOR CUFF, ARTHROSCOPIC;  Surgeon: Gary Tony MD;  Location: [facility];  Service: Orthopedics;  Laterality: Left;  arthrex    ARTHROSCOPY OF SHOULDER WITH DECOMPRESSION OF SUBACROMIAL SPACE Left 12/29/2023    Procedure: ARTHROSCOPY, SHOULDER, WITH SUBACROMIAL SPACE DECOMPRESSION;  Surgeon: Gary Tony MD;  Location: [facility];  Service: Orthopedics;  Laterality: Left;    CATARACT EXTRACTION, BILATERAL      CHOLECYSTECTOMY  10/01/2014    COLECTOMY, RIGHT  4/22/2024    Procedure: COLECTOMY, RIGHT;  Surgeon: Jaden Barrios MD;  Location: [facility];  Service: General;;    COLONOSCOPIC SURGICAL PROCEDURE  4/22/2024    Procedure: SURGICAL PROCEDURE, COLONOSCOPIC;  Surgeon: Jaden Barrios MD;  Location: [facility] OR;  Service: General;;    COLONOSCOPY N/A 4/21/2024    Procedure: COLONOSCOPY;  Surgeon: Ceasar Arias III, MD;  Location: [facility];  Service: Endoscopy;  Laterality: N/A;    ESOPHAGOGASTRODUODENOSCOPY N/A 4/20/2024    Procedure: EGD (ESOPHAGOGASTRODUODENOSCOPY);  Surgeon: Ceasar Arias III, MD;  Location: [facility] ENDO;  Service: Endoscopy;  Laterality: N/A;    EYE SURGERY      KNEE ARTHROSCOPY Left     LAPAROTOMY, EXPLORATORY N/A 4/22/2024    Procedure: LAPAROTOMY, EXPLORATORY;  Surgeon: Jaden Barrios MD;  Location: [facility];  Service: General;   Laterality: N/A;    LYSIS OF ADHESIONS  4/22/2024    Procedure: LYSIS, ADHESIONS;  Surgeon: Jaden Barrios MD;  Location: [facility];  Service: General;;    TONSILLECTOMY         Review of patient's allergies indicates:   Allergen Reactions    Statins-hmg-coa reductase inhibitors Swelling    Metformin Other (See Comments)     metallic taste , burn of the esophagus    Oxaliplatin Itching     Throat itchiness, twitching of both eyelids,tingling of hands, lips, head and feet. Cramping of toes.     Social History     Socioeconomic History    Marital status:    Tobacco Use    Smoking status: Former    Smokeless tobacco: Never   Substance and Sexual Activity    Alcohol use: No    Drug use: No    Sexual activity: Never     Social Drivers of Health     Financial Resource Strain: Low Risk  (8/14/2023)    [facility]    Overall Financial Resource Strain (CARDIA)     Difficulty of Paying Living Expenses: Not hard at all   Food Insecurity: No Food Insecurity (8/14/2023)    [facility]    Hunger Vital Sign     Worried About Running Out of Food in the Last Year: Never true     Ran Out of Food in the Last Year: Never true   Transportation Needs: No Transportation Needs (8/14/2023)    [facility]    PRAPARE - Transportation     Lack of Transportation (Medical): No     Lack of Transportation (Non-Medical): No   Physical Activity: Insufficiently Active (8/14/2023)    [facility]    Exercise Vital Sign     Days of Exercise per Week: 7 days     Minutes of Exercise per Session: 10 min   Stress: No Stress Concern Present (8/14/2023)     Received from [facility], [facility] and [facility]    [employer] [place] of [employer] - Occupational Stress Questionnaire     Feeling of Stress : Only a little   Housing Stability: Low Risk  (8/14/2023)    Received from [facility] and [facility], [facility] and [facility]    Housing Stability Vital Sign     Unable to Pay for Housing in the Last Year: No     Number of Places Lived in the Last Year: 1     Unstable Housing in the Last Year: No         Current Outpatient Medications:     acetaminophen (TYLENOL) 500 MG tablet, Take 2 tablets (1,000 mg total) by mouth every 8 (eight) hours as needed for Pain. (Patient not taking: Reported on 10/2/2024), Disp: 30 tablet, Rfl: 0    aspirin 81 MG Chew, Take 81 mg by mouth once daily., Disp: , Rfl:     diltiaZEM (CARDIZEM CD) 120 MG Cp24, Take 1 capsule (120 mg total) by mouth once daily., Disp: 30 capsule, Rfl: 11    empagliflozin (JARDIANCE) 25 mg tablet, Take 25 mg by mouth once daily., Disp: , Rfl:     ergocalciferol (ERGOCALCIFEROL) 50,000 unit Cap, Take 50,000 Units by mouth every 7 days. Fridays, Disp: , Rfl:     ezetimibe (ZETIA) 10 mg tablet, Take 10 mg by mouth once daily., Disp: , Rfl:     Lactobacillus rhamnosus GG (CULTURELLE) 10 billion cell capsule, Take 1 capsule by mouth once daily., Disp: , Rfl:     pantoprazole (PROTONIX) 40 MG tablet, Take 40 mg by mouth every morning., Disp: , Rfl:     potassium chloride (MICRO-K) 10 MEQ CpSR, TAKE 1 CAPSULE BY MOUTH EVERY DAY (Patient taking differently: Take 10 mEq by mouth once daily.), Disp: 90 capsule, Rfl: 3    WESTAB MAX 2.5-25-2 mg Tab, TAKE 1 TABLET BY MOUTH EVERY DAY, Disp: 90 tablet, Rfl: 4    diphenoxylate-atropine 2.5-0.025 mg (LOMOTIL) 2.5-0.025 mg per tablet, Take 1 tablet by mouth. (Patient not taking: Reported on 10/2/2024), Disp: , Rfl:     ELIQUIS 2.5 mg Tab, Take 2.5 mg by mouth every 12  "(twelve) hours., Disp: , Rfl:     glimepiride (AMARYL) 1 MG tablet, Take 1 mg by mouth., Disp: , Rfl:     LIDOcaine-prilocaine (EMLA) cream, Apply to skin 45-60 minutes prior to accessing port-a-cath., Disp: , Rfl:     loperamide (IMODIUM) 2 mg capsule, Take 2 mg by mouth. (Patient not taking: Reported on 10/2/2024), Disp: , Rfl:     loratadine (CLARITIN) 10 mg tablet, Take 10 mg by mouth. (Patient not taking: Reported on 10/2/2024), Disp: , Rfl:     melatonin 1 mg TbDL, Take by mouth. (Patient not taking: Reported on 10/2/2024), Disp: , Rfl:     ondansetron (ZOFRAN) 4 MG tablet, Take 4 mg by mouth. (Patient not taking: Reported on 10/2/2024), Disp: , Rfl:     polyethylene glycol (GLYCOLAX) 17 gram PwPk, Take 17 g by mouth. (Patient not taking: Reported on 10/2/2024), Disp: , Rfl:     ramelteon (ROZEREM) 8 mg tablet, TAKE 1 TABLET (8 MG TOTAL) BY MOUTH EVERY EVENING. (Patient not taking: Reported on 10/2/2024), Disp: 90 tablet, Rfl: 1  No current facility-administered medications for this visit.    Facility-Administered Medications Ordered in Other Visits:     electrolyte-S (ISOLYTE), , Intravenous, Continuous, Blade Packer MD    electrolyte-S (ISOLYTE), , Intravenous, Continuous, Blade Packer MD, Last Rate: 200 mL/hr at 12/29/23 1159, Rate Change at 12/29/23 1159    LIDOcaine (PF) 10 mg/ml (1%) injection 10 mg, 1 mL, Intradermal, Once, Blade Packer MD    midazolam (VERSED) 1 mg/mL injection 2 mg, 2 mg, Intravenous, PRN, Blade Packer MD, 2 mg at 12/29/23 6034          Objective:   Vitals:  Blood pressure 119/73, pulse 70, temperature 97.4 °F (36.3 °C), temperature source Temporal, resp. rate 16, height 5' 7" (1.702 m), weight 89.6 kg (197 lb 8 oz).    Physical Examination:   GEN: no apparent distress, comfortable  HEAD: atraumatic and normocephalic  EYES: no pallor, no icterus  ENT:  no pharyngeal erythema, external ears WNL; no nasal discharge  NECK: no masses, thyroid normal, trachea " midline, no LAD/LN's, supple  CV: RRR with no murmur; normal pulse; normal S1 and S2  CHEST: Normal respiratory effort; CTAB; normal breath sounds; no wheeze or crackles  ABDOM: nontender and nondistended; soft; L/S NP, no rebound/guarding  MUSC/Skeletal: ROM normal; no crepitus; joints normal  EXTREM: no clubbing, cyanosis, inflammation   SKIN: no rashes, lesions, ulcers, petechiae.  The left leg has chronic edema changes with prominent varicose veins, without palpable venous cord and is nontender.  The right leg is without edema, without prominent varicose veins and without palpable venous cord and is nontender.  She has qusdricepts weakness at L leg, Calf NT at L or R leg.    The midline incision site at the abdomen is healing.  Liver and spleen are not palpable, abdomen is nontender.  : no CVAT  NEURO: grossly intact; motor/sensory WNL;  no tremors  PSYCH: normal mood, affect and behavior  LYMPH: normal cervical, supraclavicular, axillary and groin LN's    CBC, CMP, TSH Negative    Ultrasound July 11, 2022 shows superficial venous thrombus in the mid and proximal greater saphenous vein, DVT is not seen.  Left leg.  Ultrasound Sept. 15, 2022 shows persistent superficial venous thrombus at calf of L leg.    Ultrasound report from March 4, 2020 shows no evidence of DVT.  Thrombosed superficial lower extremity varices are noted, there is reflux from the origin of the greater saphenous vein to the knee.    Hypercoag. Eval. MTHFR mutation + -  -  -, [id number]  Homocystine 12.3.  Homocystine Now Nl. At 6.81.  Factor 8 increased 221.  Anticardiolipid AB increased IgG 25.    Mamm shows microcalcifications.  Stable since 2021.  Re check 1 year.    Assessment:    1. This individual has 1 or 2 episodes of thrombosis of the superficial veins of the left leg.      2. Hypercoag. Eval. Is with Positive results as above.    3. Will discuss with Dr. Guerrero to see if impedance plethismography  studies are available to evaluate her  for venous insufficiency and to see if any procedure can be done to reduce the of varicosities at the L leg.?    4.On a trial of Folbic daily.  Continue Eliquis 5 mg BID.  Had TIA, now on added ASA 81 mg daily.    5.Homocystine level has now normalized.  She proceeded with knee surgery  with lovenox bridge and Eliquis management. 1/18/23.    6.Anemia eval.  PUD hx, black BM 1 month ago.  DURAN,+, Fatigue+  4/10 energy.  Fe deficiency ferritin 9.  Injectafer x's2.    7. Moderately differentiated adenocarcinoma of the ascending colon, status post right colectomy.  Grade 2 disease.  Primary tumor extended into but not through the muscular wall of the colon.  One of 35 lymph nodes were positive for metastatic disease.  MMR studies were intact.  Clinically this would be stage III disease.    8. PET scan AUTUMN.  Stage 3 dx.  To [facility] for adjuvant Rx and evaluation.    9.  She did not tolerate CAPOX adjuvant Rx.  RTC 12/2/24 [facility]  RTC here 12/9/24.

## 2024-10-14 ENCOUNTER — PATIENT MESSAGE (OUTPATIENT)
Dept: SURGERY | Facility: CLINIC | Age: 74
End: 2024-10-14
Payer: MEDICARE

## 2024-11-06 ENCOUNTER — TELEPHONE (OUTPATIENT)
Dept: FAMILY MEDICINE | Facility: CLINIC | Age: 74
End: 2024-11-06
Payer: MEDICARE

## 2024-11-06 NOTE — TELEPHONE ENCOUNTER
----- Message from Leana sent at 11/6/2024  1:21 PM CST -----  Regarding: appt request  Contact: pt  Type: Appointment Request    Caller is requesting an appointment.      Name of Caller:pt    Best Call Back Number:[phone]    Additional Information: pt wishing to est care and be seen as soon as possible.  It shows that doctor is accepting new pts but there are no appts available.

## 2024-11-08 ENCOUNTER — OFFICE VISIT (OUTPATIENT)
Dept: FAMILY MEDICINE | Facility: CLINIC | Age: 74
End: 2024-11-08
Payer: MEDICARE

## 2024-11-08 ENCOUNTER — HOSPITAL ENCOUNTER (OUTPATIENT)
Dept: RADIOLOGY | Facility: HOSPITAL | Age: 74
Discharge: HOME | End: 2024-11-08
Payer: MEDICARE

## 2024-11-08 VITALS
SYSTOLIC BLOOD PRESSURE: 130 MMHG | RESPIRATION RATE: 16 BRPM | TEMPERATURE: 98 F | BODY MASS INDEX: 30.34 KG/M2 | WEIGHT: 193.31 LBS | OXYGEN SATURATION: 97 % | HEART RATE: 77 BPM | DIASTOLIC BLOOD PRESSURE: 62 MMHG | HEIGHT: 67 IN

## 2024-11-08 DIAGNOSIS — C18.9 COLON ADENOCARCINOMA: ICD-10-CM

## 2024-11-08 DIAGNOSIS — Z15.89 MTHFR MUTATION: ICD-10-CM

## 2024-11-08 DIAGNOSIS — K43.9 VENTRAL HERNIA WITHOUT OBSTRUCTION OR GANGRENE: ICD-10-CM

## 2024-11-08 DIAGNOSIS — K43.9 VENTRAL HERNIA WITHOUT OBSTRUCTION OR GANGRENE: Primary | ICD-10-CM

## 2024-11-08 PROCEDURE — 99215 OFFICE O/P EST HI 40 MIN: CPT | Mod: PBBFAC,PO

## 2024-11-08 PROCEDURE — 74176 CT ABD & PELVIS W/O CONTRAST: CPT | Mod: 26,,, | Performed by: RADIOLOGY

## 2024-11-08 PROCEDURE — 99999 PR PBB SHADOW E&M-EST. PATIENT-LVL V: CPT | Mod: PBBFAC,,,

## 2024-11-08 PROCEDURE — 74176 CT ABD & PELVIS W/O CONTRAST: CPT | Mod: TC

## 2024-11-08 RX ORDER — PROCHLORPERAZINE MALEATE 10 MG
10 TABLET ORAL EVERY 6 HOURS PRN
COMMUNITY
Start: 2024-07-29

## 2024-11-08 NOTE — PROGRESS NOTES
"Subjective:       Patient ID:  [id number]     Chief Complaint: Hernia      History of Present Illness      Ms. Torres presents today for  evaluation of abdominal concerns.   She reports an abdominal bulge that developed between mid-September and October. The bulge is associated with a burning sensation and pain on the sides and lower abdomen, described as feeling "like somebody's punched in the gut." The bulge becomes more pronounced with standing, causing discomfort along the sides, and flattens becoming "mushy" when lying down. She experiences soft, diarrhea-like bowel movements and occasional nausea without vomiting. She also reports dump syndrome, needing to urgently use the bathroom within 5-10 minutes after eating.     She has a history of stage 3 colon cancer . She is currently undergoing follow-up every 3 months. Chemotherapy was discontinued due to intolerance. She also has a history of blood disorders including anemia and a previous blood clot in her knee prior to knee surgery. She reports having the less severe form of MTHFR mutation.   No other concerns today.  Past Medical History:   Diagnosis Date    Abnormal Pap smear     s/p cryo at 20 y/o    Anticoagulant long-term use     GERD (gastroesophageal reflux disease)     Hyperlipidemia     Migraine     Migraines     Sleep apnea     Can not use c-pap      Active Problem List with Overview Notes    Diagnosis Date Noted    Leukocytosis 04/29/2024    MTHFR mutation 04/28/2024    Colon adenocarcinoma 04/27/2024    Dysequilibrium 04/05/2024    Lightheadedness 04/05/2024    Iron deficiency anemia due to chronic blood loss 04/04/2024    Vertigo 03/07/2024    Syncope 03/07/2024    Shortness of breath 03/07/2024    Chronic embolism and thrombosis of other specified deep vein of left lower extremity 01/18/2024    Deep vein thrombosis (DVT) of left lower extremity, unspecified chronicity, unspecified vein 01/18/2024    Essential hypertension 01/18/2024    Dyslipidemia " 01/18/2024    Statin intolerance 01/18/2024    Type 2 diabetes mellitus without complication, without long-term current use of insulin 01/18/2024    Ovarian cyst 10/29/2014    Mixed incontinence urge and stress 10/29/2014    Biliary colic 10/14/2014      Review of patient's allergies indicates:   Allergen Reactions    Statins-hmg-coa reductase inhibitors Swelling    Metformin Other (See Comments)     metallic taste , burn of the esophagus    Oxaliplatin Itching     Throat itchiness, twitching of both eyelids,tingling of hands, lips, head and feet. Cramping of toes.         Current Outpatient Medications:     acetaminophen (TYLENOL) 500 MG tablet, Take 2 tablets (1,000 mg total) by mouth every 8 (eight) hours as needed for Pain., Disp: 30 tablet, Rfl: 0    aspirin 81 MG Chew, Take 81 mg by mouth once daily., Disp: , Rfl:     diltiaZEM (CARDIZEM CD) 120 MG Cp24, Take 1 capsule (120 mg total) by mouth once daily., Disp: 30 capsule, Rfl: 11    diphenoxylate-atropine 2.5-0.025 mg (LOMOTIL) 2.5-0.025 mg per tablet, Take 1 tablet by mouth., Disp: , Rfl:     ELIQUIS 2.5 mg Tab, Take 2.5 mg by mouth every 12 (twelve) hours., Disp: , Rfl:     empagliflozin (JARDIANCE) 25 mg tablet, Take 25 mg by mouth once daily., Disp: , Rfl:     ergocalciferol (ERGOCALCIFEROL) 50,000 unit Cap, Take 50,000 Units by mouth every 7 days. Fridays, Disp: , Rfl:     ezetimibe (ZETIA) 10 mg tablet, Take 10 mg by mouth once daily., Disp: , Rfl:     glimepiride (AMARYL) 1 MG tablet, Take 1 mg by mouth., Disp: , Rfl:     Lactobacillus rhamnosus GG (CULTURELLE) 10 billion cell capsule, Take 1 capsule by mouth once daily., Disp: , Rfl:     LIDOcaine-prilocaine (EMLA) cream, Apply to skin 45-60 minutes prior to accessing port-a-cath., Disp: , Rfl:     loperamide (IMODIUM) 2 mg capsule, Take 2 mg by mouth., Disp: , Rfl:     loratadine (CLARITIN) 10 mg tablet, Take 10 mg by mouth., Disp: , Rfl:     melatonin 1 mg TbDL, Take by mouth., Disp: , Rfl:      ondansetron (ZOFRAN) 4 MG tablet, Take 4 mg by mouth., Disp: , Rfl:     pantoprazole (PROTONIX) 40 MG tablet, Take 40 mg by mouth every morning., Disp: , Rfl:     potassium chloride (MICRO-K) 10 MEQ CpSR, TAKE 1 CAPSULE BY MOUTH EVERY DAY, Disp: 90 capsule, Rfl: 3    prochlorperazine (COMPAZINE) 10 MG tablet, Take 10 mg by mouth every 6 (six) hours as needed., Disp: , Rfl:     ramelteon (ROZEREM) 8 mg tablet, TAKE 1 TABLET (8 MG TOTAL) BY MOUTH EVERY EVENING., Disp: 90 tablet, Rfl: 1    WESTAB MAX 2.5-25-2 mg Tab, TAKE 1 TABLET BY MOUTH EVERY DAY, Disp: 90 tablet, Rfl: 4  No current facility-administered medications for this visit.    Facility-Administered Medications Ordered in Other Visits:     electrolyte-S (ISOLYTE), , Intravenous, Continuous, Blade Packer MD    electrolyte-S (ISOLYTE), , Intravenous, Continuous, Blade Packer MD, Last Rate: 200 mL/hr at 12/29/23 1159, Rate Change at 12/29/23 1159    LIDOcaine (PF) 10 mg/ml (1%) injection 10 mg, 1 mL, Intradermal, Once, Blade Packer MD    midazolam (VERSED) 1 mg/mL injection 2 mg, 2 mg, Intravenous, PRN, Blade Packer MD, 2 mg at 12/29/23 0955    Lab Results   Component Value Date    WBC 7.65 11/08/2024    HGB 14.3 11/08/2024    HCT 42.5 11/08/2024     11/08/2024    CHOL 191 09/12/2024    TRIG 156 (H) 09/12/2024    HDL 54 09/12/2024    ALT 42 11/08/2024    AST 42 (H) 11/08/2024     11/08/2024    K 3.6 11/08/2024     11/08/2024    CREATININE 1.2 11/08/2024    BUN 13 11/08/2024    CO2 26 11/08/2024    TSH 2.254 04/19/2024    INR 1.0 04/19/2024    HGBA1C 7.4 (H) 04/28/2024       Review of Systems   Constitutional:  Negative for fatigue and fever.   HENT: Negative.  Negative for congestion, sneezing and sore throat.    Eyes: Negative.    Respiratory:  Negative for cough, shortness of breath and wheezing.    Cardiovascular:  Negative for chest pain, palpitations and leg swelling.   Gastrointestinal:  Positive for abdominal  pain (mild), nausea and vomiting.   Genitourinary: Negative.    Musculoskeletal: Negative.  Negative for arthralgias.   Skin: Negative.  Negative for rash.   Neurological:  Negative for dizziness, weakness, light-headedness, numbness and headaches.   Hematological: Negative.    Psychiatric/Behavioral: Negative.         Objective:      Physical Exam  Constitutional:       Appearance: Normal appearance.   HENT:      Head: Normocephalic and atraumatic.      Nose: Nose normal.   Eyes:      Extraocular Movements: Extraocular movements intact.   Cardiovascular:      Rate and Rhythm: Normal rate and regular rhythm.   Pulmonary:      Effort: Pulmonary effort is normal.      Breath sounds: Normal breath sounds.   Abdominal:      General: Bowel sounds are increased.      Tenderness: There is abdominal tenderness in the epigastric area.      Hernia: A hernia is present. Hernia is present in the ventral area.   Musculoskeletal:         General: Normal range of motion.      Cervical back: Normal range of motion.   Skin:     General: Skin is warm and dry.   Neurological:      General: No focal deficit present.      Mental Status: She is alert and oriented to person, place, and time.   Psychiatric:         Mood and Affect: Mood normal.         Assessment:       1. Ventral hernia without obstruction or gangrene    2. MTHFR mutation    3. Colon adenocarcinoma        Plan:       Jen was seen today for hernia.    Diagnoses and all orders for this visit:    Ventral hernia without obstruction or gangrene  - exam concerning for ventral hernia.  We will order CT.  Recommended follow up with surgeon patient previously saw at the beginning of the month.  -     CT Abdomen Pelvis  Without Contrast; Future  -     CBC W/ AUTO DIFFERENTIAL; Future  -     COMPREHENSIVE METABOLIC PANEL; Future  -ED precautions.    MTHFR mutation  - continue follow up with Hematology/Oncology    Colon adenocarcinoma  - continue follow up with  Hematology/Oncology                Future Appointments       Date Provider Specialty Appt Notes    12/16/2024 PABLO Moreland MD Hematology and Oncology F/U AFTER [facility]               Portions of this note were dictated using voice recognition software and may contain dictation related errors in spelling / grammar / syntax not discovered on text review.     Jaci Keller PA-C

## 2024-11-13 ENCOUNTER — OFFICE VISIT (OUTPATIENT)
Dept: SURGERY | Facility: CLINIC | Age: 74
End: 2024-11-13
Payer: MEDICARE

## 2024-11-13 VITALS — TEMPERATURE: 98 F | SYSTOLIC BLOOD PRESSURE: 119 MMHG | DIASTOLIC BLOOD PRESSURE: 72 MMHG | HEART RATE: 77 BPM

## 2024-11-13 DIAGNOSIS — K43.2 INCISIONAL HERNIA: ICD-10-CM

## 2024-11-13 DIAGNOSIS — K43.2 INCISIONAL HERNIA, WITHOUT OBSTRUCTION OR GANGRENE: Primary | ICD-10-CM

## 2024-11-13 DIAGNOSIS — I82.402 DEEP VEIN THROMBOSIS (DVT) OF LEFT LOWER EXTREMITY, UNSPECIFIED CHRONICITY, UNSPECIFIED VEIN: ICD-10-CM

## 2024-11-13 DIAGNOSIS — Z15.89 MTHFR MUTATION: ICD-10-CM

## 2024-11-13 PROCEDURE — 99214 OFFICE O/P EST MOD 30 MIN: CPT | Mod: S$PBB,,, | Performed by: SURGERY

## 2024-11-13 PROCEDURE — 99999 PR PBB SHADOW E&M-EST. PATIENT-LVL III: CPT | Mod: PBBFAC,,, | Performed by: SURGERY

## 2024-11-13 PROCEDURE — 99213 OFFICE O/P EST LOW 20 MIN: CPT | Mod: PBBFAC,PN | Performed by: SURGERY

## 2024-11-13 RX ORDER — CEFAZOLIN SODIUM 2 G/50ML
2 SOLUTION INTRAVENOUS
OUTPATIENT
Start: 2024-11-13

## 2024-11-13 RX ORDER — HEPARIN SODIUM 5000 [USP'U]/ML
5000 INJECTION, SOLUTION INTRAVENOUS; SUBCUTANEOUS EVERY 8 HOURS
OUTPATIENT
Start: 2024-11-13

## 2024-11-14 RX ORDER — ENOXAPARIN SODIUM 100 MG/ML
40 INJECTION SUBCUTANEOUS EVERY 12 HOURS
Qty: 6 EACH | Refills: 0 | Status: SHIPPED | OUTPATIENT
Start: 2024-11-14 | End: 2024-11-17

## 2024-11-14 NOTE — H&P (VIEW-ONLY)
GENERAL SURGERY  OUTPATIENT H&P    REASON FOR VISIT/CC: Abdominal wall bulge    HPI: Jen Torres is a 74 y.o. female with MTHFR mutation on Eliquis, history of stage IIIA colon cancer status post right hemicolectomy in April 2024 started on adjuvant chemotherapy which was stopped due to intolerance.  Currently under surveillance.  Patient reports after episodes of diarrhea following chemotherapy she developed abdominal wall bulging.  This has worse when she was stands and reduces when she lays flat. Had had imaging completed at [facility] however we were unable to obtain the actual disc to review the images are self. Patient did meet with a new PCP he was subsequently ordered CT abdomen pelvis here in [place] which has been completed.  Patient reports today to review these images and discuss surgical plans. She again has a large bulge above the umbilicus and a bulge below the umbilicus pretty much over the entire length of her previous incision. It was not have obstructive symptoms.  Does feel the bulging it was getting larger.    I have reviewed the patient's chart including prior progress notes, procedures and testing.     ROS:   Review of Systems    PROBLEM LIST:  Patient Active Problem List   Diagnosis    Biliary colic    Ovarian cyst    Mixed incontinence urge and stress    Chronic embolism and thrombosis of other specified deep vein of left lower extremity    Deep vein thrombosis (DVT) of left lower extremity, unspecified chronicity, unspecified vein    Essential hypertension    Dyslipidemia    Statin intolerance    Type 2 diabetes mellitus without complication, without long-term current use of insulin    Vertigo    Syncope    Shortness of breath    Iron deficiency anemia due to chronic blood loss    Dysequilibrium    Lightheadedness    Colon adenocarcinoma    MTHFR mutation    Leukocytosis         HISTORY  Past Medical History:   Diagnosis Date    Abnormal Pap smear     s/p cryo at 22 y/o     Anticoagulant long-term use     GERD (gastroesophageal reflux disease)     Hyperlipidemia     Migraine     Migraines     Sleep apnea     Can not use c-pap       Past Surgical History:   Procedure Laterality Date    ARTHROSCOPIC REPAIR OF ROTATOR CUFF OF SHOULDER Left 12/29/2023    Procedure: REPAIR, ROTATOR CUFF, ARTHROSCOPIC;  Surgeon: Gary Tony MD;  Location: [facility];  Service: Orthopedics;  Laterality: Left;  arthrex    ARTHROSCOPY OF SHOULDER WITH DECOMPRESSION OF SUBACROMIAL SPACE Left 12/29/2023    Procedure: ARTHROSCOPY, SHOULDER, WITH SUBACROMIAL SPACE DECOMPRESSION;  Surgeon: Gary Tony MD;  Location: [facility];  Service: Orthopedics;  Laterality: Left;    CATARACT EXTRACTION, BILATERAL      CHOLECYSTECTOMY  10/01/2014    COLECTOMY, RIGHT  4/22/2024    Procedure: COLECTOMY, RIGHT;  Surgeon: Jaden Barrios MD;  Location: [facility];  Service: General;;    COLONOSCOPIC SURGICAL PROCEDURE  4/22/2024    Procedure: SURGICAL PROCEDURE, COLONOSCOPIC;  Surgeon: Jaden Barrios MD;  Location: [facility];  Service: General;;    COLONOSCOPY N/A 4/21/2024    Procedure: COLONOSCOPY;  Surgeon: Ceasar Arias III, MD;  Location: [facility];  Service: Endoscopy;  Laterality: N/A;    ESOPHAGOGASTRODUODENOSCOPY N/A 4/20/2024    Procedure: EGD (ESOPHAGOGASTRODUODENOSCOPY);  Surgeon: Ceasar Arias III, MD;  Location: [facility];  Service: Endoscopy;  Laterality: N/A;    EYE SURGERY      KNEE ARTHROSCOPY Left     LAPAROTOMY, EXPLORATORY N/A 4/22/2024    Procedure: LAPAROTOMY, EXPLORATORY;  Surgeon: Jaden Barrios MD;  Location: [facility];  Service: General;  Laterality: N/A;    LYSIS OF ADHESIONS  4/22/2024    Procedure: LYSIS, ADHESIONS;  Surgeon: Jaden Barrios MD;  Location: [facility];  Service: General;;    TONSILLECTOMY         Social History     Tobacco Use    Smoking status: Former    Smokeless tobacco: Never   Substance Use Topics    Alcohol use: No    Drug use: No        Family History   Problem Relation Name Age of Onset    Breast cancer Paternal Grandmother  70    Colon cancer Mother              Cancer Daughter          crohns    Ovarian cancer Daughter      Cancer Daughter  20        uterine cancer with mets s/p chemo/rad/surgery; ?genetic mutation; now with hip tumors    Breast cancer Maternal Grandmother           MEDS:  Current Outpatient Medications on File Prior to Visit   Medication Sig Dispense Refill    acetaminophen (TYLENOL) 500 MG tablet Take 2 tablets (1,000 mg total) by mouth every 8 (eight) hours as needed for Pain. 30 tablet 0    aspirin 81 MG Chew Take 81 mg by mouth once daily.      diltiaZEM (CARDIZEM CD) 120 MG Cp24 Take 1 capsule (120 mg total) by mouth once daily. 30 capsule 11    diphenoxylate-atropine 2.5-0.025 mg (LOMOTIL) 2.5-0.025 mg per tablet Take 1 tablet by mouth.      ELIQUIS 2.5 mg Tab Take 2.5 mg by mouth every 12 (twelve) hours.      empagliflozin (JARDIANCE) 25 mg tablet Take 25 mg by mouth once daily.      ergocalciferol (ERGOCALCIFEROL) 50,000 unit Cap Take 50,000 Units by mouth every 7 days.       ezetimibe (ZETIA) 10 mg tablet Take 10 mg by mouth once daily.      glimepiride (AMARYL) 1 MG tablet Take 1 mg by mouth.      Lactobacillus rhamnosus GG (CULTURELLE) 10 billion cell capsule Take 1 capsule by mouth once daily.      LIDOcaine-prilocaine (EMLA) cream Apply to skin 45-60 minutes prior to accessing port-a-cath.      loperamide (IMODIUM) 2 mg capsule Take 2 mg by mouth.      loratadine (CLARITIN) 10 mg tablet Take 10 mg by mouth.      melatonin 1 mg TbDL Take by mouth.      ondansetron (ZOFRAN) 4 MG tablet Take 4 mg by mouth.      pantoprazole (PROTONIX) 40 MG tablet Take 40 mg by mouth every morning.      potassium chloride (MICRO-K) 10 MEQ CpSR TAKE 1 CAPSULE BY MOUTH EVERY DAY 90 capsule 3    prochlorperazine (COMPAZINE) 10 MG tablet Take 10 mg by mouth every 6 (six) hours as needed.      ramelteon (ROZEREM) 8 mg  tablet TAKE 1 TABLET (8 MG TOTAL) BY MOUTH EVERY EVENING. 90 tablet 1    WESTAB MAX 2.5-25-2 mg Tab TAKE 1 TABLET BY MOUTH EVERY DAY 90 tablet 4     Current Facility-Administered Medications on File Prior to Visit   Medication Dose Route Frequency Provider Last Rate Last Admin    electrolyte-S (ISOLYTE)   Intravenous Continuous Blade Packer MD        electrolyte-S (ISOLYTE)   Intravenous Continuous Blade Packer  mL/hr at 12/29/23 1159 Rate Change at 12/29/23 1159    LIDOcaine (PF) 10 mg/ml (1%) injection 10 mg  1 mL Intradermal Once Blade Packer MD        midazolam (VERSED) 1 mg/mL injection 2 mg  2 mg Intravenous PRN Blade Packer MD   2 mg at 12/29/23 0955       ALLERGIES:  Review of patient's allergies indicates:   Allergen Reactions    Statins-hmg-coa reductase inhibitors Swelling    Metformin Other (See Comments)     metallic taste , burn of the esophagus    Oxaliplatin Itching     Throat itchiness, twitching of both eyelids,tingling of hands, lips, head and feet. Cramping of toes.         VITALS:  Vitals:    11/13/24 0953   BP: 119/72   Pulse: 77   Temp: 97.5 °F (36.4 °C)         PHYSICAL EXAM:  Physical Exam  Vitals reviewed.   Constitutional:       General: She is not in acute distress.     Appearance: Normal appearance. She is well-developed.   HENT:      Head: Normocephalic and atraumatic.      Nose: Nose normal.   Eyes:      General: No scleral icterus.     Conjunctiva/sclera: Conjunctivae normal.   Neck:      Trachea: No tracheal tenderness or tracheal deviation.   Cardiovascular:      Rate and Rhythm: Normal rate and regular rhythm.      Pulses: Normal pulses.   Pulmonary:      Effort: Pulmonary effort is normal. No accessory muscle usage or respiratory distress.      Breath sounds: Normal breath sounds.   Abdominal:      General: There is no distension.      Palpations: Abdomen is soft.      Tenderness: There is no abdominal tenderness.          Comments: Well-healed  midline laparotomy, small ulceration improved from previous visit, large bulge with standing though again fascial defect edges were difficult to appreciate, no surrounding erythema   Musculoskeletal:         General: No swelling or tenderness. Normal range of motion.      Cervical back: Normal range of motion and neck supple. No rigidity.   Skin:     General: Skin is warm and dry.      Coloration: Skin is not jaundiced.      Findings: No erythema.   Neurological:      General: No focal deficit present.      Mental Status: She is alert and oriented to person, place, and time.      Motor: No weakness or abnormal muscle tone.   Psychiatric:         Mood and Affect: Mood normal.         Behavior: Behavior normal.         Thought Content: Thought content normal.         Judgment: Judgment normal.           LABS:  Lab Results   Component Value Date    WBC 7.65 11/08/2024    RBC 4.62 11/08/2024    HGB 14.3 11/08/2024    HCT 42.5 11/08/2024     11/08/2024     Lab Results   Component Value Date     (H) 11/08/2024     11/08/2024    K 3.6 11/08/2024     11/08/2024    CO2 26 11/08/2024    BUN 13 11/08/2024    CREATININE 1.2 11/08/2024    CALCIUM 9.6 11/08/2024     Lab Results   Component Value Date    ALT 42 11/08/2024    AST 42 (H) 11/08/2024    ALKPHOS 128 11/08/2024    BILITOT 0.4 11/08/2024     Lab Results   Component Value Date    MG 1.7 04/29/2024    PHOS 2.9 04/29/2024       STUDIES:  Images and reports were personally reviewed.  CT abdomen pelvis 11/08/2024  FINDINGS:  Hypoventilatory changes in visualized dependent lung bases.  Liver and spleen unremarkable appearance.  Adrenal glands and pancreas unremarkable appearance.  Cholecystectomy clips.  No biliary duct dilatation.  Abdominal aorta tapers without aneurysmal dilatation     Anterior abdominal wall hernias in the lower abdomen anterior bowing of the anterior abdominal wall and multiple and large broad necked ventral hernias.  Umbilicus  not well defined and component baby umbilical as well.  Contains fat and bowel.  No bowel obstructive or inflammatory change.  Postoperative changes of right hemicolectomy.  No appreciable bowel wall thickening or inflammatory change and no free intraperitoneal air or fluid.  No significant adenopathy seen     Kidneys, ureters, bladder; bilateral parapelvic cysts. Evaluation for hydronephrosis is well difficult but no definite hydronephrosis. No opaque renal stone. No ureteral dilatation opaque ureteral stone or ureteral obstruction evident. Urinary bladder mildly distended at time of the exam and as visualized is unremarkable appearance     Reproductive organs; uterus and adnexal region unremarkable appearance     Osseous structures show degenerative changes without obvious aggressive appearing osseous lesion     Impression:     Postoperative changes of right hemicolectomy.  Parapelvic renal cysts.  Prior cholecystectomy.  Multiple and large lower anterior abdominal wall ventral hernias.              ASSESSMENT & PLAN:  74 y.o. female with MTHFR mutation with h/o DVT on Eliquis, colon cancer status post laparotomy, now with large incisional hernia  -imaging reviewed, patient was appears to have a large incisional hernia measuring at least 13 cm long by up to 6-7 cm wide, loops of bowel are involved however not felt to be at risk for incarceration and no evidence of obstruction  -given the imaging findings I do not feel she was a candidate for robotic approach for hernia repair but would rather recommend open repair with mesh with retrorectus dissection and possible transverse abdominis release, I explained that this has more complex abdominal wall reconstruction and does have risks which include pain, bleeding, scarring, infection, recurrence, seroma, hematoma, decreased sensation, mesh infection, poor cosmesis, pain, etc.  -patient was interested in surgical repair to prevent worsening size and symptoms  -will  schedule for 12/12/2024 for open incisional hernia repair with mesh, likely retrorectus dissection and tar, will require postoperative admission to assure adequate pain control and to assure no issues with nausea vomiting  -patient also on long-term anticoagulation due to MTHFR mutation history of DVT and TIA, will need to stop Eliquis 3 days prior to surgery, will plan to bridge with Lovenox 40 mg b.i.d. for the 3 days leading up to the surgery, will plan for prophylactic heparin for the surgery and after the 1st 24 hours, if hemoglobin is stable will plan to restart Lovenox and then Eliquis   -labs and EKG ordered    -patient has concern for possible hernia  -based off exam I feel the bulge most likely represents diastasis recti  -I will request imaging from [facility] to review and evaluate for actual fascial defect  -if fascial defect is identified we can discuss surgical treatment options depending on the size of the defect, if no defect it was appreciated in all the bulge is related to diastasis and repair would be considered cosmetic  -patient instructed to go to the emergency room for redness, hardness of the abdominal wall, obstructive symptoms

## 2024-11-14 NOTE — PROGRESS NOTES
GENERAL SURGERY  OUTPATIENT H&P    REASON FOR VISIT/CC: Abdominal wall bulge    HPI: Jen Torres is a 74 y.o. female with MTHFR mutation on Eliquis, history of stage IIIA colon cancer status post right hemicolectomy in April 2024 started on adjuvant chemotherapy which was stopped due to intolerance.  Currently under surveillance.  Patient reports after episodes of diarrhea following chemotherapy she developed abdominal wall bulging.  This has worse when she was stands and reduces when she lays flat. Had had imaging completed at [facility] however we were unable to obtain the actual disc to review the images are self. Patient did meet with a new PCP he was subsequently ordered CT abdomen pelvis here in [place] which has been completed.  Patient reports today to review these images and discuss surgical plans. She again has a large bulge above the umbilicus and a bulge below the umbilicus pretty much over the entire length of her previous incision. It was not have obstructive symptoms.  Does feel the bulging it was getting larger.    I have reviewed the patient's chart including prior progress notes, procedures and testing.     ROS:   Review of Systems    PROBLEM LIST:  Patient Active Problem List   Diagnosis    Biliary colic    Ovarian cyst    Mixed incontinence urge and stress    Chronic embolism and thrombosis of other specified deep vein of left lower extremity    Deep vein thrombosis (DVT) of left lower extremity, unspecified chronicity, unspecified vein    Essential hypertension    Dyslipidemia    Statin intolerance    Type 2 diabetes mellitus without complication, without long-term current use of insulin    Vertigo    Syncope    Shortness of breath    Iron deficiency anemia due to chronic blood loss    Dysequilibrium    Lightheadedness    Colon adenocarcinoma    MTHFR mutation    Leukocytosis         HISTORY  Past Medical History:   Diagnosis Date    Abnormal Pap smear     s/p cryo at 20 y/o     Anticoagulant long-term use     GERD (gastroesophageal reflux disease)     Hyperlipidemia     Migraine     Migraines     Sleep apnea     Can not use c-pap       Past Surgical History:   Procedure Laterality Date    ARTHROSCOPIC REPAIR OF ROTATOR CUFF OF SHOULDER Left 12/29/2023    Procedure: REPAIR, ROTATOR CUFF, ARTHROSCOPIC;  Surgeon: Gary Tony MD;  Location: [facility];  Service: Orthopedics;  Laterality: Left;  arthrex    ARTHROSCOPY OF SHOULDER WITH DECOMPRESSION OF SUBACROMIAL SPACE Left 12/29/2023    Procedure: ARTHROSCOPY, SHOULDER, WITH SUBACROMIAL SPACE DECOMPRESSION;  Surgeon: Gary Tony MD;  Location: [facility];  Service: Orthopedics;  Laterality: Left;    CATARACT EXTRACTION, BILATERAL      CHOLECYSTECTOMY  10/01/2014    COLECTOMY, RIGHT  4/22/2024    Procedure: COLECTOMY, RIGHT;  Surgeon: Jaden Barrios MD;  Location: [facility];  Service: General;;    COLONOSCOPIC SURGICAL PROCEDURE  4/22/2024    Procedure: SURGICAL PROCEDURE, COLONOSCOPIC;  Surgeon: Jaden Barrios MD;  Location: [facility];  Service: General;;    COLONOSCOPY N/A 4/21/2024    Procedure: COLONOSCOPY;  Surgeon: Ceasar Arias III, MD;  Location: [facility];  Service: Endoscopy;  Laterality: N/A;    ESOPHAGOGASTRODUODENOSCOPY N/A 4/20/2024    Procedure: EGD (ESOPHAGOGASTRODUODENOSCOPY);  Surgeon: Ceasar Arias III, MD;  Location: [facility];  Service: Endoscopy;  Laterality: N/A;    EYE SURGERY      KNEE ARTHROSCOPY Left     LAPAROTOMY, EXPLORATORY N/A 4/22/2024    Procedure: LAPAROTOMY, EXPLORATORY;  Surgeon: Jaden Barrios MD;  Location: [facility];  Service: General;  Laterality: N/A;    LYSIS OF ADHESIONS  4/22/2024    Procedure: LYSIS, ADHESIONS;  Surgeon: Jaden Barrios MD;  Location: [facility];  Service: General;;    TONSILLECTOMY         Social History     Tobacco Use    Smoking status: Former    Smokeless tobacco: Never   Substance Use Topics    Alcohol use: No    Drug use: No        Family History   Problem Relation Name Age of Onset    Breast cancer Paternal Grandmother  70    Colon cancer Mother              Cancer Daughter          crohns    Ovarian cancer Daughter      Cancer Daughter  20        uterine cancer with mets s/p chemo/rad/surgery; ?genetic mutation; now with hip tumors    Breast cancer Maternal Grandmother           MEDS:  Current Outpatient Medications on File Prior to Visit   Medication Sig Dispense Refill    acetaminophen (TYLENOL) 500 MG tablet Take 2 tablets (1,000 mg total) by mouth every 8 (eight) hours as needed for Pain. 30 tablet 0    aspirin 81 MG Chew Take 81 mg by mouth once daily.      diltiaZEM (CARDIZEM CD) 120 MG Cp24 Take 1 capsule (120 mg total) by mouth once daily. 30 capsule 11    diphenoxylate-atropine 2.5-0.025 mg (LOMOTIL) 2.5-0.025 mg per tablet Take 1 tablet by mouth.      ELIQUIS 2.5 mg Tab Take 2.5 mg by mouth every 12 (twelve) hours.      empagliflozin (JARDIANCE) 25 mg tablet Take 25 mg by mouth once daily.      ergocalciferol (ERGOCALCIFEROL) 50,000 unit Cap Take 50,000 Units by mouth every 7 days.       ezetimibe (ZETIA) 10 mg tablet Take 10 mg by mouth once daily.      glimepiride (AMARYL) 1 MG tablet Take 1 mg by mouth.      Lactobacillus rhamnosus GG (CULTURELLE) 10 billion cell capsule Take 1 capsule by mouth once daily.      LIDOcaine-prilocaine (EMLA) cream Apply to skin 45-60 minutes prior to accessing port-a-cath.      loperamide (IMODIUM) 2 mg capsule Take 2 mg by mouth.      loratadine (CLARITIN) 10 mg tablet Take 10 mg by mouth.      melatonin 1 mg TbDL Take by mouth.      ondansetron (ZOFRAN) 4 MG tablet Take 4 mg by mouth.      pantoprazole (PROTONIX) 40 MG tablet Take 40 mg by mouth every morning.      potassium chloride (MICRO-K) 10 MEQ CpSR TAKE 1 CAPSULE BY MOUTH EVERY DAY 90 capsule 3    prochlorperazine (COMPAZINE) 10 MG tablet Take 10 mg by mouth every 6 (six) hours as needed.      ramelteon (ROZEREM) 8 mg  tablet TAKE 1 TABLET (8 MG TOTAL) BY MOUTH EVERY EVENING. 90 tablet 1    WESTAB MAX 2.5-25-2 mg Tab TAKE 1 TABLET BY MOUTH EVERY DAY 90 tablet 4     Current Facility-Administered Medications on File Prior to Visit   Medication Dose Route Frequency Provider Last Rate Last Admin    electrolyte-S (ISOLYTE)   Intravenous Continuous Blade Packer MD        electrolyte-S (ISOLYTE)   Intravenous Continuous Blade Packer  mL/hr at 12/29/23 1159 Rate Change at 12/29/23 1159    LIDOcaine (PF) 10 mg/ml (1%) injection 10 mg  1 mL Intradermal Once Blade Packer MD        midazolam (VERSED) 1 mg/mL injection 2 mg  2 mg Intravenous PRN Blade Packer MD   2 mg at 12/29/23 0955       ALLERGIES:  Review of patient's allergies indicates:   Allergen Reactions    Statins-hmg-coa reductase inhibitors Swelling    Metformin Other (See Comments)     metallic taste , burn of the esophagus    Oxaliplatin Itching     Throat itchiness, twitching of both eyelids,tingling of hands, lips, head and feet. Cramping of toes.         VITALS:  Vitals:    11/13/24 0953   BP: 119/72   Pulse: 77   Temp: 97.5 °F (36.4 °C)         PHYSICAL EXAM:  Physical Exam  Vitals reviewed.   Constitutional:       General: She is not in acute distress.     Appearance: Normal appearance. She is well-developed.   HENT:      Head: Normocephalic and atraumatic.      Nose: Nose normal.   Eyes:      General: No scleral icterus.     Conjunctiva/sclera: Conjunctivae normal.   Neck:      Trachea: No tracheal tenderness or tracheal deviation.   Cardiovascular:      Rate and Rhythm: Normal rate and regular rhythm.      Pulses: Normal pulses.   Pulmonary:      Effort: Pulmonary effort is normal. No accessory muscle usage or respiratory distress.      Breath sounds: Normal breath sounds.   Abdominal:      General: There is no distension.      Palpations: Abdomen is soft.      Tenderness: There is no abdominal tenderness.          Comments: Well-healed  midline laparotomy, small ulceration improved from previous visit, large bulge with standing though again fascial defect edges were difficult to appreciate, no surrounding erythema   Musculoskeletal:         General: No swelling or tenderness. Normal range of motion.      Cervical back: Normal range of motion and neck supple. No rigidity.   Skin:     General: Skin is warm and dry.      Coloration: Skin is not jaundiced.      Findings: No erythema.   Neurological:      General: No focal deficit present.      Mental Status: She is alert and oriented to person, place, and time.      Motor: No weakness or abnormal muscle tone.   Psychiatric:         Mood and Affect: Mood normal.         Behavior: Behavior normal.         Thought Content: Thought content normal.         Judgment: Judgment normal.           LABS:  Lab Results   Component Value Date    WBC 7.65 11/08/2024    RBC 4.62 11/08/2024    HGB 14.3 11/08/2024    HCT 42.5 11/08/2024     11/08/2024     Lab Results   Component Value Date     (H) 11/08/2024     11/08/2024    K 3.6 11/08/2024     11/08/2024    CO2 26 11/08/2024    BUN 13 11/08/2024    CREATININE 1.2 11/08/2024    CALCIUM 9.6 11/08/2024     Lab Results   Component Value Date    ALT 42 11/08/2024    AST 42 (H) 11/08/2024    ALKPHOS 128 11/08/2024    BILITOT 0.4 11/08/2024     Lab Results   Component Value Date    MG 1.7 04/29/2024    PHOS 2.9 04/29/2024       STUDIES:  Images and reports were personally reviewed.  CT abdomen pelvis 11/08/2024  FINDINGS:  Hypoventilatory changes in visualized dependent lung bases.  Liver and spleen unremarkable appearance.  Adrenal glands and pancreas unremarkable appearance.  Cholecystectomy clips.  No biliary duct dilatation.  Abdominal aorta tapers without aneurysmal dilatation     Anterior abdominal wall hernias in the lower abdomen anterior bowing of the anterior abdominal wall and multiple and large broad necked ventral hernias.  Umbilicus  not well defined and component baby umbilical as well.  Contains fat and bowel.  No bowel obstructive or inflammatory change.  Postoperative changes of right hemicolectomy.  No appreciable bowel wall thickening or inflammatory change and no free intraperitoneal air or fluid.  No significant adenopathy seen     Kidneys, ureters, bladder; bilateral parapelvic cysts. Evaluation for hydronephrosis is well difficult but no definite hydronephrosis. No opaque renal stone. No ureteral dilatation opaque ureteral stone or ureteral obstruction evident. Urinary bladder mildly distended at time of the exam and as visualized is unremarkable appearance     Reproductive organs; uterus and adnexal region unremarkable appearance     Osseous structures show degenerative changes without obvious aggressive appearing osseous lesion     Impression:     Postoperative changes of right hemicolectomy.  Parapelvic renal cysts.  Prior cholecystectomy.  Multiple and large lower anterior abdominal wall ventral hernias.              ASSESSMENT & PLAN:  74 y.o. female with MTHFR mutation with h/o DVT on Eliquis, colon cancer status post laparotomy, now with large incisional hernia  -imaging reviewed, patient was appears to have a large incisional hernia measuring at least 13 cm long by up to 6-7 cm wide, loops of bowel are involved however not felt to be at risk for incarceration and no evidence of obstruction  -given the imaging findings I do not feel she was a candidate for robotic approach for hernia repair but would rather recommend open repair with mesh with retrorectus dissection and possible transverse abdominis release, I explained that this has more complex abdominal wall reconstruction and does have risks which include pain, bleeding, scarring, infection, recurrence, seroma, hematoma, decreased sensation, mesh infection, poor cosmesis, pain, etc.  -patient was interested in surgical repair to prevent worsening size and symptoms  -will  schedule for 12/12/2024 for open incisional hernia repair with mesh, likely retrorectus dissection and tar, will require postoperative admission to assure adequate pain control and to assure no issues with nausea vomiting  -patient also on long-term anticoagulation due to MTHFR mutation history of DVT and TIA, will need to stop Eliquis 3 days prior to surgery, will plan to bridge with Lovenox 40 mg b.i.d. for the 3 days leading up to the surgery, will plan for prophylactic heparin for the surgery and after the 1st 24 hours, if hemoglobin is stable will plan to restart Lovenox and then Eliquis   -labs and EKG ordered    -patient has concern for possible hernia  -based off exam I feel the bulge most likely represents diastasis recti  -I will request imaging from [facility] to review and evaluate for actual fascial defect  -if fascial defect is identified we can discuss surgical treatment options depending on the size of the defect, if no defect it was appreciated in all the bulge is related to diastasis and repair would be considered cosmetic  -patient instructed to go to the emergency room for redness, hardness of the abdominal wall, obstructive symptoms

## 2024-11-26 ENCOUNTER — INFUSION (OUTPATIENT)
Dept: INFUSION THERAPY | Facility: HOSPITAL | Age: 74
End: 2024-11-26
Attending: INTERNAL MEDICINE
Payer: MEDICARE

## 2024-11-26 VITALS
HEART RATE: 76 BPM | WEIGHT: 191.5 LBS | RESPIRATION RATE: 16 BRPM | BODY MASS INDEX: 30.06 KG/M2 | DIASTOLIC BLOOD PRESSURE: 78 MMHG | HEIGHT: 67 IN | SYSTOLIC BLOOD PRESSURE: 133 MMHG | TEMPERATURE: 98 F | OXYGEN SATURATION: 98 %

## 2024-11-26 DIAGNOSIS — C18.9 COLON ADENOCARCINOMA: Primary | ICD-10-CM

## 2024-11-26 PROCEDURE — A4216 STERILE WATER/SALINE, 10 ML: HCPCS | Performed by: INTERNAL MEDICINE

## 2024-11-26 PROCEDURE — 25000003 PHARM REV CODE 250: Performed by: INTERNAL MEDICINE

## 2024-11-26 PROCEDURE — 96523 IRRIG DRUG DELIVERY DEVICE: CPT

## 2024-11-26 PROCEDURE — 63600175 PHARM REV CODE 636 W HCPCS: Performed by: INTERNAL MEDICINE

## 2024-11-26 RX ORDER — SODIUM CHLORIDE 0.9 % (FLUSH) 0.9 %
10 SYRINGE (ML) INJECTION
Status: DISCONTINUED | OUTPATIENT
Start: 2024-11-26 | End: 2024-11-26 | Stop reason: HOSPADM

## 2024-11-26 RX ORDER — HEPARIN 100 UNIT/ML
500 SYRINGE INTRAVENOUS
Status: DISCONTINUED | OUTPATIENT
Start: 2024-11-26 | End: 2024-11-26 | Stop reason: HOSPADM

## 2024-11-26 RX ORDER — SODIUM CHLORIDE 0.9 % (FLUSH) 0.9 %
10 SYRINGE (ML) INJECTION
OUTPATIENT
Start: 2024-11-26

## 2024-11-26 RX ORDER — HEPARIN 100 UNIT/ML
500 SYRINGE INTRAVENOUS
OUTPATIENT
Start: 2024-11-26

## 2024-11-26 RX ADMIN — HEPARIN 500 UNITS: 100 SYRINGE at 11:11

## 2024-11-26 RX ADMIN — SODIUM CHLORIDE, PRESERVATIVE FREE 10 ML: 5 INJECTION INTRAVENOUS at 11:11

## 2024-11-27 ENCOUNTER — PATIENT MESSAGE (OUTPATIENT)
Dept: SURGERY | Facility: CLINIC | Age: 74
End: 2024-11-27
Payer: MEDICARE

## 2024-12-09 ENCOUNTER — HOSPITAL ENCOUNTER (OUTPATIENT)
Dept: PREADMISSION TESTING | Facility: HOSPITAL | Age: 74
Discharge: HOME | End: 2024-12-09
Attending: SURGERY
Payer: MEDICARE

## 2024-12-09 DIAGNOSIS — K43.2 INCISIONAL HERNIA, WITHOUT OBSTRUCTION OR GANGRENE: ICD-10-CM

## 2024-12-09 DIAGNOSIS — Z01.818 PRE-OP TESTING: Primary | ICD-10-CM

## 2024-12-12 ENCOUNTER — ANESTHESIA EVENT (OUTPATIENT)
Dept: SURGERY | Facility: HOSPITAL | Age: 74
End: 2024-12-12
Payer: MEDICARE

## 2024-12-12 ENCOUNTER — ANESTHESIA (OUTPATIENT)
Dept: SURGERY | Facility: HOSPITAL | Age: 74
End: 2024-12-12
Payer: MEDICARE

## 2024-12-12 ENCOUNTER — HOSPITAL ENCOUNTER (INPATIENT)
Facility: HOSPITAL | Age: 74
LOS: 2 days | Discharge: HOME HEALTH | DRG: 354 | End: 2024-12-14
Attending: SURGERY | Admitting: SURGERY
Payer: MEDICARE

## 2024-12-12 DIAGNOSIS — Z98.890 S/P REPAIR OF VENTRAL HERNIA: ICD-10-CM

## 2024-12-12 DIAGNOSIS — Z01.818 PRE-OP TESTING: ICD-10-CM

## 2024-12-12 DIAGNOSIS — E11.9 TYPE 2 DIABETES MELLITUS WITHOUT COMPLICATION, WITHOUT LONG-TERM CURRENT USE OF INSULIN: Primary | ICD-10-CM

## 2024-12-12 DIAGNOSIS — K43.2 INCISIONAL HERNIA, WITHOUT OBSTRUCTION OR GANGRENE: ICD-10-CM

## 2024-12-12 DIAGNOSIS — Z87.19 S/P REPAIR OF VENTRAL HERNIA: ICD-10-CM

## 2024-12-12 LAB
ABO + RH BLD: NORMAL
BLD GP AB SCN CELLS X3 SERPL QL: NORMAL
POCT GLUCOSE: 108 MG/DL (ref 70–110)
POCT GLUCOSE: 175 MG/DL (ref 70–110)

## 2024-12-12 PROCEDURE — 36000707: Performed by: SURGERY

## 2024-12-12 PROCEDURE — 94799 UNLISTED PULMONARY SVC/PX: CPT

## 2024-12-12 PROCEDURE — 25000003 PHARM REV CODE 250: Performed by: ANESTHESIOLOGY

## 2024-12-12 PROCEDURE — 63600175 PHARM REV CODE 636 W HCPCS: Performed by: ANESTHESIOLOGY

## 2024-12-12 PROCEDURE — 71000033 HC RECOVERY, INTIAL HOUR: Performed by: SURGERY

## 2024-12-12 PROCEDURE — 63600175 PHARM REV CODE 636 W HCPCS: Performed by: SURGERY

## 2024-12-12 PROCEDURE — 99900035 HC TECH TIME PER 15 MIN (STAT)

## 2024-12-12 PROCEDURE — 99900031 HC PATIENT EDUCATION (STAT)

## 2024-12-12 PROCEDURE — 63600175 PHARM REV CODE 636 W HCPCS

## 2024-12-12 PROCEDURE — 71000039 HC RECOVERY, EACH ADD'L HOUR: Performed by: SURGERY

## 2024-12-12 PROCEDURE — 12000002 HC ACUTE/MED SURGE SEMI-PRIVATE ROOM

## 2024-12-12 PROCEDURE — 49595 RPR AA HRN 1ST > 10 RDC: CPT | Mod: 51,,, | Performed by: SURGERY

## 2024-12-12 PROCEDURE — 94761 N-INVAS EAR/PLS OXIMETRY MLT: CPT

## 2024-12-12 PROCEDURE — 37000009 HC ANESTHESIA EA ADD 15 MINS: Performed by: SURGERY

## 2024-12-12 PROCEDURE — 25000003 PHARM REV CODE 250

## 2024-12-12 PROCEDURE — 36000706: Performed by: SURGERY

## 2024-12-12 PROCEDURE — C1781 MESH (IMPLANTABLE): HCPCS | Performed by: SURGERY

## 2024-12-12 PROCEDURE — 86850 RBC ANTIBODY SCREEN: CPT | Performed by: SURGERY

## 2024-12-12 PROCEDURE — 37000008 HC ANESTHESIA 1ST 15 MINUTES: Performed by: SURGERY

## 2024-12-12 PROCEDURE — 25000003 PHARM REV CODE 250: Performed by: SURGERY

## 2024-12-12 PROCEDURE — 27000221 HC OXYGEN, UP TO 24 HOURS

## 2024-12-12 PROCEDURE — 0WQF0ZZ REPAIR ABDOMINAL WALL, OPEN APPROACH: ICD-10-PCS | Performed by: SURGERY

## 2024-12-12 PROCEDURE — 15734 MUSCLE-SKIN GRAFT TRUNK: CPT | Mod: ,,, | Performed by: SURGERY

## 2024-12-12 PROCEDURE — C9290 INJ, BUPIVACAINE LIPOSOME: HCPCS | Performed by: SURGERY

## 2024-12-12 DEVICE — MESH HERNIA PHASIX 8X10IN RND: Type: IMPLANTABLE DEVICE | Site: ABDOMEN | Status: FUNCTIONAL

## 2024-12-12 RX ORDER — OXYCODONE HYDROCHLORIDE 5 MG/1
5 TABLET ORAL EVERY 4 HOURS PRN
Status: DISCONTINUED | OUTPATIENT
Start: 2024-12-12 | End: 2024-12-14 | Stop reason: HOSPADM

## 2024-12-12 RX ORDER — MIDAZOLAM HYDROCHLORIDE 1 MG/ML
INJECTION INTRAMUSCULAR; INTRAVENOUS
Status: DISCONTINUED | OUTPATIENT
Start: 2024-12-12 | End: 2024-12-12

## 2024-12-12 RX ORDER — BUPIVACAINE HYDROCHLORIDE 2.5 MG/ML
INJECTION, SOLUTION EPIDURAL; INFILTRATION; INTRACAUDAL
Status: DISCONTINUED | OUTPATIENT
Start: 2024-12-12 | End: 2024-12-12 | Stop reason: HOSPADM

## 2024-12-12 RX ORDER — ONDANSETRON HYDROCHLORIDE 2 MG/ML
INJECTION, SOLUTION INTRAVENOUS
Status: DISCONTINUED | OUTPATIENT
Start: 2024-12-12 | End: 2024-12-12

## 2024-12-12 RX ORDER — TALC
6 POWDER (GRAM) TOPICAL NIGHTLY PRN
Status: DISCONTINUED | OUTPATIENT
Start: 2024-12-12 | End: 2024-12-14 | Stop reason: HOSPADM

## 2024-12-12 RX ORDER — CEFAZOLIN 2 G/1
2 INJECTION, POWDER, FOR SOLUTION INTRAMUSCULAR; INTRAVENOUS
Status: COMPLETED | OUTPATIENT
Start: 2024-12-12 | End: 2024-12-12

## 2024-12-12 RX ORDER — MORPHINE SULFATE 4 MG/ML
4 INJECTION, SOLUTION INTRAMUSCULAR; INTRAVENOUS EVERY 4 HOURS PRN
Status: DISCONTINUED | OUTPATIENT
Start: 2024-12-12 | End: 2024-12-14 | Stop reason: HOSPADM

## 2024-12-12 RX ORDER — ONDANSETRON 4 MG/1
4 TABLET, ORALLY DISINTEGRATING ORAL EVERY 6 HOURS PRN
Status: DISCONTINUED | OUTPATIENT
Start: 2024-12-12 | End: 2024-12-14 | Stop reason: HOSPADM

## 2024-12-12 RX ORDER — FAMOTIDINE 10 MG/ML
INJECTION INTRAVENOUS
Status: DISCONTINUED | OUTPATIENT
Start: 2024-12-12 | End: 2024-12-12

## 2024-12-12 RX ORDER — PHENYLEPHRINE HYDROCHLORIDE 10 MG/ML
INJECTION INTRAVENOUS
Status: DISCONTINUED | OUTPATIENT
Start: 2024-12-12 | End: 2024-12-12

## 2024-12-12 RX ORDER — PROPOFOL 10 MG/ML
VIAL (ML) INTRAVENOUS
Status: DISCONTINUED | OUTPATIENT
Start: 2024-12-12 | End: 2024-12-12

## 2024-12-12 RX ORDER — ACETAMINOPHEN 325 MG/1
650 TABLET ORAL EVERY 8 HOURS PRN
Status: DISCONTINUED | OUTPATIENT
Start: 2024-12-12 | End: 2024-12-14 | Stop reason: HOSPADM

## 2024-12-12 RX ORDER — DEXAMETHASONE SODIUM PHOSPHATE 4 MG/ML
INJECTION, SOLUTION INTRA-ARTICULAR; INTRALESIONAL; INTRAMUSCULAR; INTRAVENOUS; SOFT TISSUE
Status: DISCONTINUED | OUTPATIENT
Start: 2024-12-12 | End: 2024-12-12

## 2024-12-12 RX ORDER — ROCURONIUM BROMIDE 10 MG/ML
INJECTION, SOLUTION INTRAVENOUS
Status: DISCONTINUED | OUTPATIENT
Start: 2024-12-12 | End: 2024-12-12

## 2024-12-12 RX ORDER — ACETAMINOPHEN 10 MG/ML
INJECTION, SOLUTION INTRAVENOUS
Status: DISCONTINUED | OUTPATIENT
Start: 2024-12-12 | End: 2024-12-12

## 2024-12-12 RX ORDER — KETAMINE HCL IN 0.9 % NACL 50 MG/5 ML
SYRINGE (ML) INTRAVENOUS
Status: DISCONTINUED | OUTPATIENT
Start: 2024-12-12 | End: 2024-12-12

## 2024-12-12 RX ORDER — GLUCAGON 1 MG
1 KIT INJECTION
Status: DISCONTINUED | OUTPATIENT
Start: 2024-12-12 | End: 2024-12-12 | Stop reason: HOSPADM

## 2024-12-12 RX ORDER — OXYCODONE HYDROCHLORIDE 5 MG/1
5 TABLET ORAL
Status: DISCONTINUED | OUTPATIENT
Start: 2024-12-12 | End: 2024-12-12 | Stop reason: HOSPADM

## 2024-12-12 RX ORDER — DIPHENHYDRAMINE HYDROCHLORIDE 50 MG/ML
12.5 INJECTION INTRAMUSCULAR; INTRAVENOUS
Status: DISCONTINUED | OUTPATIENT
Start: 2024-12-12 | End: 2024-12-12 | Stop reason: HOSPADM

## 2024-12-12 RX ORDER — ONDANSETRON HYDROCHLORIDE 2 MG/ML
4 INJECTION, SOLUTION INTRAVENOUS DAILY PRN
Status: DISCONTINUED | OUTPATIENT
Start: 2024-12-12 | End: 2024-12-12 | Stop reason: HOSPADM

## 2024-12-12 RX ORDER — SUCCINYLCHOLINE CHLORIDE 20 MG/ML
INJECTION INTRAMUSCULAR; INTRAVENOUS
Status: DISCONTINUED | OUTPATIENT
Start: 2024-12-12 | End: 2024-12-12

## 2024-12-12 RX ORDER — SODIUM CHLORIDE, SODIUM LACTATE, POTASSIUM CHLORIDE, CALCIUM CHLORIDE 600; 310; 30; 20 MG/100ML; MG/100ML; MG/100ML; MG/100ML
INJECTION, SOLUTION INTRAVENOUS CONTINUOUS PRN
Status: DISCONTINUED | OUTPATIENT
Start: 2024-12-12 | End: 2024-12-12

## 2024-12-12 RX ORDER — BUPIVACAINE 13.3 MG/ML
INJECTION, SUSPENSION, LIPOSOMAL INFILTRATION
Status: DISCONTINUED | OUTPATIENT
Start: 2024-12-12 | End: 2024-12-12 | Stop reason: HOSPADM

## 2024-12-12 RX ORDER — HYDROMORPHONE HYDROCHLORIDE 1 MG/ML
0.2 INJECTION, SOLUTION INTRAMUSCULAR; INTRAVENOUS; SUBCUTANEOUS EVERY 5 MIN PRN
Status: DISCONTINUED | OUTPATIENT
Start: 2024-12-12 | End: 2024-12-12 | Stop reason: HOSPADM

## 2024-12-12 RX ORDER — FENTANYL CITRATE 50 UG/ML
INJECTION, SOLUTION INTRAMUSCULAR; INTRAVENOUS
Status: DISCONTINUED | OUTPATIENT
Start: 2024-12-12 | End: 2024-12-12

## 2024-12-12 RX ORDER — HEPARIN SODIUM 5000 [USP'U]/ML
5000 INJECTION, SOLUTION INTRAVENOUS; SUBCUTANEOUS EVERY 8 HOURS
Status: DISCONTINUED | OUTPATIENT
Start: 2024-12-12 | End: 2024-12-14

## 2024-12-12 RX ORDER — LIDOCAINE HYDROCHLORIDE 20 MG/ML
INJECTION INTRAVENOUS
Status: DISCONTINUED | OUTPATIENT
Start: 2024-12-12 | End: 2024-12-12

## 2024-12-12 RX ORDER — MEPERIDINE HYDROCHLORIDE 50 MG/ML
12.5 INJECTION INTRAMUSCULAR; INTRAVENOUS; SUBCUTANEOUS EVERY 10 MIN PRN
Status: DISCONTINUED | OUTPATIENT
Start: 2024-12-12 | End: 2024-12-12 | Stop reason: HOSPADM

## 2024-12-12 RX ADMIN — FENTANYL CITRATE 100 MCG: 50 INJECTION, SOLUTION INTRAMUSCULAR; INTRAVENOUS at 12:12

## 2024-12-12 RX ADMIN — LIDOCAINE HYDROCHLORIDE 100 MG: 20 INJECTION, SOLUTION INTRAVENOUS at 12:12

## 2024-12-12 RX ADMIN — CEFAZOLIN 2 G: 2 INJECTION, POWDER, FOR SOLUTION INTRAMUSCULAR; INTRAVENOUS at 12:12

## 2024-12-12 RX ADMIN — Medication 120 MG: at 12:12

## 2024-12-12 RX ADMIN — SODIUM CHLORIDE, SODIUM LACTATE, POTASSIUM CHLORIDE, AND CALCIUM CHLORIDE: .6; .31; .03; .02 INJECTION, SOLUTION INTRAVENOUS at 12:12

## 2024-12-12 RX ADMIN — MIDAZOLAM HYDROCHLORIDE 2 MG: 1 INJECTION, SOLUTION INTRAMUSCULAR; INTRAVENOUS at 12:12

## 2024-12-12 RX ADMIN — Medication 25 MG: at 01:12

## 2024-12-12 RX ADMIN — ONDANSETRON 4 MG: 2 INJECTION INTRAMUSCULAR; INTRAVENOUS at 12:12

## 2024-12-12 RX ADMIN — ROCURONIUM BROMIDE 15 MG: 10 INJECTION, SOLUTION INTRAVENOUS at 01:12

## 2024-12-12 RX ADMIN — ACETAMINOPHEN 650 MG: 325 TABLET ORAL at 09:12

## 2024-12-12 RX ADMIN — ROCURONIUM BROMIDE 10 MG: 10 INJECTION, SOLUTION INTRAVENOUS at 12:12

## 2024-12-12 RX ADMIN — FENTANYL CITRATE 50 MCG: 50 INJECTION, SOLUTION INTRAMUSCULAR; INTRAVENOUS at 01:12

## 2024-12-12 RX ADMIN — FENTANYL CITRATE 50 MCG: 50 INJECTION, SOLUTION INTRAMUSCULAR; INTRAVENOUS at 02:12

## 2024-12-12 RX ADMIN — ACETAMINOPHEN 1000 MG: 10 INJECTION, SOLUTION INTRAVENOUS at 12:12

## 2024-12-12 RX ADMIN — HYDROMORPHONE HYDROCHLORIDE 0.2 MG: 1 INJECTION, SOLUTION INTRAMUSCULAR; INTRAVENOUS; SUBCUTANEOUS at 04:12

## 2024-12-12 RX ADMIN — OXYCODONE HYDROCHLORIDE 5 MG: 5 TABLET ORAL at 04:12

## 2024-12-12 RX ADMIN — DEXAMETHASONE SODIUM PHOSPHATE 8 MG: 4 INJECTION, SOLUTION INTRAMUSCULAR; INTRAVENOUS at 01:12

## 2024-12-12 RX ADMIN — PROPOFOL 160 MG: 10 INJECTION, EMULSION INTRAVENOUS at 12:12

## 2024-12-12 RX ADMIN — PHENYLEPHRINE HYDROCHLORIDE 100 MCG: 10 INJECTION INTRAVENOUS at 01:12

## 2024-12-12 RX ADMIN — FAMOTIDINE 20 MG: 10 INJECTION, SOLUTION INTRAVENOUS at 12:12

## 2024-12-12 RX ADMIN — ROCURONIUM BROMIDE 25 MG: 10 INJECTION, SOLUTION INTRAVENOUS at 01:12

## 2024-12-12 RX ADMIN — GLYCOPYRROLATE 0.4 MG: 0.2 INJECTION, SOLUTION INTRAMUSCULAR; INTRAVITREAL at 01:12

## 2024-12-12 RX ADMIN — HEPARIN SODIUM 5000 UNITS: 5000 INJECTION INTRAVENOUS; SUBCUTANEOUS at 12:12

## 2024-12-12 RX ADMIN — HEPARIN SODIUM 5000 UNITS: 5000 INJECTION INTRAVENOUS; SUBCUTANEOUS at 09:12

## 2024-12-12 NOTE — NURSING
Arrived to unit via stretcher, AAOx4 answered questions appropriately  v/s stable, Denies any needs at this time. Educate pt to call for assistance, call light and personal items within reach, wheels locked, bed in lowest position, side rails up x2, bed alarm activated .

## 2024-12-12 NOTE — ANESTHESIA PREPROCEDURE EVALUATION
12/12/2024  Jen Torres is a 74 y.o., female.    Patient Active Problem List   Diagnosis    Biliary colic    Ovarian cyst    Mixed incontinence urge and stress    Chronic embolism and thrombosis of other specified deep vein of left lower extremity    Deep vein thrombosis (DVT) of left lower extremity, unspecified chronicity, unspecified vein    Essential hypertension    Dyslipidemia    Statin intolerance    Type 2 diabetes mellitus without complication, without long-term current use of insulin    Vertigo    Syncope    Shortness of breath    Iron deficiency anemia due to chronic blood loss    Dysequilibrium    Lightheadedness    Colon adenocarcinoma    MTHFR mutation    Leukocytosis       Past Surgical History:   Procedure Laterality Date    ARTHROSCOPIC REPAIR OF ROTATOR CUFF OF SHOULDER Left 12/29/2023    Procedure: REPAIR, ROTATOR CUFF, ARTHROSCOPIC;  Surgeon: Gary Tony MD;  Location: [facility] OR;  Service: Orthopedics;  Laterality: Left;  arthrex    ARTHROSCOPY OF SHOULDER WITH DECOMPRESSION OF SUBACROMIAL SPACE Left 12/29/2023    Procedure: ARTHROSCOPY, SHOULDER, WITH SUBACROMIAL SPACE DECOMPRESSION;  Surgeon: Gary Tony MD;  Location: [facility] OR;  Service: Orthopedics;  Laterality: Left;    CATARACT EXTRACTION, BILATERAL      CHOLECYSTECTOMY  10/01/2014    COLECTOMY, RIGHT  04/22/2024    Procedure: COLECTOMY, RIGHT;  Surgeon: Jaden Barrios MD;  Location: [facility] OR;  Service: General;;    COLON SURGERY      COLONOSCOPIC SURGICAL PROCEDURE  04/22/2024    Procedure: SURGICAL PROCEDURE, COLONOSCOPIC;  Surgeon: Jaden Barrios MD;  Location: [facility] OR;  Service: General;;    COLONOSCOPY N/A 04/21/2024    Procedure: COLONOSCOPY;  Surgeon: Ceasar Arias III, MD;  Location: [facility] ENDO;  Service: Endoscopy;  Laterality: N/A;    ESOPHAGOGASTRODUODENOSCOPY N/A 04/20/2024     Procedure: EGD (ESOPHAGOGASTRODUODENOSCOPY);  Surgeon: Ceasar Arias III, MD;  Location: [facility] ENDO;  Service: Endoscopy;  Laterality: N/A;    EYE SURGERY      KNEE ARTHROSCOPY Left     LAPAROTOMY, EXPLORATORY N/A 04/22/2024    Procedure: LAPAROTOMY, EXPLORATORY;  Surgeon: Jaden Barrios MD;  Location: [facility] OR;  Service: General;  Laterality: N/A;    LYSIS OF ADHESIONS  04/22/2024    Procedure: LYSIS, ADHESIONS;  Surgeon: Jaden Barrios MD;  Location: [facility] OR;  Service: General;;    TONSILLECTOMY          Tobacco Use:  The patient  reports that she has quit smoking. She has never used smokeless tobacco.     Results for orders placed or performed during the hospital encounter of 04/19/24   EKG 12-lead    Collection Time: 04/23/24 12:17 PM   Result Value Ref Range    QRS Duration 84 ms    OHS QTC Calculation 449 ms    Narrative    Test Reason : R07.9,    Vent. Rate : 085 BPM     Atrial Rate : 085 BPM     P-R Int : 156 ms          QRS Dur : 084 ms      QT Int : 378 ms       P-R-T Axes : 060 050 058 degrees     QTc Int : 449 ms    Normal sinus rhythm with sinus arrhythmia  Normal ECG  When compared with ECG of 19-APR-2024 13:23,  Sinus rhythm has replaced Ectopic atrial rhythm  Left posterior fascicular block is no longer Present  Criteria for Septal infarct are no longer Present  Nonspecific T wave abnormality no longer evident in Anterior leads  QT has lengthened  Confirmed by Candace RAYMOND, Patrick ([id number]) on 5/14/2024 10:59:52 AM    Referred By: [email or web address]   SELF           Confirmed By:Patrick Maria MD             Lab Results   Component Value Date    WBC 7.39 12/09/2024    HGB 14.3 12/09/2024    HCT 43.0 12/09/2024    MCV 92 12/09/2024     12/09/2024     BMP  Lab Results   Component Value Date     12/09/2024    K 3.8 12/09/2024     12/09/2024    CO2 28 12/09/2024    BUN 14 12/09/2024    CREATININE 1.1 12/09/2024    CALCIUM 9.4 12/09/2024    ANIONGAP 7 (L) 12/09/2024      12/09/2024     (H) 11/08/2024    GLU 99 10/07/2024       Results for orders placed during the hospital encounter of 03/07/24    Echo    Interpretation Summary    Left Ventricle: The left ventricle is normal in size. Mildly increased wall thickness. There is mild concentric hypertrophy. Normal wall motion. There is normal systolic function with a visually estimated ejection fraction of 65 - 70%. There is normal diastolic function.    Right Ventricle: Normal right ventricular cavity size. Wall thickness is normal. Right ventricle wall motion  is normal. Systolic function is normal.    Mitral Valve: There is mild regurgitation with a centrally directed jet.    IVC/SVC: Normal venous pressure at 3 mmHg.            Pre-op Assessment    I have reviewed the Patient Summary Reports.     I have reviewed the Nursing Notes. I have reviewed the NPO Status.   I have reviewed the Medications.     Review of Systems  Anesthesia Hx:  No problems with previous Anesthesia             Denies Family Hx of Anesthesia complications.    Denies Personal Hx of Anesthesia complications.                    Social:  Non-Smoker       Hematology/Oncology:  Hematology Normal                  Hematology Comments: MTHFR on Eliquis bridged to Lovenox and heparin                    EENT/Dental:  EENT/Dental Normal           Cardiovascular:     Hypertension    Dysrhythmias atrial fibrillation       DURAN  ECG has been reviewed.                            Pulmonary:      Shortness of breath  Sleep Apnea           Education provided regarding risk of obstructive sleep apnea            Renal/:  Renal/ Normal                 Hepatic/GI:     GERD                Musculoskeletal:  Musculoskeletal Normal                Neurological:   CVA   Headaches                                 Endocrine:  Diabetes           Psych:  Psychiatric Normal                    Physical Exam  General: Well nourished    Airway:  Mallampati: II   Mouth Opening: Normal  TM  Distance: Normal  Tongue: Normal  Neck ROM: Normal ROM    Dental:  Intact  Multiple molar are missing  Chest/Lungs:  Clear to auscultation, Normal Respiratory Rate    Heart:  Rate: Normal  Rhythm: Regular Rhythm        Anesthesia Plan  Type of Anesthesia, risks & benefits discussed:    Anesthesia Type: Gen ETT  Intra-op Monitoring Plan: Standard ASA Monitors  Post Op Pain Control Plan: IV/PO Opioids PRN and multimodal analgesia  Induction:  IV  Airway Plan: Video  Informed Consent: Informed consent signed with the Patient and all parties understand the risks and agree with anesthesia plan.  All questions answered.   ASA Score: 3  Anesthesia Plan Notes: GETA  Multimodal analgesia with ofirmev 1000 mg, decadron 8 mg, and ketamine 25 mg.  PONV prophylaxis with Pepcid 20 mg IV, and Zofran 4 mg IV.        Ready For Surgery From Anesthesia Perspective.     .

## 2024-12-12 NOTE — ANESTHESIA POSTPROCEDURE EVALUATION
Anesthesia Post Evaluation    Patient: Jen Melissa    Procedure(s) Performed: Procedure(s) (LRB):  REPAIR, HERNIA, INCISIONAL (N/A)    Final Anesthesia Type: general      Patient location during evaluation: PACU  Patient participation: Yes- Able to Participate  Level of consciousness: awake and alert  Post-procedure vital signs: reviewed and stable  Pain management: adequate  Airway patency: patent    PONV status at discharge: No PONV  Anesthetic complications: no      Cardiovascular status: blood pressure returned to baseline and stable  Respiratory status: unassisted and room air  Hydration status: euvolemic  Follow-up not needed.              Vitals Value Taken Time   /72 12/12/24 1715   Temp 36.8 °C (98.3 °F) 12/12/24 1520   Pulse 68 12/12/24 1715   Resp 18 12/12/24 1715   SpO2 96 % 12/12/24 1715   Vitals shown include unfiled device data.      No case tracking events are documented in the log.      Pain/Judith Score: Pain Rating Prior to Med Admin: 6 (12/12/2024  4:35 PM)

## 2024-12-12 NOTE — TRANSFER OF CARE
"Anesthesia Transfer of Care Note    Patient: Jen Torres    Procedure(s) Performed: Procedure(s) (LRB):  REPAIR, HERNIA, INCISIONAL (N/A)    Patient location: PACU    Anesthesia Type: general    Transport from OR: Transported from OR on room air with adequate spontaneous ventilation    Post pain: adequate analgesia    Post assessment: no apparent anesthetic complications and tolerated procedure well    Post vital signs: stable    Level of consciousness: responds to stimulation    Nausea/Vomiting: no nausea/vomiting    Complications: none    Transfer of care protocol was followed      Last vitals: Visit Vitals  BP (!) 146/75 (BP Location: Left arm, Patient Position: Lying)   Pulse 65   Temp 36.4 °C (97.5 °F) (Oral)   Resp 16   Ht 5' 7" (1.702 m)   Wt 86.9 kg (191 lb 9.3 oz)   SpO2 98%   Breastfeeding No   BMI 30.01 kg/m²     "

## 2024-12-12 NOTE — ANESTHESIA PREPROCEDURE EVALUATION
12/12/2024  Jen Torres is a 74 y.o., female.      Pre-op Assessment          Review of Systems          .

## 2024-12-12 NOTE — OP NOTE
DATE OF PROCEDURE: 12/12/2024    PREOPERATIVE DIAGNOSIS: Incisional hernia    POSTOPERATIVE DIAGNOSIS: Incisional hernia with 15 x 8 cm fascial defect    PROCEDURE: Open ventral hernia repair of incisional hernia with 15 x 8 cm fascial defect  Bilateral retrorectus dissection with unilateral transverse abdominis release (myocutaneous flap)    SURGEON: Marlon Marks M.D    ASSISTANT: None    ANESTHESIA: General    ESTIMATED BLOOD LOSS: 40 cc    SPECIMEN: None    CONDITION: Stable    COMPLICATIONS: None    FINDINGS:   1. Two large incisional defects with combined size of 15 cm in length by 8 cm wide  2. Retrorectus dissection performed bilaterally however still with tension at the midline therefore unilateral tar performed on the left side with adequate release which allowed tension-free closure of the posterior layer  3. A piece of Phasix mesh was cut to size to fit into our retrorectus space which measured 22 x 18 cm   4. Anterior fascia closed with minimal tension    INDICATIONS: The patient is a 74-year-old female with a history of right hemicolectomy for stage III colon cancer currently on surveillance. Developed large abdominal wall bulging with imaging consistent with incisional hernia. Given the size of the defect she was offered open repair with retrorectus dissection and possible tar.    PROCEDURE IN DETAIL: The patient is taken operating room placed in supine position where general endotracheal intubation was achieved. She received perioperative antibiotics.  A Saldaña was placed to decompress the bladder and kept in place at the end of the procedure. Her abdomen was prepped and draped in typical sterile fashion.  A time-out performed by members of the operative team. Her previous midline laparotomy incision measured about 15 cm in length and centered around the umbilicus. Essentially this entire area had developed a hernia. The skin was incised sharply through the old incision and extended slightly more  inferiorly. Electrocautery was used to dissect through the dermis and subcutaneous fat.  A hernia sac was quickly identified and circumferentially dissected on both sides down to the level of the fascia. Of note there were 2 separate large defects  only by a small fascial bridge of about 1 cm. This has fascial bridge was lysed. Our dissection of the hernia sac was continued until we reached beyond the posterior rectus sheath circumferentially.  In the inferior aspect where the rectus sheath was non-existent a preperitoneal dissection was completed. We did entering into the sac and perform lysis of adhesions of the intra-abdominal bowel and omentum. This peritoneal opening was then closed with a Vicryl suture. Satisfied with our reduction and lysis of adhesions I then performed bilateral retrorectus dissection. Near the linea alba the posterior rectus leaf was transected using electrocautery along the length of our incision. As it was continued inferiorly, the rectus sheath was replaced with preperitoneal dissection. The dissection was taken superiorly about 3 cm above our fascial defect and a crossover was performed.  This dissection was performed bilaterally.  A combination of blunt and sharp dissection was used to to create our retrorectus space. Using [facility] clamps we attempted to approximate the posterior leaflets towards midline however there was some tension. I therefore elected to perform a unilateral transversus abdominis release on the left side. Along the lateral aspect of our retrorectus dissection electrocautery was used to dissect through the posterior sheath until the transverse abdominis muscle was encountered. It was then opened along the length of our dissection. The preperitoneal dissection was then performed bluntly.  This has allowed better mobilization of the left side and we are able to bring together our posterior no significant tension. At this point we assured we had adequate  counts.  The posterior layer was closed with running PDS suture. We had a nice large retrorectus dissection field.  Our fascial defect measured 15 cm long by 8 cm wide. We had a space that was approximately 22 cm tall by 18 cm wide. A piece of Phasix bioabsorbable mesh was selected. It was cut to size and placed in the retrorectus and percutaneous space.  It is found to lay flat with good coverage.  It was tacked to the midline with interrupted Vicryl suture.  We assured adequate hemostasis. Local anesthetic was injected. A 19 Libyan channel drain was brought in through a stab incision in the right upper abdomen and looped on top of the mesh. I then closed the anterior fascia with running PDS suture.  This came together with minimal tension. The subcutaneous space containing the hernia was relatively minimal and there was not a large soft tissue defect.  I elected not to place a drain in the soft tissue. We assured adequate hemostasis and I closed the deep dermal layer with interrupted Vicryl suture.  The skin was then closed with a Monocryl subcuticular stitch and Dermabond. A bandage was placed around the drain site.  An abdominal binder was placed.  Patient was aroused from sedation, extubated taken to recovery room in stable condition have suffered no complications.  All counts correct x2 the case.  I was present scrubbed throughout all operative portions of the case.    DISPO: PACU then admit

## 2024-12-12 NOTE — ANESTHESIA PROCEDURE NOTES
Intubation    Date/Time: 12/12/2024 12:55 PM    Performed by: Thalia Beltran CRNA  Authorized by: Juan Angulo MD    Intubation:     Induction:  Intravenous    Intubated:  Postinduction    Mask Ventilation:  Easy mask    Attempts:  1    Attempted By:  CRNA    Method of Intubation:  Video laryngoscopy    Blade:  Maurice 3    Laryngeal View Grade: Grade I - full view of cords      Difficult Airway Encountered?: No      Complications:  None    Airway Device:  Oral endotracheal tube    Airway Device Size:  7.5    Style/Cuff Inflation:  Cuffed (inflated to minimal occlusive pressure)    Tube secured:  22    Secured at:  The lips    Placement Verified By:  Capnometry    Complicating Factors:  None    Findings Post-Intubation:  BS equal bilateral and atraumatic/condition of teeth unchanged

## 2024-12-13 LAB
ANION GAP SERPL CALC-SCNC: 9 MMOL/L (ref 8–16)
BASOPHILS # BLD AUTO: 0.04 K/UL (ref 0–0.2)
BASOPHILS NFR BLD: 0.3 % (ref 0–1.9)
BUN SERPL-MCNC: 13 MG/DL (ref 8–23)
CALCIUM SERPL-MCNC: 8.9 MG/DL (ref 8.7–10.5)
CHLORIDE SERPL-SCNC: 105 MMOL/L (ref 95–110)
CO2 SERPL-SCNC: 26 MMOL/L (ref 23–29)
CREAT SERPL-MCNC: 1 MG/DL (ref 0.5–1.4)
DIFFERENTIAL METHOD BLD: ABNORMAL
EOSINOPHIL # BLD AUTO: 0 K/UL (ref 0–0.5)
EOSINOPHIL NFR BLD: 0.1 % (ref 0–8)
ERYTHROCYTE [DISTWIDTH] IN BLOOD BY AUTOMATED COUNT: 12.9 % (ref 11.5–14.5)
EST. GFR  (NO RACE VARIABLE): 59.9 ML/MIN/1.73 M^2
ESTIMATED AVG GLUCOSE: 151 MG/DL (ref 68–131)
GLUCOSE SERPL-MCNC: 163 MG/DL (ref 70–110)
HBA1C MFR BLD: 6.9 % (ref 4.5–6.2)
HCT VFR BLD AUTO: 41.4 % (ref 37–48.5)
HGB BLD-MCNC: 13.4 G/DL (ref 12–16)
IMM GRANULOCYTES # BLD AUTO: 0.09 K/UL (ref 0–0.04)
IMM GRANULOCYTES NFR BLD AUTO: 0.6 % (ref 0–0.5)
LYMPHOCYTES # BLD AUTO: 1.1 K/UL (ref 1–4.8)
LYMPHOCYTES NFR BLD: 7.6 % (ref 18–48)
MAGNESIUM SERPL-MCNC: 1.8 MG/DL (ref 1.6–2.6)
MCH RBC QN AUTO: 30 PG (ref 27–31)
MCHC RBC AUTO-ENTMCNC: 32.4 G/DL (ref 32–36)
MCV RBC AUTO: 93 FL (ref 82–98)
MONOCYTES # BLD AUTO: 0.7 K/UL (ref 0.3–1)
MONOCYTES NFR BLD: 4.8 % (ref 4–15)
NEUTROPHILS # BLD AUTO: 12.7 K/UL (ref 1.8–7.7)
NEUTROPHILS NFR BLD: 86.6 % (ref 38–73)
NRBC BLD-RTO: 0 /100 WBC
PHOSPHATE SERPL-MCNC: 4.7 MG/DL (ref 2.7–4.5)
PLATELET # BLD AUTO: 266 K/UL (ref 150–450)
PMV BLD AUTO: 10.3 FL (ref 9.2–12.9)
POCT GLUCOSE: 141 MG/DL (ref 70–110)
POCT GLUCOSE: 209 MG/DL (ref 70–110)
POTASSIUM SERPL-SCNC: 4.3 MMOL/L (ref 3.5–5.1)
RBC # BLD AUTO: 4.47 M/UL (ref 4–5.4)
SODIUM SERPL-SCNC: 140 MMOL/L (ref 136–145)
WBC # BLD AUTO: 14.63 K/UL (ref 3.9–12.7)

## 2024-12-13 PROCEDURE — 12000002 HC ACUTE/MED SURGE SEMI-PRIVATE ROOM

## 2024-12-13 PROCEDURE — 84100 ASSAY OF PHOSPHORUS: CPT | Performed by: SURGERY

## 2024-12-13 PROCEDURE — 83036 HEMOGLOBIN GLYCOSYLATED A1C: CPT | Performed by: SURGERY

## 2024-12-13 PROCEDURE — 27000221 HC OXYGEN, UP TO 24 HOURS

## 2024-12-13 PROCEDURE — 83036 HEMOGLOBIN GLYCOSYLATED A1C: CPT | Mod: 91 | Performed by: SURGERY

## 2024-12-13 PROCEDURE — 36415 COLL VENOUS BLD VENIPUNCTURE: CPT | Performed by: SURGERY

## 2024-12-13 PROCEDURE — 63600175 PHARM REV CODE 636 W HCPCS: Performed by: SURGERY

## 2024-12-13 PROCEDURE — 80048 BASIC METABOLIC PNL TOTAL CA: CPT | Performed by: SURGERY

## 2024-12-13 PROCEDURE — 94761 N-INVAS EAR/PLS OXIMETRY MLT: CPT

## 2024-12-13 PROCEDURE — 99900031 HC PATIENT EDUCATION (STAT)

## 2024-12-13 PROCEDURE — 25000003 PHARM REV CODE 250: Performed by: INTERNAL MEDICINE

## 2024-12-13 PROCEDURE — 25000003 PHARM REV CODE 250: Performed by: SURGERY

## 2024-12-13 PROCEDURE — 83735 ASSAY OF MAGNESIUM: CPT | Performed by: SURGERY

## 2024-12-13 PROCEDURE — 85025 COMPLETE CBC W/AUTO DIFF WBC: CPT | Performed by: SURGERY

## 2024-12-13 RX ORDER — MUPIROCIN 20 MG/G
OINTMENT TOPICAL 2 TIMES DAILY
Status: DISCONTINUED | OUTPATIENT
Start: 2024-12-13 | End: 2024-12-14 | Stop reason: HOSPADM

## 2024-12-13 RX ORDER — PROCHLORPERAZINE MALEATE 5 MG
10 TABLET ORAL EVERY 6 HOURS PRN
Status: DISCONTINUED | OUTPATIENT
Start: 2024-12-13 | End: 2024-12-14 | Stop reason: HOSPADM

## 2024-12-13 RX ORDER — SIMETHICONE 80 MG
1 TABLET,CHEWABLE ORAL 3 TIMES DAILY PRN
Status: DISCONTINUED | OUTPATIENT
Start: 2024-12-13 | End: 2024-12-14 | Stop reason: HOSPADM

## 2024-12-13 RX ORDER — INSULIN ASPART 100 [IU]/ML
0-10 INJECTION, SOLUTION INTRAVENOUS; SUBCUTANEOUS
Status: DISCONTINUED | OUTPATIENT
Start: 2024-12-13 | End: 2024-12-14 | Stop reason: HOSPADM

## 2024-12-13 RX ORDER — POLYETHYLENE GLYCOL 3350 17 G/17G
17 POWDER, FOR SOLUTION ORAL DAILY
Status: DISCONTINUED | OUTPATIENT
Start: 2024-12-13 | End: 2024-12-14 | Stop reason: HOSPADM

## 2024-12-13 RX ORDER — IBUPROFEN 200 MG
24 TABLET ORAL
Status: DISCONTINUED | OUTPATIENT
Start: 2024-12-13 | End: 2024-12-14 | Stop reason: HOSPADM

## 2024-12-13 RX ORDER — HEPARIN SODIUM 5000 [USP'U]/ML
5000 INJECTION, SOLUTION INTRAVENOUS; SUBCUTANEOUS EVERY 8 HOURS
Status: DISCONTINUED | OUTPATIENT
Start: 2024-12-13 | End: 2024-12-13

## 2024-12-13 RX ORDER — PANTOPRAZOLE SODIUM 40 MG/1
40 TABLET, DELAYED RELEASE ORAL EVERY MORNING
Status: DISCONTINUED | OUTPATIENT
Start: 2024-12-13 | End: 2024-12-14 | Stop reason: HOSPADM

## 2024-12-13 RX ORDER — LOPERAMIDE HYDROCHLORIDE 2 MG/1
2 CAPSULE ORAL 3 TIMES DAILY PRN
Status: DISCONTINUED | OUTPATIENT
Start: 2024-12-13 | End: 2024-12-14 | Stop reason: HOSPADM

## 2024-12-13 RX ORDER — GLUCAGON 1 MG
1 KIT INJECTION
Status: DISCONTINUED | OUTPATIENT
Start: 2024-12-13 | End: 2024-12-14 | Stop reason: HOSPADM

## 2024-12-13 RX ORDER — POTASSIUM CHLORIDE 750 MG/1
10 CAPSULE, EXTENDED RELEASE ORAL DAILY
Status: DISCONTINUED | OUTPATIENT
Start: 2024-12-14 | End: 2024-12-14 | Stop reason: HOSPADM

## 2024-12-13 RX ORDER — IBUPROFEN 200 MG
16 TABLET ORAL
Status: DISCONTINUED | OUTPATIENT
Start: 2024-12-13 | End: 2024-12-14 | Stop reason: HOSPADM

## 2024-12-13 RX ORDER — EZETIMIBE 10 MG/1
10 TABLET ORAL DAILY
Status: DISCONTINUED | OUTPATIENT
Start: 2024-12-13 | End: 2024-12-14 | Stop reason: HOSPADM

## 2024-12-13 RX ORDER — DILTIAZEM HYDROCHLORIDE 120 MG/1
120 CAPSULE, COATED, EXTENDED RELEASE ORAL DAILY
Status: DISCONTINUED | OUTPATIENT
Start: 2024-12-13 | End: 2024-12-14 | Stop reason: HOSPADM

## 2024-12-13 RX ADMIN — HEPARIN SODIUM 5000 UNITS: 5000 INJECTION INTRAVENOUS; SUBCUTANEOUS at 08:12

## 2024-12-13 RX ADMIN — DILTIAZEM HYDROCHLORIDE 120 MG: 120 CAPSULE, COATED, EXTENDED RELEASE ORAL at 12:12

## 2024-12-13 RX ADMIN — HEPARIN SODIUM 5000 UNITS: 5000 INJECTION INTRAVENOUS; SUBCUTANEOUS at 05:12

## 2024-12-13 RX ADMIN — INSULIN ASPART 2 UNITS: 100 INJECTION, SOLUTION INTRAVENOUS; SUBCUTANEOUS at 08:12

## 2024-12-13 RX ADMIN — EZETIMIBE 10 MG: 10 TABLET ORAL at 12:12

## 2024-12-13 RX ADMIN — MUPIROCIN 1 G: 20 OINTMENT TOPICAL at 08:12

## 2024-12-13 RX ADMIN — ACETAMINOPHEN 650 MG: 325 TABLET ORAL at 09:12

## 2024-12-13 RX ADMIN — PANTOPRAZOLE SODIUM 40 MG: 40 TABLET, DELAYED RELEASE ORAL at 12:12

## 2024-12-13 RX ADMIN — HEPARIN SODIUM 5000 UNITS: 5000 INJECTION INTRAVENOUS; SUBCUTANEOUS at 03:12

## 2024-12-13 RX ADMIN — POLYETHYLENE GLYCOL 3350 17 G: 17 POWDER, FOR SOLUTION ORAL at 03:12

## 2024-12-13 RX ADMIN — SODIUM CHLORIDE 500 ML: 9 INJECTION, SOLUTION INTRAVENOUS at 03:12

## 2024-12-13 NOTE — CONSULTS
[facility] Medicine  Consult Note    Patient Name: Jen Torres  MRN: [id number]  Admission Date: 12/12/2024  Hospital Length of Stay: 1 days  Attending Physician: Marlon Marks Jr*   Primary Care Provider: Jaci Keller PA-C           Patient information was obtained from patient.     Consults  Subjective:     Principal Problem: <principal problem not specified>    Chief Complaint: No chief complaint on file.       HPI: 74 y.o. female with MTHFR mutation on Eliquis, history of stage IIIA colon cancer status post right hemicolectomy in April 2024 started on adjuvant chemotherapy which was stopped due to intolerance.  Currently under surveillance.  Patient reports after episodes of diarrhea following chemotherapy she developed abdominal wall bulging.  This has worse when she was stands and reduces when she lays flat. Had had imaging completed at [facility] however we were unable to obtain the actual disc to review the images are self. Patient did meet with a new PCP he was subsequently ordered CT abdomen pelvis here in [place] which has been completed.  Patient reports today to review these images and discuss surgical plans. She again has a large bulge above the umbilicus and a bulge below the umbilicus pretty much over the entire length of her previous incision. It was not have obstructive symptoms.  Does feel the bulging it was getting larger.     Past Medical History:   Diagnosis Date    Abnormal Pap smear     s/p cryo at 20 y/o    Anticoagulant long-term use     Cancer     Diabetes mellitus     Encounter for blood transfusion     GERD (gastroesophageal reflux disease)     Hyperlipidemia     Hypertension     Migraine     Migraines     Sleep apnea     Can not use c-pap    Stroke     TIA (transient ischemic attack)        Past Surgical History:   Procedure Laterality Date    ARTHROSCOPIC REPAIR OF ROTATOR CUFF OF SHOULDER Left 12/29/2023    Procedure: REPAIR, ROTATOR CUFF,  ARTHROSCOPIC;  Surgeon: Gary Tony MD;  Location: [facility] OR;  Service: Orthopedics;  Laterality: Left;  arthrex    ARTHROSCOPY OF SHOULDER WITH DECOMPRESSION OF SUBACROMIAL SPACE Left 12/29/2023    Procedure: ARTHROSCOPY, SHOULDER, WITH SUBACROMIAL SPACE DECOMPRESSION;  Surgeon: Gary Tony MD;  Location: [facility] OR;  Service: Orthopedics;  Laterality: Left;    CATARACT EXTRACTION, BILATERAL      CHOLECYSTECTOMY  10/01/2014    COLECTOMY, RIGHT  04/22/2024    Procedure: COLECTOMY, RIGHT;  Surgeon: Jaden Barrios MD;  Location: [facility];  Service: General;;    COLON SURGERY      COLONOSCOPIC SURGICAL PROCEDURE  04/22/2024    Procedure: SURGICAL PROCEDURE, COLONOSCOPIC;  Surgeon: Jaden Barrios MD;  Location: [facility] OR;  Service: General;;    COLONOSCOPY N/A 04/21/2024    Procedure: COLONOSCOPY;  Surgeon: Ceasar Arias III, MD;  Location: [facility] ENDO;  Service: Endoscopy;  Laterality: N/A;    ESOPHAGOGASTRODUODENOSCOPY N/A 04/20/2024    Procedure: EGD (ESOPHAGOGASTRODUODENOSCOPY);  Surgeon: Ceasar Arias III, MD;  Location: [facility] ENDO;  Service: Endoscopy;  Laterality: N/A;    EYE SURGERY      KNEE ARTHROSCOPY Left     LAPAROTOMY, EXPLORATORY N/A 04/22/2024    Procedure: LAPAROTOMY, EXPLORATORY;  Surgeon: Jaden Barrios MD;  Location: [facility] OR;  Service: General;  Laterality: N/A;    LYSIS OF ADHESIONS  04/22/2024    Procedure: LYSIS, ADHESIONS;  Surgeon: Jaden Barrios MD;  Location: [facility] OR;  Service: General;;    REPAIR, HERNIA, INCISIONAL N/A 12/12/2024    Procedure: REPAIR, HERNIA, INCISIONAL;  Surgeon: Marlon Marks Jr., MD;  Location: [facility] OR;  Service: General;  Laterality: N/A;  retrorectus w/ TAR    TONSILLECTOMY         Review of patient's allergies indicates:   Allergen Reactions    Statins-hmg-coa reductase inhibitors Swelling    Metformin Other (See Comments)     metallic taste , burn of the esophagus    Oxaliplatin Itching     Throat itchiness,  twitching of both eyelids,tingling of hands, lips, head and feet. Cramping of toes.       Current Facility-Administered Medications on File Prior to Encounter   Medication    electrolyte-S (ISOLYTE)    electrolyte-S (ISOLYTE)    LIDOcaine (PF) 10 mg/ml (1%) injection 10 mg    midazolam (VERSED) 1 mg/mL injection 2 mg     Current Outpatient Medications on File Prior to Encounter   Medication Sig    aspirin 81 MG Chew Take 81 mg by mouth once daily.    diltiaZEM (CARDIZEM CD) 120 MG Cp24 Take 1 capsule (120 mg total) by mouth once daily.    empagliflozin (JARDIANCE) 25 mg tablet Take 25 mg by mouth once daily.    ergocalciferol (ERGOCALCIFEROL) 50,000 unit Cap Take 50,000 Units by mouth every 7 days. Fridays    ezetimibe (ZETIA) 10 mg tablet Take 10 mg by mouth once daily.    glimepiride (AMARYL) 1 MG tablet Take 1 mg by mouth.    Lactobacillus rhamnosus GG (CULTURELLE) 10 billion cell capsule Take 1 capsule by mouth once daily.    melatonin 1 mg TbDL Take by mouth.    ondansetron (ZOFRAN) 4 MG tablet Take 4 mg by mouth.    pantoprazole (PROTONIX) 40 MG tablet Take 40 mg by mouth every morning.    potassium chloride (MICRO-K) 10 MEQ CpSR TAKE 1 CAPSULE BY MOUTH EVERY DAY    ramelteon (ROZEREM) 8 mg tablet TAKE 1 TABLET (8 MG TOTAL) BY MOUTH EVERY EVENING.    WESTAB MAX 2.5-25-2 mg Tab TAKE 1 TABLET BY MOUTH EVERY DAY    acetaminophen (TYLENOL) 500 MG tablet Take 2 tablets (1,000 mg total) by mouth every 8 (eight) hours as needed for Pain.    diphenoxylate-atropine 2.5-0.025 mg (LOMOTIL) 2.5-0.025 mg per tablet Take 1 tablet by mouth.    ELIQUIS 2.5 mg Tab Take 2.5 mg by mouth every 12 (twelve) hours.    LIDOcaine-prilocaine (EMLA) cream Apply to skin 45-60 minutes prior to accessing port-a-cath.    loperamide (IMODIUM) 2 mg capsule Take 2 mg by mouth.    loratadine (CLARITIN) 10 mg tablet Take 10 mg by mouth.    prochlorperazine (COMPAZINE) 10 MG tablet Take 10 mg by mouth every 6 (six) hours as needed.     Family  History       Problem Relation (Age of Onset)    Breast cancer Paternal Grandmother (70), Maternal Grandmother    Cancer Daughter, Daughter (20)    Colon cancer Mother    Ovarian cancer Daughter          Tobacco Use    Smoking status: Former    Smokeless tobacco: Never   Substance and Sexual Activity    Alcohol use: No    Drug use: No    Sexual activity: Never     Review of Systems   Constitutional:  Negative for chills and fever.   Respiratory:  Negative for cough, shortness of breath and wheezing.    Cardiovascular:  Negative for chest pain, palpitations and leg swelling.   Gastrointestinal:  Positive for abdominal pain. Negative for diarrhea and nausea.   Genitourinary:  Negative for dysuria and flank pain.   Expected postoperative abdominal discomfort present.      Objective:     Vital Signs (Most Recent):  Temp: 97.4 °F (36.3 °C) (12/13/24 1059)  Pulse: 82 (12/13/24 1400)  Resp: 18 (12/13/24 1059)  BP: 111/63 (12/13/24 1400)  SpO2: (!) 94 % (12/13/24 1310) Vital Signs (24h Range):  Temp:  [97.4 °F (36.3 °C)-98 °F (36.7 °C)] 97.4 °F (36.3 °C)  Pulse:  [64-82] 82  Resp:  [12-18] 18  SpO2:  [93 %-100 %] 94 %  BP: ()/(56-82) 111/63     Weight: 88.8 kg (195 lb 12.3 oz)  Body mass index is 29.77 kg/m².     Physical Exam  Constitutional:       General: She is not in acute distress.  HENT:      Head: Normocephalic and atraumatic.      Mouth/Throat:      Mouth: Mucous membranes are moist.   Eyes:      Extraocular Movements: Extraocular movements intact.   Cardiovascular:      Rate and Rhythm: Normal rate and regular rhythm.   Pulmonary:      Effort: Pulmonary effort is normal. No respiratory distress.      Breath sounds: No wheezing or rales.   Abdominal:      General: Bowel sounds are normal. There is no distension.      Palpations: Abdomen is soft.      Tenderness: There is abdominal tenderness. There is no guarding.      Comments: Expected postoperative tenderness.   Musculoskeletal:         General: No  deformity.   Neurological:      Mental Status: She is alert.      Comments: CN II-XII grossly intact          Significant Labs: All pertinent labs within the past 24 hours have been reviewed.  CBC:   Recent Labs   Lab 12/13/24 0436   WBC 14.63*   HGB 13.4   HCT 41.4        CMP:   Recent Labs   Lab 12/13/24 0436      K 4.3      CO2 26   *   BUN 13   CREATININE 1.0   CALCIUM 8.9   ANIONGAP 9     Magnesium:   Recent Labs   Lab 12/13/24  0436   MG 1.8       Significant Imaging: I have reviewed all pertinent imaging results/findings within the past 24 hours.  Assessment/Plan:     MTHFR mutation    See DVT    Colon adenocarcinoma    Aware    Lightheadedness    Patient with orthostatic hypotension on 12/13.  Fluid administered and lightheadedness resolved.  Follow off IVF.    Type 2 diabetes mellitus without complication, without long-term current use of insulin    Insulin sliding scale.  Hold home regimen.    Essential hypertension  Patient's blood pressure range in the last 24 hours was: BP  Min: 90/56  Max: 157/82.The patient's inpatient anti-hypertensive regimen is listed below:  Current Antihypertensives  diltiaZEM 24 hr capsule 120 mg, Daily, Oral    Plan  - BP is controlled, no changes needed to their regimen  - hold parameters placed given orthostatic hypotension on 12/13    Chronic embolism and thrombosis of other specified deep vein of left lower extremity    Eliquis Held.  Currently on DVT prophylaxis.  Resume Eliquis on 12/14 if approved by surgery and hemoglobin stable without evidence of active bleed.      VTE Risk Mitigation (From admission, onward)           Ordered     heparin (porcine) injection 5,000 Units  Every 8 hours         12/12/24 1035                    We will take over as primary    Ramin Baltazar MD  Department of Hospital Medicine   [facility]

## 2024-12-13 NOTE — PT/OT/SLP PROGRESS
Physical Therapy      Patient Name:  Jen Torres   MRN:  [id number]    Patient not seen today secondary to RN reports patient has been hypotensive and is about to receive a bolus. Will follow-up 12/14/24.

## 2024-12-13 NOTE — HOSPITAL COURSE
The patient was admitted for incisional hernia repair which occurred on 12/12.  Hospital medicine was for consulted and has been asked to accept onto our service as primary.    Prior to admission the patient was having symptoms as described related to the hernia.  No chest pain, shortness a breath or lightheadedness.  No recent changes to medications.  No new lower extremity edema.    On postop day 1 at which time we were consulted, the patient complained of lightheadedness with standing and was orthostatic.  500 cc fluid bolus was administered leading to resolution of lightheadedness.    The patient remained stable and was discharged home in stable condition.

## 2024-12-13 NOTE — ASSESSMENT & PLAN NOTE
Patient with orthostatic hypotension on 12/13.  Fluid administered and lightheadedness resolved.  Follow off IVF.

## 2024-12-13 NOTE — HPI
74 y.o. female with MTHFR mutation on Eliquis, history of stage IIIA colon cancer status post right hemicolectomy in April 2024 started on adjuvant chemotherapy which was stopped due to intolerance.  Currently under surveillance.  Patient reports after episodes of diarrhea following chemotherapy she developed abdominal wall bulging.  This has worse when she was stands and reduces when she lays flat. Had had imaging completed at [facility] however we were unable to obtain the actual disc to review the images are self. Patient did meet with a new PCP he was subsequently ordered CT abdomen pelvis here in [place] which has been completed.  Patient reports today to review these images and discuss surgical plans. She again has a large bulge above the umbilicus and a bulge below the umbilicus pretty much over the entire length of her previous incision. It was not have obstructive symptoms.  Does feel the bulging it was getting larger.

## 2024-12-13 NOTE — PLAN OF CARE
[facility]  Initial Discharge Assessment       Primary Care Provider: Jaci Keller PA-C    Admission Diagnosis: Incisional hernia, without obstruction or gangrene [K43.2]  S/P repair of ventral hernia [Z98.890, Z87.19]    Admission Date: 12/12/2024  Expected Discharge Date: 12/14/2024    Transition of Care Barriers: None    Payor: MEDICARE / Plan: MEDICARE PART A & B / Product Type: Government /     Extended Emergency Contact Information  Primary Emergency Contact: MelissaJavi  Address: [address] [place] [address] [facility] [place]  Home Phone: [phone]  Mobile Phone: [phone]  Relation: Spouse  Secondary Emergency Contact: Luisa Marie   [place]  Mobile Phone: [phone]  Relation: Grandchild    Discharge Plan A: Home with family  Discharge Plan B: Home Health      [id number] - [place], [place] - [address] AT [facility]  [address]  [place] [address]  Phone: [phone] Fax: [phone]    DC assessment completed at bedside. Information on facesheet verified. Lives at listed address with spouse and brother in law. Grandson will drive her home. PCP is KOBY Ramon. Last appt a month ago. [facility]. Denies coumadin, HH, HD, DME. Independent, drives self. Insurance verified. POA is daughter and spouse. Denies recent admission. Planning to DC home when clear.     Initial Assessment (most recent)       Adult Discharge Assessment - 12/13/24 1343          Discharge Assessment    Assessment Type Discharge Planning Assessment     Confirmed/corrected address, phone number and insurance Yes     Confirmed Demographics Correct on Facesheet     Source of Information patient     Communicated KATIE with patient/caregiver Yes     People in Home spouse;sibling(s)   brother in law    Facility Arrived From: home     Do you expect to return to your current living situation? Yes     Do you have help at home or  someone to help you manage your care at home? Yes     Prior to hospitilization cognitive status: Alert/Oriented     Current cognitive status: Alert/Oriented     Equipment Currently Used at Home none     Readmission within 30 days? No     Patient currently being followed by outpatient case management? No     Do you currently have service(s) that help you manage your care at home? No     Do you take prescription medications? Yes     Do you have prescription coverage? Yes     Do you have any problems affording any of your prescribed medications? No     Is the patient taking medications as prescribed? yes     Who is going to help you get home at discharge? nehal     How do you get to doctors appointments? car, drives self     Are you on dialysis? No     Do you take coumadin? No     Discharge Plan A Home with family     Discharge Plan B Home Health     DME Needed Upon Discharge  none     Discharge Plan discussed with: Patient     Transition of Care Barriers None

## 2024-12-13 NOTE — ASSESSMENT & PLAN NOTE
Eliquis Held.  Currently on DVT prophylaxis.  Resume Eliquis on 12/14 if approved by surgery and hemoglobin stable without evidence of active bleed.

## 2024-12-13 NOTE — NURSING
Pt tolerated full liquids well for breakfast. No complaints of n/v or any discomfort.  Advance diet for lunch as tolerated. will continue to monitor.

## 2024-12-13 NOTE — PLAN OF CARE
12/12/24 2015   Patient Assessment/Suction   Level of Consciousness (AVPU) alert   Respiratory Effort Normal;Unlabored   Expansion/Accessory Muscles/Retractions no use of accessory muscles   All Lung Fields Breath Sounds clear;equal bilaterally   Rhythm/Pattern, Respiratory pattern regular;depth regular;unlabored   PRE-TX-O2   Device (Oxygen Therapy) nasal cannula   $ Is the patient on Low Flow Oxygen? Yes   Flow (L/min) (Oxygen Therapy) 2   SpO2 97 %   Pulse Oximetry Type Intermittent   $ Pulse Oximetry - Multiple Charge Pulse Oximetry - Multiple   Pulse 75   Resp 18   Education   $ Education Oxygen;15 min   Tobacco Cessation Intervention   Do you use any type of tobacco product? No   Respiratory Evaluation   $ Care Plan Tech Time 15 min   $ Respiratory Evaluation Complete   Evaluation For New Orders   Admitting Diagnosis incisional hernia repair   Cardiac Diagnosis HTN   Pulmonary Diagnosis SOB, anemia   Current Surgeries incisional hernia repair   Home Oxygen   Has Home Oxygen? No   Home Aerosol, MDI, DPI, and Other Treatments/Therapies   Home Respiratory Therapy Per Patient/Review of Chart No   Oxygen Care Plan   Oxygen Care Plan Per Protocol   SPO2 Goal (%) 92% non-cardiac   Rationale Suspected hyoxemia;Post-op recovery   Bronchodilator Care Plan   Rationale No Rationale found   Atelectasis Care Plan   Rationale No Rational Found   Airway Clearance Care Plan   Rationale No rationale found

## 2024-12-13 NOTE — SUBJECTIVE & OBJECTIVE
Past Medical History:   Diagnosis Date    Abnormal Pap smear     s/p cryo at 22 y/o    Anticoagulant long-term use     Cancer     Diabetes mellitus     Encounter for blood transfusion     GERD (gastroesophageal reflux disease)     Hyperlipidemia     Hypertension     Migraine     Migraines     Sleep apnea     Can not use c-pap    Stroke     TIA (transient ischemic attack)        Past Surgical History:   Procedure Laterality Date    ARTHROSCOPIC REPAIR OF ROTATOR CUFF OF SHOULDER Left 12/29/2023    Procedure: REPAIR, ROTATOR CUFF, ARTHROSCOPIC;  Surgeon: Gary Tony MD;  Location: [facility];  Service: Orthopedics;  Laterality: Left;  arthrex    ARTHROSCOPY OF SHOULDER WITH DECOMPRESSION OF SUBACROMIAL SPACE Left 12/29/2023    Procedure: ARTHROSCOPY, SHOULDER, WITH SUBACROMIAL SPACE DECOMPRESSION;  Surgeon: Gary Tony MD;  Location: [facility];  Service: Orthopedics;  Laterality: Left;    CATARACT EXTRACTION, BILATERAL      CHOLECYSTECTOMY  10/01/2014    COLECTOMY, RIGHT  04/22/2024    Procedure: COLECTOMY, RIGHT;  Surgeon: Jaden Barrios MD;  Location: [facility];  Service: General;;    COLON SURGERY      COLONOSCOPIC SURGICAL PROCEDURE  04/22/2024    Procedure: SURGICAL PROCEDURE, COLONOSCOPIC;  Surgeon: Jaden Barrios MD;  Location: [facility];  Service: General;;    COLONOSCOPY N/A 04/21/2024    Procedure: COLONOSCOPY;  Surgeon: Ceasar Arias III, MD;  Location: [facility];  Service: Endoscopy;  Laterality: N/A;    ESOPHAGOGASTRODUODENOSCOPY N/A 04/20/2024    Procedure: EGD (ESOPHAGOGASTRODUODENOSCOPY);  Surgeon: Ceasar Arias III, MD;  Location: [facility];  Service: Endoscopy;  Laterality: N/A;    EYE SURGERY      KNEE ARTHROSCOPY Left     LAPAROTOMY, EXPLORATORY N/A 04/22/2024    Procedure: LAPAROTOMY, EXPLORATORY;  Surgeon: Jaden Barrios MD;  Location: [facility] OR;  Service: General;  Laterality: N/A;    LYSIS OF ADHESIONS  04/22/2024    Procedure: LYSIS, ADHESIONS;   Surgeon: Jaden Barrios MD;  Location: [facility] OR;  Service: General;;    REPAIR, HERNIA, INCISIONAL N/A 12/12/2024    Procedure: REPAIR, HERNIA, INCISIONAL;  Surgeon: Marlon Marks Jr., MD;  Location: [facility] OR;  Service: General;  Laterality: N/A;  retrorectus w/ TAR    TONSILLECTOMY         Review of patient's allergies indicates:   Allergen Reactions    Statins-hmg-coa reductase inhibitors Swelling    Metformin Other (See Comments)     metallic taste , burn of the esophagus    Oxaliplatin Itching     Throat itchiness, twitching of both eyelids,tingling of hands, lips, head and feet. Cramping of toes.       Current Facility-Administered Medications on File Prior to Encounter   Medication    electrolyte-S (ISOLYTE)    electrolyte-S (ISOLYTE)    LIDOcaine (PF) 10 mg/ml (1%) injection 10 mg    midazolam (VERSED) 1 mg/mL injection 2 mg     Current Outpatient Medications on File Prior to Encounter   Medication Sig    aspirin 81 MG Chew Take 81 mg by mouth once daily.    diltiaZEM (CARDIZEM CD) 120 MG Cp24 Take 1 capsule (120 mg total) by mouth once daily.    empagliflozin (JARDIANCE) 25 mg tablet Take 25 mg by mouth once daily.    ergocalciferol (ERGOCALCIFEROL) 50,000 unit Cap Take 50,000 Units by mouth every 7 days. Fridays    ezetimibe (ZETIA) 10 mg tablet Take 10 mg by mouth once daily.    glimepiride (AMARYL) 1 MG tablet Take 1 mg by mouth.    Lactobacillus rhamnosus GG (CULTURELLE) 10 billion cell capsule Take 1 capsule by mouth once daily.    melatonin 1 mg TbDL Take by mouth.    ondansetron (ZOFRAN) 4 MG tablet Take 4 mg by mouth.    pantoprazole (PROTONIX) 40 MG tablet Take 40 mg by mouth every morning.    potassium chloride (MICRO-K) 10 MEQ CpSR TAKE 1 CAPSULE BY MOUTH EVERY DAY    ramelteon (ROZEREM) 8 mg tablet TAKE 1 TABLET (8 MG TOTAL) BY MOUTH EVERY EVENING.    WESTAB MAX 2.5-25-2 mg Tab TAKE 1 TABLET BY MOUTH EVERY DAY    acetaminophen (TYLENOL) 500 MG tablet Take 2 tablets (1,000 mg total) by  mouth every 8 (eight) hours as needed for Pain.    diphenoxylate-atropine 2.5-0.025 mg (LOMOTIL) 2.5-0.025 mg per tablet Take 1 tablet by mouth.    ELIQUIS 2.5 mg Tab Take 2.5 mg by mouth every 12 (twelve) hours.    LIDOcaine-prilocaine (EMLA) cream Apply to skin 45-60 minutes prior to accessing port-a-cath.    loperamide (IMODIUM) 2 mg capsule Take 2 mg by mouth.    loratadine (CLARITIN) 10 mg tablet Take 10 mg by mouth.    prochlorperazine (COMPAZINE) 10 MG tablet Take 10 mg by mouth every 6 (six) hours as needed.     Family History       Problem Relation (Age of Onset)    Breast cancer Paternal Grandmother (70), Maternal Grandmother    Cancer Daughter, Daughter (20)    Colon cancer Mother    Ovarian cancer Daughter          Tobacco Use    Smoking status: Former    Smokeless tobacco: Never   Substance and Sexual Activity    Alcohol use: No    Drug use: No    Sexual activity: Never     Review of Systems   Constitutional:  Negative for chills and fever.   Respiratory:  Negative for cough, shortness of breath and wheezing.    Cardiovascular:  Negative for chest pain, palpitations and leg swelling.   Gastrointestinal:  Positive for abdominal pain. Negative for diarrhea and nausea.   Genitourinary:  Negative for dysuria and flank pain.   Expected postoperative abdominal discomfort present.      Objective:     Vital Signs (Most Recent):  Temp: 97.4 °F (36.3 °C) (12/13/24 1059)  Pulse: 82 (12/13/24 1400)  Resp: 18 (12/13/24 1059)  BP: 111/63 (12/13/24 1400)  SpO2: (!) 94 % (12/13/24 1310) Vital Signs (24h Range):  Temp:  [97.4 °F (36.3 °C)-98 °F (36.7 °C)] 97.4 °F (36.3 °C)  Pulse:  [64-82] 82  Resp:  [12-18] 18  SpO2:  [93 %-100 %] 94 %  BP: ()/(56-82) 111/63     Weight: 88.8 kg (195 lb 12.3 oz)  Body mass index is 29.77 kg/m².     Physical Exam  Constitutional:       General: She is not in acute distress.  HENT:      Head: Normocephalic and atraumatic.      Mouth/Throat:      Mouth: Mucous membranes are moist.    Eyes:      Extraocular Movements: Extraocular movements intact.   Cardiovascular:      Rate and Rhythm: Normal rate and regular rhythm.   Pulmonary:      Effort: Pulmonary effort is normal. No respiratory distress.      Breath sounds: No wheezing or rales.   Abdominal:      General: Bowel sounds are normal. There is no distension.      Palpations: Abdomen is soft.      Tenderness: There is abdominal tenderness. There is no guarding.      Comments: Expected postoperative tenderness.   Musculoskeletal:         General: No deformity.   Neurological:      Mental Status: She is alert.      Comments: CN II-XII grossly intact          Significant Labs: All pertinent labs within the past 24 hours have been reviewed.  CBC:   Recent Labs   Lab 12/13/24  0436   WBC 14.63*   HGB 13.4   HCT 41.4        CMP:   Recent Labs   Lab 12/13/24  0436      K 4.3      CO2 26   *   BUN 13   CREATININE 1.0   CALCIUM 8.9   ANIONGAP 9     Magnesium:   Recent Labs   Lab 12/13/24  0436   MG 1.8       Significant Imaging: I have reviewed all pertinent imaging results/findings within the past 24 hours.

## 2024-12-13 NOTE — ASSESSMENT & PLAN NOTE
Patient's blood pressure range in the last 24 hours was: BP  Min: 90/56  Max: 157/82.The patient's inpatient anti-hypertensive regimen is listed below:  Current Antihypertensives  diltiaZEM 24 hr capsule 120 mg, Daily, Oral    Plan  - BP is controlled, no changes needed to their regimen  - hold parameters placed given orthostatic hypotension on 12/13

## 2024-12-13 NOTE — PROGRESS NOTES
General Surgery Progress Note    Admit Date: 12/12/2024  S/P: Procedure(s) (LRB):  REPAIR, HERNIA, INCISIONAL (N/A)    Post-operative Day: 1 Day Post-Op    Hospital Day: 2    SUBJECTIVE:   Developed orthostatic hypotension. Received 500 cc fluid bolus.  A feeling better. Has expected postoperative pain. Denies nausea or vomiting.  Has tolerated full liquids. No passage of stool or flatus.  Saldaña catheter remains in place.    OBJECTIVE:     Vital Signs (Most Recent)  Temp:  [97.4 °F (36.3 °C)-98 °F (36.7 °C)] 97.4 °F (36.3 °C)  Pulse:  [64-82] 82  Resp:  [12-20] 18  SpO2:  [93 %-100 %] 94 %  BP: ()/(56-82) 111/63    I&Os:  I/O last 3 completed shifts:  In: 220 [P.O.:120; IV Piggyback:100]  Out: 1360 [Urine:1260; Drains:100]    Physical Exam:  Gen: NAD, AAOx3  HEENT: Anicteric sclera  Pulm: unlabored, symmetrical   Abd: Soft with appropriate tenderness palpation, ZO drain with sanguinous output, midline incision intact    Laboratory:  CBC:   Recent Labs   Lab 12/13/24  0436   WBC 14.63*   RBC 4.47   HGB 13.4   HCT 41.4      MCV 93   MCH 30.0   MCHC 32.4     BMP:   Recent Labs   Lab 12/13/24  0436   *      K 4.3      CO2 26   BUN 13   CREATININE 1.0   CALCIUM 8.9     Labs within the past 24 hours have been reviewed.    ASSESSMENT/PLAN:     Patient Active Problem List    Diagnosis Date Noted    Leukocytosis 04/29/2024    MTHFR mutation 04/28/2024    Colon adenocarcinoma 04/27/2024    Dysequilibrium 04/05/2024    Lightheadedness 04/05/2024    Iron deficiency anemia due to chronic blood loss 04/04/2024    Vertigo 03/07/2024    Syncope 03/07/2024    Shortness of breath 03/07/2024    Chronic embolism and thrombosis of other specified deep vein of left lower extremity 01/18/2024    Deep vein thrombosis (DVT) of left lower extremity, unspecified chronicity, unspecified vein 01/18/2024    Essential hypertension 01/18/2024    Dyslipidemia 01/18/2024    Statin intolerance 01/18/2024    Type 2  diabetes mellitus without complication, without long-term current use of insulin 01/18/2024    Ovarian cyst 10/29/2014    Mixed incontinence urge and stress 10/29/2014    Biliary colic 10/14/2014         74 y.o. female status post incisional hernia repair  -doing as expected postoperatively  -had orthostatic hypotension which may be related to pain medication or volume depletion, bolus given, vital signs stable currently  -diet as tolerated  -discontinue Saldaña catheter  -start stool softener  -home meds restarted  -sliding scale insulin  -will re-attempt PT OT, out of bed to chair, ambulation tomorrow  -on heparin 5000 Q 8, if hemoglobin stable tomorrow okay to transitioned to home dose of Eliquis  -leukocytosis likely secondary to surgery, continue to trend

## 2024-12-14 VITALS
BODY MASS INDEX: 29.67 KG/M2 | WEIGHT: 195.75 LBS | TEMPERATURE: 98 F | HEART RATE: 91 BPM | RESPIRATION RATE: 18 BRPM | HEIGHT: 68 IN | OXYGEN SATURATION: 93 % | SYSTOLIC BLOOD PRESSURE: 151 MMHG | DIASTOLIC BLOOD PRESSURE: 81 MMHG

## 2024-12-14 LAB
ANION GAP SERPL CALC-SCNC: 6 MMOL/L (ref 8–16)
BASOPHILS # BLD AUTO: 0.03 K/UL (ref 0–0.2)
BASOPHILS NFR BLD: 0.3 % (ref 0–1.9)
BUN SERPL-MCNC: 15 MG/DL (ref 8–23)
CALCIUM SERPL-MCNC: 8.4 MG/DL (ref 8.7–10.5)
CHLORIDE SERPL-SCNC: 106 MMOL/L (ref 95–110)
CO2 SERPL-SCNC: 27 MMOL/L (ref 23–29)
CREAT SERPL-MCNC: 0.9 MG/DL (ref 0.5–1.4)
DIFFERENTIAL METHOD BLD: ABNORMAL
EOSINOPHIL # BLD AUTO: 0.2 K/UL (ref 0–0.5)
EOSINOPHIL NFR BLD: 2 % (ref 0–8)
ERYTHROCYTE [DISTWIDTH] IN BLOOD BY AUTOMATED COUNT: 13.2 % (ref 11.5–14.5)
EST. GFR  (NO RACE VARIABLE): >60 ML/MIN/1.73 M^2
ESTIMATED AVG GLUCOSE: 151 MG/DL (ref 68–131)
GLUCOSE SERPL-MCNC: 165 MG/DL (ref 70–110)
HBA1C MFR BLD: 6.9 % (ref 4.5–6.2)
HCT VFR BLD AUTO: 37.6 % (ref 37–48.5)
HGB BLD-MCNC: 12.2 G/DL (ref 12–16)
IMM GRANULOCYTES # BLD AUTO: 0.04 K/UL (ref 0–0.04)
IMM GRANULOCYTES NFR BLD AUTO: 0.4 % (ref 0–0.5)
LYMPHOCYTES # BLD AUTO: 1.6 K/UL (ref 1–4.8)
LYMPHOCYTES NFR BLD: 14.8 % (ref 18–48)
MAGNESIUM SERPL-MCNC: 1.8 MG/DL (ref 1.6–2.6)
MCH RBC QN AUTO: 30 PG (ref 27–31)
MCHC RBC AUTO-ENTMCNC: 32.4 G/DL (ref 32–36)
MCV RBC AUTO: 92 FL (ref 82–98)
MONOCYTES # BLD AUTO: 0.8 K/UL (ref 0.3–1)
MONOCYTES NFR BLD: 7 % (ref 4–15)
NEUTROPHILS # BLD AUTO: 8.1 K/UL (ref 1.8–7.7)
NEUTROPHILS NFR BLD: 75.5 % (ref 38–73)
NRBC BLD-RTO: 0 /100 WBC
PHOSPHATE SERPL-MCNC: 1.5 MG/DL (ref 2.7–4.5)
PLATELET # BLD AUTO: 248 K/UL (ref 150–450)
PMV BLD AUTO: 10.4 FL (ref 9.2–12.9)
POCT GLUCOSE: 147 MG/DL (ref 70–110)
POCT GLUCOSE: 148 MG/DL (ref 70–110)
POCT GLUCOSE: 214 MG/DL (ref 70–110)
POTASSIUM SERPL-SCNC: 3.6 MMOL/L (ref 3.5–5.1)
RBC # BLD AUTO: 4.07 M/UL (ref 4–5.4)
SODIUM SERPL-SCNC: 139 MMOL/L (ref 136–145)
WBC # BLD AUTO: 10.7 K/UL (ref 3.9–12.7)

## 2024-12-14 PROCEDURE — 63600175 PHARM REV CODE 636 W HCPCS: Performed by: SURGERY

## 2024-12-14 PROCEDURE — 94761 N-INVAS EAR/PLS OXIMETRY MLT: CPT

## 2024-12-14 PROCEDURE — 25000003 PHARM REV CODE 250: Performed by: SURGERY

## 2024-12-14 PROCEDURE — 84100 ASSAY OF PHOSPHORUS: CPT | Performed by: SURGERY

## 2024-12-14 PROCEDURE — 85025 COMPLETE CBC W/AUTO DIFF WBC: CPT | Performed by: SURGERY

## 2024-12-14 PROCEDURE — 25000003 PHARM REV CODE 250: Performed by: INTERNAL MEDICINE

## 2024-12-14 PROCEDURE — 97161 PT EVAL LOW COMPLEX 20 MIN: CPT

## 2024-12-14 PROCEDURE — 80048 BASIC METABOLIC PNL TOTAL CA: CPT | Performed by: SURGERY

## 2024-12-14 PROCEDURE — 36415 COLL VENOUS BLD VENIPUNCTURE: CPT | Performed by: SURGERY

## 2024-12-14 PROCEDURE — 83735 ASSAY OF MAGNESIUM: CPT | Performed by: SURGERY

## 2024-12-14 RX ORDER — OXYCODONE HYDROCHLORIDE 5 MG/1
5 TABLET ORAL EVERY 4 HOURS PRN
Qty: 20 TABLET | Refills: 0 | Status: SHIPPED | OUTPATIENT
Start: 2024-12-14

## 2024-12-14 RX ADMIN — POTASSIUM CHLORIDE 10 MEQ: 750 CAPSULE, EXTENDED RELEASE ORAL at 09:12

## 2024-12-14 RX ADMIN — PANTOPRAZOLE SODIUM 40 MG: 40 TABLET, DELAYED RELEASE ORAL at 05:12

## 2024-12-14 RX ADMIN — APIXABAN 2.5 MG: 2.5 TABLET, FILM COATED ORAL at 09:12

## 2024-12-14 RX ADMIN — EZETIMIBE 10 MG: 10 TABLET ORAL at 09:12

## 2024-12-14 RX ADMIN — POLYETHYLENE GLYCOL 3350 17 G: 17 POWDER, FOR SOLUTION ORAL at 09:12

## 2024-12-14 RX ADMIN — MUPIROCIN 1 G: 20 OINTMENT TOPICAL at 09:12

## 2024-12-14 RX ADMIN — DILTIAZEM HYDROCHLORIDE 120 MG: 120 CAPSULE, COATED, EXTENDED RELEASE ORAL at 09:12

## 2024-12-14 RX ADMIN — HEPARIN SODIUM 5000 UNITS: 5000 INJECTION INTRAVENOUS; SUBCUTANEOUS at 05:12

## 2024-12-14 NOTE — PT/OT/SLP EVAL
Physical Therapy Evaluation    Patient Name:  Jen Torres   MRN:  [id number]    Recommendations:     Discharge Recommendations: Low Intensity Therapy   Discharge Equipment Recommendations: to be determined by next level of care   Barriers to discharge:  medical status    Assessment:     Jen Torres is a 74 y.o. female admitted with a medical diagnosis of Chronic embolism and thrombosis of other specified deep vein of left lower extremity.  She presents with the following impairments/functional limitations: weakness, impaired endurance, impaired functional mobility, gait instability, decreased lower extremity function, impaired cardiopulmonary response to activity, pain Patient agreeable to PT evaluation. SBA for bed mobility and transfers. Patient agreed to use RW for safety and stability as she reported feeling tired, sore at her stomach. She walked for 75ft with RW CGA, no loss of balance but with decreased theresa. Patient had no change in symptoms with walking. Discussed benefit of continuing with PT after discharge from the hospital to continue to improve strength and endurance.    Rehab Prognosis: Good; patient would benefit from acute skilled PT services to address these deficits and reach maximum level of function.    Recent Surgery: Procedure(s) (LRB):  REPAIR, HERNIA, INCISIONAL (N/A) 2 Days Post-Op    Plan:     During this hospitalization, patient to be seen 5 x/week to address the identified rehab impairments via gait training, therapeutic activities, therapeutic exercises, neuromuscular re-education and progress toward the following goals:    Plan of Care Expires:       Subjective     Chief Complaint: tired, sore at belly  Patient/Family Comments/goals: go home today  Pain/Comfort:       Patients cultural, spiritual, Oriental orthodox conflicts given the current situation:      Living Environment:  Home with spouse (who is ill) and brother in law (who is ill)  Prior to admission, patients level of  function was ind.  Equipment used at home: walker, rolling, wheelchair.  DME owned (not currently used): none.  Upon discharge, patient will have assistance from spouse.    Objective:     Communicated with nurse prior to session.  Patient found HOB elevated with    upon PT entry to room.    General Precautions: Standard, fall  Orthopedic Precautions:    Braces:    Respiratory Status: Room air    Exams:  RLE ROM: WNL  RLE Strength: WNL  LLE ROM: WNL  LLE Strength: WNL    Functional Mobility:  Bed Mobility:     Supine to Sit: stand by assistance  Sit to Supine: stand by assistance  Transfers:     Sit to Stand:  stand by assistance with no AD  Gait: 75ft with RW for safety; decreased theresa  Balance: sitting EOB unsupported: good; standing unsupported good - and initiated use of RW to improve her stability      AM-PAC 6 CLICK MOBILITY  Total Score:23       Treatment & Education:  Pt educated on POC, discharge recommendation, need for assist with mobility, use of call bell to seek assistance as needed and fall prevention      Patient left up in chair with all lines intact, call button in reach, and nurse notified.    GOALS:   Multidisciplinary Problems       Physical Therapy Goals          Problem: Physical Therapy    Goal Priority Disciplines Outcome Interventions   Physical Therapy Goal     PT, PT/OT     Description: Goals to be met by: 25     Patient will increase functional independence with mobility by performin. Sit to stand transfer with Harvey  2. Gait  x 200 feet with Modified Harvey using Rolling Walker.                          History:     Past Medical History:   Diagnosis Date    Abnormal Pap smear     s/p cryo at 22 y/o    Anticoagulant long-term use     Cancer     Diabetes mellitus     Encounter for blood transfusion     GERD (gastroesophageal reflux disease)     Hyperlipidemia     Hypertension     Migraine     Migraines     Sleep apnea     Can not use c-pap    Stroke     TIA  (transient ischemic attack)        Past Surgical History:   Procedure Laterality Date    ARTHROSCOPIC REPAIR OF ROTATOR CUFF OF SHOULDER Left 12/29/2023    Procedure: REPAIR, ROTATOR CUFF, ARTHROSCOPIC;  Surgeon: Gary Tony MD;  Location: [facility];  Service: Orthopedics;  Laterality: Left;  arthrex    ARTHROSCOPY OF SHOULDER WITH DECOMPRESSION OF SUBACROMIAL SPACE Left 12/29/2023    Procedure: ARTHROSCOPY, SHOULDER, WITH SUBACROMIAL SPACE DECOMPRESSION;  Surgeon: Gary Tony MD;  Location: [facility];  Service: Orthopedics;  Laterality: Left;    CATARACT EXTRACTION, BILATERAL      CHOLECYSTECTOMY  10/01/2014    COLECTOMY, RIGHT  04/22/2024    Procedure: COLECTOMY, RIGHT;  Surgeon: Jaden Barrios MD;  Location: [facility];  Service: General;;    COLON SURGERY      COLONOSCOPIC SURGICAL PROCEDURE  04/22/2024    Procedure: SURGICAL PROCEDURE, COLONOSCOPIC;  Surgeon: Jaden Barrios MD;  Location: [facility];  Service: General;;    COLONOSCOPY N/A 04/21/2024    Procedure: COLONOSCOPY;  Surgeon: Ceasar Arias III, MD;  Location: [facility];  Service: Endoscopy;  Laterality: N/A;    ESOPHAGOGASTRODUODENOSCOPY N/A 04/20/2024    Procedure: EGD (ESOPHAGOGASTRODUODENOSCOPY);  Surgeon: Ceasar Arias III, MD;  Location: [facility];  Service: Endoscopy;  Laterality: N/A;    EYE SURGERY      KNEE ARTHROSCOPY Left     LAPAROTOMY, EXPLORATORY N/A 04/22/2024    Procedure: LAPAROTOMY, EXPLORATORY;  Surgeon: Jaden Barrios MD;  Location: [facility] OR;  Service: General;  Laterality: N/A;    LYSIS OF ADHESIONS  04/22/2024    Procedure: LYSIS, ADHESIONS;  Surgeon: Jaden Barrios MD;  Location: [facility] OR;  Service: General;;    REPAIR, HERNIA, INCISIONAL N/A 12/12/2024    Procedure: REPAIR, HERNIA, INCISIONAL;  Surgeon: Marlon Marks Jr., MD;  Location: [facility] OR;  Service: General;  Laterality: N/A;  retrorectus w/ TAR    TONSILLECTOMY         Time Tracking:     PT Received On: 12/14/24  PT  Start Time: 0936     PT Stop Time: 0954  PT Total Time (min): 18 min     Billable Minutes: Evaluation 18      12/14/2024

## 2024-12-14 NOTE — RESPIRATORY THERAPY
12/14/24 0651   Patient Assessment/Suction   Level of Consciousness (AVPU) alert   Respiratory Effort Unlabored   PRE-TX-O2   Device (Oxygen Therapy) room air  (o2 in use prn)   SpO2 95 %   Pulse Oximetry Type Intermittent   $ Pulse Oximetry - Multiple Charge Pulse Oximetry - Multiple   Pulse 91   Resp 18

## 2024-12-14 NOTE — NURSING
pt called for assistance for help to sit up on side of the bed. Pt became dizzy and feeling lightheaded, check bp it was 90s/50s; did orthostatics(charted). Hospitalist Delaney was notified placed order for 500cc bolus of NS. Will continue to monitor, call light and personal items within reach, wheels locked, bed in lowest position, side rails up x2, bed alarm activated, educated pt to call if anything is needed.

## 2024-12-14 NOTE — PT/OT/SLP PROGRESS
Occupational Therapy      Patient Name:  Jen Torres   MRN:  [id number]    Patient not seen today secondary to Patient discharged prior to initiation of evaluation.     12/14/2024

## 2024-12-14 NOTE — PLAN OF CARE
12/14/24 1300   Medicare Message   Important Message from Medicare regarding Discharge Appeal Rights Given to patient/caregiver;Explained to patient/caregiver;Signed/date by patient/caregiver   Date IMM was signed 12/14/24   Time IMM was signed 1100

## 2024-12-14 NOTE — NURSING
Orders placed for olivares to be removed, proper protocol followed for removal. Pt tolerated well, will continue to follow.      1400- 500cc bolus of NS completed - rechecked bp: 111/63.

## 2024-12-14 NOTE — PLAN OF CARE
Patient cleared for discharge from case management standpoint. Patient will be followed by [facility]. CHERRY spoke with Judith, on call nurse, and confirmed that her start of care date is 12/15/2024. CHERRY relayed this information to patient via cell phone.     Follow up appointments scheduled and added to AVS.    Chart and discharge orders reviewed.  Patient discharged home with home health. There are no further case management needs.       12/14/24 1250   Final Note   Assessment Type Final Discharge Note   Anticipated Discharge Disposition Home-Health   What phone number can be called within the next 1-3 days to see how you are doing after discharge? [phone]   Post-Acute Status   Patient choice form signed by patient/caregiver List from [employer]   Discharge Delays   (Waiting for acceptance and start of care date.)

## 2024-12-14 NOTE — PLAN OF CARE
Problem: Adult Inpatient Plan of Care  Goal: Plan of Care Review  Outcome: Met  Goal: Patient-Specific Goal (Individualized)  Outcome: Met  Goal: Absence of Hospital-Acquired Illness or Injury  Outcome: Met  Goal: Optimal Comfort and Wellbeing  Outcome: Met  Goal: Readiness for Transition of Care  Outcome: Met     Problem: Diabetes Comorbidity  Goal: Blood Glucose Level Within Targeted Range  Outcome: Met     Problem: Infection  Goal: Absence of Infection Signs and Symptoms  Outcome: Met     Problem: Wound  Goal: Optimal Coping  Outcome: Met  Goal: Optimal Functional Ability  Outcome: Met  Goal: Absence of Infection Signs and Symptoms  Outcome: Met  Goal: Improved Oral Intake  Outcome: Met  Goal: Optimal Pain Control and Function  Outcome: Met  Goal: Skin Health and Integrity  Outcome: Met  Goal: Optimal Wound Healing  Outcome: Met     Problem: Fall Injury Risk  Goal: Absence of Fall and Fall-Related Injury  Outcome: Met     Problem: Skin Injury Risk Increased  Goal: Skin Health and Integrity  Outcome: Met

## 2024-12-14 NOTE — DISCHARGE SUMMARY
[facility] Medicine  Discharge Summary      Patient Name: Jen Torres  MRN: [id number]  VALENCIA: [id number]  Patient Class: IP- Inpatient  Admission Date: 12/12/2024  Hospital Length of Stay: 2 days  Discharge Date and Time:  12/14/2024 9:41 AM  Attending Physician: Olivia Gamboa MD   Discharging Provider: Olivia Gamboa MD, MD  Primary Care Provider: Jaci Keller PA-C    Primary Care Team: Networked reference to record PCT     HPI:   74 y.o. female with MTHFR mutation on Eliquis, history of stage IIIA colon cancer status post right hemicolectomy in April 2024 started on adjuvant chemotherapy which was stopped due to intolerance.  Currently under surveillance.  Patient reports after episodes of diarrhea following chemotherapy she developed abdominal wall bulging.  This has worse when she was stands and reduces when she lays flat. Had had imaging completed at [facility] however we were unable to obtain the actual disc to review the images are self. Patient did meet with a new PCP he was subsequently ordered CT abdomen pelvis here in [place] which has been completed.  Patient reports today to review these images and discuss surgical plans. She again has a large bulge above the umbilicus and a bulge below the umbilicus pretty much over the entire length of her previous incision. It was not have obstructive symptoms.  Does feel the bulging it was getting larger.     Procedure(s) (LRB):  REPAIR, HERNIA, INCISIONAL (N/A)      Hospital Course:   The patient was admitted for incisional hernia repair which occurred on 12/12.  Hospital medicine was for consulted and has been asked to accept onto our service as primary.    Prior to admission the patient was having symptoms as described related to the hernia.  No chest pain, shortness a breath or lightheadedness.  No recent changes to medications.  No new lower extremity edema.    On postop day 1 at which time we were consulted, the patient  complained of lightheadedness with standing and was orthostatic.  500 cc fluid bolus was administered leading to resolution of lightheadedness.    The patient remained stable and was discharged home in stable condition.     Goals of Care Treatment Preferences:  Code Status: Full Code      SDOH Screening:  The patient was screened for utility difficulties, food insecurity, transport difficulties, housing insecurity, and interpersonal safety and there were no concerns identified this admission.     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  Continue eliquis    MTHFR mutation    See DVT    Colon adenocarcinoma    Aware    Lightheadedness  Resolved    Type 2 diabetes mellitus without complication, without long-term current use of insulin  Home meds    Essential hypertension  Home med      Final Active Diagnoses:    Diagnosis Date Noted POA    PRINCIPAL PROBLEM:  Chronic embolism and thrombosis of other specified deep vein of left lower extremity [I82.592] 01/18/2024 Yes    MTHFR mutation [Z15.89] 04/28/2024 Not Applicable    Colon adenocarcinoma [C18.9] 04/27/2024 Yes    Lightheadedness [R42] 04/05/2024 Yes    Essential hypertension [I10] 01/18/2024 Yes    Type 2 diabetes mellitus without complication, without long-term current use of insulin [E11.9] 01/18/2024 Yes      Problems Resolved During this Admission:       Discharged Condition: stable    Disposition: [facility]-Health Care [facility] [place] [facility]    Follow Up:   Follow-up Information       Nato Marks Jr., MD. Go on 12/30/2024.    Specialty: General Surgery  Why: post-op appt at 1:00  Contact information:  [facility]  [address] [place] [id number]  [phone]                           Patient Instructions:      Diet  diabetic     HOME HEALTH ORDERS   Order Comments: For nurse, PT/OT     Order Specific Question Answer Comments   What Home Health Agency is the patient currently using? Other/External      Activity as tolerated       Significant Diagnostic Studies: N/A    Pending Diagnostic Studies:       None           Medications:  Reconciled Home Medications:      Medication List        START taking these medications      oxyCODONE 5 MG immediate release tablet  Commonly known as: ROXICODONE  Take 1 tablet (5 mg total) by mouth every 4 (four) hours as needed for Pain (prn post-op pain).            CONTINUE taking these medications      aspirin 81 MG Chew  Take 81 mg by mouth once daily.     diltiaZEM 120 MG Cp24  Commonly known as: CARDIZEM CD  Take 1 capsule (120 mg total) by mouth once daily.     diphenoxylate-atropine 2.5-0.025 mg 2.5-0.025 mg per tablet  Commonly known as: LOMOTIL  Take 1 tablet by mouth.     ELIQUIS 2.5 mg Tab  Generic drug: apixaban  Take 2.5 mg by mouth every 12 (twelve) hours.     empagliflozin 25 mg tablet  Commonly known as: Jardiance  Take 25 mg by mouth once daily.     ergocalciferol 50,000 unit Cap  Commonly known as: ERGOCALCIFEROL  Take 50,000 Units by mouth every 7 days. Fridays     ezetimibe 10 mg tablet  Commonly known as: ZETIA  Take 10 mg by mouth once daily.     glimepiride 1 MG tablet  Commonly known as: AMARYL  Take 1 mg by mouth.     Lactobacillus rhamnosus GG 10 billion cell capsule  Commonly known as: CULTURELLE  Take 1 capsule by mouth once daily.     LIDOcaine-prilocaine cream  Commonly known as: EMLA  Apply to skin 45-60 minutes prior to accessing port-a-cath.     loperamide 2 mg capsule  Commonly known as: IMODIUM  Take 2 mg by mouth.     loratadine 10 mg tablet  Commonly known as: CLARITIN  Take 10 mg by mouth.     melatonin 1 mg Tbdl  Take by mouth.     ondansetron 4 MG tablet  Commonly known as: ZOFRAN  Take 4 mg by mouth.     PAIN RELIEVER ES(ACETAMINOPHN) 500 mg tablet  Generic  drug: acetaminophen  Take 2 tablets (1,000 mg total) by mouth every 8 (eight) hours as needed for Pain.     pantoprazole 40 MG tablet  Commonly known as: PROTONIX  Take 40 mg by mouth every morning.     potassium chloride 10 MEQ Cpsr  Commonly known as: MICRO-K  TAKE 1 CAPSULE BY MOUTH EVERY DAY     prochlorperazine 10 MG tablet  Commonly known as: COMPAZINE  Take 10 mg by mouth every 6 (six) hours as needed.     ramelteon 8 mg tablet  Commonly known as: ROZEREM  TAKE 1 TABLET (8 MG TOTAL) BY MOUTH EVERY EVENING.     WESTAB MAX 2.5-25-2 mg Tab  Generic drug: folic acid-vit B6-vit B12 2.5-25-2 mg  TAKE 1 TABLET BY MOUTH EVERY DAY              Indwelling Lines/Drains at time of discharge:   Lines/Drains/Airways       Central Venous Catheter Line  Duration             Port A Cath Single Lumen  Subclavian Right -- days              Drain  Duration                  Closed/Suction Drain 12/12/24 1412 Tube - 1 Right RUQ Bulb 19 Fr. 1 day                    Time spent on the discharge of patient: 35 minutes         Olivia Gamboa MD, MD  Department of Hospital Medicine  [facility]

## 2024-12-14 NOTE — PLAN OF CARE
12/13/24 2045   Patient Assessment/Suction   Level of Consciousness (AVPU) alert   Respiratory Effort Normal;Unlabored   Expansion/Accessory Muscles/Retractions no use of accessory muscles;expansion symmetric   All Lung Fields Breath Sounds clear;equal bilaterally   Rhythm/Pattern, Respiratory unlabored;pattern regular   PRE-TX-O2   Device (Oxygen Therapy) nasal cannula   $ Is the patient on Low Flow Oxygen? Yes   Flow (L/min) (Oxygen Therapy) 3   SpO2 (!) 94 %   Pulse Oximetry Type Intermittent   $ Pulse Oximetry - Multiple Charge Pulse Oximetry - Multiple   Pulse 92   Resp 18   Education   $ Education Oxygen;15 min     Placed on 3L O2. spo2 on RA 90%. Pt has KARINA

## 2024-12-14 NOTE — PLAN OF CARE
Problem: Adult Inpatient Plan of Care  Goal: Plan of Care Review  12/13/2024 1824 by Ana Laura Barraza LPN  Outcome: Progressing  12/13/2024 0946 by Ana Laura Barraza LPN  Outcome: Progressing  Goal: Patient-Specific Goal (Individualized)  12/13/2024 1824 by Ana Laura Barraza LPN  Outcome: Progressing  12/13/2024 0946 by Ana Laura Barraza LPN  Outcome: Progressing  Goal: Absence of Hospital-Acquired Illness or Injury  12/13/2024 1824 by Ana Laura Barraza LPN  Outcome: Progressing  12/13/2024 0946 by Ana Laura Barraza LPN  Outcome: Progressing  Goal: Optimal Comfort and Wellbeing  12/13/2024 1824 by Ana Laura Barraza LPN  Outcome: Progressing  12/13/2024 0946 by Ana Laura Barraza LPN  Outcome: Progressing  Goal: Readiness for Transition of Care  12/13/2024 1824 by Ana Laura Barraza LPN  Outcome: Progressing  12/13/2024 0946 by Ana Lauar Barraza LPN  Outcome: Progressing     Problem: Diabetes Comorbidity  Goal: Blood Glucose Level Within Targeted Range  12/13/2024 1824 by Ana Laura Barraza LPN  Outcome: Progressing  12/13/2024 0946 by Ana Laura Barraza LPN  Outcome: Progressing     Problem: Infection  Goal: Absence of Infection Signs and Symptoms  12/13/2024 1824 by Ana Laura Barraza LPN  Outcome: Progressing  12/13/2024 0946 by Ana Laura Barraza LPN  Outcome: Progressing     Problem: Wound  Goal: Optimal Coping  12/13/2024 1824 by Ana Laura Barraza LPN  Outcome: Progressing  12/13/2024 0946 by Ana Laura Barraza LPN  Outcome: Progressing  Goal: Optimal Functional Ability  12/13/2024 1824 by Ana Laura Barraza LPN  Outcome: Progressing  12/13/2024 0946 by Ana Laura Barraza LPN  Outcome: Progressing  Goal: Absence of Infection Signs and Symptoms  12/13/2024 1824 by Ana Laura Barraza LPN  Outcome: Progressing  12/13/2024 0946 by Ana Laura Barraza LPN  Outcome: Progressing  Goal: Improved Oral Intake  12/13/2024 1824 by Ana Laura Barraza LPN  Outcome: Progressing  12/13/2024 0946 by Ana Laura Barraza LPN  Outcome: Progressing  Goal: Optimal Pain Control and Function  12/13/2024 1824 by  Roxton, Ana Laura, LPN  Outcome: Progressing  12/13/2024 0946 by Ana Laura Barraza LPN  Outcome: Progressing  Goal: Skin Health and Integrity  12/13/2024 1824 by Ana Laura Barraza LPN  Outcome: Progressing  12/13/2024 0946 by Ana Laura Barraza LPN  Outcome: Progressing  Goal: Optimal Wound Healing  12/13/2024 1824 by Ana Laura Barraza LPN  Outcome: Progressing  12/13/2024 0946 by Ana Laura Barraza LPN  Outcome: Progressing     Problem: Fall Injury Risk  Goal: Absence of Fall and Fall-Related Injury  12/13/2024 1824 by Ana Laura Barraza LPN  Outcome: Progressing  12/13/2024 0946 by Ana Laura Barraza LPN  Outcome: Progressing     Problem: Skin Injury Risk Increased  Goal: Skin Health and Integrity  12/13/2024 1824 by Ana Laura Barraza LPN  Outcome: Progressing  12/13/2024 0946 by Ana Laura Barraza LPN  Outcome: Progressing

## 2024-12-14 NOTE — NURSING
Discharged instructions reviewed with pt. Verbalized understanding. Iv removed removed without difficulty, catheter intact. Discharged off unit via wheelchair in stable condition to personal vehicle.

## 2024-12-15 PROCEDURE — G0180 MD CERTIFICATION HHA PATIENT: HCPCS | Mod: ,,,

## 2024-12-19 ENCOUNTER — TELEPHONE (OUTPATIENT)
Dept: SURGERY | Facility: CLINIC | Age: 74
End: 2024-12-19
Payer: MEDICARE

## 2024-12-19 NOTE — TELEPHONE ENCOUNTER
----- Message from [employer] sent at 12/19/2024  8:46 AM CST -----  Regarding: Sooner Appointment reschedule Request  Contact: patient at [phone]  Type:  Sooner Appointment reschedule Request    Name of Caller:  patient at [phone]    Additional Information:  Patient was told by  to reschedule appointment from 12/27 to 12/23 per conversation at the hospital. Please call and advise.

## 2024-12-30 ENCOUNTER — OFFICE VISIT (OUTPATIENT)
Dept: SURGERY | Facility: CLINIC | Age: 74
End: 2024-12-30
Payer: MEDICARE

## 2024-12-30 VITALS — TEMPERATURE: 97 F | HEART RATE: 88 BPM | SYSTOLIC BLOOD PRESSURE: 136 MMHG | DIASTOLIC BLOOD PRESSURE: 72 MMHG

## 2024-12-30 DIAGNOSIS — Z98.890 S/P HERNIA REPAIR: Primary | ICD-10-CM

## 2024-12-30 DIAGNOSIS — Z87.19 S/P HERNIA REPAIR: Primary | ICD-10-CM

## 2024-12-30 PROCEDURE — 99999 PR PBB SHADOW E&M-EST. PATIENT-LVL II: CPT | Mod: PBBFAC,,, | Performed by: SURGERY

## 2024-12-30 PROCEDURE — 99212 OFFICE O/P EST SF 10 MIN: CPT | Mod: PBBFAC,PN | Performed by: SURGERY

## 2024-12-30 NOTE — PROGRESS NOTES
GENERAL SURGERY  POST-OP PROGRESS NOTE    HPI: Jen Torres is a 74 y.o. female status post open retrorectus and unilateral tar repair with Phasix mesh of an incisional hernia with a 15 x 8 cm fascial defect on 12/12/2024 here for follow-up. Overall patient is doing well.  Pain is well controlled with over-the-counter medication.  She is tolerating a diet. No nausea or vomiting, fevers or chills.  Drain with minimal output.    VITALS:  Vitals:    12/30/24 1305   BP: 136/72   Pulse: 88   Temp: 97.4 °F (36.3 °C)       PHYSICAL EXAM:  GEN: No acute distress, alert orient x3  HEENT: Anicteric sclerae  CV: Regular rate rhythm  RESP: Nonlabored breathing  ABD: Soft, midline incision intact without significant underlying fluid collection such as seroma or hematoma, skin appears healthy and viable without ischemia, ZO drain in the right upper abdomen with minimal serosanguineous output  EXT: No edema        ASSESSMENT & PLAN:  74 y.o. female s/p component separation hernia repair  -overall doing well from surgery  -drain removed  -avoid straining, heavy lifting, etc. for a total 6-8 weeks after surgery, light activity and day-to-day activity okay  -return to clinic in one-month for follow up

## 2025-01-07 ENCOUNTER — TELEPHONE (OUTPATIENT)
Facility: CLINIC | Age: 75
End: 2025-01-07
Payer: MEDICARE

## 2025-01-07 DIAGNOSIS — D50.0 IRON DEFICIENCY ANEMIA DUE TO CHRONIC BLOOD LOSS: ICD-10-CM

## 2025-01-07 DIAGNOSIS — C18.2 MALIGNANT NEOPLASM OF ASCENDING COLON: Primary | ICD-10-CM

## 2025-01-07 DIAGNOSIS — I82.402 DEEP VEIN THROMBOSIS (DVT) OF LEFT LOWER EXTREMITY, UNSPECIFIED CHRONICITY, UNSPECIFIED VEIN: ICD-10-CM

## 2025-01-07 DIAGNOSIS — Z15.89 MTHFR MUTATION: ICD-10-CM

## 2025-01-07 DIAGNOSIS — C77.2 PRIMARY COLON CANCER WITH METASTASIS TO 1 REGIONAL LYMPH NODE (N1A): ICD-10-CM

## 2025-01-07 DIAGNOSIS — C18.9 PRIMARY COLON CANCER WITH METASTASIS TO 1 REGIONAL LYMPH NODE (N1A): ICD-10-CM

## 2025-01-07 NOTE — TELEPHONE ENCOUNTER
Pt would like to have labs done at [facility] and not [facility]. Labs re-prepared and pending approval.

## 2025-01-09 ENCOUNTER — TELEPHONE (OUTPATIENT)
Dept: FAMILY MEDICINE | Facility: CLINIC | Age: 75
End: 2025-01-09
Payer: MEDICARE

## 2025-01-10 ENCOUNTER — DOCUMENT SCAN (OUTPATIENT)
Dept: HOME HEALTH SERVICES | Facility: HOSPITAL | Age: 75
End: 2025-01-10
Payer: MEDICARE

## 2025-01-14 ENCOUNTER — INFUSION (OUTPATIENT)
Dept: INFUSION THERAPY | Facility: HOSPITAL | Age: 75
End: 2025-01-14
Attending: INTERNAL MEDICINE
Payer: MEDICARE

## 2025-01-14 VITALS
HEIGHT: 68 IN | HEART RATE: 73 BPM | OXYGEN SATURATION: 96 % | WEIGHT: 189.63 LBS | SYSTOLIC BLOOD PRESSURE: 139 MMHG | DIASTOLIC BLOOD PRESSURE: 77 MMHG | TEMPERATURE: 97 F | RESPIRATION RATE: 16 BRPM | BODY MASS INDEX: 28.74 KG/M2

## 2025-01-14 DIAGNOSIS — C18.9 COLON ADENOCARCINOMA: Primary | ICD-10-CM

## 2025-01-14 PROCEDURE — 25000003 PHARM REV CODE 250: Performed by: INTERNAL MEDICINE

## 2025-01-14 PROCEDURE — 96523 IRRIG DRUG DELIVERY DEVICE: CPT

## 2025-01-14 PROCEDURE — 63600175 PHARM REV CODE 636 W HCPCS: Performed by: INTERNAL MEDICINE

## 2025-01-14 PROCEDURE — A4216 STERILE WATER/SALINE, 10 ML: HCPCS | Performed by: INTERNAL MEDICINE

## 2025-01-14 RX ORDER — SODIUM CHLORIDE 0.9 % (FLUSH) 0.9 %
10 SYRINGE (ML) INJECTION
OUTPATIENT
Start: 2025-01-14

## 2025-01-14 RX ORDER — HEPARIN 100 UNIT/ML
500 SYRINGE INTRAVENOUS
OUTPATIENT
Start: 2025-01-14

## 2025-01-14 RX ORDER — SODIUM CHLORIDE 0.9 % (FLUSH) 0.9 %
10 SYRINGE (ML) INJECTION
Status: DISCONTINUED | OUTPATIENT
Start: 2025-01-14 | End: 2025-01-14 | Stop reason: HOSPADM

## 2025-01-14 RX ORDER — HEPARIN 100 UNIT/ML
500 SYRINGE INTRAVENOUS
Status: DISCONTINUED | OUTPATIENT
Start: 2025-01-14 | End: 2025-01-14 | Stop reason: HOSPADM

## 2025-01-14 RX ADMIN — Medication 10 ML: at 02:01

## 2025-01-14 RX ADMIN — HEPARIN 500 UNITS: 100 SYRINGE at 02:01

## 2025-01-14 NOTE — PLAN OF CARE
Problem: Infection  Goal: Absence of Infection Signs and Symptoms  Outcome: Progressing  Intervention: Prevent or Manage Infection  Flowsheets (Taken 1/14/2025 1441)  Infection Management: aseptic technique maintained  Isolation Precautions:   precautions initiated   precautions maintained

## 2025-01-15 ENCOUNTER — TELEPHONE (OUTPATIENT)
Dept: FAMILY MEDICINE | Facility: CLINIC | Age: 75
End: 2025-01-15
Payer: MEDICARE

## 2025-01-17 ENCOUNTER — DOCUMENT SCAN (OUTPATIENT)
Dept: HOME HEALTH SERVICES | Facility: HOSPITAL | Age: 75
End: 2025-01-17
Payer: MEDICARE

## 2025-01-23 ENCOUNTER — TELEPHONE (OUTPATIENT)
Facility: CLINIC | Age: 75
End: 2025-01-23
Payer: MEDICARE

## 2025-01-24 ENCOUNTER — TELEPHONE (OUTPATIENT)
Facility: CLINIC | Age: 75
End: 2025-01-24
Payer: MEDICARE

## 2025-01-24 ENCOUNTER — OFFICE VISIT (OUTPATIENT)
Facility: CLINIC | Age: 75
End: 2025-01-24
Payer: MEDICARE

## 2025-01-24 ENCOUNTER — TELEPHONE (OUTPATIENT)
Facility: CLINIC | Age: 75
End: 2025-01-24

## 2025-01-24 VITALS
DIASTOLIC BLOOD PRESSURE: 76 MMHG | HEART RATE: 71 BPM | TEMPERATURE: 97 F | HEIGHT: 68 IN | SYSTOLIC BLOOD PRESSURE: 128 MMHG | RESPIRATION RATE: 16 BRPM | WEIGHT: 189 LBS | BODY MASS INDEX: 28.64 KG/M2

## 2025-01-24 DIAGNOSIS — C77.2 PRIMARY COLON CANCER WITH METASTASIS TO 1 REGIONAL LYMPH NODE (N1A): Primary | ICD-10-CM

## 2025-01-24 DIAGNOSIS — E04.1 THYROID NODULE: ICD-10-CM

## 2025-01-24 DIAGNOSIS — R79.89 OTHER SPECIFIED ABNORMAL FINDINGS OF BLOOD CHEMISTRY: ICD-10-CM

## 2025-01-24 DIAGNOSIS — I82.592 CHRONIC EMBOLISM AND THROMBOSIS OF OTHER SPECIFIED DEEP VEIN OF LEFT LOWER EXTREMITY: ICD-10-CM

## 2025-01-24 DIAGNOSIS — C18.9 PRIMARY COLON CANCER WITH METASTASIS TO 1 REGIONAL LYMPH NODE (N1A): Primary | ICD-10-CM

## 2025-01-24 DIAGNOSIS — I80.02 PHLEBITIS AND THROMBOPHLEBITIS OF SUPERFICIAL VESSELS OF LEFT LOWER EXTREMITY: ICD-10-CM

## 2025-01-24 DIAGNOSIS — E78.5 DYSLIPIDEMIA: ICD-10-CM

## 2025-01-24 DIAGNOSIS — Z15.89 MTHFR MUTATION: ICD-10-CM

## 2025-01-24 PROCEDURE — 99214 OFFICE O/P EST MOD 30 MIN: CPT | Mod: PBBFAC,PN | Performed by: NURSE PRACTITIONER

## 2025-01-24 PROCEDURE — 99215 OFFICE O/P EST HI 40 MIN: CPT | Mod: S$PBB,,, | Performed by: NURSE PRACTITIONER

## 2025-01-24 PROCEDURE — G2211 COMPLEX E/M VISIT ADD ON: HCPCS | Mod: S$PBB,,, | Performed by: NURSE PRACTITIONER

## 2025-01-24 PROCEDURE — 99999 PR PBB SHADOW E&M-EST. PATIENT-LVL IV: CPT | Mod: PBBFAC,,, | Performed by: NURSE PRACTITIONER

## 2025-01-24 RX ORDER — CYANOCOBALAMIN/FOLIC AC/VIT B6 2-2.5-25MG
1 TABLET ORAL DAILY
Qty: 90 TABLET | Refills: 4 | Status: SHIPPED | OUTPATIENT
Start: 2025-01-24

## 2025-01-24 RX ORDER — APIXABAN 2.5 MG/1
2.5 TABLET, FILM COATED ORAL EVERY 12 HOURS
Qty: 90 TABLET | Refills: 3 | Status: SHIPPED | OUTPATIENT
Start: 2025-01-24

## 2025-01-24 RX ORDER — EZETIMIBE 10 MG/1
10 TABLET ORAL DAILY
Qty: 90 TABLET | Refills: 3 | Status: SHIPPED | OUTPATIENT
Start: 2025-01-24

## 2025-01-24 RX ORDER — ENOXAPARIN SODIUM 100 MG/ML
40 INJECTION SUBCUTANEOUS DAILY
Qty: 2 EACH | Refills: 0 | Status: SHIPPED | OUTPATIENT
Start: 2025-01-24

## 2025-01-24 NOTE — PROGRESS NOTES
[facility] HEMATOLGY ONCOLOGY CONSULTATION    Subjective:       Patient ID: Jen Torres is a 74 y.o. female returning today for a regularly scheduled follow-up visit.    Chief Complaint: Malignant neoplasm of ascending colon (Follow up after MD Teixeira )      HPI  The patient is here by herself.   She went out to [facility] for a colon cancer follow up in December.  She had abdominal hernia surgery upon her return with Dr. Marks on 12/12/2024.     She is doing well today.     She is due to go back out to [facility] in March for repeat scans and a thyroid biopsy.     Last CT scan from 12/2/2024 showed no evidence of disease.     CT DNA from 12/2 was also negative.       Previous History:   Stage IIIA (T2N1) [id number] MSI-H adenocarcinoma of the ascending colon s/p R hemicolectomy 4/22/24 in [place]. She presented as new patient visit to [facility] 6/17/24 at which time outside post-op signatera was negative. Outside post-op PET/CT 5/30/24 showed no clear evidence of disease but there was a small mesenteric node. She was started on adjuvant XELOX 7/2/24 with development of severe diarrhea and dehydration, DPD testing, stool toxin/c. diff negative. All chemo was held due to toxicity. She has been on surveillance.       Past Medical History:   Diagnosis Date    Abnormal Pap smear     s/p cryo at 22 y/o    Anticoagulant long-term use     Cancer     Diabetes mellitus     Encounter for blood transfusion     GERD (gastroesophageal reflux disease)     Hyperlipidemia     Hypertension     Migraine     Migraines     Sleep apnea     Can not use c-pap    Stroke     TIA (transient ischemic attack)        Past Surgical History:   Procedure Laterality Date    ARTHROSCOPIC REPAIR OF ROTATOR CUFF OF SHOULDER Left 12/29/2023    Procedure: REPAIR, ROTATOR CUFF, ARTHROSCOPIC;  Surgeon: Gary Tony MD;  Location: [facility];  Service: Orthopedics;  Laterality: Left;  arthrex    ARTHROSCOPY OF SHOULDER WITH DECOMPRESSION OF SUBACROMIAL  SPACE Left 12/29/2023    Procedure: ARTHROSCOPY, SHOULDER, WITH SUBACROMIAL SPACE DECOMPRESSION;  Surgeon: Gary Tony MD;  Location: [facility];  Service: Orthopedics;  Laterality: Left;    CATARACT EXTRACTION, BILATERAL      CHOLECYSTECTOMY  10/01/2014    COLECTOMY, RIGHT  04/22/2024    Procedure: COLECTOMY, RIGHT;  Surgeon: Jaden Barrios MD;  Location: [facility] OR;  Service: General;;    COLON SURGERY      COLONOSCOPIC SURGICAL PROCEDURE  04/22/2024    Procedure: SURGICAL PROCEDURE, COLONOSCOPIC;  Surgeon: Jaden Barrios MD;  Location: [facility] OR;  Service: General;;    COLONOSCOPY N/A 04/21/2024    Procedure: COLONOSCOPY;  Surgeon: Ceasar Arias III, MD;  Location: [facility];  Service: Endoscopy;  Laterality: N/A;    ESOPHAGOGASTRODUODENOSCOPY N/A 04/20/2024    Procedure: EGD (ESOPHAGOGASTRODUODENOSCOPY);  Surgeon: Ceasar Arias III, MD;  Location: [facility];  Service: Endoscopy;  Laterality: N/A;    EYE SURGERY      KNEE ARTHROSCOPY Left     LAPAROTOMY, EXPLORATORY N/A 04/22/2024    Procedure: LAPAROTOMY, EXPLORATORY;  Surgeon: Jaden Barrios MD;  Location: [facility];  Service: General;  Laterality: N/A;    LYSIS OF ADHESIONS  04/22/2024    Procedure: LYSIS, ADHESIONS;  Surgeon: Jaden Barrios MD;  Location: [facility] OR;  Service: General;;    REPAIR, HERNIA, INCISIONAL N/A 12/12/2024    Procedure: REPAIR, HERNIA, INCISIONAL;  Surgeon: Marlon Marks Jr., MD;  Location: [facility] OR;  Service: General;  Laterality: N/A;  retrorectus w/ TAR    TONSILLECTOMY         Social History     Socioeconomic History    Marital status:    Tobacco Use    Smoking status: Former    Smokeless tobacco: Never   Substance and Sexual Activity    Alcohol use: No    Drug use: No    Sexual activity: Never     Social Drivers of Health     Financial Resource Strain: Low Risk  (12/12/2024)    Overall Financial Resource Strain (CARDIA)     Difficulty of Paying Living Expenses: Not hard at all   Food  Insecurity: No Food Insecurity (2024)    Hunger Vital Sign     Worried About Running Out of Food in the Last Year: Never true     Ran Out of Food in the Last Year: Never true   Transportation Needs: No Transportation Needs (2024)    TRANSPORTATION NEEDS     Transportation : No   Physical Activity: Insufficiently Active (2023)    [facility]    Exercise Vital Sign     Days of Exercise per Week: 7 days     Minutes of Exercise per Session: 10 min   Stress: No Stress Concern Present (2024)    [employer] [place] [employer] - Occupational Stress Questionnaire     Feeling of Stress : Not at all   Housing Stability: Low Risk  (2024)    Housing Stability Vital Sign     Unable to Pay for Housing in the Last Year: No     Homeless in the Last Year: No       Family History   Problem Relation Name Age of Onset    Breast cancer Paternal Grandmother  70    Colon cancer Mother              Cancer Daughter          crohns    Ovarian cancer Daughter      Cancer Daughter  20        uterine cancer with mets s/p chemo/rad/surgery; ?genetic mutation; now with hip tumors    Breast cancer Maternal Grandmother         Review of patient's allergies indicates:   Allergen Reactions    Statins-hmg-coa reductase inhibitors Swelling    Metformin Other (See Comments)     metallic taste , burn of the esophagus    Oxaliplatin Itching     Throat itchiness, twitching of both eyelids,tingling of hands, lips, head and feet. Cramping of toes.       Current Outpatient Medications:     acetaminophen (TYLENOL) 500 MG tablet, Take 2 tablets (1,000 mg total) by mouth every 8 (eight) hours as needed for Pain., Disp: 30 tablet, Rfl: 0    aspirin 81 MG Chew, Take 81 mg by mouth once daily., Disp: , Rfl:     diltiaZEM (CARDIZEM CD) 120 MG Cp24, Take 1 capsule (120 mg total) by mouth once daily., Disp: 30 capsule, Rfl: 11     diphenoxylate-atropine 2.5-0.025 mg (LOMOTIL) 2.5-0.025 mg per tablet, Take 1 tablet by mouth., Disp: , Rfl:     empagliflozin (JARDIANCE) 25 mg tablet, Take 25 mg by mouth once daily., Disp: , Rfl:     ergocalciferol (ERGOCALCIFEROL) 50,000 unit Cap, Take 50,000 Units by mouth every 7 days. Fridays, Disp: , Rfl:     glimepiride (AMARYL) 1 MG tablet, Take 1 mg by mouth., Disp: , Rfl:     Lactobacillus rhamnosus GG (CULTURELLE) 10 billion cell capsule, Take 1 capsule by mouth once daily., Disp: , Rfl:     LIDOcaine-prilocaine (EMLA) cream, Apply to skin 45-60 minutes prior to accessing port-a-cath., Disp: , Rfl:     loperamide (IMODIUM) 2 mg capsule, Take 2 mg by mouth., Disp: , Rfl:     loratadine (CLARITIN) 10 mg tablet, Take 10 mg by mouth., Disp: , Rfl:     melatonin 1 mg TbDL, Take by mouth., Disp: , Rfl:     ondansetron (ZOFRAN) 4 MG tablet, Take 4 mg by mouth., Disp: , Rfl:     oxyCODONE (ROXICODONE) 5 MG immediate release tablet, Take 1 tablet (5 mg total) by mouth every 4 (four) hours as needed for Pain (prn post-op pain)., Disp: 20 tablet, Rfl: 0    pantoprazole (PROTONIX) 40 MG tablet, Take 40 mg by mouth every morning., Disp: , Rfl:     potassium chloride (MICRO-K) 10 MEQ CpSR, TAKE 1 CAPSULE BY MOUTH EVERY DAY, Disp: 90 capsule, Rfl: 3    prochlorperazine (COMPAZINE) 10 MG tablet, Take 10 mg by mouth every 6 (six) hours as needed., Disp: , Rfl:     ramelteon (ROZEREM) 8 mg tablet, TAKE 1 TABLET (8 MG TOTAL) BY MOUTH EVERY EVENING., Disp: 90 tablet, Rfl: 1    ELIQUIS 2.5 mg Tab, Take 1 tablet (2.5 mg total) by mouth every 12 (twelve) hours., Disp: 90 tablet, Rfl: 3    ezetimibe (ZETIA) 10 mg tablet, Take 1 tablet (10 mg total) by mouth once daily., Disp: 90 tablet, Rfl: 3    folic acid-vit B6-vit B12 2.5-25-2 mg (WESTAB MAX) 2.5-25-2 mg Tab, Take 1 tablet by mouth once daily., Disp: 90 tablet, Rfl: 4  No current facility-administered medications for this visit.    Facility-Administered Medications Ordered  "in Other Visits:     electrolyte-S (ISOLYTE), , Intravenous, Continuous, Blade Packer MD    electrolyte-S (ISOLYTE), , Intravenous, Continuous, Blade Packer MD, Last Rate: 200 mL/hr at 12/29/23 1159, Rate Change at 12/29/23 1159    LIDOcaine (PF) 10 mg/ml (1%) injection 10 mg, 1 mL, Intradermal, Once, Blade Packer MD    midazolam (VERSED) 1 mg/mL injection 2 mg, 2 mg, Intravenous, PRN, Blade Packer MD, 2 mg at 12/29/23 0955    All medications and past history have been reviewed.    Review of Systems   Constitutional:  Negative for activity change, appetite change, fatigue and fever.   HENT:  Negative for congestion, mouth sores, postnasal drip, rhinorrhea, sinus pressure, sore throat and trouble swallowing.    Eyes:  Negative for photophobia and visual disturbance.   Respiratory:  Negative for cough, chest tightness, shortness of breath and wheezing.    Cardiovascular:  Negative for chest pain and leg swelling.   Gastrointestinal:  Positive for abdominal pain (tenderness). Negative for abdominal distention, constipation, diarrhea, nausea and vomiting.   Endocrine: Negative for cold intolerance.   Genitourinary:  Negative for decreased urine volume, dysuria and frequency.   Musculoskeletal:  Negative for arthralgias and myalgias.   Skin:  Negative for pallor and rash.   Allergic/Immunologic: Negative for immunocompromised state.   Neurological:  Negative for dizziness, syncope, weakness, light-headedness, numbness and headaches.   Hematological:  Negative for adenopathy. Does not bruise/bleed easily.   Psychiatric/Behavioral:  Negative for sleep disturbance. The patient is nervous/anxious.        Objective:        /76 (BP Location: Right arm, Patient Position: Sitting)   Pulse 71   Temp 97.2 °F (36.2 °C) (Temporal)   Resp 16   Ht 5' 8" (1.727 m)   Wt 85.7 kg (189 lb)   BMI 28.74 kg/m²     Physical Exam  Constitutional:       Appearance: Normal appearance.   HENT:      Head: " Normocephalic and atraumatic.      Mouth/Throat:      Mouth: Mucous membranes are moist.   Cardiovascular:      Rate and Rhythm: Normal rate and regular rhythm.      Pulses: Normal pulses.      Heart sounds: Normal heart sounds.   Pulmonary:      Effort: Pulmonary effort is normal. No respiratory distress.      Breath sounds: Normal breath sounds. No wheezing.   Abdominal:      General: There is no distension.      Palpations: Abdomen is soft. There is no mass.      Tenderness: There is no abdominal tenderness.   Musculoskeletal:         General: No swelling. Normal range of motion.      Right lower leg: No edema.      Left lower leg: No edema.   Skin:     General: Skin is warm and dry.      Capillary Refill: Capillary refill takes 2 to 3 seconds.      Findings: No bruising or rash.   Neurological:      Mental Status: She is alert and oriented to person, place, and time. Mental status is at baseline.      Motor: No weakness.   Psychiatric:         Mood and Affect: Mood normal.         Behavior: Behavior normal.           Lab:    Ref Range & Units 2 wk ago    WBC 4.8 - 10.8 benjamin/L 6.4   RBC 4.50 - 5.50 tril/L 4.43 Low    Hemoglobin 12.0 - 15.0 g/dL 13.9   Hematocrit 37.0 - 47.0 % 39.9   MCV 81 - 97 fL 90   MCH 27 - 32 pg 31   MCHC 32 - 36 g/dL 35   RDW 11.5 - 14.5 % 14   Platelets 150 - 400 benjamin/L 272   MPV 7.4 - 10.4 fL 8.7   Granulocytes % 42.2 - 75.2 % 58.4   Lymphocytes % 20.5 - 51.1 % 31.5   Monocytes % 1.7 - 9.3 % 6.5   Eosinophils % 0.0 - <5.0 % 3.1   Basophils % 0.0 - <5.0 % 0.5   Granulocytes Absolute 1.4 - 6.5 K/UL 3.7   Lymphocytes, Absolute 1.2 - 3.4 K/uL 2   Monocyte Absolute 0.11 - 0.59 K/uL 0.4   Eosinophils, Absolute 0.00 - 0.70 K/UL 0.2   Basophils, Absolute 0.00 - 0.20 K/UL 0   ANC 1400 - 6500 /uL 3,700   nRBC% 0 - 0 /100 0      Ref Range & Units 2 wk ago   CEA 0.0 - 3.0 ng/mL 3.7 High        Ref Range & Units 2 wk ago    Sodium 136 - 145 mmol/L 141   Potassium 3.4 - 4.5 mmol/L 3.8   Chloride 98 - 107  mmol/L 105   CO2 21 - 31 mmol/L 27   BUN 7 - 25 mg/dL 15   Creatinine 0.60 - 1.30 mg/dL 1.13   Glucose 74 - 109 mg/dL 214 High    Calcium 8.6 - 10.3 mg/dL 9.2   Albumin 3.5 - 5.2 g/dL 4.1   AST 13 - 39 U/L 28   ALT 7 - 52 U/L 23   Alkaline Phosphatase 34 - 104 U/L 116 High    Total Bilirubin 0.3 - 1.0 mg/dL 0.4   Protein Total 6.4 - 8.9 g/dL 7.4   Anion Gap 3 - 15 mmol/L 9   Albumin/Globulin Ratio 1.0 - 2.0 1.2   Globulin 2.0 - 4.0 g/dL 3.3   Osmolality Calc 275 - 295 mOsmol/kg 289   eGFR >=60 mL/min 52 Low    [facility]   Narrative    No results found for this or any previous visit (from the past 2 weeks).  CMP  Sodium   Date Value Ref Range Status   12/14/2024 139 136 - 145 mmol/L Final     Potassium   Date Value Ref Range Status   12/14/2024 3.6 3.5 - 5.1 mmol/L Final     Chloride   Date Value Ref Range Status   12/14/2024 106 95 - 110 mmol/L Final     CO2   Date Value Ref Range Status   12/14/2024 27 23 - 29 mmol/L Final     Glucose   Date Value Ref Range Status   12/14/2024 165 (H) 70 - 110 mg/dL Final     BUN   Date Value Ref Range Status   12/14/2024 15 8 - 23 mg/dL Final     Creatinine   Date Value Ref Range Status   12/14/2024 0.9 0.5 - 1.4 mg/dL Final     Calcium   Date Value Ref Range Status   12/14/2024 8.4 (L) 8.7 - 10.5 mg/dL Final     Total Protein   Date Value Ref Range Status   11/08/2024 7.8 6.0 - 8.4 g/dL Final     Albumin   Date Value Ref Range Status   11/08/2024 3.7 3.5 - 5.2 g/dL Final     Total Bilirubin   Date Value Ref Range Status   11/08/2024 0.4 0.1 - 1.0 mg/dL Final     Comment:     For infants and newborns, interpretation of results should be based  on gestational age, weight and in agreement with clinical  observations.    Premature Infant recommended reference ranges:  Up to 24 hours.............<8.0 mg/dL  Up to 48 hours............<12.0 mg/dL  3-5 days..................<15.0 mg/dL  6-29 days.................<15.0 mg/dL       Alkaline Phosphatase   Date Value Ref  Range Status   11/08/2024 128 40 - 150 U/L Final     AST   Date Value Ref Range Status   11/08/2024 42 (H) 10 - 40 U/L Final     ALT   Date Value Ref Range Status   11/08/2024 42 10 - 44 U/L Final     Anion Gap   Date Value Ref Range Status   12/14/2024 6 (L) 8 - 16 mmol/L Final         Specimen (24h ago, onward)      None              Radiology/Diagnostic Studies:  No results found for this or any previous visit (from the past 2160 hours).  Results for orders placed or performed during the hospital encounter of 11/08/24 (from the past 2160 hours)   CT Abdomen Pelvis  Without Contrast    Narrative    EXAMINATION:  CT ABDOMEN PELVIS WITHOUT CONTRAST    CLINICAL HISTORY:  Hernia, complicated; Ventral hernia without obstruction or gangrene    TECHNIQUE:  Low dose axial images, sagittal and coronal reformations were obtained from the lung bases to the pubic symphysis.  No p.o. contrast    COMPARISON:  04/30/2024    FINDINGS:  Hypoventilatory changes in visualized dependent lung bases.  Liver and spleen unremarkable appearance.  Adrenal glands and pancreas unremarkable appearance.  Cholecystectomy clips.  No biliary duct dilatation.  Abdominal aorta tapers without aneurysmal dilatation    Anterior abdominal wall hernias in the lower abdomen anterior bowing of the anterior abdominal wall and multiple and large broad necked ventral hernias.  Umbilicus not well defined and component baby umbilical as well.  Contains fat and bowel.  No bowel obstructive or inflammatory change.  Postoperative changes of right hemicolectomy.  No appreciable bowel wall thickening or inflammatory change and no free intraperitoneal air or fluid.  No significant adenopathy seen    Kidneys, ureters, bladder; bilateral parapelvic cysts. Evaluation for hydronephrosis is well difficult but no definite hydronephrosis. No opaque renal stone. No ureteral dilatation opaque ureteral stone or ureteral obstruction evident. Urinary bladder mildly distended at  time of the exam and as visualized is unremarkable appearance    Reproductive organs; uterus and adnexal region unremarkable appearance    Osseous structures show degenerative changes without obvious aggressive appearing osseous lesion      Impression    Postoperative changes of right hemicolectomy.  Parapelvic renal cysts.  Prior cholecystectomy.  Multiple and large lower anterior abdominal wall ventral hernias.      Electronically signed by: Dena Grace MD  Date:    11/08/2024  Time:    10:32         All lab results and imaging results have been reviewed and discussed with the patient.   Assessment/Plan:       1. Primary colon cancer with metastasis to 1 regional lymph node (N1a)    2. Dyslipidemia    3. Chronic embolism and thrombosis of other specified deep vein of left lower extremity    4. Other specified abnormal findings of blood chemistry    5. Phlebitis and thrombophlebitis of superficial vessels of left lower extremity    6. MTHFR mutation    7. Thyroid nodule      Primary colon cancer with metastasis to 1 regional lymph node (N1a)    Dyslipidemia  -     ELIQUIS 2.5 mg Tab; Take 1 tablet (2.5 mg total) by mouth every 12 (twelve) hours.  Dispense: 90 tablet; Refill: 3  -     ezetimibe (ZETIA) 10 mg tablet; Take 1 tablet (10 mg total) by mouth once daily.  Dispense: 90 tablet; Refill: 3    Chronic embolism and thrombosis of other specified deep vein of left lower extremity  -     ELIQUIS 2.5 mg Tab; Take 1 tablet (2.5 mg total) by mouth every 12 (twelve) hours.  Dispense: 90 tablet; Refill: 3  -     ezetimibe (ZETIA) 10 mg tablet; Take 1 tablet (10 mg total) by mouth once daily.  Dispense: 90 tablet; Refill: 3    Other specified abnormal findings of blood chemistry  -     folic acid-vit B6-vit B12 2.5-25-2 mg (WESTAB MAX) 2.5-25-2 mg Tab; Take 1 tablet by mouth once daily.  Dispense: 90 tablet; Refill: 4    Phlebitis and thrombophlebitis of superficial vessels of left lower extremity  -     folic  acid-vit B6-vit B12 2.5-25-2 mg (WESTAB MAX) 2.5-25-2 mg Tab; Take 1 tablet by mouth once daily.  Dispense: 90 tablet; Refill: 4    MTHFR mutation    Thyroid nodule      PLAN:   Goes back to [facility] for CT scans 3/9/2025 and sees them on 3/10/2025   She then has a thyroid biopsy scheduled for 3/10/2025 but may need to be on a lovenox bridge and holding aspirin for about 7 days before hand and resume the day after if no bleeding. As for the lovenox, she can hold eliquis two days before hand and then do lovenox daily on those two days, hold day of procedure and resume eliquis the day after.   She is currently on eliquis 2.5 BID - [facility] is wanting a lovenox bridge for the thyroid biopsy at [facility] which is on 3/10/2025  Resume ct DNA at [facility] in March   Refill medicaitons today for coagulation disorder   CEA check and now at 3.7 only mildly elevated from previous 3.1  Recheck monthly   Post hernia surgery with Dr Marks on 12/12/2025 and healing well.     RTC to see Dr. Moreland after [facility] visit in March.       I have explained and the patient understands all of  the current recommendation(s). I have answered all of their questions to the best of my ability and to their complete satisfaction.   The patient is to continue with the current management plan.            Electronically signed by: Sharmin Pike, MSN, APRN, AGNP-C

## 2025-01-24 NOTE — TELEPHONE ENCOUNTER
Spoke to Patient.  Verbalized Sharmin and Dr. Felder's  message.  Patient verbally demonstrated understanding.  Sharmin made aware.

## 2025-01-24 NOTE — TELEPHONE ENCOUNTER
----- Message from Sharmin Saleh NP sent at 1/24/2025 11:12 AM CST -----  Please tell patient, I sent over lovenox RX to her pharamcy for biopsy in march   Per Dr. Moreland, Hold eliquis two days before biopsy, inject lovenox daily for those two days, nothing day of surgery and resume eliquis and aspirin if no bleeding the day after.

## 2025-01-24 NOTE — ADDENDUM NOTE
Addended by: THIAGO HOFFMAN [facility] on: 1/24/2025 11:12 AM     Modules accepted: Orders

## 2025-01-24 NOTE — TELEPHONE ENCOUNTER
----- Message from Nurse Lucia sent at 1/23/2025  5:09 PM CST -----  Regarding: FW: Return Call  We didn't call so guessing it was you she needs  ----- Message -----  From: Ashely Bustos  Sent: 1/23/2025   4:48 PM CST  To: [facility]  Subject: Return Call                                      Type:  Patient Returning Call    Who Called:pt     Who Left Message for Patient:ukn    Does the patient know what this is regarding?:ukn    Would the patient rather a call back or a response via [facility]? Call back     Best Call Back Number:[phone]      Additional Information:    please call to advise, Thank You.

## 2025-01-31 ENCOUNTER — OFFICE VISIT (OUTPATIENT)
Dept: SURGERY | Facility: CLINIC | Age: 75
End: 2025-01-31
Payer: MEDICARE

## 2025-01-31 ENCOUNTER — EXTERNAL HOME HEALTH (OUTPATIENT)
Dept: HOME HEALTH SERVICES | Facility: HOSPITAL | Age: 75
End: 2025-01-31
Payer: MEDICARE

## 2025-01-31 VITALS
TEMPERATURE: 97 F | WEIGHT: 189 LBS | BODY MASS INDEX: 28.64 KG/M2 | DIASTOLIC BLOOD PRESSURE: 70 MMHG | HEART RATE: 67 BPM | HEIGHT: 68 IN | SYSTOLIC BLOOD PRESSURE: 127 MMHG

## 2025-01-31 DIAGNOSIS — Z98.890 S/P HERNIA REPAIR: Primary | ICD-10-CM

## 2025-01-31 DIAGNOSIS — Z87.19 S/P HERNIA REPAIR: Primary | ICD-10-CM

## 2025-01-31 PROCEDURE — 99999 PR PBB SHADOW E&M-EST. PATIENT-LVL IV: CPT | Mod: PBBFAC,,, | Performed by: SURGERY

## 2025-01-31 PROCEDURE — 99024 POSTOP FOLLOW-UP VISIT: CPT | Mod: POP,,, | Performed by: SURGERY

## 2025-01-31 PROCEDURE — 99214 OFFICE O/P EST MOD 30 MIN: CPT | Mod: PBBFAC,PN | Performed by: SURGERY

## 2025-02-03 ENCOUNTER — DOCUMENT SCAN (OUTPATIENT)
Dept: HOME HEALTH SERVICES | Facility: HOSPITAL | Age: 75
End: 2025-02-03
Payer: MEDICARE

## 2025-02-10 RX ORDER — DILTIAZEM HYDROCHLORIDE 120 MG/1
120 CAPSULE, COATED, EXTENDED RELEASE ORAL
Qty: 90 CAPSULE | Refills: 0 | Status: SHIPPED | OUTPATIENT
Start: 2025-02-10

## 2025-02-12 NOTE — PROGRESS NOTES
GENERAL SURGERY  POST-OP PROGRESS NOTE    HPI: Jen Torres is a 74 y.o. female status post open retrorectus and unilateral tar repair with Phasix mesh of an incisional hernia with a 15 x 8 cm fascial defect on 12/12/2024. Initially did well the pain was adequately controlled. No major issues with wound healing.  Here today for scheduled follow up t.    VITALS:  Vitals:    01/31/25 0955   BP: 127/70   Pulse: 67   Temp: 97.3 °F (36.3 °C)       PHYSICAL EXAM:  GEN: No acute distress, alert orient x3  HEENT: Anicteric sclerae  CV: Regular rate rhythm  RESP: Nonlabored breathing  ABD: Soft, midline incision intact without significant underlying fluid collection such as seroma or hematoma, skin appears healthy and viable without ischemia, ZO well healed, expected postoperative firmness present on palpation but no obvious recurrent hernia  EXT: No edema        ASSESSMENT & PLAN:  74 y.o. female s/p component separation hernia repair  -continues to do well  -no evidence of seroma, hematoma recurrence  -weight and activity restriction lifted after 8 weeks total from day of surgery  -return to clinic as needed

## 2025-02-17 ENCOUNTER — TELEPHONE (OUTPATIENT)
Facility: CLINIC | Age: 75
End: 2025-02-17
Payer: MEDICARE

## 2025-02-17 NOTE — TELEPHONE ENCOUNTER
----- Message from Mercedez sent at 2/10/2025  1:04 PM CST -----  Micki with [facility] asking for a copy of last clinic notes    Cb [phone] ext [phone]    Fax [phone]

## 2025-02-25 ENCOUNTER — INFUSION (OUTPATIENT)
Dept: INFUSION THERAPY | Facility: HOSPITAL | Age: 75
End: 2025-02-25
Attending: INTERNAL MEDICINE
Payer: MEDICARE

## 2025-02-25 VITALS
DIASTOLIC BLOOD PRESSURE: 73 MMHG | BODY MASS INDEX: 28.79 KG/M2 | HEART RATE: 76 BPM | OXYGEN SATURATION: 97 % | HEIGHT: 68 IN | SYSTOLIC BLOOD PRESSURE: 112 MMHG | TEMPERATURE: 98 F | WEIGHT: 190 LBS | RESPIRATION RATE: 16 BRPM

## 2025-02-25 DIAGNOSIS — C18.9 COLON ADENOCARCINOMA: Primary | ICD-10-CM

## 2025-02-25 PROCEDURE — A4216 STERILE WATER/SALINE, 10 ML: HCPCS | Performed by: INTERNAL MEDICINE

## 2025-02-25 PROCEDURE — 63600175 PHARM REV CODE 636 W HCPCS: Performed by: INTERNAL MEDICINE

## 2025-02-25 PROCEDURE — 25000003 PHARM REV CODE 250: Performed by: INTERNAL MEDICINE

## 2025-02-25 PROCEDURE — 96523 IRRIG DRUG DELIVERY DEVICE: CPT

## 2025-02-25 RX ORDER — HEPARIN 100 UNIT/ML
500 SYRINGE INTRAVENOUS
OUTPATIENT
Start: 2025-02-25

## 2025-02-25 RX ORDER — SODIUM CHLORIDE 0.9 % (FLUSH) 0.9 %
10 SYRINGE (ML) INJECTION
Status: DISCONTINUED | OUTPATIENT
Start: 2025-02-25 | End: 2025-02-25 | Stop reason: HOSPADM

## 2025-02-25 RX ORDER — HEPARIN 100 UNIT/ML
500 SYRINGE INTRAVENOUS
Status: DISCONTINUED | OUTPATIENT
Start: 2025-02-25 | End: 2025-02-25 | Stop reason: HOSPADM

## 2025-02-25 RX ORDER — SODIUM CHLORIDE 0.9 % (FLUSH) 0.9 %
10 SYRINGE (ML) INJECTION
OUTPATIENT
Start: 2025-02-25

## 2025-02-25 RX ADMIN — SODIUM CHLORIDE, PRESERVATIVE FREE 10 ML: 5 INJECTION INTRAVENOUS at 10:02

## 2025-02-25 RX ADMIN — HEPARIN 500 UNITS: 100 SYRINGE at 10:02

## 2025-03-19 ENCOUNTER — LAB VISIT (OUTPATIENT)
Dept: LAB | Facility: HOSPITAL | Age: 75
End: 2025-03-19
Attending: NURSE PRACTITIONER
Payer: MEDICARE

## 2025-03-19 ENCOUNTER — OFFICE VISIT (OUTPATIENT)
Facility: CLINIC | Age: 75
End: 2025-03-19
Payer: MEDICARE

## 2025-03-19 VITALS
WEIGHT: 192 LBS | HEIGHT: 68 IN | OXYGEN SATURATION: 98 % | HEART RATE: 65 BPM | SYSTOLIC BLOOD PRESSURE: 115 MMHG | DIASTOLIC BLOOD PRESSURE: 78 MMHG | BODY MASS INDEX: 29.1 KG/M2 | RESPIRATION RATE: 16 BRPM | TEMPERATURE: 97 F

## 2025-03-19 DIAGNOSIS — D50.0 IRON DEFICIENCY ANEMIA DUE TO CHRONIC BLOOD LOSS: ICD-10-CM

## 2025-03-19 DIAGNOSIS — C18.2 MALIGNANT NEOPLASM OF ASCENDING COLON: ICD-10-CM

## 2025-03-19 DIAGNOSIS — C18.9 COLON ADENOCARCINOMA: Primary | ICD-10-CM

## 2025-03-19 DIAGNOSIS — Z15.89 MTHFR MUTATION: ICD-10-CM

## 2025-03-19 DIAGNOSIS — I82.402 DEEP VEIN THROMBOSIS (DVT) OF LEFT LOWER EXTREMITY, UNSPECIFIED CHRONICITY, UNSPECIFIED VEIN: ICD-10-CM

## 2025-03-19 LAB
ALBUMIN SERPL BCP-MCNC: 4 G/DL (ref 3.5–5.2)
ALP SERPL-CCNC: 124 U/L (ref 55–135)
ALT SERPL W/O P-5'-P-CCNC: 18 U/L (ref 10–44)
ANION GAP SERPL CALC-SCNC: 9 MMOL/L (ref 8–16)
AST SERPL-CCNC: 20 U/L (ref 10–40)
BASOPHILS # BLD AUTO: 0.04 K/UL (ref 0–0.2)
BASOPHILS NFR BLD: 0.6 % (ref 0–1.9)
BILIRUB SERPL-MCNC: 0.4 MG/DL (ref 0.1–1)
BUN SERPL-MCNC: 20 MG/DL (ref 8–23)
CALCIUM SERPL-MCNC: 9.4 MG/DL (ref 8.7–10.5)
CEA SERPL-MCNC: 3.3 NG/ML (ref 0–5)
CHLORIDE SERPL-SCNC: 106 MMOL/L (ref 95–110)
CO2 SERPL-SCNC: 24 MMOL/L (ref 23–29)
CREAT SERPL-MCNC: 1.2 MG/DL (ref 0.5–1.4)
DIFFERENTIAL METHOD BLD: NORMAL
EOSINOPHIL # BLD AUTO: 0.1 K/UL (ref 0–0.5)
EOSINOPHIL NFR BLD: 1.3 % (ref 0–8)
ERYTHROCYTE [DISTWIDTH] IN BLOOD BY AUTOMATED COUNT: 13.6 % (ref 11.5–14.5)
EST. GFR  (NO RACE VARIABLE): 48.1 ML/MIN/1.73 M^2
GLUCOSE SERPL-MCNC: 154 MG/DL (ref 70–110)
HCT VFR BLD AUTO: 41.9 % (ref 37–48.5)
HGB BLD-MCNC: 13.9 G/DL (ref 12–16)
IMM GRANULOCYTES # BLD AUTO: 0.02 K/UL (ref 0–0.04)
IMM GRANULOCYTES NFR BLD AUTO: 0.3 % (ref 0–0.5)
LYMPHOCYTES # BLD AUTO: 2.2 K/UL (ref 1–4.8)
LYMPHOCYTES NFR BLD: 32.1 % (ref 18–48)
MCH RBC QN AUTO: 30.3 PG (ref 27–31)
MCHC RBC AUTO-ENTMCNC: 33.2 G/DL (ref 32–36)
MCV RBC AUTO: 91 FL (ref 82–98)
MONOCYTES # BLD AUTO: 0.5 K/UL (ref 0.3–1)
MONOCYTES NFR BLD: 6.7 % (ref 4–15)
NEUTROPHILS # BLD AUTO: 4 K/UL (ref 1.8–7.7)
NEUTROPHILS NFR BLD: 59 % (ref 38–73)
NRBC BLD-RTO: 0 /100 WBC
PLATELET # BLD AUTO: 287 K/UL (ref 150–450)
PMV BLD AUTO: 9.9 FL (ref 9.2–12.9)
POTASSIUM SERPL-SCNC: 3.6 MMOL/L (ref 3.5–5.1)
PROT SERPL-MCNC: 7.5 G/DL (ref 6–8.4)
RBC # BLD AUTO: 4.59 M/UL (ref 4–5.4)
SODIUM SERPL-SCNC: 139 MMOL/L (ref 136–145)
WBC # BLD AUTO: 6.7 K/UL (ref 3.9–12.7)

## 2025-03-19 PROCEDURE — 99999 PR PBB SHADOW E&M-EST. PATIENT-LVL V: CPT | Mod: PBBFAC,,, | Performed by: INTERNAL MEDICINE

## 2025-03-19 PROCEDURE — 82378 CARCINOEMBRYONIC ANTIGEN: CPT | Performed by: NURSE PRACTITIONER

## 2025-03-19 PROCEDURE — 99215 OFFICE O/P EST HI 40 MIN: CPT | Mod: PBBFAC,PN | Performed by: INTERNAL MEDICINE

## 2025-03-19 PROCEDURE — 36415 COLL VENOUS BLD VENIPUNCTURE: CPT | Performed by: NURSE PRACTITIONER

## 2025-03-19 PROCEDURE — 99215 OFFICE O/P EST HI 40 MIN: CPT | Mod: S$PBB,,, | Performed by: INTERNAL MEDICINE

## 2025-03-19 PROCEDURE — 80053 COMPREHEN METABOLIC PANEL: CPT | Performed by: NURSE PRACTITIONER

## 2025-03-19 PROCEDURE — 85025 COMPLETE CBC W/AUTO DIFF WBC: CPT | Performed by: NURSE PRACTITIONER

## 2025-03-19 NOTE — PROGRESS NOTES
[facility] [address] Hematology Oncology In Office Subsequent Encounter Note    3/19/25          Patient ID:   Jen Torres  74 y.o. female  1950  MD JESSICA Martinez MD, MD      Chief Complaint:   Recurrent leg clots    HPI:  74 y.o. female with 1 or 2 episodes of superficial venous clots involving the left leg.    Patient has history of falling off a porch, she has lumbar spine disease and low back pain symptoms with ski attic a. She is to see a chiropractor.  Other history includes a twisted left knee and torn meniscus.  She hit L leg, it was black and blue.  Check U/S of L leg now 12/2022.  Seeing Dr. Raz Jimenes for L knee pain.  Sample orders for lovenox bridge written and reviewed with her.  Await a definite surgical date, to decide on lovenox bridge dates.  She had L knee surgery 2/2023, now on eliquis 5 mg BID.    L rotator cuff surgery 12/29/23.  3/7/24 TIA.  Had Vertigo sx, SOB, L facial numbness and justa white in appearance, increased BP.  Now on Eliquis 5 mg 1 BID and ASA 81 mg 1 daily.  Expressive aphasia x's 2 months.    She has history of cancer of the cervix, a cyst on her ovary, and calcium deposits in the left breast.    She has history of asthma, type 2 diabetes, high cholesterol, hypertension, sleep apnea syndrome, GERD and dysphagia symptoms history of ulcer per Dr. Ritchie, she has a small bladder and arthritic symptoms in the fingers and knee.    Significantly around March of 2021 her medial lower knee area was swollen at the left leg and she was found to have a superficial clot.  Recently she had left leg fullness with increased edema and ultrasound showed superficial vein clot.  Will discuss with radiologist if the event of 2021 and 2022 likely represent the same clot or different clot events.    She has seen Dr. Guerrero of vascular surgery for venous evaluation.  I do not believe she has had any specific treatment or intervention as of yet.    She does not  smoke, she does not drink alcohol, she is allergic to statins with swelling and metformin with metallic taste and burning of the esophagus.    She has history of abnormal Pap smear with cryo procedure at age 21.  Other history includes GERD, hyperlipidemia, migraine headaches and biliary colic.    Medications include Trulicity, Amaryl, hydrochlorothiazide, Medrol Dosepak, Starlix, and Micro-K.    Her mother had colon cancer, and had been hit in the ankle area of the left leg and later developed a DVT and pulmonary embolus.  She had venous disease and venous stripping procedure.  Her dad had Alzheimer's disease.  Her aunt had colon cancer.  A paternal grandmother had breast cancer.  Another aunt had colitis, colon cancer irritable bowel syndrome.  A brother  of suicide, a brother  in MVA and another brother is alive and well.    Her daughter had Crohn's disease and ovarian cancer.  Another daughter had uterine cancer at age 20.    She has hx of L knee injury, needed elective arthroscopy.  On Elliquis 5 mg 1 BID.  On folbic.  Re check F8, fibrinogen, homocystine at [facility].  Recent U/S residual superficial clot seen in L leg veins.  She had surgery 25, with lovenox bridge.  Dr. Hatfield.    Repeat U/S of leg no DVT seen.  22.    She had UTI, now on nitrofurantoin antibiotic.    Hgb 14.1 to 11.5 to 10.6 to 9,8.  Hx of PUD, and black BM 1 month ago.  Eats 2 meals daily.  Check mamm,  check lab for anemia eval.  Ferritin 9.  EGD and colonoscopy 1 year ago.  Injectafer x's 2.  She did receive 1 of the injected for infusions and then was hospitalized with bright red blood per rectum     Hemoglobin had been 8.4 and ferritin had been 11.  She had GI evaluation and was found to have a mass in the colon she had partial colectomy and was found to have adenocarcinoma of the colon with 1 of 35 positive lymph nodes.  Dr. Stevenson was the surgeon of record.  She had been in rehab since the surgery and is now  returned home.  She is eating well and BM is beginning to firm up.  She does have a daughter with Crohn's disease and another child with IBS it her weight has gone from 194-198 lb she is 5 ft 8 in tall.    At this point she would appear to have clinical stage III disease.  CT scan did not show liver nodules or lymphadenopathy.  PET scan will be done at approximately 4 weeks postop.  CEA is 2.0.  Monitoring with CT DNA will be done at 4-6 weeks postop per Akanksha HERNANDEZ.     Estimated recurrence risk is 45-50% over the next 4-5 years.  Adjuvant treatment with FOLFOX or CAPOX would reduce her recurrence risk down to 20-25% over the next 4-5 years..  I reviewed the pathology report and this significance the extent of the tumor into or through the wall of the colon and the significance of positive lymphadenopathy in her stage and in assessment of recurrence risk.    I discussed with her the rationale of adjuvant treatment and reviewed with her the schema of FOLFOX and CAPOX over the next 3-6 months.    She tells me that her home situation is very severe, she has an invalid with her  and a son that is being treated for medical conditions, she describes it as a hoarder situation of the house.  She plans to move to her daughter's in [place] and have treatment if needed at [facility] and then return here after therapy has been completed.    PET Scan AUTUMN 5/30/24  Hgb 8.9 to 9.5, eGFR 54.  To [place].    She returned to this area.  She was treated with CAPOX but tolerated it poorly with cough, muscle spasms, diarrhea, even when treated with steroids and Benadryl.    She is to be observed.  If she develops recurrent or metastatic disease, then FOLFOX or FOLFIRI can be reconsidered.  She followed up here December 9, 2024.  Port flushes q.6 weeks.  She returned to [facility] December 2, 2024 for thyroid biopsy and CT scan of abdomen.  Mammogram [facility] of the right breast screening.    3/19/25 she has history of recurrent clot  events.  She is on West tab daily and Eliquis b.i.d. at 2.5 mg b.i.d..  At [facility] she was given CAPOX x1 cycle with diarrhea times 3-4 weeks, she was told the chemotherapy adjuvantly was too toxic for her at [facility], and they recommended observation.  CT DNA studies from March 9, 2025 were negative.    CT scan from March 9, 2025 did not show mass effect, lymphadenopathy, or malignancy.  CEA was normal at 3.3.  Hemoglobin is 13.9 and EGFR is 48.  She follows up with Dr. Liliam Lopez at [facility].  Due for colonoscopy [facility] 4/2025.  CT here 6/2025.  RTC here end of 6/2025.     ROS:   GEN: normal without any fever, night sweats or weight loss  HEENT: normal with no HA's, sore throat, stiff neck, changes in vision  CV: See HPI  PULM: See HPI  GI: See HPI  : normal with no hematuria, dysuria  BREAST: normal with no mass, discharge, pain  SKIN: See HPI     Past Medical History:   Diagnosis Date    Abnormal Pap smear     s/p cryo at 22 y/o    Anticoagulant long-term use     Cancer     Diabetes mellitus     Encounter for blood transfusion     GERD (gastroesophageal reflux disease)     Hyperlipidemia     Hypertension     Migraine     Migraines     Sleep apnea     Can not use c-pap    Stroke     TIA (transient ischemic attack)      Past Surgical History:   Procedure Laterality Date    ARTHROSCOPIC REPAIR OF ROTATOR CUFF OF SHOULDER Left 12/29/2023    Procedure: REPAIR, ROTATOR CUFF, ARTHROSCOPIC;  Surgeon: Gary Tony MD;  Location: [facility] OR;  Service: Orthopedics;  Laterality: Left;  arthrex    ARTHROSCOPY OF SHOULDER WITH DECOMPRESSION OF SUBACROMIAL SPACE Left 12/29/2023    Procedure: ARTHROSCOPY, SHOULDER, WITH SUBACROMIAL SPACE DECOMPRESSION;  Surgeon: Gary Tony MD;  Location: [facility] OR;  Service: Orthopedics;  Laterality: Left;    CATARACT EXTRACTION, BILATERAL      CHOLECYSTECTOMY  10/01/2014    COLECTOMY, RIGHT  04/22/2024    Procedure: COLECTOMY, RIGHT;  Surgeon:  Jaden Barrios MD;  Location: [facility] OR;  Service: General;;    COLON SURGERY      COLONOSCOPIC SURGICAL PROCEDURE  04/22/2024    Procedure: SURGICAL PROCEDURE, COLONOSCOPIC;  Surgeon: Jaden Barrios MD;  Location: [facility] OR;  Service: General;;    COLONOSCOPY N/A 04/21/2024    Procedure: COLONOSCOPY;  Surgeon: Ceasar Arias III, MD;  Location: [facility] ENDO;  Service: Endoscopy;  Laterality: N/A;    ESOPHAGOGASTRODUODENOSCOPY N/A 04/20/2024    Procedure: EGD (ESOPHAGOGASTRODUODENOSCOPY);  Surgeon: Ceasar Arias III, MD;  Location: [facility] ENDO;  Service: Endoscopy;  Laterality: N/A;    EYE SURGERY      KNEE ARTHROSCOPY Left     LAPAROTOMY, EXPLORATORY N/A 04/22/2024    Procedure: LAPAROTOMY, EXPLORATORY;  Surgeon: Jaden Barrios MD;  Location: [facility] OR;  Service: General;  Laterality: N/A;    LYSIS OF ADHESIONS  04/22/2024    Procedure: LYSIS, ADHESIONS;  Surgeon: Jaden Barrios MD;  Location: [facility] OR;  Service: General;;    REPAIR, HERNIA, INCISIONAL N/A 12/12/2024    Procedure: REPAIR, HERNIA, INCISIONAL;  Surgeon: Marlon Marks Jr., MD;  Location: [facility] OR;  Service: General;  Laterality: N/A;  retrorectus w/ TAR    TONSILLECTOMY         Review of patient's allergies indicates:   Allergen Reactions    Statins-hmg-coa reductase inhibitors Swelling    Metformin Other (See Comments)     metallic taste , burn of the esophagus    Oxaliplatin Itching     Throat itchiness, twitching of both eyelids,tingling of hands, lips, head and feet. Cramping of toes.     Social History     Socioeconomic History    Marital status:    Tobacco Use    Smoking status: Former    Smokeless tobacco: Never   Substance and Sexual Activity    Alcohol use: No    Drug use: No    Sexual activity: Never     Social Drivers of Health     Financial Resource Strain: Low Risk  (12/12/2024)    Overall Financial Resource Strain (CARDIA)     Difficulty of Paying Living Expenses: Not hard at all   Food Insecurity: No  Food Insecurity (12/12/2024)    Hunger Vital Sign     Worried About Running Out of Food in the Last Year: Never true     Ran Out of Food in the Last Year: Never true   Transportation Needs: No Transportation Needs (12/12/2024)    TRANSPORTATION NEEDS     Transportation : No   Physical Activity: Insufficiently Active (8/14/2023)    [facility]    Exercise Vital Sign     Days of Exercise per Week: 7 days     Minutes of Exercise per Session: 10 min   Stress: No Stress Concern Present (12/12/2024)    [place] [place] [employer] - Occupational Stress Questionnaire     Feeling of Stress : Not at all   Housing Stability: Unknown (12/12/2024)    Housing Stability Vital Sign     Unable to Pay for Housing in the Last Year: No     Homeless in the Last Year: No         Current Outpatient Medications:     acetaminophen (TYLENOL) 500 MG tablet, Take 2 tablets (1,000 mg total) by mouth every 8 (eight) hours as needed for Pain., Disp: 30 tablet, Rfl: 0    aspirin 81 MG Chew, Take 81 mg by mouth once daily., Disp: , Rfl:     diltiaZEM (CARDIZEM CD) 120 MG Cp24, TAKE 1 CAPSULE BY MOUTH ONCE DAILY, Disp: 90 capsule, Rfl: 0    diphenoxylate-atropine 2.5-0.025 mg (LOMOTIL) 2.5-0.025 mg per tablet, Take 1 tablet by mouth., Disp: , Rfl:     ELIQUIS 2.5 mg Tab, Take 1 tablet (2.5 mg total) by mouth every 12 (twelve) hours., Disp: 90 tablet, Rfl: 3    empagliflozin (JARDIANCE) 25 mg tablet, Take 25 mg by mouth once daily., Disp: , Rfl:     enoxaparin (LOVENOX) 40 mg/0.4 mL Syrg, Inject 0.4 mLs (40 mg total) into the skin Daily. Two days before and one day before thyroid biopsy. Hold eliquis on days you inject lovenox., Disp: 2 each, Rfl: 0    ergocalciferol (ERGOCALCIFEROL) 50,000 unit Cap, Take 50,000 Units by mouth every 7 days. Fridays, Disp: , Rfl:     ezetimibe (ZETIA) 10 mg tablet, Take 1 tablet (10 mg total) by mouth once daily., Disp: 90 tablet, Rfl: 3    folic acid-vit  B6-vit B12 2.5-25-2 mg (WESTAB MAX) 2.5-25-2 mg Tab, Take 1 tablet by mouth once daily., Disp: 90 tablet, Rfl: 4    glimepiride (AMARYL) 1 MG tablet, Take 1 mg by mouth., Disp: , Rfl:     Lactobacillus rhamnosus GG (CULTURELLE) 10 billion cell capsule, Take 1 capsule by mouth once daily., Disp: , Rfl:     LIDOcaine-prilocaine (EMLA) cream, Apply to skin 45-60 minutes prior to accessing port-a-cath., Disp: , Rfl:     loperamide (IMODIUM) 2 mg capsule, Take 2 mg by mouth., Disp: , Rfl:     loratadine (CLARITIN) 10 mg tablet, Take 10 mg by mouth., Disp: , Rfl:     melatonin 1 mg TbDL, Take by mouth., Disp: , Rfl:     ondansetron (ZOFRAN) 4 MG tablet, Take 4 mg by mouth., Disp: , Rfl:     oxyCODONE (ROXICODONE) 5 MG immediate release tablet, Take 1 tablet (5 mg total) by mouth every 4 (four) hours as needed for Pain (prn post-op pain)., Disp: 20 tablet, Rfl: 0    pantoprazole (PROTONIX) 40 MG tablet, Take 40 mg by mouth every morning., Disp: , Rfl:     potassium chloride (MICRO-K) 10 MEQ CpSR, TAKE 1 CAPSULE BY MOUTH EVERY DAY, Disp: 90 capsule, Rfl: 3    prochlorperazine (COMPAZINE) 10 MG tablet, Take 10 mg by mouth every 6 (six) hours as needed., Disp: , Rfl:     ramelteon (ROZEREM) 8 mg tablet, TAKE 1 TABLET (8 MG TOTAL) BY MOUTH EVERY EVENING., Disp: 90 tablet, Rfl: 1  No current facility-administered medications for this visit.    Facility-Administered Medications Ordered in Other Visits:     electrolyte-S (ISOLYTE), , Intravenous, Continuous, Blade Packer MD    electrolyte-S (ISOLYTE), , Intravenous, Continuous, Blade Packer MD, Last Rate: 200 mL/hr at 12/29/23 1159, Rate Change at 12/29/23 1159    LIDOcaine (PF) 10 mg/ml (1%) injection 10 mg, 1 mL, Intradermal, Once, Blade Packer MD    midazolam (VERSED) 1 mg/mL injection 2 mg, 2 mg, Intravenous, PRN, Blade Packer MD, 2 mg at 12/29/23 0955          Objective:   Vitals:  Blood pressure 115/78, pulse 65, temperature 97.2 °F (36.2 °C),  "temperature source Temporal, resp. rate 16, height 5' 8" (1.727 m), weight 87.1 kg (192 lb), SpO2 98%.    Physical Examination:   GEN: no apparent distress, comfortable  HEAD: atraumatic and normocephalic  EYES: no pallor, no icterus  ENT:  no pharyngeal erythema, external ears WNL; no nasal discharge  NECK: no masses, thyroid normal, trachea midline, no LAD/LN's, supple  CV: RRR with no murmur; normal pulse; normal S1 and S2  CHEST: Normal respiratory effort; CTAB; normal breath sounds; no wheeze or crackles  ABDOM: nontender and nondistended; soft; L/S NP, no rebound/guarding  MUSC/Skeletal: ROM normal; no crepitus; joints normal  EXTREM: no clubbing, cyanosis, inflammation   SKIN: no rashes, lesions, ulcers, petechiae.  The left leg has chronic edema changes with prominent varicose veins, without palpable venous cord and is nontender.  The right leg is without edema, without prominent varicose veins and without palpable venous cord and is nontender.  She has qusdricepts weakness at L leg, Calf NT at L or R leg.    The midline incision site at the abdomen is healing.  Liver and spleen are not palpable, abdomen is nontender.  : no CVAT  NEURO: grossly intact; motor/sensory WNL;  no tremors  PSYCH: normal mood, affect and behavior  LYMPH: normal cervical, supraclavicular, axillary and groin LN's    CBC, CMP, TSH Negative    Ultrasound July 11, 2022 shows superficial venous thrombus in the mid and proximal greater saphenous vein, DVT is not seen.  Left leg.  Ultrasound Sept. 15, 2022 shows persistent superficial venous thrombus at calf of L leg.    Ultrasound report from March 4, 2020 shows no evidence of DVT.  Thrombosed superficial lower extremity varices are noted, there is reflux from the origin of the greater saphenous vein to the knee.    Hypercoag. Eval. MTHFR mutation + -  -  -, [id number]  Homocystine 12.3.  Homocystine Now Nl. At 6.81.  Factor 8 increased 221.  Anticardiolipid AB increased IgG 25.    Mamm " shows microcalcifications.  Stable since 2021.  Re check 1 year.    Assessment:    1. This individual has 1 or 2 episodes of thrombosis of the superficial veins of the left leg.      2. Hypercoag. Eval. Is with Positive results as above.    3. Will discuss with Dr. Guerrero to see if impedance plethismography  studies are available to evaluate her for venous insufficiency and to see if any procedure can be done to reduce the of varicosities at the L leg.?    4.On a trial of Folbic daily.  Continue Eliquis 5 mg BID.  Had TIA, now on added ASA 81 mg daily.    5.Homocystine level has now normalized.  She proceeded with knee surgery  with lovenox bridge and Eliquis management. 1/18/23.    6.Anemia eval.  PUD hx, black BM 1 month ago.  DURAN,+, Fatigue+  4/10 energy.  Fe deficiency ferritin 9.  Injectafer x's2.    7. Moderately differentiated adenocarcinoma of the ascending colon, status post right colectomy.  Grade 2 disease.  Primary tumor extended into but not through the muscular wall of the colon.  One of 35 lymph nodes were positive for metastatic disease.  MMR studies were intact.  Clinically this would be stage III disease.    8. PET scan AUTUMN.  Stage 3 dx.  To [facility] for adjuvant Rx and evaluation.    9.  She did not tolerate CAPOX adjuvant Rx.  Observe for now.    10.  Colonoscopy Dr. Ritchie and [facility] 4/2025.  CT of abd and pelvis 6/2025.  CTDNA early 6/2025.  See me end 6/2025.    Pepe Felder MD  [facility]  3/19/25

## 2025-03-20 ENCOUNTER — TELEPHONE (OUTPATIENT)
Facility: CLINIC | Age: 75
End: 2025-03-20
Payer: MEDICARE

## 2025-03-20 NOTE — TELEPHONE ENCOUNTER
Spoke to Patient. This RN explained that per HENRIQUE Finney she needed to increase hydration.  Patient added that she does have DM and watches her sugar intake.  I gave the examples of sugar free Gatorade and sugar free liquid IV as possibilities to add to her normal tea and water intake.  Patient verbally demonstrated understanding.

## 2025-03-29 DIAGNOSIS — C77.2 PRIMARY COLON CANCER WITH METASTASIS TO 1 REGIONAL LYMPH NODE (N1A): Primary | ICD-10-CM

## 2025-03-29 DIAGNOSIS — C18.9 PRIMARY COLON CANCER WITH METASTASIS TO 1 REGIONAL LYMPH NODE (N1A): Primary | ICD-10-CM

## 2025-03-29 DIAGNOSIS — D50.0 IRON DEFICIENCY ANEMIA DUE TO CHRONIC BLOOD LOSS: ICD-10-CM

## 2025-04-02 DIAGNOSIS — E04.1 THYROID NODULE: Primary | ICD-10-CM

## 2025-04-03 ENCOUNTER — TELEPHONE (OUTPATIENT)
Dept: ENDOCRINOLOGY | Facility: CLINIC | Age: 75
End: 2025-04-03
Payer: MEDICARE

## 2025-04-03 NOTE — TELEPHONE ENCOUNTER
Call spoke with patient offered patient an slot with HENRIQUE Meeks. Patient only wants to see [facility], nothing available until August. Patient states she will call back.

## 2025-04-08 ENCOUNTER — INFUSION (OUTPATIENT)
Dept: INFUSION THERAPY | Facility: HOSPITAL | Age: 75
End: 2025-04-08
Attending: INTERNAL MEDICINE
Payer: MEDICARE

## 2025-04-08 VITALS
DIASTOLIC BLOOD PRESSURE: 76 MMHG | BODY MASS INDEX: 29.51 KG/M2 | WEIGHT: 194.69 LBS | RESPIRATION RATE: 16 BRPM | HEIGHT: 68 IN | TEMPERATURE: 97 F | SYSTOLIC BLOOD PRESSURE: 123 MMHG | HEART RATE: 69 BPM | OXYGEN SATURATION: 98 %

## 2025-04-08 DIAGNOSIS — C18.9 COLON ADENOCARCINOMA: Primary | ICD-10-CM

## 2025-04-08 PROCEDURE — 63600175 PHARM REV CODE 636 W HCPCS: Performed by: INTERNAL MEDICINE

## 2025-04-08 PROCEDURE — A4216 STERILE WATER/SALINE, 10 ML: HCPCS | Performed by: INTERNAL MEDICINE

## 2025-04-08 PROCEDURE — 25000003 PHARM REV CODE 250: Performed by: INTERNAL MEDICINE

## 2025-04-08 PROCEDURE — 96523 IRRIG DRUG DELIVERY DEVICE: CPT

## 2025-04-08 RX ORDER — SODIUM CHLORIDE 0.9 % (FLUSH) 0.9 %
10 SYRINGE (ML) INJECTION
OUTPATIENT
Start: 2025-04-08

## 2025-04-08 RX ORDER — HEPARIN 100 UNIT/ML
500 SYRINGE INTRAVENOUS
OUTPATIENT
Start: 2025-04-08

## 2025-04-08 RX ORDER — HEPARIN 100 UNIT/ML
500 SYRINGE INTRAVENOUS
Status: DISCONTINUED | OUTPATIENT
Start: 2025-04-08 | End: 2025-04-08 | Stop reason: HOSPADM

## 2025-04-08 RX ORDER — SODIUM CHLORIDE 0.9 % (FLUSH) 0.9 %
10 SYRINGE (ML) INJECTION
Status: DISCONTINUED | OUTPATIENT
Start: 2025-04-08 | End: 2025-04-08 | Stop reason: HOSPADM

## 2025-04-08 RX ADMIN — HEPARIN 500 UNITS: 100 SYRINGE at 12:04

## 2025-04-08 RX ADMIN — Medication 10 ML: at 12:04

## 2025-04-14 ENCOUNTER — EXTERNAL HOME HEALTH (OUTPATIENT)
Dept: HOME HEALTH SERVICES | Facility: HOSPITAL | Age: 75
End: 2025-04-14
Payer: MEDICARE

## 2025-04-21 ENCOUNTER — TELEPHONE (OUTPATIENT)
Facility: CLINIC | Age: 75
End: 2025-04-21
Payer: MEDICARE

## 2025-04-21 NOTE — TELEPHONE ENCOUNTER
----- Message from Ranjana sent at 4/21/2025  1:00 PM CDT -----  The patient said she is having a colonoscopy on 4/28 and she needs Lovenox shots to take for 2 days before the procedure. Please send to [facility] in [place]. CB# [phone]

## 2025-04-22 DIAGNOSIS — Z15.89 MTHFR MUTATION: ICD-10-CM

## 2025-04-22 DIAGNOSIS — I82.592 CHRONIC EMBOLISM AND THROMBOSIS OF OTHER SPECIFIED DEEP VEIN OF LEFT LOWER EXTREMITY: ICD-10-CM

## 2025-04-22 RX ORDER — ENOXAPARIN SODIUM 100 MG/ML
40 INJECTION SUBCUTANEOUS DAILY
Qty: 2 EACH | Refills: 0 | Status: SHIPPED | OUTPATIENT
Start: 2025-04-22

## 2025-04-22 NOTE — TELEPHONE ENCOUNTER
Spoke to Patient. This RN explained her clearance letter had been written and signed by Dr. Felder then faxed to the [facility].  Her RX for Lovenox 40 mg, SQ has been sent to her pharmacy and she should check back with them this afternoon or tomorrow.  Patient verbally demonstrated understanding of POC.

## 2025-04-22 NOTE — TELEPHONE ENCOUNTER
----- Message from Ranjana sent at 4/21/2025  1:00 PM CDT -----  The patient said she is having a colonoscopy on 4/28 and she needs Lovenox shots to take for 2 days before the procedure. Please send to [facility] in [place]. CB# [phone]

## 2025-04-23 ENCOUNTER — TELEPHONE (OUTPATIENT)
Facility: CLINIC | Age: 75
End: 2025-04-23
Payer: MEDICARE

## 2025-04-23 NOTE — TELEPHONE ENCOUNTER
Left [facility] for Patient inquiring to assure she receive her Lovenox injections. Asked Patient to return phone call if she had any questions or concerns.

## 2025-05-13 DIAGNOSIS — I10 ESSENTIAL HYPERTENSION: Primary | ICD-10-CM

## 2025-05-14 RX ORDER — DILTIAZEM HYDROCHLORIDE 120 MG/1
120 CAPSULE, COATED, EXTENDED RELEASE ORAL
Qty: 90 CAPSULE | Refills: 0 | Status: SHIPPED | OUTPATIENT
Start: 2025-05-14

## 2025-05-28 ENCOUNTER — INFUSION (OUTPATIENT)
Dept: INFUSION THERAPY | Facility: HOSPITAL | Age: 75
End: 2025-05-28
Attending: INTERNAL MEDICINE
Payer: MEDICARE

## 2025-05-28 VITALS — HEIGHT: 68 IN | BODY MASS INDEX: 30.1 KG/M2 | WEIGHT: 198.63 LBS

## 2025-05-28 DIAGNOSIS — C18.9 COLON ADENOCARCINOMA: Primary | ICD-10-CM

## 2025-05-28 PROCEDURE — A4216 STERILE WATER/SALINE, 10 ML: HCPCS | Performed by: INTERNAL MEDICINE

## 2025-05-28 PROCEDURE — 63600175 PHARM REV CODE 636 W HCPCS: Performed by: INTERNAL MEDICINE

## 2025-05-28 PROCEDURE — 96523 IRRIG DRUG DELIVERY DEVICE: CPT

## 2025-05-28 PROCEDURE — 25000003 PHARM REV CODE 250: Performed by: INTERNAL MEDICINE

## 2025-05-28 RX ORDER — HEPARIN 100 UNIT/ML
500 SYRINGE INTRAVENOUS
OUTPATIENT
Start: 2025-05-28

## 2025-05-28 RX ORDER — SODIUM CHLORIDE 0.9 % (FLUSH) 0.9 %
10 SYRINGE (ML) INJECTION
Status: DISCONTINUED | OUTPATIENT
Start: 2025-05-28 | End: 2025-05-28 | Stop reason: HOSPADM

## 2025-05-28 RX ORDER — SODIUM CHLORIDE 0.9 % (FLUSH) 0.9 %
10 SYRINGE (ML) INJECTION
OUTPATIENT
Start: 2025-05-28

## 2025-05-28 RX ORDER — HEPARIN 100 UNIT/ML
500 SYRINGE INTRAVENOUS
Status: DISCONTINUED | OUTPATIENT
Start: 2025-05-28 | End: 2025-05-28 | Stop reason: HOSPADM

## 2025-05-28 RX ADMIN — HEPARIN 500 UNITS: 100 SYRINGE at 11:05

## 2025-05-28 RX ADMIN — Medication 10 ML: at 11:05

## 2025-05-28 NOTE — PLAN OF CARE
Problem: Fatigue  Goal: Improved Activity Tolerance  Outcome: Progressing  Intervention: Promote Improved Energy  Flowsheets (Taken 5/28/2025 1127)  Fatigue Management: frequent rest breaks encouraged  Environmental Support: rest periods encouraged

## 2025-06-05 ENCOUNTER — EXTERNAL HOME HEALTH (OUTPATIENT)
Dept: HOME HEALTH SERVICES | Facility: HOSPITAL | Age: 75
End: 2025-06-05
Payer: MEDICARE

## 2025-06-12 ENCOUNTER — TELEPHONE (OUTPATIENT)
Facility: CLINIC | Age: 75
End: 2025-06-12
Payer: MEDICARE

## 2025-06-12 DIAGNOSIS — C77.2 PRIMARY COLON CANCER WITH METASTASIS TO 1 REGIONAL LYMPH NODE (N1A): Primary | ICD-10-CM

## 2025-06-12 DIAGNOSIS — C18.9 PRIMARY COLON CANCER WITH METASTASIS TO 1 REGIONAL LYMPH NODE (N1A): Primary | ICD-10-CM

## 2025-06-12 DIAGNOSIS — D68.59 HYPERCOAGULATION SYNDROME: ICD-10-CM

## 2025-06-12 DIAGNOSIS — D50.0 IRON DEFICIENCY ANEMIA DUE TO CHRONIC BLOOD LOSS: ICD-10-CM

## 2025-06-12 NOTE — TELEPHONE ENCOUNTER
Spoke to Sandy from Dr. Lopez's office. She explained they needed labs: CBC, CMP, CEA, a CT scan Abd/Pelvis with contrast and a Signatera. Confirmed with verbal read back. Provideed Sandy with the RN's direct number in the event she has questions or concerns.     Spoke to Patient.  Explained her POC.  Patient verbally demonstrated understanding.

## 2025-06-12 NOTE — TELEPHONE ENCOUNTER
----- Message from Leana sent at 6/12/2025  9:44 AM CDT -----  Pt calling to get her labs scheduled and checking on the orders for her CT Scan of the Chest Abdomen and Pelvis confirming the orders for the CT Scan. [facility]# [phone]  ----- Message -----  From: Leana See  Sent: 6/12/2025   9:46 AM CDT  To: Merly Cantu Staff    Pt calling to get her labs scheduled and Pt calling in regards to her CT Scan of the Chest Abdomen and Pelvis confirming the orders for the CT Scan. [facility]# [phone]

## 2025-06-12 NOTE — TELEPHONE ENCOUNTER
Phoned  Dr. Lopez's office at [facility]. Left a VMM regarding Patient's CT scan Abd/Pelvis  with contrast as well as a question regarding which labs that need to be drawn.  Patient sees her on 7/14.  Patient's RTC with Dr. Felder Is 6/23/25.

## 2025-06-14 ENCOUNTER — HOSPITAL ENCOUNTER (OUTPATIENT)
Dept: RADIOLOGY | Facility: HOSPITAL | Age: 75
Discharge: HOME | End: 2025-06-14
Attending: INTERNAL MEDICINE
Payer: MEDICARE

## 2025-06-14 DIAGNOSIS — D68.59 HYPERCOAGULATION SYNDROME: ICD-10-CM

## 2025-06-14 DIAGNOSIS — D50.0 IRON DEFICIENCY ANEMIA DUE TO CHRONIC BLOOD LOSS: ICD-10-CM

## 2025-06-14 DIAGNOSIS — C77.2 PRIMARY COLON CANCER WITH METASTASIS TO 1 REGIONAL LYMPH NODE (N1A): ICD-10-CM

## 2025-06-14 DIAGNOSIS — C18.9 PRIMARY COLON CANCER WITH METASTASIS TO 1 REGIONAL LYMPH NODE (N1A): ICD-10-CM

## 2025-06-14 PROCEDURE — A9698 NON-RAD CONTRAST MATERIALNOC: HCPCS

## 2025-06-14 PROCEDURE — 74177 CT ABD & PELVIS W/CONTRAST: CPT | Mod: 26,,, | Performed by: RADIOLOGY

## 2025-06-14 PROCEDURE — 25500020 PHARM REV CODE 255

## 2025-06-14 PROCEDURE — 74177 CT ABD & PELVIS W/CONTRAST: CPT | Mod: TC

## 2025-06-14 RX ADMIN — IOHEXOL 75 ML: 350 INJECTION, SOLUTION INTRAVENOUS at 10:06

## 2025-06-14 RX ADMIN — IOHEXOL 750 ML: 12 SOLUTION ORAL at 10:06

## 2025-06-23 ENCOUNTER — OFFICE VISIT (OUTPATIENT)
Facility: CLINIC | Age: 75
End: 2025-06-23
Payer: MEDICARE

## 2025-06-23 VITALS
HEART RATE: 75 BPM | WEIGHT: 198.19 LBS | HEIGHT: 68 IN | SYSTOLIC BLOOD PRESSURE: 145 MMHG | DIASTOLIC BLOOD PRESSURE: 66 MMHG | OXYGEN SATURATION: 98 % | RESPIRATION RATE: 18 BRPM | TEMPERATURE: 98 F | BODY MASS INDEX: 30.04 KG/M2

## 2025-06-23 DIAGNOSIS — Z85.038 H/O COLON CANCER, STAGE III: ICD-10-CM

## 2025-06-23 DIAGNOSIS — R41.3 MEMORY CHANGES: Primary | ICD-10-CM

## 2025-06-23 DIAGNOSIS — R93.89 ABNORMAL FINDING ON CT SCAN: Primary | ICD-10-CM

## 2025-06-23 DIAGNOSIS — Z12.31 BREAST CANCER SCREENING BY MAMMOGRAM: ICD-10-CM

## 2025-06-23 PROCEDURE — 99999 PR PBB SHADOW E&M-EST. PATIENT-LVL V: CPT | Mod: PBBFAC,,, | Performed by: INTERNAL MEDICINE

## 2025-06-23 PROCEDURE — 99215 OFFICE O/P EST HI 40 MIN: CPT | Mod: S$PBB,,, | Performed by: INTERNAL MEDICINE

## 2025-06-23 PROCEDURE — G2211 COMPLEX E/M VISIT ADD ON: HCPCS | Mod: ,,, | Performed by: INTERNAL MEDICINE

## 2025-06-23 PROCEDURE — 99215 OFFICE O/P EST HI 40 MIN: CPT | Mod: PBBFAC,PN | Performed by: INTERNAL MEDICINE

## 2025-06-23 NOTE — PROGRESS NOTES
[facility] [address] Hematology Oncology In Office Subsequent Encounter Note    6/23/25          Patient ID:   Jen Torres  74 y.o. female  1950  MD JESSICA Martinez MD, MD      Chief Complaint:   Recurrent leg clots    HPI:  She returns June 23, 2025.  Her colon cancer condition was diagnosed in April 2023, she is now 2 years out from the time of her diagnosis.  Her original tumor was 3.5 cm extending into the muscularis propria, she had 1 of 35 positive lymph nodes, it was a moderately differentiated colon cancer.    CT DNA studies were done at [facility] in March 2025 and were negative.  Cat scan of the abdomen and pelvis is negative for recurrent or metastatic disease.  She does have some uterine findings and she will be referred to Dr. Jones of gyn for evaluation.    She complains of problems with her memory and mentation over the last year, she has history of TIA.  Her father and grandfather had Alzheimer's disease.  Will refer her to Dr. Thom Low of Neurology for memory evaluation.    Mammogram is due at [facility], will schedule for her.    She has thyroid abnormality and is due for partial thyroidectomy at [facility] July 14, 2025.  They are concerned that she may have cancer?          74 y.o. female with 1 or 2 episodes of superficial venous clots involving the left leg.    Patient has history of falling off a porch, she has lumbar spine disease and low back pain symptoms with ski attic a. She is to see a chiropractor.  Other history includes a twisted left knee and torn meniscus.  She hit L leg, it was black and blue.  Check U/S of L leg now 12/2022.  Seeing Dr. Raz Jimenes for L knee pain.  Sample orders for lovenox bridge written and reviewed with her.  Await a definite surgical date, to decide on lovenox bridge dates.  She had L knee surgery 2/2023, now on eliquis 5 mg BID.    L rotator cuff surgery 12/29/23.  3/7/24 TIA.  Had Vertigo sx, SOB, L  facial numbness and justa white in appearance, increased BP.  Now on Eliquis 5 mg 1 BID and ASA 81 mg 1 daily.  Expressive aphasia x's 2 months.    She has history of cancer of the cervix, a cyst on her ovary, and calcium deposits in the left breast.    She has history of asthma, type 2 diabetes, high cholesterol, hypertension, sleep apnea syndrome, GERD and dysphagia symptoms history of ulcer per Dr. Ritchie, she has a small bladder and arthritic symptoms in the fingers and knee.    Significantly around 2021 her medial lower knee area was swollen at the left leg and she was found to have a superficial clot.  Recently she had left leg fullness with increased edema and ultrasound showed superficial vein clot.  Will discuss with radiologist if the event of  and  likely represent the same clot or different clot events.    She has seen Dr. Guerrero of vascular surgery for venous evaluation.  I do not believe she has had any specific treatment or intervention as of yet.    She does not smoke, she does not drink alcohol, she is allergic to statins with swelling and metformin with metallic taste and burning of the esophagus.    She has history of abnormal Pap smear with cryo procedure at age 21.  Other history includes GERD, hyperlipidemia, migraine headaches and biliary colic.    Medications include Trulicity, Amaryl, hydrochlorothiazide, Medrol Dosepak, Starlix, and Micro-K.    Her mother had colon cancer, and had been hit in the ankle area of the left leg and later developed a DVT and pulmonary embolus.  She had venous disease and venous stripping procedure.  Her dad had Alzheimer's disease.  Her aunt had colon cancer.  A paternal grandmother had breast cancer.  Another aunt had colitis, colon cancer irritable bowel syndrome.  A brother  of suicide, a brother  in MVA and another brother is alive and well.    Her daughter had Crohn's disease and ovarian cancer.  Another daughter had uterine cancer  at age 20.    She has hx of L knee injury, needed elective arthroscopy.  On Elliquis 5 mg 1 BID.  On folbic.  Re check F8, fibrinogen, homocystine at [facility].  Recent U/S residual superficial clot seen in L leg veins.  She had surgery 1/18/23, with lovenox bridge.  Dr. Hatfield.    Repeat U/S of leg no DVT seen.  12/22/22.    She had UTI, now on nitrofurantoin antibiotic.    Hgb 14.1 to 11.5 to 10.6 to 9,8.  Hx of PUD, and black BM 1 month ago.  Eats 2 meals daily.  Check mamm,  check lab for anemia eval.  Ferritin 9.  EGD and colonoscopy 1 year ago.  Injectafer x's 2.  She did receive 1 of the injected for infusions and then was hospitalized with bright red blood per rectum     Hemoglobin had been 8.4 and ferritin had been 11.  She had GI evaluation and was found to have a mass in the colon she had partial colectomy and was found to have adenocarcinoma of the colon with 1 of 35 positive lymph nodes.  Dr. Stevenson was the surgeon of record.  She had been in rehab since the surgery and is now returned home.  She is eating well and BM is beginning to firm up.  She does have a daughter with Crohn's disease and another child with IBS it her weight has gone from 194-198 lb she is 5 ft 8 in tall.    At this point she would appear to have clinical stage III disease.  CT scan did not show liver nodules or lymphadenopathy.  PET scan will be done at approximately 4 weeks postop.  CEA is 2.0.  Monitoring with CT DNA will be done at 4-6 weeks postop per Akanksha HERNANDEZ.     Estimated recurrence risk is 45-50% over the next 4-5 years.  Adjuvant treatment with FOLFOX or CAPOX would reduce her recurrence risk down to 20-25% over the next 4-5 years..  I reviewed the pathology report and this significance the extent of the tumor into or through the wall of the colon and the significance of positive lymphadenopathy in her stage and in assessment of recurrence risk.    I discussed with her the rationale of adjuvant treatment and reviewed  with her the schema of FOLFOX and CAPOX over the next 3-6 months.    She tells me that her home situation is very severe, she has an invalid with her  and a son that is being treated for medical conditions, she describes it as a hoarder situation of the house.  She plans to move to her daughter's in [place] and have treatment if needed at [facility] and then return here after therapy has been completed.    PET Scan AUTUMN 5/30/24  Hgb 8.9 to 9.5, eGFR 54.  To [place].    She returned to this area.  She was treated with CAPOX but tolerated it poorly with cough, muscle spasms, diarrhea, even when treated with steroids and Benadryl.    She is to be observed.  If she develops recurrent or metastatic disease, then FOLFOX or FOLFIRI can be reconsidered.  She followed up here December 9, 2024.  Port flushes q.6 weeks.  She returned to [facility] December 2, 2024 for thyroid biopsy and CT scan of abdomen.  Mammogram [facility] of the right breast screening.    3/19/25 she has history of recurrent clot events.  She is on West tab daily and Eliquis b.i.d. at 2.5 mg b.i.d..  At [facility] she was given CAPOX x1 cycle with diarrhea times 3-4 weeks, she was told the chemotherapy adjuvantly was too toxic for her at [facility], and they recommended observation.  CT DNA studies from March 9, 2025 were negative.    CT scan from March 9, 2025 did not show mass effect, lymphadenopathy, or malignancy.  CEA was normal at 3.3.  Hemoglobin is 13.9 and EGFR is 48.  She follows up with Dr. Liliam Lopez at [facility].  Due for colonoscopy [facility] 4/2025.  CT here 6/2025.  RTC here end of 6/2025.     ROS:   GEN: normal without any fever, night sweats or weight loss  HEENT: normal with no HA's, sore throat, stiff neck, changes in vision  CV: See HPI  PULM: See HPI  GI: See HPI  : normal with no hematuria, dysuria  BREAST: normal with no mass, discharge, pain  SKIN: See HPI     Past Medical History:   Diagnosis Date     Abnormal Pap smear     s/p cryo at 20 y/o    Anticoagulant long-term use     Cancer     Diabetes mellitus     Encounter for blood transfusion     GERD (gastroesophageal reflux disease)     Hyperlipidemia     Hypertension     Migraine     Migraines     Sleep apnea     Can not use c-pap    Stroke     TIA (transient ischemic attack)      Past Surgical History:   Procedure Laterality Date    ARTHROSCOPIC REPAIR OF ROTATOR CUFF OF SHOULDER Left 12/29/2023    Procedure: REPAIR, ROTATOR CUFF, ARTHROSCOPIC;  Surgeon: Gary Tony MD;  Location: [facility];  Service: Orthopedics;  Laterality: Left;  arthrex    ARTHROSCOPY OF SHOULDER WITH DECOMPRESSION OF SUBACROMIAL SPACE Left 12/29/2023    Procedure: ARTHROSCOPY, SHOULDER, WITH SUBACROMIAL SPACE DECOMPRESSION;  Surgeon: Gary Tony MD;  Location: [facility] OR;  Service: Orthopedics;  Laterality: Left;    CATARACT EXTRACTION, BILATERAL      CHOLECYSTECTOMY  10/01/2014    COLECTOMY, RIGHT  04/22/2024    Procedure: COLECTOMY, RIGHT;  Surgeon: Jaden Barrios MD;  Location: [facility];  Service: General;;    COLON SURGERY      COLONOSCOPIC SURGICAL PROCEDURE  04/22/2024    Procedure: SURGICAL PROCEDURE, COLONOSCOPIC;  Surgeon: Jaden Barrios MD;  Location: [facility] OR;  Service: General;;    COLONOSCOPY N/A 04/21/2024    Procedure: COLONOSCOPY;  Surgeon: Ceasar Arias III, MD;  Location: [facility];  Service: Endoscopy;  Laterality: N/A;    ESOPHAGOGASTRODUODENOSCOPY N/A 04/20/2024    Procedure: EGD (ESOPHAGOGASTRODUODENOSCOPY);  Surgeon: Ceasar Arias III, MD;  Location: [facility] ENDO;  Service: Endoscopy;  Laterality: N/A;    EYE SURGERY      KNEE ARTHROSCOPY Left     LAPAROTOMY, EXPLORATORY N/A 04/22/2024    Procedure: LAPAROTOMY, EXPLORATORY;  Surgeon: Jaden Barrios MD;  Location: [facility] OR;  Service: General;  Laterality: N/A;    LYSIS OF ADHESIONS  04/22/2024    Procedure: LYSIS, ADHESIONS;  Surgeon: Jaden Barrios MD;  Location: [facility]  OR;  Service: General;;    REPAIR, HERNIA, INCISIONAL N/A 12/12/2024    Procedure: REPAIR, HERNIA, INCISIONAL;  Surgeon: Marlon Marks Jr., MD;  Location: [facility] OR;  Service: General;  Laterality: N/A;  retrorectus w/ TAR    TONSILLECTOMY         Review of patient's allergies indicates:   Allergen Reactions    Statins-hmg-coa reductase inhibitors Swelling    Metformin Other (See Comments)     metallic taste , burn of the esophagus    Oxaliplatin Itching     Throat itchiness, twitching of both eyelids,tingling of hands, lips, head and feet. Cramping of toes.     Social History     Socioeconomic History    Marital status:    Tobacco Use    Smoking status: Former    Smokeless tobacco: Never   Substance and Sexual Activity    Alcohol use: No    Drug use: No    Sexual activity: Never     Social Drivers of Health     Financial Resource Strain: Low Risk  (12/12/2024)    Overall Financial Resource Strain (CARDIA)     Difficulty of Paying Living Expenses: Not hard at all   Food Insecurity: No Food Insecurity (12/12/2024)    Hunger Vital Sign     Worried About Running Out of Food in the Last Year: Never true     Ran Out of Food in the Last Year: Never true   Transportation Needs: No Transportation Needs (12/12/2024)    TRANSPORTATION NEEDS     Transportation : No   Physical Activity: Insufficiently Active (8/14/2023)    [facility]    Exercise Vital Sign     Days of Exercise per Week: 7 days     Minutes of Exercise per Session: 10 min   Stress: No Stress Concern Present (12/12/2024)    [employer] - Occupational Stress Questionnaire     Feeling of Stress : Not at all   Housing Stability: Unknown (12/12/2024)    Housing Stability Vital Sign     Unable to Pay for Housing in the Last Year: No     Homeless in the Last Year: No         Current Outpatient Medications:     acetaminophen (TYLENOL) 500 MG tablet, Take 2 tablets (1,000 mg  total) by mouth every 8 (eight) hours as needed for Pain., Disp: 30 tablet, Rfl: 0    aspirin 81 MG Chew, Take 81 mg by mouth once daily., Disp: , Rfl:     diltiaZEM (CARDIZEM CD) 120 MG Cp24, TAKE 1 CAPSULE BY MOUTH EVERY DAY, Disp: 90 capsule, Rfl: 0    diphenoxylate-atropine 2.5-0.025 mg (LOMOTIL) 2.5-0.025 mg per tablet, Take 1 tablet by mouth., Disp: , Rfl:     ELIQUIS 2.5 mg Tab, Take 1 tablet (2.5 mg total) by mouth every 12 (twelve) hours., Disp: 90 tablet, Rfl: 3    empagliflozin (JARDIANCE) 25 mg tablet, Take 25 mg by mouth once daily., Disp: , Rfl:     enoxaparin (LOVENOX) 40 mg/0.4 mL Syrg, Inject 0.4 mLs (40 mg total) into the skin Daily. Beginning two (2) days prior and one day before Colonoscopy.  Hold eliquis on days you inject lovenox. Hold eliquis on days you inject lovenox., Disp: 2 each, Rfl: 0    ergocalciferol (ERGOCALCIFEROL) 50,000 unit Cap, Take 50,000 Units by mouth every 7 days. Fridays, Disp: , Rfl:     ezetimibe (ZETIA) 10 mg tablet, Take 1 tablet (10 mg total) by mouth once daily., Disp: 90 tablet, Rfl: 3    folic acid-vit B6-vit B12 2.5-25-2 mg (WESTAB MAX) 2.5-25-2 mg Tab, Take 1 tablet by mouth once daily., Disp: 90 tablet, Rfl: 4    glimepiride (AMARYL) 1 MG tablet, Take 1 mg by mouth., Disp: , Rfl:     Lactobacillus rhamnosus GG (CULTURELLE) 10 billion cell capsule, Take 1 capsule by mouth once daily., Disp: , Rfl:     LIDOcaine-prilocaine (EMLA) cream, Apply to skin 45-60 minutes prior to accessing port-a-cath., Disp: , Rfl:     loperamide (IMODIUM) 2 mg capsule, Take 2 mg by mouth., Disp: , Rfl:     loratadine (CLARITIN) 10 mg tablet, Take 10 mg by mouth., Disp: , Rfl:     melatonin 1 mg TbDL, Take by mouth., Disp: , Rfl:     ondansetron (ZOFRAN) 4 MG tablet, Take 4 mg by mouth., Disp: , Rfl:     oxyCODONE (ROXICODONE) 5 MG immediate release tablet, Take 1 tablet (5 mg total) by mouth every 4 (four) hours as needed for Pain (prn post-op pain)., Disp: 20 tablet, Rfl: 0     "pantoprazole (PROTONIX) 40 MG tablet, Take 40 mg by mouth every morning., Disp: , Rfl:     potassium chloride (MICRO-K) 10 MEQ CpSR, TAKE 1 CAPSULE BY MOUTH EVERY DAY, Disp: 90 capsule, Rfl: 3    prochlorperazine (COMPAZINE) 10 MG tablet, Take 10 mg by mouth every 6 (six) hours as needed., Disp: , Rfl:     ramelteon (ROZEREM) 8 mg tablet, TAKE 1 TABLET (8 MG TOTAL) BY MOUTH EVERY EVENING., Disp: 90 tablet, Rfl: 1  No current facility-administered medications for this visit.    Facility-Administered Medications Ordered in Other Visits:     electrolyte-S (ISOLYTE), , Intravenous, Continuous, Blade Packer MD    electrolyte-S (ISOLYTE), , Intravenous, Continuous, Blade Packer MD, Last Rate: 200 mL/hr at 12/29/23 1159, Rate Change at 12/29/23 1159    LIDOcaine (PF) 10 mg/ml (1%) injection 10 mg, 1 mL, Intradermal, Once, Blade Packer MD    midazolam (VERSED) 1 mg/mL injection 2 mg, 2 mg, Intravenous, PRN, Blade Packer MD, 2 mg at 12/29/23 0955          Objective:   Vitals:  Blood pressure (!) 145/66, pulse (P) 69, temperature 97.7 °F (36.5 °C), temperature source Temporal, resp. rate 18, height 5' 8" (1.727 m), weight 89.9 kg (198 lb 3.2 oz), SpO2 98%.    Physical Examination:   GEN: no apparent distress, comfortable  HEAD: atraumatic and normocephalic  EYES: no pallor, no icterus  ENT:  no pharyngeal erythema, external ears WNL; no nasal discharge  NECK: no masses, thyroid normal, trachea midline, no LAD/LN's, supple  CV: RRR with no murmur; normal pulse; normal S1 and S2  CHEST: Normal respiratory effort; CTAB; normal breath sounds; no wheeze or crackles  ABDOM: nontender and nondistended; soft; L/S NP, no rebound/guarding  MUSC/Skeletal: ROM normal; no crepitus; joints normal  EXTREM: no clubbing, cyanosis, inflammation   SKIN: no rashes, lesions, ulcers, petechiae.  The left leg has chronic edema changes with prominent varicose veins, without palpable venous cord and is nontender.  The right " leg is without edema, without prominent varicose veins and without palpable venous cord and is nontender.  She has qusdricepts weakness at L leg, Calf NT at L or R leg.    The midline incision site at the abdomen is healing.  Liver and spleen are not palpable, abdomen is nontender.  : no CVAT  NEURO: grossly intact; motor/sensory WNL;  no tremors  PSYCH: normal mood, affect and behavior  LYMPH: normal cervical, supraclavicular, axillary and groin LN's    CBC, CMP, TSH Negative    Ultrasound July 11, 2022 shows superficial venous thrombus in the mid and proximal greater saphenous vein, DVT is not seen.  Left leg.  Ultrasound Sept. 15, 2022 shows persistent superficial venous thrombus at calf of L leg.    Ultrasound report from March 4, 2020 shows no evidence of DVT.  Thrombosed superficial lower extremity varices are noted, there is reflux from the origin of the greater saphenous vein to the knee.    Hypercoag. Eval. MTHFR mutation + -  -  -, [id number]  Homocystine 12.3.  Homocystine Now Nl. At 6.81.  Factor 8 increased 221.  Anticardiolipid AB increased IgG 25.    Mamm shows microcalcifications.  Stable since 2021.  Re check 1 year.    Assessment:    1. This individual has 1 or 2 episodes of thrombosis of the superficial veins of the left leg.      2. Hypercoag. Eval. Is with Positive results as above.    3. Will discuss with Dr. Guerrero to see if impedance plethismography  studies are available to evaluate her for venous insufficiency and to see if any procedure can be done to reduce the of varicosities at the L leg.?    4.On a trial of Folbic daily.  Continue Eliquis 5 mg BID.  Had TIA, now on added ASA 81 mg daily.    5.Homocystine level has now normalized.  She proceeded with knee surgery  with lovenox bridge and Eliquis management. 1/18/23.    6.Anemia eval.  PUD hx, black BM 1 month ago.  DURAN,+, Fatigue+  4/10 energy.  Fe deficiency ferritin 9.  Injectafer x's2.    7. Moderately differentiated adenocarcinoma of  the ascending colon, status post right colectomy.  Grade 2 disease.  Primary tumor extended into but not through the muscular wall of the colon.  One of 35 lymph nodes were positive for metastatic disease.  MMR studies were intact.  Clinically this would be stage III disease.    8. PET scan AUTUMN.  Stage 3 dx.  To [place] for adjuvant Rx and evaluation.    9.  She did not tolerate CAPOX adjuvant Rx.  Observe for now.    10.  Colonoscopy Dr. Ritchie and [place] 4/2025.  CT of abd and pelvis 6/2025.  No evidence of recurrent disease, however uterine findings were noted, will refer her to Dr. Jones of gyn for evaluation.  [facility] March of 2025 [facility].  11. She complains of decreasing memory over the last year, she has history of TIA, she has family history of Alzheimer's disease.  Will refer her to Dr. Thom Low of Neurology for mental and memory evaluation.    Mammogram will be scheduled at [facility]    Return to clinic here in 4 months.    Pepe Felder MD  [facility]  6/23/25

## 2025-06-25 ENCOUNTER — OFFICE VISIT (OUTPATIENT)
Dept: CARDIOLOGY | Facility: CLINIC | Age: 75
End: 2025-06-25
Payer: MEDICARE

## 2025-06-25 VITALS
HEART RATE: 80 BPM | SYSTOLIC BLOOD PRESSURE: 120 MMHG | DIASTOLIC BLOOD PRESSURE: 66 MMHG | BODY MASS INDEX: 29.91 KG/M2 | OXYGEN SATURATION: 97 % | WEIGHT: 197.38 LBS | HEIGHT: 68 IN

## 2025-06-25 DIAGNOSIS — E11.9 TYPE 2 DIABETES MELLITUS WITHOUT COMPLICATION, WITHOUT LONG-TERM CURRENT USE OF INSULIN: ICD-10-CM

## 2025-06-25 DIAGNOSIS — I20.89 STABLE ANGINA PECTORIS: ICD-10-CM

## 2025-06-25 DIAGNOSIS — E78.5 DYSLIPIDEMIA: ICD-10-CM

## 2025-06-25 DIAGNOSIS — I10 ESSENTIAL HYPERTENSION: Primary | ICD-10-CM

## 2025-06-25 DIAGNOSIS — R06.02 SHORTNESS OF BREATH: ICD-10-CM

## 2025-06-25 DIAGNOSIS — K43.2 INCISIONAL HERNIA, WITHOUT OBSTRUCTION OR GANGRENE: ICD-10-CM

## 2025-06-25 PROCEDURE — 93005 ELECTROCARDIOGRAM TRACING: CPT | Mod: PBBFAC,PN | Performed by: INTERNAL MEDICINE

## 2025-06-25 PROCEDURE — 99214 OFFICE O/P EST MOD 30 MIN: CPT | Mod: PBBFAC,PN | Performed by: NURSE PRACTITIONER

## 2025-06-25 PROCEDURE — 99214 OFFICE O/P EST MOD 30 MIN: CPT | Mod: S$PBB,,, | Performed by: NURSE PRACTITIONER

## 2025-06-25 PROCEDURE — 99999 PR PBB SHADOW E&M-EST. PATIENT-LVL IV: CPT | Mod: PBBFAC,,, | Performed by: NURSE PRACTITIONER

## 2025-06-25 RX ORDER — RANOLAZINE 500 MG/1
500 TABLET, EXTENDED RELEASE ORAL 2 TIMES DAILY
Qty: 60 TABLET | Refills: 11 | Status: SHIPPED | OUTPATIENT
Start: 2025-06-25 | End: 2026-06-25

## 2025-06-25 RX ORDER — OMEGA-3-ACID ETHYL ESTERS 1 G/1
2 CAPSULE, LIQUID FILLED ORAL 2 TIMES DAILY
COMMUNITY

## 2025-06-25 NOTE — PROGRESS NOTES
Subjective:    Patient ID:  Jen Torres is a 74 y.o. female who presents for follow-up of Annual Exam, Fatigue, and Shortness of Breath         History of Present Illness    Ms. Torres presents today for follow up of multiple medical conditions including thyroid cancer and stress. She reports recent diagnosis of thyroid cancer with a nodule that is 75% cancerous. Surgical intervention is scheduled for the 14th. She expresses significant stress related to personal caregiving responsibilities for her  and brother-in-law, in addition to managing her own medical condition. She acknowledges the emotional burden of her current health situation. She reports dyspnea triggered by humidity and physical exertion. Symptoms worsen when walking quickly or moving rapidly. She limits outdoor activities due to dyspnea and does not stay outside for extended periods. [facility]. She experiences stabbing chest pain lasting 5 minutes. Pain is localized to the chest area and occasionally radiates to the shoulder. Pain is intermittent, occurring every 5 minutes. She notes uncertainty about the cause, suggesting potential aggravation or tension. She has a previous history of shoulder and neck pain related to prior rotator cuff injury. She has a history of severe vascular migraines characterized by significant neurological symptoms. When experiencing a full-blown migraine, she describes losing vision and developing one-sided paralysis, which typically results in hospitalization. She is currently experiencing frequent headaches but has not progressed to a complete migraine episode. She notes pain localized in the neck and back of the head, with potential vascular spasms contributing to symptoms. She has a complex medical history including a history of TIA and an unspecified blood disorder. She was diagnosed with Stage III colon cancer in May 2024, during which she lost significant weight  and experienced severe respiratory distress. She lost blood during this period, resulting in significant anemia. She has GERD and attributes occasional chest pain to potential tension or indigestion related to this condition. She expresses hesitancy about stress testing due to previous hospital admissions when such tests were previously scheduled. PET scan performed during the time of initial Stage III colon cancer diagnosis showed multiple cysts in the uterus and kidneys, as well as nodules and lesions in the liver. She is currently taking Cardizem and Eliquis, prescribed due to a history of TIA and a blood disorder.      ROS:  General: no fever, no chills, no fatigue, no weight gain, no weight loss  Eyes: no vision changes, no redness, no discharge  ENT: no ear pain, no nasal congestion, no sore throat  Cardiovascular: positive chest pain, no palpitations, no lower extremity edema  Respiratory: no cough, positive shortness of breath, positive exertional dyspnea  Gastrointestinal: no abdominal pain, no nausea, no vomiting, no diarrhea, no constipation, no blood in stool, positive indigestion  Genitourinary: no dysuria, no hematuria, no frequency  Musculoskeletal: no joint pain, no muscle pain, positive neck pain, positive back pain  Skin: no rash, no lesion  Neurological: positive headache, no dizziness, no numbness, no tingling  Psychiatric: no anxiety, no depression, no sleep difficulty, positive increased stressors         Review of patient's allergies indicates:   Allergen Reactions    Statins-hmg-coa reductase inhibitors Swelling    Metformin Other (See Comments)     metallic taste , burn of the esophagus    Oxaliplatin Itching     Throat itchiness, twitching of both eyelids,tingling of hands, lips, head and feet. Cramping of toes.     Past Medical History:   Diagnosis Date    Abnormal Pap smear     s/p cryo at 22 y/o    Anticoagulant long-term use     Cancer     Diabetes mellitus     Encounter for blood  transfusion     GERD (gastroesophageal reflux disease)     Hyperlipidemia     Hypertension     Migraine     Migraines     Sleep apnea     Can not use c-pap    Stroke     TIA (transient ischemic attack)      Past Surgical History:   Procedure Laterality Date    ARTHROSCOPIC REPAIR OF ROTATOR CUFF OF SHOULDER Left 12/29/2023    Procedure: REPAIR, ROTATOR CUFF, ARTHROSCOPIC;  Surgeon: Gary Tony MD;  Location: [facility];  Service: Orthopedics;  Laterality: Left;  arthrex    ARTHROSCOPY OF SHOULDER WITH DECOMPRESSION OF SUBACROMIAL SPACE Left 12/29/2023    Procedure: ARTHROSCOPY, SHOULDER, WITH SUBACROMIAL SPACE DECOMPRESSION;  Surgeon: Gary Tony MD;  Location: [facility] OR;  Service: Orthopedics;  Laterality: Left;    CATARACT EXTRACTION, BILATERAL      CHOLECYSTECTOMY  10/01/2014    COLECTOMY, RIGHT  04/22/2024    Procedure: COLECTOMY, RIGHT;  Surgeon: Jaden Barrios MD;  Location: [facility];  Service: General;;    COLON SURGERY      COLONOSCOPIC SURGICAL PROCEDURE  04/22/2024    Procedure: SURGICAL PROCEDURE, COLONOSCOPIC;  Surgeon: Jaden Barrios MD;  Location: [facility];  Service: General;;    COLONOSCOPY N/A 04/21/2024    Procedure: COLONOSCOPY;  Surgeon: Ceasar Arias III, MD;  Location: [facility];  Service: Endoscopy;  Laterality: N/A;    ESOPHAGOGASTRODUODENOSCOPY N/A 04/20/2024    Procedure: EGD (ESOPHAGOGASTRODUODENOSCOPY);  Surgeon: Ceasar Arias III, MD;  Location: [facility] END[facility];  Service: Endoscopy;  Laterality: N/A;    EYE SURGERY      KNEE ARTHROSCOPY Left     LAPAROTOMY, EXPLORATORY N/A 04/22/2024    Procedure: LAPAROTOMY, EXPLORATORY;  Surgeon: Jaden Barrios MD;  Location: [facility];  Service: General;  Laterality: N/A;    LYSIS OF ADHESIONS  04/22/2024    Procedure: LYSIS, ADHESIONS;  Surgeon: Jaden Barrios MD;  Location: [facility];  Service: General;;    REPAIR, HERNIA, INCISIONAL N/A 12/12/2024    Procedure: REPAIR, HERNIA, INCISIONAL;  Surgeon:  Marlon Marks Jr., MD;  Location: [facility];  Service: General;  Laterality: N/A;  retrorectus w/ TAR    TONSILLECTOMY       Social History[1]  Family History   Problem Relation Name Age of Onset    Breast cancer Paternal Grandmother  70    Colon cancer Mother              Cancer Daughter          crohns    Ovarian cancer Daughter      Cancer Daughter  20        uterine cancer with mets s/p chemo/rad/surgery; ?genetic mutation; now with hip tumors    Breast cancer Maternal Grandmother                     Objective:        Vitals:    25 0912   BP: 120/66   Pulse: 80       Lab Results   Component Value Date    WBC 7.67 2025    HGB 14.2 2025    HCT 44.4 2025     2025    CHOL 191 2024    TRIG 156 (H) 2024    HDL 54 2024    ALT 23 2025    AST 31 2025     2025    K 4.1 2025     2025    CREATININE 1.1 2025    BUN 18 2025    CO2 21 (L) 2025    TSH 2.254 2024    INR 1.0 2024    HGBA1C 6.9 (H) 2024        ECHOCARDIOGRAM RESULTS  Results for orders placed during the hospital encounter of 24    Echo    Interpretation Summary    Left Ventricle: The left ventricle is normal in size. Mildly increased wall thickness. There is mild concentric hypertrophy. Normal wall motion. There is normal systolic function with a visually estimated ejection fraction of 65 - 70%. There is normal diastolic function.    Right Ventricle: Normal right ventricular cavity size. Wall thickness is normal. Right ventricle wall motion  is normal. Systolic function is normal.    Mitral Valve: There is mild regurgitation with a centrally directed jet.    IVC/SVC: Normal venous pressure at 3 mmHg.        CURRENT/PREVIOUS VISIT EKG  Results for orders placed or performed during the hospital encounter of 24   EKG 12-lead    Collection Time: 24 12:17 PM   Result Value Ref Range    QRS Duration 84 ms    OHS  QTC Calculation 449 ms    Narrative    Test Reason : R07.9,    Vent. Rate : 085 BPM     Atrial Rate : 085 BPM     P-R Int : 156 ms          QRS Dur : 084 ms      QT Int : 378 ms       P-R-T Axes : 060 050 058 degrees     QTc Int : 449 ms    Normal sinus rhythm with sinus arrhythmia  Normal ECG  When compared with ECG of 19-APR-2024 13:23,  Sinus rhythm has replaced Ectopic atrial rhythm  Left posterior fascicular block is no longer Present  Criteria for Septal infarct are no longer Present  Nonspecific T wave abnormality no longer evident in Anterior leads  QT has lengthened  Confirmed by Patrick Maria MD ([id number]) on 5/14/2024 10:59:52 AM    Referred By: CHEMA   SELF           Confirmed By:Patrick Maria MD         Physical Exam    General: No acute distress. Well-developed. Well-nourished.  Eyes: EOMI. Sclerae anicteric.  HENT: Normocephalic. Atraumatic. Nares patent. Moist oral mucosa.  Ears: Bilateral TMs clear. Bilateral EACs clear.  Cardiovascular: Regular rate. Regular rhythm. No murmurs. No rubs. No gallops. Normal S1, S2.  Respiratory: Normal respiratory effort. Clear to auscultation bilaterally. No rales. No rhonchi. No wheezing.  Abdomen: Soft. Non-tender. Non-distended. Normoactive bowel sounds.  Musculoskeletal: No  obvious deformity.  Extremities: No lower extremity edema.  Neurological: Alert & oriented x3. No slurred speech. Normal gait.  Psychiatric: Normal mood. Normal affect. Good insight. Good judgment.  Skin: Warm. Dry. No rash.        Medication List with Changes/Refills   New Medications    RANOLAZINE (RANEXA) 500 MG TB12    Take 1 tablet (500 mg total) by mouth 2 (two) times daily.   Current Medications    ACETAMINOPHEN (TYLENOL) 500 MG TABLET    Take 2 tablets (1,000 mg total) by mouth every 8 (eight) hours as needed for Pain.    ALENDRONATE-VITAMIN D3 (FOSAMAX PLUS D) 70 MG- 5,600 UNIT PER TABLET    Take 1 tablet by mouth every 7 days.    ASPIRIN 81 MG CHEW    Take 81 mg by mouth once  daily.    DILTIAZEM (CARDIZEM CD) 120 MG CP24    TAKE 1 CAPSULE BY MOUTH EVERY DAY    DIPHENOXYLATE-ATROPINE 2.5-0.025 MG (LOMOTIL) 2.5-0.025 MG PER TABLET    Take 1 tablet by mouth.    ELIQUIS 2.5 MG TAB    Take 1 tablet (2.5 mg total) by mouth every 12 (twelve) hours.    EMPAGLIFLOZIN (JARDIANCE) 25 MG TABLET    Take 25 mg by mouth once daily.    ENOXAPARIN (LOVENOX) 40 MG/0.4 ML SYRG    Inject 0.4 mLs (40 mg total) into the skin Daily. Beginning two (2) days prior and one day before Colonoscopy.  Hold eliquis on days you inject lovenox. Hold eliquis on days you inject lovenox.    ERGOCALCIFEROL (ERGOCALCIFEROL) 50,000 UNIT CAP    Take 50,000 Units by mouth every 7 days. Fridays    EZETIMIBE (ZETIA) 10 MG TABLET    Take 1 tablet (10 mg total) by mouth once daily.    FOLIC ACID-VIT B6-VIT B12 2.5-25-2 MG (WESTAB MAX) 2.5-25-2 MG TAB    Take 1 tablet by mouth once daily.    GLIMEPIRIDE (AMARYL) 1 MG TABLET    Take 1 mg by mouth.    LACTOBACILLUS RHAMNOSUS GG (CULTURELLE) 10 BILLION CELL CAPSULE    Take 1 capsule by mouth once daily.    LIDOCAINE-PRILOCAINE (EMLA) CREAM    Apply to skin 45-60 minutes prior to accessing port-a-cath.    LOPERAMIDE (IMODIUM) 2 MG CAPSULE    Take 2 mg by mouth.    LORATADINE (CLARITIN) 10 MG TABLET    Take 10 mg by mouth.    MELATONIN 1 MG TBDL    Take by mouth.    OMEGA-3 ACID ETHYL ESTERS (LOVAZA) 1 GRAM CAPSULE    Take 2 g by mouth 2 (two) times daily.    ONDANSETRON (ZOFRAN) 4 MG TABLET    Take 4 mg by mouth.    OXYCODONE (ROXICODONE) 5 MG IMMEDIATE RELEASE TABLET    Take 1 tablet (5 mg total) by mouth every 4 (four) hours as needed for Pain (prn post-op pain).    PANTOPRAZOLE (PROTONIX) 40 MG TABLET    Take 40 mg by mouth every morning.    POTASSIUM CHLORIDE (MICRO-K) 10 MEQ CPSR    TAKE 1 CAPSULE BY MOUTH EVERY DAY    RAMELTEON (ROZEREM) 8 MG TABLET    TAKE 1 TABLET (8 MG TOTAL) BY MOUTH EVERY EVENING.   Discontinued Medications    PROCHLORPERAZINE (COMPAZINE) 10 MG TABLET    Take  10 mg by mouth every 6 (six) hours as needed.         Assessment & Plan:       1. Essential hypertension    2. Shortness of breath    3. Dyslipidemia    4. Type 2 diabetes mellitus without complication, without long-term current use of insulin    5. Stable angina pectoris      Assessment & Plan    E11.9 Type 2 diabetes mellitus without complication, without long-term current use of insulin  I10 Essential hypertension  R06.02 Shortness of breath  E78.5 Dyslipidemia  I20.89 Stable angina pectoris    PLAN SUMMARY:  1. Continue Cardizem and Eliquis  2. Start Ranexa, morning and evening, for vessel spasm prevention  3. Ordered stress test  4. Ordered echocardiogram  5. EKG noted as abnormal, slightly different from previous    ESSENTIAL HYPERTENSION:  1. Continued Cardizem.    STABLE ANGINA PECTORIS:  1. EKG abnormal, slightly different from previous.  2. Suspect angina and possible small vessel disease due to stress.  3. Started Ranexa, to be taken in the morning and evening for prevention of vessel spasms.  4. Continued Eliquis.  5. Ordered stress test and echocardiogram to further evaluate cardiac condition.             No follow-ups on file.    This note was generated with the assistance of ambient listening technology. Verbal consent was obtained by the patient and accompanying visitor(s) for the recording of patient appointment to facilitate this note. I attest to having reviewed and edited the generated note for accuracy, though some syntax or spelling errors may persist. Please contact the author of this note for any clarification.         Olesya James, CATHLEEN-ACNP-BC,CVNP-BC            [1]   Social History  Tobacco Use    Smoking status: Former    Smokeless tobacco: Never   Substance Use Topics    Alcohol use: No    Drug use: No

## 2025-06-26 ENCOUNTER — TELEPHONE (OUTPATIENT)
Dept: OBSTETRICS AND GYNECOLOGY | Facility: CLINIC | Age: 75
End: 2025-06-26
Payer: MEDICARE

## 2025-06-26 NOTE — TELEPHONE ENCOUNTER
Copied from CRM #[id number]. Topic: Appointments - Appointment Access  >> Jun 26, 2025 10:30 AM Heather wrote:  Type:  Sooner Appointment Request    Caller is requesting a sooner appointment.  Caller declined first available appointment listed below.  Caller will not accept being placed on the waitlist and is requesting a message be sent to doctor.  Name of Caller:Pt  When is the first available appointment?Oct  Symptoms:Concerning Cancer  Would the patient rather a call back or a response via [facility]? Call  Best Call Back Number:[phone]   Additional Information:   New Pt has a referral

## 2025-06-27 ENCOUNTER — HOSPITAL ENCOUNTER (OUTPATIENT)
Dept: RADIOLOGY | Facility: HOSPITAL | Age: 75
Discharge: HOME | End: 2025-06-27
Attending: INTERNAL MEDICINE
Payer: MEDICARE

## 2025-06-27 DIAGNOSIS — Z12.31 BREAST CANCER SCREENING BY MAMMOGRAM: ICD-10-CM

## 2025-06-27 LAB
OHS QRS DURATION: 90 MS
OHS QTC CALCULATION: 391 MS

## 2025-06-27 PROCEDURE — 77063 BREAST TOMOSYNTHESIS BI: CPT | Mod: 26,,, | Performed by: RADIOLOGY

## 2025-06-27 PROCEDURE — 77067 SCR MAMMO BI INCL CAD: CPT | Mod: 26,,, | Performed by: RADIOLOGY

## 2025-06-27 PROCEDURE — 77063 BREAST TOMOSYNTHESIS BI: CPT | Mod: TC

## 2025-07-02 ENCOUNTER — TELEPHONE (OUTPATIENT)
Dept: CARDIOLOGY | Facility: HOSPITAL | Age: 75
End: 2025-07-02

## 2025-07-02 NOTE — TELEPHONE ENCOUNTER
Left message on voicemail.   Patient advised, test will be at [facility] ([address]  Will need to register on the first floor at the main entrance.  Patient advised that arrival time is 06:40.  Patient advised that they may be here about 3.5-4 hours, and may want to bring something to occupy their time, as there will be periods of waiting.    Patient advised, they may take their medications prior to testing if you need to. Advised if food is needed to take medications, please keep it light, like toast and juice.    Patient advised to avoid all caffeine 12 hours prior to testing.  This includes chocolate, tea and decaf coffee.    Wear comfortable clothing.  No lotions, oils, or powders to the upper chest area. May wear deodorant.    No metal jewelry, buttons, or zippers to the upper body.    Instructions sent to [employer].

## 2025-07-03 ENCOUNTER — HOSPITAL ENCOUNTER (OUTPATIENT)
Dept: RADIOLOGY | Facility: HOSPITAL | Age: 75
Discharge: HOME | End: 2025-07-03
Attending: NURSE PRACTITIONER
Payer: MEDICARE

## 2025-07-03 ENCOUNTER — HOSPITAL ENCOUNTER (OUTPATIENT)
Dept: CARDIOLOGY | Facility: HOSPITAL | Age: 75
Discharge: HOME | End: 2025-07-03
Attending: NURSE PRACTITIONER
Payer: MEDICARE

## 2025-07-03 VITALS — BODY MASS INDEX: 29.86 KG/M2 | HEIGHT: 68 IN | WEIGHT: 197 LBS

## 2025-07-03 DIAGNOSIS — I20.89 STABLE ANGINA PECTORIS: ICD-10-CM

## 2025-07-03 DIAGNOSIS — E78.5 DYSLIPIDEMIA: ICD-10-CM

## 2025-07-03 DIAGNOSIS — E11.9 TYPE 2 DIABETES MELLITUS WITHOUT COMPLICATION, WITHOUT LONG-TERM CURRENT USE OF INSULIN: ICD-10-CM

## 2025-07-03 LAB
AORTIC ROOT ANNULUS: 3.3 CM
AORTIC SIZE INDEX: 1.5 CM/M2
AORTIC VALVE CUSP SEPERATION: 2.02 CM
APICAL FOUR CHAMBER EJECTION FRACTION: 63 %
APICAL TWO CHAMBER EJECTION FRACTION: 67 %
ASCENDING AORTA: 3 CM
AV INDEX (PROSTH): 0.71
AV MEAN GRADIENT: 4 MMHG
AV PEAK GRADIENT: 8 MMHG
AV REGURGITATION PRESSURE HALF TIME: 548 MS
AV VALVE AREA BY VELOCITY RATIO: 2.2 CM²
AV VALVE AREA: 2.2 CM²
AV VELOCITY RATIO: 0.71
BSA FOR ECHO PROCEDURE: 2.07 M2
CV ECHO LV RWT: 0.37 CM
CV PHARM DOSE: 0.4 MG
CV STRESS BASE HR: 72 BPM
DIASTOLIC BLOOD PRESSURE: 61 MMHG
DOP CALC AO PEAK VEL: 1.4 M/S
DOP CALC AO VTI: 32.5 CM
DOP CALC LVOT AREA: 3.1 CM2
DOP CALC LVOT DIAMETER: 2 CM
DOP CALC LVOT PEAK VEL: 1 M/S
DOP CALC LVOT STROKE VOLUME: 72.5 CM3
DOP CALC MV VTI: 24.8 CM
DOP CALCLVOT PEAK VEL VTI: 23.1 CM
E WAVE DECELERATION TIME: 200 MSEC
E/A RATIO: 0.76
E/E' RATIO: 12 M/S
ECHO LV POSTERIOR WALL: 0.9 CM (ref 0.6–1.1)
EJECTION FRACTION- HIGH: 65 %
END DIASTOLIC INDEX-HIGH: 153 ML/M2
END DIASTOLIC INDEX-LOW: 93 ML/M2
END SYSTOLIC INDEX-HIGH: 71 ML/M2
END SYSTOLIC INDEX-LOW: 31 ML/M2
FRACTIONAL SHORTENING: 34.7 % (ref 28–44)
INTERVENTRICULAR SEPTUM: 0.8 CM (ref 0.6–1.1)
IVC DIAMETER: 1.87 CM
LA MAJOR: 4.7 CM
LA MINOR: 5.4 CM
LEFT ATRIUM AREA SYSTOLIC (APICAL 2 CHAMBER): 14.74 CM2
LEFT ATRIUM AREA SYSTOLIC (APICAL 4 CHAMBER): 16.36 CM2
LEFT ATRIUM SIZE: 3.8 CM
LEFT ATRIUM VOLUME INDEX MOD: 20 ML/M2
LEFT ATRIUM VOLUME MOD: 40 ML
LEFT INTERNAL DIMENSION IN SYSTOLE: 3.2 CM (ref 2.1–4)
LEFT VENTRICLE DIASTOLIC VOLUME INDEX: 56.16 ML/M2
LEFT VENTRICLE DIASTOLIC VOLUME: 114 ML
LEFT VENTRICLE END DIASTOLIC VOLUME APICAL 2 CHAMBER: 59.27 ML
LEFT VENTRICLE END DIASTOLIC VOLUME APICAL 4 CHAMBER: 63.81 ML
LEFT VENTRICLE END SYSTOLIC VOLUME APICAL 2 CHAMBER: 34.5 ML
LEFT VENTRICLE END SYSTOLIC VOLUME APICAL 4 CHAMBER: 44.26 ML
LEFT VENTRICLE MASS INDEX: 69.9 G/M2
LEFT VENTRICLE SYSTOLIC VOLUME INDEX: 20.2 ML/M2
LEFT VENTRICLE SYSTOLIC VOLUME: 41 ML
LEFT VENTRICULAR INTERNAL DIMENSION IN DIASTOLE: 4.9 CM (ref 3.5–6)
LEFT VENTRICULAR MASS: 141.9 G
LV LATERAL E/E' RATIO: 10.6 M/S
LV SEPTAL E/E' RATIO: 14.8 M/S
LVED V (TEICH): 113.61 ML
LVES V (TEICH): 40.74 ML
LVOT MG: 2.15 MMHG
LVOT MV: 0.7 CM/S
MV MEAN GRADIENT: 2 MMHG
MV PEAK A VEL: 0.98 M/S
MV PEAK E VEL: 0.74 M/S
MV PEAK GRADIENT: 4 MMHG
MV STENOSIS PRESSURE HALF TIME: 64.49 MS
MV VALVE AREA BY CONTINUITY EQUATION: 2.92 CM2
MV VALVE AREA P 1/2 METHOD: 3.41 CM2
NUC REST DIASTOLIC VOLUME INDEX: 66
NUC REST EJECTION FRACTION: 80
NUC REST SYSTOLIC VOLUME INDEX: 13
NUC STRESS DIASTOLIC VOLUME INDEX: 66
NUC STRESS EJECTION FRACTION: 80 %
NUC STRESS SYSTOLIC VOLUME INDEX: 13
OHS CV CPX 1 MINUTE RECOVERY HEART RATE: 90 BPM
OHS CV CPX 85 PERCENT MAX PREDICTED HEART RATE MALE: 126
OHS CV CPX MAX PREDICTED HEART RATE: 148
OHS CV CPX PATIENT IS FEMALE: 1
OHS CV CPX PATIENT IS MALE: 0
OHS CV CPX PEAK DIASTOLIC BLOOD PRESSURE: 62 MMHG
OHS CV CPX PEAK HEAR RATE: 90 BPM
OHS CV CPX PEAK RATE PRESSURE PRODUCT: NORMAL
OHS CV CPX PEAK SYSTOLIC BLOOD PRESSURE: 126 MMHG
OHS CV CPX PERCENT MAX PREDICTED HEART RATE ACHIEVED: 63
OHS CV CPX RATE PRESSURE PRODUCT PRESENTING: 8928
OHS CV INITIAL DOSE: 12.6 MCG/KG/MIN
OHS CV PEAK DOSE: 26.3 MCG/KG/MIN
OHS LV EJECTION FRACTION SIMPSONS BIPLANE MOD: 66 %
PISA AR MAX VEL: 3.01 M/S
PISA MRMAX VEL: 4.48 M/S
PISA TR MAX VEL: 2.3 M/S
PV MV: 0.58 M/S
PV PEAK GRADIENT: 2 MMHG
PV PEAK VELOCITY: 0.76 M/S
RA MAJOR: 4.35 CM
RA PRESSURE ESTIMATED: 3 MMHG
RA VOL SYS: 23.59 ML
RETIRED EF AND QEF - SEE NOTES: 53 %
RIGHT ATRIAL AREA: 11.5 CM2
RIGHT ATRIUM END SYSTOLIC VOLUME APICAL 4 CHAMBER INDEX BSA: 11.35 ML/M2
RIGHT ATRIUM VOLUME AREA LENGTH APICAL 4 CHAMBER: 23.04 ML
RV TB RVSP: 5 MMHG
RV TISSUE DOPPLER FREE WALL SYSTOLIC VELOCITY 1 (APICAL 4 CHAMBER VIEW): 15.69 CM/S
STJ: 3.1 CM
SYSTOLIC BLOOD PRESSURE: 124 MMHG
TDI LATERAL: 0.07 M/S
TDI SEPTAL: 0.05 M/S
TDI: 0.06 M/S
TR MAX PG: 21 MMHG
TV REST PULMONARY ARTERY PRESSURE: 24 MMHG
Z-SCORE OF LEFT VENTRICULAR DIMENSION IN END DIASTOLE: -2.05
Z-SCORE OF LEFT VENTRICULAR DIMENSION IN END SYSTOLE: -1.12

## 2025-07-03 PROCEDURE — 93306 TTE W/DOPPLER COMPLETE: CPT

## 2025-07-03 PROCEDURE — A9502 TC99M TETROFOSMIN: HCPCS | Performed by: NURSE PRACTITIONER

## 2025-07-03 PROCEDURE — 93306 TTE W/DOPPLER COMPLETE: CPT | Mod: 26,,, | Performed by: INTERNAL MEDICINE

## 2025-07-03 PROCEDURE — 78452 HT MUSCLE IMAGE SPECT MULT: CPT

## 2025-07-03 PROCEDURE — 63600175 PHARM REV CODE 636 W HCPCS: Performed by: NURSE PRACTITIONER

## 2025-07-03 RX ORDER — REGADENOSON 0.08 MG/ML
0.4 INJECTION, SOLUTION INTRAVENOUS
Status: COMPLETED | OUTPATIENT
Start: 2025-07-03 | End: 2025-07-03

## 2025-07-03 RX ADMIN — REGADENOSON 0.4 MG: 0.08 INJECTION, SOLUTION INTRAVENOUS at 08:07

## 2025-07-03 RX ADMIN — TETROFOSMIN 12.6 MILLICURIE: 1.38 INJECTION, POWDER, LYOPHILIZED, FOR SOLUTION INTRAVENOUS at 06:07

## 2025-07-03 RX ADMIN — TETROFOSMIN 26.3 MILLICURIE: 1.38 INJECTION, POWDER, LYOPHILIZED, FOR SOLUTION INTRAVENOUS at 08:07

## 2025-07-04 ENCOUNTER — HOSPITAL ENCOUNTER (EMERGENCY)
Facility: HOSPITAL | Age: 75
Discharge: HOME | End: 2025-07-04
Attending: EMERGENCY MEDICINE
Payer: MEDICARE

## 2025-07-04 VITALS
OXYGEN SATURATION: 97 % | TEMPERATURE: 99 F | HEIGHT: 68 IN | HEART RATE: 70 BPM | BODY MASS INDEX: 30.01 KG/M2 | RESPIRATION RATE: 16 BRPM | WEIGHT: 198 LBS | SYSTOLIC BLOOD PRESSURE: 149 MMHG | DIASTOLIC BLOOD PRESSURE: 70 MMHG

## 2025-07-04 DIAGNOSIS — S89.91XA RIGHT LEG INJURY, INITIAL ENCOUNTER: ICD-10-CM

## 2025-07-04 DIAGNOSIS — S80.11XA LEG HEMATOMA, RIGHT, INITIAL ENCOUNTER: Primary | ICD-10-CM

## 2025-07-04 DIAGNOSIS — M79.89 RIGHT LEG SWELLING: ICD-10-CM

## 2025-07-04 LAB
ABSOLUTE EOSINOPHIL (SMH): 0.16 K/UL
ABSOLUTE MONOCYTE (SMH): 0.81 K/UL (ref 0.3–1)
ABSOLUTE NEUTROPHIL COUNT (SMH): 4.8 K/UL (ref 1.8–7.7)
ANION GAP (SMH): 9 MMOL/L (ref 8–16)
BASOPHILS # BLD AUTO: 0.03 K/UL
BASOPHILS NFR BLD AUTO: 0.4 %
BUN SERPL-MCNC: 14 MG/DL (ref 8–23)
CALCIUM SERPL-MCNC: 9.2 MG/DL (ref 8.7–10.5)
CHLORIDE SERPL-SCNC: 111 MMOL/L (ref 95–110)
CO2 SERPL-SCNC: 22 MMOL/L (ref 23–29)
CREAT SERPL-MCNC: 1 MG/DL (ref 0.5–1.4)
ERYTHROCYTE [DISTWIDTH] IN BLOOD BY AUTOMATED COUNT: 13.5 % (ref 11.5–14.5)
GFR SERPLBLD CREATININE-BSD FMLA CKD-EPI: 60 ML/MIN/1.73/M2
GLUCOSE SERPL-MCNC: 141 MG/DL (ref 70–110)
HCT VFR BLD AUTO: 37 % (ref 37–48.5)
HGB BLD-MCNC: 12.2 GM/DL (ref 12–16)
IMM GRANULOCYTES # BLD AUTO: 0.03 K/UL (ref 0–0.04)
IMM GRANULOCYTES NFR BLD AUTO: 0.4 % (ref 0–0.5)
LYMPHOCYTES # BLD AUTO: 2.73 K/UL (ref 1–4.8)
MCH RBC QN AUTO: 30.4 PG (ref 27–31)
MCHC RBC AUTO-ENTMCNC: 33 G/DL (ref 32–36)
MCV RBC AUTO: 92 FL (ref 82–98)
NUCLEATED RBC (/100WBC) (SMH): 0 /100 WBC
PLATELET # BLD AUTO: 264 K/UL (ref 150–450)
PMV BLD AUTO: 10.4 FL (ref 9.2–12.9)
POTASSIUM SERPL-SCNC: 3.9 MMOL/L (ref 3.5–5.1)
RBC # BLD AUTO: 4.01 M/UL (ref 4–5.4)
RELATIVE EOSINOPHIL (SMH): 1.9 % (ref 0–8)
RELATIVE LYMPHOCYTE (SMH): 31.9 % (ref 18–48)
RELATIVE MONOCYTE (SMH): 9.5 % (ref 4–15)
RELATIVE NEUTROPHIL (SMH): 55.9 % (ref 38–73)
SODIUM SERPL-SCNC: 142 MMOL/L (ref 136–145)
WBC # BLD AUTO: 8.55 K/UL (ref 3.9–12.7)

## 2025-07-04 PROCEDURE — 99285 EMERGENCY DEPT VISIT HI MDM: CPT | Mod: 25

## 2025-07-04 PROCEDURE — 80048 BASIC METABOLIC PNL TOTAL CA: CPT | Performed by: EMERGENCY MEDICINE

## 2025-07-04 PROCEDURE — 85025 COMPLETE CBC W/AUTO DIFF WBC: CPT | Performed by: EMERGENCY MEDICINE

## 2025-07-04 NOTE — ED PROVIDER NOTES
Chief complaint:  Leg Swelling (RT LEG, S/P TRIP AND ALMOST FELL SAT. PULLED SOMETHING BACK OF RT LOWER LEG. US OF RT LOWER LEG, DONE ON TUESDAY. SWELLING HAS GOTTEN WORSE)      HPI:  Jen Torres is a 75 y.o. female with hx DM, anemia, htn, CVA on apixaban presenting with right leg swelling.  Patient notably seen three days ago for a stated fall on 06/28/2025, seven days ago, per EMR review.  Pain in the right leg radiating from the hip to the level of the foot endorsed with some swelling of the leg.  Prior history of DVT referenced.  Ultrasound ordered and reportedly w/o sign of DVT, although actual results or images are not available.  Patient endorses constant swelling in the right leg with some pain and bruising to the posterior calf and thigh.  This had increased slightly since initial visit to PCP and ultrasound shortly thereafter.  She denies numbness or weakness.  It is mildly painful.  Mechanism of injury was she stumbled while walking without fall to the ground or other direct trauma with subsequent swelling and pain.  She denies fever or other rash.  There is no specific joint pain.  She is able to bear weight.    ROS: As per HPI and below:  No vomiting, dyspnea, oliguria.    Review of patient's allergies indicates:   Allergen Reactions    Statins-hmg-coa reductase inhibitors Swelling    Metformin Other (See Comments)     metallic taste , burn of the esophagus    Oxaliplatin Itching     Throat itchiness, twitching of both eyelids,tingling of hands, lips, head and feet. Cramping of toes.       Discharge Medication List as of 7/4/2025  1:47 PM        CONTINUE these medications which have NOT CHANGED    Details   acetaminophen (TYLENOL) 500 MG tablet Take 2 tablets (1,000 mg total) by mouth every 8 (eight) hours as needed for Pain., Starting Thu 12/28/2023, Normal      alendronate-vitamin D3 (FOSAMAX PLUS D) 70 mg- 5,600 unit per tablet Take 1 tablet by mouth every 7 days., Historical Med      aspirin  81 MG Chew Take 81 mg by mouth once daily., Historical Med      diltiaZEM (CARDIZEM CD) 120 MG Cp24 TAKE 1 CAPSULE BY MOUTH EVERY DAY, Starting Wed 5/14/2025, Normal      diphenoxylate-atropine 2.5-0.025 mg (LOMOTIL) 2.5-0.025 mg per tablet Take 1 tablet by mouth., Starting Wed 7/10/2024, Historical Med      ELIQUIS 2.5 mg Tab Take 1 tablet (2.5 mg total) by mouth every 12 (twelve) hours., Starting Fri 1/24/2025, Normal      empagliflozin (JARDIANCE) 25 mg tablet Take 25 mg by mouth once daily., Starting Thu 9/12/2024, Historical Med      enoxaparin (LOVENOX) 40 mg/0.4 mL Syrg Inject 0.4 mLs (40 mg total) into the skin Daily. Beginning two (2) days prior and one day before Colonoscopy.  Hold eliquis on days you inject lovenox. Hold eliquis on days you inject lovenox., Starting Tue 4/22/2025, Normal      ergocalciferol (ERGOCALCIFEROL) 50,000 unit Cap Take 50,000 Units by mouth every 7 days. Fridays, Starting Mon 2/27/2023, Historical Med      ezetimibe (ZETIA) 10 mg tablet Take 1 tablet (10 mg total) by mouth once daily., Starting Fri 1/24/2025, Normal      folic acid-vit B6-vit B12 2.5-25-2 mg (WESTAB MAX) 2.5-25-2 mg Tab Take 1 tablet by mouth once daily., Starting Fri 1/24/2025, Normal      glimepiride (AMARYL) 1 MG tablet Take 1 mg by mouth., Starting Thu 9/12/2024, Historical Med      Lactobacillus rhamnosus GG (CULTURELLE) 10 billion cell capsule Take 1 capsule by mouth once daily., Historical Med      LIDOcaine-prilocaine (EMLA) cream Apply to skin 45-60 minutes prior to accessing port-a-cath., Historical Med      loperamide (IMODIUM) 2 mg capsule Take 2 mg by mouth., Starting Wed 7/10/2024, Historical Med      loratadine (CLARITIN) 10 mg tablet Take 10 mg by mouth., Historical Med      melatonin 1 mg TbDL Take by mouth., Historical Med      omega-3 acid ethyl esters (LOVAZA) 1 gram capsule Take 2 g by mouth 2 (two) times daily., Historical Med      ondansetron (ZOFRAN) 4 MG tablet Take 4 mg by mouth.,  Starting Thu 12/28/2023, Historical Med      oxyCODONE (ROXICODONE) 5 MG immediate release tablet Take 1 tablet (5 mg total) by mouth every 4 (four) hours as needed for Pain (prn post-op pain)., Starting Sat 12/14/2024, Normal      pantoprazole (PROTONIX) 40 MG tablet Take 40 mg by mouth every morning., Starting Wed 8/24/2022, Historical Med      potassium chloride (MICRO-K) 10 MEQ CpSR TAKE 1 CAPSULE BY MOUTH EVERY DAY, Normal      ramelteon (ROZEREM) 8 mg tablet TAKE 1 TABLET (8 MG TOTAL) BY MOUTH EVERY EVENING., Starting Mon 6/3/2024, Normal      ranolazine (RANEXA) 500 MG Tb12 Take 1 tablet (500 mg total) by mouth 2 (two) times daily., Starting Wed 6/25/2025, Until Thu 6/25/2026, Normal             PMH:  As per HPI and below:  Past Medical History:   Diagnosis Date    Abnormal Pap smear     s/p cryo at 20 y/o    Anticoagulant long-term use     Cancer     Diabetes mellitus     Encounter for blood transfusion     GERD (gastroesophageal reflux disease)     Hyperlipidemia     Hypertension     Migraine     Migraines     Sleep apnea     Can not use c-pap    Stroke     TIA (transient ischemic attack)      Past Surgical History:   Procedure Laterality Date    ARTHROSCOPIC REPAIR OF ROTATOR CUFF OF SHOULDER Left 12/29/2023    Procedure: REPAIR, ROTATOR CUFF, ARTHROSCOPIC;  Surgeon: Gary Tony MD;  Location: [facility];  Service: Orthopedics;  Laterality: Left;  arthrex    ARTHROSCOPY OF SHOULDER WITH DECOMPRESSION OF SUBACROMIAL SPACE Left 12/29/2023    Procedure: ARTHROSCOPY, SHOULDER, WITH SUBACROMIAL SPACE DECOMPRESSION;  Surgeon: Gary Tony MD;  Location: [facility] OR;  Service: Orthopedics;  Laterality: Left;    CATARACT EXTRACTION, BILATERAL      CHOLECYSTECTOMY  10/01/2014    COLECTOMY, RIGHT  04/22/2024    Procedure: COLECTOMY, RIGHT;  Surgeon: Jaden Barrios MD;  Location: [facility] OR;  Service: General;;    COLON SURGERY      COLONOSCOPIC SURGICAL PROCEDURE  04/22/2024    Procedure: SURGICAL  PROCEDURE, COLONOSCOPIC;  Surgeon: Jaden Barrios MD;  Location: [facility] OR;  Service: General;;    COLONOSCOPY N/A 04/21/2024    Procedure: COLONOSCOPY;  Surgeon: Ceasar Arias III, MD;  Location: [facility] ENDO;  Service: Endoscopy;  Laterality: N/A;    ESOPHAGOGASTRODUODENOSCOPY N/A 04/20/2024    Procedure: EGD (ESOPHAGOGASTRODUODENOSCOPY);  Surgeon: Ceasar Arias III, MD;  Location: [facility] ENDO;  Service: Endoscopy;  Laterality: N/A;    EYE SURGERY      KNEE ARTHROSCOPY Left     LAPAROTOMY, EXPLORATORY N/A 04/22/2024    Procedure: LAPAROTOMY, EXPLORATORY;  Surgeon: Jaden Barrios MD;  Location: [facility] OR;  Service: General;  Laterality: N/A;    LYSIS OF ADHESIONS  04/22/2024    Procedure: LYSIS, ADHESIONS;  Surgeon: Jaden Barrios MD;  Location: [facility] OR;  Service: General;;    REPAIR, HERNIA, INCISIONAL N/A 12/12/2024    Procedure: REPAIR, HERNIA, INCISIONAL;  Surgeon: Marlon Marks Jr., MD;  Location: [facility] OR;  Service: General;  Laterality: N/A;  retrorectus w/ TAR    TONSILLECTOMY         Social History     Socioeconomic History    Marital status:    Tobacco Use    Smoking status: Former    Smokeless tobacco: Never   Substance and Sexual Activity    Alcohol use: No    Drug use: No    Sexual activity: Never     Social Drivers of Health     Financial Resource Strain: Low Risk  (12/12/2024)    Overall Financial Resource Strain (CARDIA)     Difficulty of Paying Living Expenses: Not hard at all   Food Insecurity: No Food Insecurity (12/12/2024)    Hunger Vital Sign     Worried About Running Out of Food in the Last Year: Never true     Ran Out of Food in the Last Year: Never true   Transportation Needs: No Transportation Needs (12/12/2024)    TRANSPORTATION NEEDS     Transportation : No   Physical Activity: Insufficiently Active (8/14/2023)    [facility]    Exercise Vital Sign     Days of Exercise per Week: 7 days     Minutes of  Exercise per Session: 10 min   Stress: No Stress Concern Present (2024)    [employer] - Occupational Stress Questionnaire     Feeling of Stress : Not at all   Housing Stability: Unknown (2024)    Housing Stability Vital Sign     Unable to Pay for Housing in the Last Year: No     Homeless in the Last Year: No       Family History   Problem Relation Name Age of Onset    Breast cancer Paternal Grandmother  70    Colon cancer Mother              Cancer Daughter          crohns    Ovarian cancer Daughter      Cancer Daughter  20        uterine cancer with mets s/p chemo/rad/surgery; ?genetic mutation; now with hip tumors    Breast cancer Maternal Grandmother         Physical Exam:    Vitals:    25 1357   BP: (!) 149/70   Pulse: 70   Resp: 16   Temp: 98.7 °F (37.1 °C)     GENERAL:  No apparent distress.  Alert.    HEENT:  Moist mucous membranes.  Normocephalic and atraumatic.    NECK:  No swelling.  Midline trachea.   CARDIOVASCULAR:  Regular rate and rhythm.  2+ radial pulses.  No murmur.  PULMONARY:  Lungs clear to auscultation bilaterally.  No wheezes, rales, or rhonci.    EXTREMITIES:  Warm and well perfused.  Brisk capillary refill.  Asymmetry with increased size of right leg with swelling to the right posterior thigh and calf with some associated ecchymosis and mild tenderness.  No erythema or increased warmth.  No joint effusion.  Full active range of motion at the right hip and flexion as well as the knee and ankle in flexion and extension.  2+ DP and PT pulses.  NEUROLOGICAL:  Normal mental status.  Appropriate and conversant.  5/5 strength and equal sensation light touch in the distal lower extremities bilaterally.  SKIN:  No rashes or ecchymoses.      Labs Reviewed   BASIC METABOLIC PANEL - Abnormal       Result Value    Sodium 142      Potassium 3.9      Chloride 111 (*)     CO2 22 (*)     Glucose 141 (*)     BUN 14      Creatinine 1.0      Calcium 9.2       Anion Gap 9      eGFR 60 (*)    CBC WITH DIFFERENTIAL - Normal    WBC 8.55      RBC 4.01      Hgb 12.2      Hct 37.0      MCV 92      MCH 30.4      MCHC 33.0      RDW 13.5      Platelet Count 264      MPV 10.4      Nucleated RBC 0      Neut % 55.9      Lymph % 31.9      Mono % 9.5      Eos % 1.9      Basophil % 0.4      Imm Grans % 0.4      Neut # 4.8      Lymph # 2.73      Mono # 0.81      Eos # 0.16      Baso # 0.03      Imm Grans # 0.03     CBC W/ AUTO DIFFERENTIAL    Narrative:     The following orders were created for panel order CBC auto differential.  Procedure                               Abnormality         Status                     ---------                               -----------         ------                     CBC with Differential[[id number]]       Normal              Final result                 Please view results for these tests on the individual orders.       Discharge Medication List as of 7/4/2025  1:47 PM        CONTINUE these medications which have NOT CHANGED    Details   acetaminophen (TYLENOL) 500 MG tablet Take 2 tablets (1,000 mg total) by mouth every 8 (eight) hours as needed for Pain., Starting Thu 12/28/2023, Normal      alendronate-vitamin D3 (FOSAMAX PLUS D) 70 mg- 5,600 unit per tablet Take 1 tablet by mouth every 7 days., Historical Med      aspirin 81 MG Chew Take 81 mg by mouth once daily., Historical Med      diltiaZEM (CARDIZEM CD) 120 MG Cp24 TAKE 1 CAPSULE BY MOUTH EVERY DAY, Starting Wed 5/14/2025, Normal      diphenoxylate-atropine 2.5-0.025 mg (LOMOTIL) 2.5-0.025 mg per tablet Take 1 tablet by mouth., Starting Wed 7/10/2024, Historical Med      ELIQUIS 2.5 mg Tab Take 1 tablet (2.5 mg total) by mouth every 12 (twelve) hours., Starting Fri 1/24/2025, Normal      empagliflozin (JARDIANCE) 25 mg tablet Take 25 mg by mouth once daily., Starting Thu 9/12/2024, Historical Med      enoxaparin (LOVENOX) 40 mg/0.4 mL Syrg Inject 0.4 mLs (40 mg total) into the skin Daily.  Beginning two (2) days prior and one day before Colonoscopy.  Hold eliquis on days you inject lovenox. Hold eliquis on days you inject lovenox., Starting Tue 4/22/2025, Normal      ergocalciferol (ERGOCALCIFEROL) 50,000 unit Cap Take 50,000 Units by mouth every 7 days. Fridays, Starting Mon 2/27/2023, Historical Med      ezetimibe (ZETIA) 10 mg tablet Take 1 tablet (10 mg total) by mouth once daily., Starting Fri 1/24/2025, Normal      folic acid-vit B6-vit B12 2.5-25-2 mg (WESTAB MAX) 2.5-25-2 mg Tab Take 1 tablet by mouth once daily., Starting Fri 1/24/2025, Normal      glimepiride (AMARYL) 1 MG tablet Take 1 mg by mouth., Starting Thu 9/12/2024, Historical Med      Lactobacillus rhamnosus GG (CULTURELLE) 10 billion cell capsule Take 1 capsule by mouth once daily., Historical Med      LIDOcaine-prilocaine (EMLA) cream Apply to skin 45-60 minutes prior to accessing port-a-cath., Historical Med      loperamide (IMODIUM) 2 mg capsule Take 2 mg by mouth., Starting Wed 7/10/2024, Historical Med      loratadine (CLARITIN) 10 mg tablet Take 10 mg by mouth., Historical Med      melatonin 1 mg TbDL Take by mouth., Historical Med      omega-3 acid ethyl esters (LOVAZA) 1 gram capsule Take 2 g by mouth 2 (two) times daily., Historical Med      ondansetron (ZOFRAN) 4 MG tablet Take 4 mg by mouth., Starting Thu 12/28/2023, Historical Med      oxyCODONE (ROXICODONE) 5 MG immediate release tablet Take 1 tablet (5 mg total) by mouth every 4 (four) hours as needed for Pain (prn post-op pain)., Starting Sat 12/14/2024, Normal      pantoprazole (PROTONIX) 40 MG tablet Take 40 mg by mouth every morning., Starting Wed 8/24/2022, Historical Med      potassium chloride (MICRO-K) 10 MEQ CpSR TAKE 1 CAPSULE BY MOUTH EVERY DAY, Normal      ramelteon (ROZEREM) 8 mg tablet TAKE 1 TABLET (8 MG TOTAL) BY MOUTH EVERY EVENING., Starting Mon 6/3/2024, Normal      ranolazine (RANEXA) 500 MG Tb12 Take 1 tablet (500 mg total) by mouth 2 (two) times  daily., Starting Wed 6/25/2025, Until Thu 6/25/2026, Normal             Orders Placed This Encounter   Procedures    US Lower Extremity Veins Right    X-Ray Femur Ap/Lat Right    X-Ray Tibia Fibula 2 View Right    CBC auto differential    Basic metabolic panel (BMP)    CBC with Differential       Imaging Results              US Lower Extremity Veins Right (Final result)  Result time 07/04/25 13:15:29      Final result by Mahin Anna Jr., MD (07/04/25 13:15:29)                   Impression:      No evidence of deep venous thrombosis bilaterally.  In right popliteal fossa he long soft tissue focus that shows complex internal echo structure suggested of an organized hematoma corresponds with the area of bruising, although may alternatively reflect a complex Baker cyst formation without evidence of dehiscence.  Recommend short-term follow-up sonographically or alternatively, a dedicated MRI of the lower extremity may be performed.      Electronically signed by: Mahin Anna MD  Date:    07/04/2025  Time:    13:15               Narrative:    EXAMINATION:  US LOWER EXTREMITY VEINS RIGHT    CLINICAL HISTORY:  Other specified soft tissue disorders    TECHNIQUE:  Duplex and color flow Doppler evaluation of the bilateral lower extremity veins was performed.    COMPARISON:  None    FINDINGS:  Right - There is no evidence of acute venous thrombosis in the common femoral, superficial femoral, greater saphenous, popliteal, peroneal, anterior tibial and posterior tibial veins.  There is no reflux to suggest valvular incompetence.  Elongated hypoechoic echo structure is noted in the posterior popliteal region extending inferiorly may reflect an organized hematoma or complex Baker cyst formation without dehiscence.  The latter is in favor given the internal complexity as no appreciable flow on Doppler inspection.                                       X-Ray Femur Ap/Lat Right (Final result)  Result time 07/04/25 12:51:18       Final result by Mahin Anna Jr., MD (07/04/25 12:51:18)                   Impression:      Negative right femur series.      Electronically signed by: Mahin Anna MD  Date:    07/04/2025  Time:    12:51               Narrative:    EXAMINATION:  XR FEMUR 2 VIEW RIGHT    CLINICAL HISTORY:  Unspecified injury of right lower leg, initial encounter    TECHNIQUE:  AP and lateral views of the right femur were performed.    COMPARISON:  None    FINDINGS:  Long shaft of the femur appears normal.  No evidence of metabolic, traumatic, or inflammatory process.  Soft tissues yield no significant finding.  Articular cartilage space of the hip normal.  Articular cartilage spaces of the included images of the knee joint are normal.                                       X-Ray Tibia Fibula 2 View Right (Final result)  Result time 07/04/25 12:52:20      Final result by Mahin Anna Jr., MD (07/04/25 12:52:20)                   Impression:      Prominent soft tissue inflammatory changes of the distal extremity length.  The underlying bone appears well preserved.      Electronically signed by: Mahin Anna MD  Date:    07/04/2025  Time:    12:52               Narrative:    EXAMINATION:  XR TIBIA FIBULA 2 VIEW RIGHT    CLINICAL HISTORY:  Unspecified injury of right lower leg, initial encounter    TECHNIQUE:  AP and lateral views of the right tibia and fibula were performed.    COMPARISON:  None.    FINDINGS:  Osseous structures appear intact without evidence of an acute fracture deformity.  No significant metabolic, traumatic, or inflammatory process.  Soft tissue reticulation and inflammatory changes are observed involving the entire length of the calf.  No evidence of soft tissue gas or radiopaque foreign bodies.                                  (Rad read)    The patient was informed of the incidental finding(s) as well as the need for PCP or specialist follow-up for reevaluation and possible further  investigation or monitoring.      ED Course as of 07/04/25 1502   Fri Jul 04, 2025   1246 XR R femur and tib-fib:  No fx or dislocation. (Independently interpreted by me)   [MR]      ED Course User Index  [MR] Chandler Rg MD       MDM:    75 y.o. female with right leg swelling following stumbling injury consistent with likely hematoma based on exam.  Repeat ultrasound performed here at patient request with low suspicion for DVT.  X-ray performed to exclude fracture or dislocation with very low suspicion.  No sign of distal neurovascular compromise.  I doubt infection.  I doubt Leone-Ranjeet lesion.  Incidental findings on x-ray and ultrasound support hematoma.  Close outpatient orthopedic follow-up recommended with return precautions for new or worsening issues indicative of complications such as fever, new rash, or dramatic change in pain.    Diagnoses:    1. Right leg hematoma       Chandler Rg MD  07/04/25 1502

## 2025-07-04 NOTE — DISCHARGE INSTRUCTIONS
Ultrasounds without sign of deep vein thrombosis but with inflammation along the calf muscle, which is nonspecific, but can be associated with process including injury or hematoma.  Ultrasound does suggest a hematoma.    You have a likely collection of blood under the skin from muscle or other structure injury called the hematoma.  This may slowly resolve at a time, but it is strongly recommended you follow up with Orthopedics to monitor into decide on other therapy if indicated.    As we discussed, return to the emergency department for new or worsening symptoms including fever, passing out, or new rash apart from bruises.

## 2025-07-04 NOTE — ED NOTES
C/o right lower leg pain and bruising states had ultrasound 4 days ago negative states pain and swelling has become worse

## 2025-07-07 ENCOUNTER — RESULTS FOLLOW-UP (OUTPATIENT)
Dept: CARDIOLOGY | Facility: CLINIC | Age: 75
End: 2025-07-07

## 2025-07-08 ENCOUNTER — OFFICE VISIT (OUTPATIENT)
Dept: CARDIOLOGY | Facility: CLINIC | Age: 75
End: 2025-07-08
Payer: MEDICARE

## 2025-07-08 DIAGNOSIS — Z01.810 ENCOUNTER FOR PRE-OPERATIVE CARDIOVASCULAR CLEARANCE: ICD-10-CM

## 2025-07-08 DIAGNOSIS — Z71.2 ENCOUNTER TO DISCUSS TEST RESULTS: ICD-10-CM

## 2025-07-08 DIAGNOSIS — I82.402 DEEP VEIN THROMBOSIS (DVT) OF LEFT LOWER EXTREMITY, UNSPECIFIED CHRONICITY, UNSPECIFIED VEIN: ICD-10-CM

## 2025-07-08 DIAGNOSIS — C73 THYROID CANCER: ICD-10-CM

## 2025-07-08 DIAGNOSIS — I10 ESSENTIAL HYPERTENSION: ICD-10-CM

## 2025-07-08 DIAGNOSIS — E78.5 DYSLIPIDEMIA: Primary | ICD-10-CM

## 2025-07-08 PROBLEM — R06.02 SHORTNESS OF BREATH: Status: RESOLVED | Noted: 2024-03-07 | Resolved: 2025-07-08

## 2025-07-08 PROCEDURE — 98006 SYNCH AUDIO-VIDEO EST MOD 30: CPT | Mod: 95,,, | Performed by: NURSE PRACTITIONER

## 2025-07-08 NOTE — ASSESSMENT & PLAN NOTE
Patient was evaluated in the office.  Stress test is negative for reversible ischemia.  Echocardiogram is completely normal.  She has no persistent anginal symptoms.  Her blood pressure is controlled.  Patient is cleared from cardiology standpoint to proceed with partial thyroidectomy at [facility].

## 2025-07-08 NOTE — ASSESSMENT & PLAN NOTE
She is being followed by [facility].  She is scheduled for partial thyroidectomy on Monday  Cleared from cardiology standpoint for surgical procedure

## 2025-07-08 NOTE — ASSESSMENT & PLAN NOTE
She has been on Eliquis for this.  She states [facility] has ordered bridging with Lovenox 2 days prior to surgery.

## 2025-07-08 NOTE — ASSESSMENT & PLAN NOTE
Reviewed latest CBC results with the patient which were normal  Reviewed echocardiogram results at [facility] which is normal  Reviewed stress test findings.  Explained the abnormalities found are attributable to shadows.  EKG during stress showed no ischemia.  Dr. Maria noted no reversible ischemia on the stress test.  She does not appear to have concerning anginal symptoms    Patient is cleared to undergo partial thyroidectomy from Cardiology standpoint  She needs to hold aspirin 1 week prior to surgery and Eliquis 3 days prior to surgery  Please resume both once deemed safely appropriate by surgeon at [facility]

## 2025-07-08 NOTE — PROGRESS NOTES
The patient location is: [place]  The chief complaint leading to consultation is: results follow up    Visit type: audiovisual    Face to Face time with patient: 20 minutes of total time spent on the encounter, which includes face to face time and non-face to face time preparing to see the patient (eg, review of tests), Obtaining and/or reviewing separately obtained history, Documenting clinical information in the electronic or other health record, Independently interpreting results (not separately reported) and communicating results to the patient/family/caregiver, or Care coordination (not separately reported).         Each patient to whom he or she provides medical services by telemedicine is:  (1) informed of the relationship between the physician and patient and the respective role of any other health care provider with respect to management of the patient; and (2) notified that he or she may decline to receive medical services by telemedicine and may withdraw from such care at any time.    Notes:              Subjective:    Patient ID:  Jen Torres is a 75 y.o. female patient here for evaluation No chief complaint on file.    History of Present Illness:     Patient is 75-year-old female seen on audio/visual visit today for discussion of stress and echocardiogram results  Patient is scheduled for partial thyroidectomy next Monday for thyroid cancer at [facility].  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 Normal wall thickness. Normal wall motion. There is normal systolic function with a visually estimated ejection fraction of 60 - 65%. There is normal diastolic function.    Right Ventricle: The right ventricle is normal in sizeWall thickness is normal. Systolic function is normal.    Left Atrium: The left atrium is mildly dilated    Tricuspid Valve: There is mild regurgitation.    Pulmonary Artery: The estimated pulmonary artery systolic pressure is 24 mmHg.    IVC/SVC: Normal venous pressure at 3 mmHg.      Most Recent Nuclear Stress Test Results  Results for orders placed during the hospital encounter of 07/03/25    Nuclear Stress - Cardiology Interpreted    Interpretation Summary    Abnormal myocardial perfusion scan.    moderate intensity, medium sized, equivocal  in the mid to apical apical and anteroapical wall(s). This finding is equivocal due to soft tissue shadow.    Perfusion Abnormality #2 - There is a medium sized, moderate intensity, equivocal perfusion abnormality that is fixed in the basal to mid  wall(s). This finding is equivocal due to diaphragm shadow.    There are no other significant perfusion abnormalities.    There is a mild to moderate intensity fixed perfusion abnormality in the inferior wall of the left ventricle secondary to diaphragm attenuation.    There is a moderate intensity fixed perfusion abnormality in the anteroapical wall of the left ventricle, secondary to soft tissue attenuation.    The gated perfusion images showed an ejection fraction of 80% at rest. The gated perfusion images showed an ejection fraction of 80% post stress. Normal ejection fraction is greater than 53%.    There is normal wall motion at rest and post-stress.    LV cavity size is normal at rest  and normal at post-stress.    The ECG portion of the study is negative for ischemia.    The patient reported no chest pain during the stress test.      Most Recent Cardiac PET Stress Test Results  No results found for this or any previous visit.      Most Recent Cardiovascular Angiogram results  No results found for this or any previous visit.      Other Most Recent Cardiology Results  Results for orders placed during the hospital encounter of 04/19/24    Cardiac monitoring strips      REVIEW OF SYSTEMS: As noted in HPI        Past Medical History:   Diagnosis Date    Abnormal Pap smear     s/p cryo at 20 y/o    Anticoagulant long-term use     Cancer     Diabetes mellitus     Encounter for blood transfusion     GERD (gastroesophageal reflux disease)     Hyperlipidemia     Hypertension     Migraine     Migraines     Sleep apnea     Can not use c-pap    Stroke     TIA (transient ischemic attack)      Past Surgical History:   Procedure Laterality Date    ARTHROSCOPIC REPAIR OF ROTATOR CUFF OF SHOULDER Left 12/29/2023    Procedure: REPAIR, ROTATOR CUFF, ARTHROSCOPIC;  Surgeon: Gary Tony MD;  Location: [facility];  Service: Orthopedics;  Laterality: Left;  arthrex    ARTHROSCOPY OF SHOULDER WITH DECOMPRESSION OF SUBACROMIAL SPACE Left 12/29/2023    Procedure: ARTHROSCOPY, SHOULDER, WITH SUBACROMIAL SPACE DECOMPRESSION;  Surgeon: Gary Tony MD;  Location: [facility] OR;  Service: Orthopedics;  Laterality: Left;    CATARACT EXTRACTION, BILATERAL      CHOLECYSTECTOMY  10/01/2014    COLECTOMY, RIGHT  04/22/2024    Procedure: COLECTOMY, RIGHT;  Surgeon: Jaden Barrios MD;  Location: [facility] OR;  Service: General;;    COLON SURGERY      COLONOSCOPIC SURGICAL PROCEDURE  04/22/2024    Procedure: SURGICAL PROCEDURE, COLONOSCOPIC;  Surgeon: Jaden Brarios MD;  Location: [facility] OR;  Service: General;;    COLONOSCOPY N/A 04/21/2024    Procedure: COLONOSCOPY;  Surgeon: Ceasar Arias III, MD;  Location:  [facility] ENDO;  Service: Endoscopy;  Laterality: N/A;    ESOPHAGOGASTRODUODENOSCOPY N/A 04/20/2024    Procedure: EGD (ESOPHAGOGASTRODUODENOSCOPY);  Surgeon: Ceasar Arias III, MD;  Location: [facility] ENDO;  Service: Endoscopy;  Laterality: N/A;    EYE SURGERY      KNEE ARTHROSCOPY Left     LAPAROTOMY, EXPLORATORY N/A 04/22/2024    Procedure: LAPAROTOMY, EXPLORATORY;  Surgeon: Jaden Barrios MD;  Location: [facility] OR;  Service: General;  Laterality: N/A;    LYSIS OF ADHESIONS  04/22/2024    Procedure: LYSIS, ADHESIONS;  Surgeon: Jaden Barrios MD;  Location: [facility] OR;  Service: General;;    REPAIR, HERNIA, INCISIONAL N/A 12/12/2024    Procedure: REPAIR, HERNIA, INCISIONAL;  Surgeon: Marlon Marks Jr., MD;  Location: [facility] OR;  Service: General;  Laterality: N/A;  retrorectus w/ TAR    TONSILLECTOMY       Social History[1]      Objective    There were no vitals filed for this visit.    LAST EKG  Results for orders placed or performed in visit on 06/25/25   EKG 12-lead    Collection Time: 06/25/25  9:23 AM   Result Value Ref Range    QRS Duration 90 ms    OHS QTC Calculation 391 ms    Narrative    Test Reason : K43.2,    Vent. Rate :  71 BPM     Atrial Rate :  71 BPM     P-R Int : 162 ms          QRS Dur :  90 ms      QT Int : 360 ms       P-R-T Axes :  63  39  58 degrees    QTcB Int : 391 ms    Normal sinus rhythm  Nonspecific T wave abnormality  Abnormal ECG  When compared with ECG of 23-Apr-2024 12:17,  QT has shortened  Confirmed by Patrick Maria ([id number]) on 6/27/2025 3:10:35 PM    Referred By:            Confirmed By: Patrick Maria     LIPIDS - LAST 2   Lab Results   Component Value Date    CHOL 191 09/12/2024    CHOL 199 03/07/2024    HDL 54 09/12/2024    HDL 44 03/07/2024    LDLCALC 106 (H) 09/12/2024    LDLCALC 99.0 03/07/2024    TRIG 156 (H) 09/12/2024    TRIG 155 (H) 04/26/2024    CHOLHDL 22.1 03/07/2024    CHOLHDL 18.4 (L) 10/10/2019     CARDIAC PROFILE - LAST 2  Lab Results   Component Value  Date    BNP 37 04/19/2024    BNP 16 03/07/2024    CPK 34 04/19/2024      CBC - LAST 2  Lab Results   Component Value Date    WBC 8.55 07/04/2025    WBC 7.67 06/14/2025    RBC 4.01 07/04/2025    RBC 4.72 06/14/2025    HGB 12.2 07/04/2025    HGB 14.2 06/14/2025    HCT 37.0 07/04/2025    HCT 44.4 06/14/2025     07/04/2025     06/14/2025     Lab Results   Component Value Date    INR 1.0 04/19/2024    INR 0.9 03/07/2024    APTT 32.3 (H) 04/19/2024    APTT 31.6 12/18/2023     CHEMISTRY - LAST 2  Lab Results   Component Value Date     07/04/2025     06/14/2025    K 3.9 07/04/2025    K 4.1 06/14/2025     (H) 07/04/2025     06/14/2025    CO2 22 (L) 07/04/2025    CO2 21 (L) 06/14/2025    ANIONGAP 9 07/04/2025    ANIONGAP 9 06/14/2025    BUN 14 07/04/2025    BUN 18 06/14/2025    CREATININE 1.0 07/04/2025    CREATININE 1.1 06/14/2025     (H) 07/04/2025     (H) 06/14/2025    CALCIUM 9.2 07/04/2025    CALCIUM 9.3 06/14/2025    MG 1.8 12/14/2024    MG 1.8 12/13/2024    ALBUMIN 4.0 06/14/2025    ALBUMIN 4.0 03/19/2025    PROT 7.7 06/14/2025    PROT 7.5 03/19/2025    ALKPHOS 130 06/14/2025    ALKPHOS 124 03/19/2025    ALT 23 06/14/2025    ALT 18 03/19/2025    AST 31 06/14/2025    AST 20 03/19/2025    BILITOT 0.4 06/14/2025    BILITOT 0.4 03/19/2025      ENDOCRINE - LAST 2  Lab Results   Component Value Date    HGBA1C 6.9 (H) 12/13/2024    HGBA1C 6.9 (H) 12/13/2024    TSH 2.254 04/19/2024    TSH 3.161 03/14/2024        PHYSICAL EXAM  CONSTITUTIONAL:  Pleasant calm elderly female breathing comfortably on video  NEURO: AAO X 3, speech clear, memory clear    I HAVE REVIEWED :    The vital signs, most recent cardiac testing, and most recent pertinent non-cardiology provider notes.    Current Outpatient Medications   Medication Instructions    alendronate-vitamin D3 (FOSAMAX PLUS D) 70 mg- 5,600 unit per tablet 1 tablet, Every 7 days    aspirin 81 mg, Daily    diltiaZEM (CARDIZEM CD) 120  mg, Oral    ELIQUIS 2.5 mg, Oral, Every 12 hours    empagliflozin (JARDIANCE) 25 mg, Daily    enoxaparin (LOVENOX) 40 mg, Subcutaneous, Daily, Beginning two (2) days prior and one day before Colonoscopy.  Hold eliquis on days you inject lovenox. Hold eliquis on days you inject lovenox.    ergocalciferol (ERGOCALCIFEROL) 50,000 Units, Every 7 days    ezetimibe (ZETIA) 10 mg, Oral, Daily    folic acid-vit B6-vit B12 2.5-25-2 mg (WESTAB MAX) 2.5-25-2 mg Tab 1 tablet, Oral, Daily    glimepiride (AMARYL) 1 mg    Lactobacillus rhamnosus GG (CULTURELLE) 10 billion cell capsule 1 capsule, Daily    LIDOcaine-prilocaine (EMLA) cream Apply to skin 45-60 minutes prior to accessing port-a-cath.    loperamide (IMODIUM) 2 mg    loratadine (CLARITIN) 10 mg    melatonin 1 mg TbDL Take by mouth.    omega-3 acid ethyl esters (LOVAZA) 2 g, 2 times daily    ondansetron (ZOFRAN) 4 mg    oxyCODONE (ROXICODONE) 5 mg, Oral, Every 4 hours PRN    PAIN RELIEVER ES(ACETAMINOPHN) 1,000 mg, Oral, Every 8 hours PRN    pantoprazole (PROTONIX) 40 mg, Every morning    potassium chloride (MICRO-K) 10 MEQ CpSR TAKE 1 CAPSULE BY MOUTH EVERY DAY    ramelteon (ROZEREM) 8 mg, Oral, Nightly    ranolazine (RANEXA) 500 mg, Oral, 2 times daily      Assessment & Plan     Essential hypertension  Blood pressure has been well controlled.  She does not take it at home but last few blood pressure is she has noted have been normal.  She takes diltiazem 120 mg daily.    Encounter to discuss test results  Reviewed latest CBC results with the patient which were normal  Reviewed echocardiogram results at length which is normal  Reviewed stress test findings.  Explained the abnormalities found are attributable to shadows.  EKG during stress showed no ischemia.  Dr. Maria noted no reversible ischemia on the stress test.  She does not appear to have concerning anginal symptoms    Patient is cleared to undergo partial thyroidectomy from Cardiology standpoint  She needs to  hold aspirin 1 week prior to surgery and Eliquis 3 days prior to surgery  Please resume both once deemed safely appropriate by surgeon at [facility]    Deep vein thrombosis (DVT) of left lower extremity, unspecified chronicity, unspecified vein  She has been on Eliquis for this.  She states [facility] has ordered bridging with Lovenox 2 days prior to surgery.    Encounter for pre-operative cardiovascular clearance  Patient was evaluated in the office.  Stress test is negative for reversible ischemia.  Echocardiogram is completely normal.  She has no persistent anginal symptoms.  Her blood pressure is controlled.  Patient is cleared from cardiology standpoint to proceed with partial thyroidectomy at [facility].    Thyroid cancer  She is being followed by [facility].  She is scheduled for partial thyroidectomy on Monday  Cleared from cardiology standpoint for surgical procedure          Advised follow up in 4-6 weeks postoperatively or sooner if needed               JUDY Quispe, REBECCA-BC                             [1]   Social History  Tobacco Use    Smoking status: Former    Smokeless tobacco: Never   Substance Use Topics    Alcohol use: No    Drug use: No

## 2025-07-08 NOTE — ASSESSMENT & PLAN NOTE
Blood pressure has been well controlled.  She does not take it at home but last few blood pressure is she has noted have been normal.  She takes diltiazem 120 mg daily.

## 2025-07-28 ENCOUNTER — OFFICE VISIT (OUTPATIENT)
Dept: OBSTETRICS AND GYNECOLOGY | Facility: CLINIC | Age: 75
End: 2025-07-28
Payer: MEDICARE

## 2025-07-28 VITALS
DIASTOLIC BLOOD PRESSURE: 60 MMHG | WEIGHT: 194.31 LBS | SYSTOLIC BLOOD PRESSURE: 120 MMHG | HEIGHT: 68 IN | BODY MASS INDEX: 29.45 KG/M2

## 2025-07-28 DIAGNOSIS — R93.89 ABNORMAL FINDING ON CT SCAN: Primary | ICD-10-CM

## 2025-07-28 DIAGNOSIS — Z85.038 H/O COLON CANCER, STAGE III: ICD-10-CM

## 2025-07-28 PROCEDURE — 99999 PR PBB SHADOW E&M-EST. PATIENT-LVL III: CPT | Mod: PBBFAC,,, | Performed by: GENERAL PRACTICE

## 2025-07-28 PROCEDURE — 99204 OFFICE O/P NEW MOD 45 MIN: CPT | Mod: S$PBB,,, | Performed by: GENERAL PRACTICE

## 2025-07-28 PROCEDURE — 99213 OFFICE O/P EST LOW 20 MIN: CPT | Mod: PBBFAC,PO | Performed by: GENERAL PRACTICE

## 2025-07-28 NOTE — PROGRESS NOTES
ASSESSMENT AND PLAN   2025  75 y.o.  with thickened endometrium on recent CT scan.  Discussed the definitive way to r/o cancer is with tissue sampling.  Discussed can be achieved with office biopsy or in OR.  Discussed pelvic US is better modality to evaluate the endometrium.  No vaginal bleeding since her menopause age 40.  Prefers not to try for biopsy today and would rather start with pelvic US.    Rads: pelvic US  Cervical Cancer screening: PAP testing no longer indicated; recommend periodic pelvic exams with visual inspection and palpation  Mammogram: up to date  Return to clinic: after above studies are completed to discuss next steps    Leslie L Weeks, MD [facility]    SUBJECTIVE   2025  Jen Melissa is a 75 y.o. here for further evaluation of findings on recent CT ABD/PELVIS.  Last GYN exam was in .  The patient has a history of colon and thyroid cancer.  Managed through [facility].    G'sP's:   Relationship:  33yrs, not sexually active  PAP Hx: had cryo age 21 with normal PAP's since  LAST PAP: 2021: PAP neg   FMP: age 40  HRT: never used   BLEEDING: denies  MMG (25) = negative, recommend 1-yr f/u     OBJECTIVE   PHYSICAL EXAM  Vitals:    25 1004   BP: 120/60     GEN = alert/oriented, nad, pleasant  HEENT = sclera anicteric, EOM grossly normal  CV = BP and HR as per vitals  PULM = normal respiratory effort   = deferred, no acute concerns    LABS AND RADS  Lab Results   Component Value Date    WBC 8.55 2025    HGB 12.2 2025    HCT 37.0 2025     2025    MCV 92 2025      CT ABD/PELVIS (2025)  Uterus has 1.5cm fibroid left anterior    hypodense material present within the endometrial canal of the uterine fundus, favored to represent blood products within the endometrial canal and/or cystic degeneration of the endometrium , endometrial canal measures up to 8.5mm    No solid or cystic adnexal  masses.  The ovaries are normal in appearance    HISTORY   Date taken or verified: 07/28/2025     GYNECOLOGIC HISTORY  PAP Hx: had cryo age 21 with normal PAP's since  Genital HSV: No  Other STD Hx: denies    Menarche: 14yo  Pain -- Non-menstrual pelvic pain: by her ovaries and bladder / Dyspareunia: n/a  Other -- Vasomotor Sxs: denies / Vaginal dryness: denies    OBSTETRIC HISTORY  Living children: 2  Vaginal deliveries: 2  Miscarriages <20wks: 1  IAB: 1    SOCIAL HISTORY  Lives with:   Smoker: non-smoker / Alcohol: denies / Drugs: denies  Domestic Violence: No  Occupation: homemaker    FAMILY HISTORY  BLEEDING or CLOTTING DISORDERS: none  BREAST CA: grandmother / UTERINE CA: none / OVARIAN CA: none  COLON CA: mother and self     PAST MEDICAL HISTORY  -------------------------------------    Anticoagulant long-term use    Anxiety disorder, unspecified    Cancer    colon and thyroid    Diabetes mellitus    DVT (deep venous thrombosis)    Encounter for blood transfusion    GERD (gastroesophageal reflux disease)    Hyperlipidemia    Hypertension    Migraine    Sleep apnea    Can not use c-pap    Stroke    TIA (transient ischemic attack)     PAST SURGICAL HISTORY  ----------------------------    Arthroscopic repair of rotator cuff of shoulder    Procedure: REPAIR, ROTATOR CUFF, ARTHROSCOPIC;  Surgeon: Gary Tony MD;  Location: [facility];  Service: Orthopedics;  Laterality: Left;  arthrex    Arthroscopy of shoulder with decompression of subacromial space    Procedure: ARTHROSCOPY, SHOULDER, WITH SUBACROMIAL SPACE DECOMPRESSION;  Surgeon: Gary Tony MD;  Location: [facility];  Service: Orthopedics;  Laterality: Left;    Cataract extraction, bilateral    Cervix lesion destruction    age 21    Cholecystectomy    Colectomy, right    Procedure: COLECTOMY, RIGHT;  Surgeon: Jaden Barrios MD;  Location: [facility] OR;  Service: General;;    Colon surgery    Colonoscopic surgical procedure     Procedure: SURGICAL PROCEDURE, COLONOSCOPIC;  Surgeon: Jaden Barrios MD;  Location: [facility] OR;  Service: General;;    Colonoscopy    Procedure: COLONOSCOPY;  Surgeon: Ceasar Arias III, MD;  Location: [facility] ENDO;  Service: Endoscopy;  Laterality: N/A;    Dilation and curettage of uterus    Esophagogastroduodenoscopy    Procedure: EGD (ESOPHAGOGASTRODUODENOSCOPY);  Surgeon: Ceasar Arias III, MD;  Location: [facility] ENDO;  Service: Endoscopy;  Laterality: N/A;    Eye surgery    Knee arthroscopy    Laparotomy, exploratory    Procedure: LAPAROTOMY, EXPLORATORY;  Surgeon: Jaden Barrios MD;  Location: [facility] OR;  Service: General;  Laterality: N/A;    Lysis of adhesions    Procedure: LYSIS, ADHESIONS;  Surgeon: Jaden Barrios MD;  Location: [facility] OR;  Service: General;;    Repair, hernia, incisional    Procedure: REPAIR, HERNIA, INCISIONAL;  Surgeon: Marlon Marks Jr., MD;  Location: [facility] OR;  Service: General;  Laterality: N/A;  retrorectus w/ TAR    Thyroidectomy, partial    Tonsillectomy     ALLERGIES  Review of patient's allergies indicates:   Allergen Reactions    Statins-hmg-coa reductase inhibitors Swelling    Metformin Other (See Comments)     metallic taste , burn of the esophagus    Oxaliplatin Itching     Throat itchiness, twitching of both eyelids,tingling of hands, lips, head and feet. Cramping of toes.     MEDICATIONS  Current Outpatient Medications   Medication Instructions    alendronate-vitamin D3 (FOSAMAX PLUS D) 70 mg- 5,600 unit per tablet 1 tablet, Every 7 days    aspirin 81 mg, Daily    diltiaZEM (CARDIZEM CD) 120 mg, Oral    ELIQUIS 2.5 mg, Oral, Every 12 hours    empagliflozin (JARDIANCE) 25 mg, Daily    enoxaparin (LOVENOX) 40 mg, Subcutaneous, Daily, Beginning two (2) days prior and one day before Colonoscopy.  Hold eliquis on days you inject lovenox. Hold eliquis on days you inject lovenox.    ergocalciferol (ERGOCALCIFEROL) 50,000 Units, Every 7 days    ezetimibe  (ZETIA) 10 mg, Oral, Daily    folic acid-vit B6-vit B12 2.5-25-2 mg (WESTAB MAX) 2.5-25-2 mg Tab 1 tablet, Oral, Daily    glimepiride (AMARYL) 1 mg    Lactobacillus rhamnosus GG (CULTURELLE) 10 billion cell capsule 1 capsule, Daily    LIDOcaine-prilocaine (EMLA) cream Apply to skin 45-60 minutes prior to accessing port-a-cath.    loperamide (IMODIUM) 2 mg    loratadine (CLARITIN) 10 mg    melatonin 1 mg TbDL Take by mouth.    omega-3 acid ethyl esters (LOVAZA) 2 g, 2 times daily    ondansetron (ZOFRAN) 4 mg    oxyCODONE (ROXICODONE) 5 mg, Oral, Every 4 hours PRN    PAIN RELIEVER ES(ACETAMINOPHN) 1,000 mg, Oral, Every 8 hours PRN    pantoprazole (PROTONIX) 40 mg, Every morning    potassium chloride (MICRO-K) 10 MEQ CpSR TAKE 1 CAPSULE BY MOUTH EVERY DAY    ramelteon (ROZEREM) 8 mg, Oral, Nightly    ranolazine (RANEXA) 500 mg, Oral, 2 times daily

## 2025-08-04 ENCOUNTER — HOSPITAL ENCOUNTER (OUTPATIENT)
Dept: RADIOLOGY | Facility: HOSPITAL | Age: 75
Discharge: HOME | End: 2025-08-04
Attending: GENERAL PRACTICE
Payer: MEDICARE

## 2025-08-04 DIAGNOSIS — R93.89 ABNORMAL FINDING ON CT SCAN: ICD-10-CM

## 2025-08-04 PROCEDURE — 76830 TRANSVAGINAL US NON-OB: CPT | Mod: 26,,, | Performed by: RADIOLOGY

## 2025-08-04 PROCEDURE — 76856 US EXAM PELVIC COMPLETE: CPT | Mod: 26,,, | Performed by: RADIOLOGY

## 2025-08-04 PROCEDURE — 76856 US EXAM PELVIC COMPLETE: CPT | Mod: TC

## 2025-08-05 NOTE — PROGRESS NOTES
Background   2025  Jen Torres is a 75 y.o. here for further evaluation of findings on recent CT ABD/PELVIS.  Last GYN exam was in .  The patient has a history of colon and thyroid cancer.  Managed through [facility].  --thickened endometrium on recent CT scan.  No vaginal bleeding since her menopause age 40.  Prefers not to try for biopsy today and would rather start with pelvic US.       ASSESSMENT AND PLAN   2025  75 y.o.  with incidental finding of thickened endometrium (no bleeding) to 8mm per recent pelvic US.  We discussed more definitive evaluation would include hysteroscopy with D&C (in the OR).  We discussed that per my understanding of the literature there is at least >95% chance she does not have a malignancy of the endometrium.  We discussed some benign explanations for a thickened endometrium.  Mrs. Li at this time feels comfortable with watchful waiting, especially since she gets such frequent CT scans of the pelvis.    Scheduled 23 SEP for next CT of pelvis - will be at [facility] with Dr. Lopez.  Encouraged patient to send her a message to ask the radiologist to pay some extra attention to the uterus.  Return to clinic: after above studies are completed to discuss next steps if any, return sooner for bleeding or other GYN concerns.    MD Lidia Del [facility]    SUBJECTIVE   2025  Jen Torres is a 75 y.o. here for f/u of recent pelvic US. Gets CT scans every 3 months with next one already scheduled for 23 SEP @ [facility].     G'sP's:   Relationship:  33yrs, not sexually active  PAP Hx: had cryo age 21 with normal PAP's since  LAST PAP: 2021: PAP neg   FMP: age 40  HRT: never used   BLEEDING: denies  MMG (25) = negative, recommend 1-yr f/u     OBJECTIVE   PHYSICAL EXAM  Vitals:    25 1340   BP: 128/76     GEN = alert/oriented, nad, pleasant  HEENT = sclera anicteric, EOM grossly normal  CV = BP and  HR as per vitals  PULM = normal respiratory effort   = deferred, no concerns    LABS AND RADS  **These are images I grabbed and annotated for comparison.  Obviously, I am not a radiologist.  19 APR 2024    The uterus and adnexal structures are unremarkable.     (Fibroid circled, EM underlined by me)    14 JUN 2025    Uterus has 1.5cm fibroid left anterior     hypodense material present within the endometrial canal of the uterine fundus, favored to represent blood products within the endometrial canal and/or cystic degeneration of the endometrium , endometrial canal measures up to 8.5mm     No solid or cystic adnexal masses.  The ovaries are normal in appearance    (Fibroid circled, EM underlined by me)    PELVIC US (8/4/25) =  Uterus 5 x 3 x 4 cm  EMS 8 mm  ROV 2 x 1 x 1 cm  LOV 3 x 2 x 2 cm  --no comment on fibroid, but was seen on images      HISTORY   Date taken or verified: 07/28/2025     GYNECOLOGIC HISTORY  PAP Hx: had cryo age 21 with normal PAP's since  Genital HSV: No  Other STD Hx: denies    Menarche: 14yo  Pain -- Non-menstrual pelvic pain: by her ovaries and bladder / Dyspareunia: n/a  Other -- Vasomotor Sxs: denies / Vaginal dryness: denies    OBSTETRIC HISTORY  Living children: 2  Vaginal deliveries: 2  Miscarriages <20wks: 1  IAB: 1    SOCIAL HISTORY  Lives with:   Smoker: non-smoker / Alcohol: denies / Drugs: denies  Domestic Violence: No  Occupation: homemaker    FAMILY HISTORY  BLEEDING or CLOTTING DISORDERS: none  BREAST CA: grandmother / UTERINE CA: none / OVARIAN CA: none  COLON CA: mother and self     PAST MEDICAL HISTORY  -------------------------------------    Anticoagulant long-term use    Anxiety disorder, unspecified    Cancer    colon and thyroid    Diabetes mellitus    DVT (deep venous thrombosis)    Encounter for blood transfusion    GERD (gastroesophageal reflux disease)    Hyperlipidemia    Hypertension    Migraine    Sleep apnea    Can not use c-pap    Stroke    TIA  (transient ischemic attack)     PAST SURGICAL HISTORY  ----------------------------    Arthroscopic repair of rotator cuff of shoulder    Procedure: REPAIR, ROTATOR CUFF, ARTHROSCOPIC;  Surgeon: Gary Tony MD;  Location: [facility];  Service: Orthopedics;  Laterality: Left;  arthrex    Arthroscopy of shoulder with decompression of subacromial space    Procedure: ARTHROSCOPY, SHOULDER, WITH SUBACROMIAL SPACE DECOMPRESSION;  Surgeon: Gary Tony MD;  Location: [facility];  Service: Orthopedics;  Laterality: Left;    Cataract extraction, bilateral    Cervix lesion destruction    age 21    Cholecystectomy    Colectomy, right    Procedure: COLECTOMY, RIGHT;  Surgeon: Jaden Barrios MD;  Location: [facility];  Service: General;;    Colon surgery    Colonoscopic surgical procedure    Procedure: SURGICAL PROCEDURE, COLONOSCOPIC;  Surgeon: Jaden Barrios MD;  Location: [facility];  Service: General;;    Colonoscopy    Procedure: COLONOSCOPY;  Surgeon: Ceasar Arias III, MD;  Location: [facility];  Service: Endoscopy;  Laterality: N/A;    Dilation and curettage of uterus    Esophagogastroduodenoscopy    Procedure: EGD (ESOPHAGOGASTRODUODENOSCOPY);  Surgeon: Ceasar Arias III, MD;  Location: [facility];  Service: Endoscopy;  Laterality: N/A;    Eye surgery    Knee arthroscopy    Laparotomy, exploratory    Procedure: LAPAROTOMY, EXPLORATORY;  Surgeon: Jaden Barrios MD;  Location: [facility];  Service: General;  Laterality: N/A;    Lysis of adhesions    Procedure: LYSIS, ADHESIONS;  Surgeon: Jaden Barrios MD;  Location: [facility];  Service: General;;    Repair, hernia, incisional    Procedure: REPAIR, HERNIA, INCISIONAL;  Surgeon: Marlon Marks Jr., MD;  Location: [facility];  Service: General;  Laterality: N/A;  retrorectus w/ TAR    Thyroidectomy, partial    Tonsillectomy     ALLERGIES  Review of patient's allergies indicates:   Allergen Reactions    Statins-hmg-coa reductase  inhibitors Swelling    Metformin Other (See Comments)     metallic taste , burn of the esophagus    Oxaliplatin Itching     Throat itchiness, twitching of both eyelids,tingling of hands, lips, head and feet. Cramping of toes.     MEDICATIONS  Current Outpatient Medications   Medication Instructions    alendronate-vitamin D3 (FOSAMAX PLUS D) 70 mg- 5,600 unit per tablet 1 tablet, Every 7 days    aspirin 81 mg, Daily    diltiaZEM (CARDIZEM CD) 120 mg, Oral    ELIQUIS 2.5 mg, Oral, Every 12 hours    empagliflozin (JARDIANCE) 25 mg, Daily    enoxaparin (LOVENOX) 40 mg, Subcutaneous, Daily, Beginning two (2) days prior and one day before Colonoscopy.  Hold eliquis on days you inject lovenox. Hold eliquis on days you inject lovenox.    ergocalciferol (ERGOCALCIFEROL) 50,000 Units, Every 7 days    ezetimibe (ZETIA) 10 mg, Oral, Daily    folic acid-vit B6-vit B12 2.5-25-2 mg (WESTAB MAX) 2.5-25-2 mg Tab 1 tablet, Oral, Daily    glimepiride (AMARYL) 1 mg    Lactobacillus rhamnosus GG (CULTURELLE) 10 billion cell capsule 1 capsule, Daily    LIDOcaine-prilocaine (EMLA) cream Apply to skin 45-60 minutes prior to accessing port-a-cath.    loperamide (IMODIUM) 2 mg    loratadine (CLARITIN) 10 mg    melatonin 1 mg TbDL Take by mouth.    omega-3 acid ethyl esters (LOVAZA) 2 g, 2 times daily    ondansetron (ZOFRAN) 4 mg    oxyCODONE (ROXICODONE) 5 mg, Oral, Every 4 hours PRN    PAIN RELIEVER ES(ACETAMINOPHN) 1,000 mg, Oral, Every 8 hours PRN    pantoprazole (PROTONIX) 40 mg, Every morning    potassium chloride (MICRO-K) 10 MEQ CpSR TAKE 1 CAPSULE BY MOUTH EVERY DAY    ramelteon (ROZEREM) 8 mg, Oral, Nightly    ranolazine (RANEXA) 500 mg, Oral, 2 times daily

## 2025-08-08 ENCOUNTER — OFFICE VISIT (OUTPATIENT)
Dept: OBSTETRICS AND GYNECOLOGY | Facility: CLINIC | Age: 75
End: 2025-08-08
Payer: MEDICARE

## 2025-08-08 VITALS
BODY MASS INDEX: 30.07 KG/M2 | WEIGHT: 197.75 LBS | DIASTOLIC BLOOD PRESSURE: 76 MMHG | SYSTOLIC BLOOD PRESSURE: 128 MMHG

## 2025-08-08 DIAGNOSIS — R93.89 ABNORMAL FINDING ON CT SCAN: Primary | ICD-10-CM

## 2025-08-08 PROCEDURE — 99213 OFFICE O/P EST LOW 20 MIN: CPT | Mod: PBBFAC,PO | Performed by: GENERAL PRACTICE

## 2025-08-08 PROCEDURE — 99999 PR PBB SHADOW E&M-EST. PATIENT-LVL III: CPT | Mod: PBBFAC,,, | Performed by: GENERAL PRACTICE

## 2025-08-09 DIAGNOSIS — I10 ESSENTIAL HYPERTENSION: ICD-10-CM

## 2025-08-12 RX ORDER — DILTIAZEM HYDROCHLORIDE 120 MG/1
120 CAPSULE, COATED, EXTENDED RELEASE ORAL
Qty: 90 CAPSULE | Refills: 3 | Status: SHIPPED | OUTPATIENT
Start: 2025-08-12

## 2025-08-13 DIAGNOSIS — E78.5 DYSLIPIDEMIA: ICD-10-CM

## 2025-08-13 DIAGNOSIS — I82.592 CHRONIC EMBOLISM AND THROMBOSIS OF OTHER SPECIFIED DEEP VEIN OF LEFT LOWER EXTREMITY: ICD-10-CM

## 2025-08-14 RX ORDER — APIXABAN 2.5 MG/1
2.5 TABLET, FILM COATED ORAL EVERY 12 HOURS
Qty: 60 TABLET | Refills: 5 | Status: SHIPPED | OUTPATIENT
Start: 2025-08-14

## 2025-08-20 ENCOUNTER — INFUSION (OUTPATIENT)
Dept: INFUSION THERAPY | Facility: HOSPITAL | Age: 75
End: 2025-08-20
Attending: INTERNAL MEDICINE
Payer: MEDICARE

## 2025-08-20 VITALS
DIASTOLIC BLOOD PRESSURE: 77 MMHG | HEART RATE: 67 BPM | SYSTOLIC BLOOD PRESSURE: 128 MMHG | RESPIRATION RATE: 16 BRPM | BODY MASS INDEX: 29.4 KG/M2 | WEIGHT: 194 LBS | OXYGEN SATURATION: 97 % | HEIGHT: 68 IN | TEMPERATURE: 98 F

## 2025-08-20 DIAGNOSIS — C18.9 COLON ADENOCARCINOMA: Primary | ICD-10-CM

## 2025-08-20 PROCEDURE — 96523 IRRIG DRUG DELIVERY DEVICE: CPT

## 2025-08-20 PROCEDURE — 63600175 PHARM REV CODE 636 W HCPCS: Performed by: INTERNAL MEDICINE

## 2025-08-20 PROCEDURE — 25000003 PHARM REV CODE 250: Performed by: INTERNAL MEDICINE

## 2025-08-20 PROCEDURE — A4216 STERILE WATER/SALINE, 10 ML: HCPCS | Performed by: INTERNAL MEDICINE

## 2025-08-20 RX ORDER — SODIUM CHLORIDE 0.9 % (FLUSH) 0.9 %
10 SYRINGE (ML) INJECTION
OUTPATIENT
Start: 2025-08-20

## 2025-08-20 RX ORDER — SODIUM CHLORIDE 0.9 % (FLUSH) 0.9 %
10 SYRINGE (ML) INJECTION
Status: DISCONTINUED | OUTPATIENT
Start: 2025-08-20 | End: 2025-08-20 | Stop reason: HOSPADM

## 2025-08-20 RX ORDER — HEPARIN 100 UNIT/ML
500 SYRINGE INTRAVENOUS
OUTPATIENT
Start: 2025-08-20

## 2025-08-20 RX ORDER — HEPARIN 100 UNIT/ML
500 SYRINGE INTRAVENOUS
Status: DISCONTINUED | OUTPATIENT
Start: 2025-08-20 | End: 2025-08-20 | Stop reason: HOSPADM

## 2025-08-20 RX ADMIN — Medication 10 ML: at 10:08

## 2025-08-20 RX ADMIN — HEPARIN 500 UNITS: 100 SYRINGE at 10:08

## (undated) DEVICE — TUBING ARTHROSCOPY

## (undated) DEVICE — SOL NACL IRR 1000ML BTL

## (undated) DEVICE — SUT SILK 3-0 SH DETACH 30IN

## (undated) DEVICE — SUT 0 VICRYL PLUS CT-1 27IN

## (undated) DEVICE — BLADE SURG CARBON STEEL SZ11

## (undated) DEVICE — HYDROGEN PEROXIDE HDX10 3% 4OZ

## (undated) DEVICE — TOWEL OR DISP STRL BLUE 4/PK

## (undated) DEVICE — PACK SHOULDER DRAPE POUCH

## (undated) DEVICE — DRAPE THREE-QTR REINF 53X77IN

## (undated) DEVICE — SUT SILK 3-0 STRANDS 30IN

## (undated) DEVICE — STRAP OR TABLE 5IN X 72IN

## (undated) DEVICE — GLOVE BIOGEL SKINSENSE PI 7.5

## (undated) DEVICE — SLEEVE LATERAL TRACTION ARM

## (undated) DEVICE — APPLICATOR CHLORAPREP ORN 26ML

## (undated) DEVICE — DRAPE STERI INSTRUMENT 1018

## (undated) DEVICE — PACK SIRUS BASIC V SURG STRL

## (undated) DEVICE — GLOVE SENSICARE PI ALOE 7

## (undated) DEVICE — TRAY GENERAL SURGERY SMH

## (undated) DEVICE — COVER LIGHT HANDLE 80/CA

## (undated) DEVICE — NDL SUREFIRE SCORPION RC

## (undated) DEVICE — SUT ETHILON 3-0 FS-1 30

## (undated) DEVICE — SUT VICRYL PLUS 3-0 SH 18IN

## (undated) DEVICE — GOWN POLY REINF BRTH SLV 2XL

## (undated) DEVICE — GOWN POLY REINF X-LONG XL

## (undated) DEVICE — SUT SILK 2-0 STRANDS 30IN

## (undated) DEVICE — DRAPE STERI U-SHAPED 47X51IN

## (undated) DEVICE — DRAPE INCISE IOBAN 2 23X33IN

## (undated) DEVICE — TAPE ADH MEDIPORE 4 X 10YDS

## (undated) DEVICE — ADHESIVE DERMABOND ADVANCED

## (undated) DEVICE — MAT SURGICAL ECOSUCTIONER

## (undated) DEVICE — GOWN POLY REINF BRTH SLV XL

## (undated) DEVICE — CANNULA PASSPORT 8 MM X 4CM.

## (undated) DEVICE — SUT MCRYL PLUS 4-0 PS2 27IN

## (undated) DEVICE — GLOVE SENSICARE PI SURG 7

## (undated) DEVICE — PROBE ABLATION RF ARTHSCP 3.5

## (undated) DEVICE — ELECTRODE REM PLYHSV RETURN 9

## (undated) DEVICE — CUTTER AGGRESSIVE PLUS 3.5MM

## (undated) DEVICE — DRAPE FLUID WARMER ORS 44X44IN

## (undated) DEVICE — MAT QUICK 40X30 FLOOR FLUID LF

## (undated) DEVICE — Device

## (undated) DEVICE — MANIFOLD 4 PORT

## (undated) DEVICE — NDL SPINAL 18GX3.5 SPINOCAN

## (undated) DEVICE — PACK SET UP 190 OMC-NS

## (undated) DEVICE — DRAPE U SPLIT SHEET 54X76IN

## (undated) DEVICE — GLOVE SENSICARE PI GRN 8

## (undated) DEVICE — PAD ABDOMINAL STERILE 8X10IN

## (undated) DEVICE — DRESSING MEPILEX BORDER AG 4X4

## (undated) DEVICE — TAPE MEDIPORE 4IN X 2YDS

## (undated) DEVICE — ALCOHOL 70% ANTISEPTIC ISO 4OZ

## (undated) DEVICE — GLOVE SENSICARE PI ALOE 7.5

## (undated) DEVICE — CONNECTOR TUBING STR 5 IN 1

## (undated) DEVICE — CANNULA PASSPORT 8 MM X 3CM

## (undated) DEVICE — DRAPE INVISISHIELD TOWEL SMALL

## (undated) DEVICE — CUTTER MENISCUS AGGRESSIVE 4.0

## (undated) DEVICE — DRAPE ORTH SPLIT 77X108IN

## (undated) DEVICE — TUBING SUC UNIV W/CONN 12FT

## (undated) DEVICE — SUT SILK 0 STRANDS 30IN BLK

## (undated) DEVICE — DRESSING MEPILEX 4X10IN

## (undated) DEVICE — SPONGE BULKEE II ABSRB 6X6.75

## (undated) DEVICE — SUT PDS PLUS 1 TP1 96IN

## (undated) DEVICE — SOL NACL IRR 3000ML

## (undated) DEVICE — DRESSING TRANS 4X4 3/4

## (undated) DEVICE — GOWN ECLIPSE REINF LVL4 TWL XL

## (undated) DEVICE — SUT SILK SH 2-0 30IN MP BLK

## (undated) DEVICE — CUTTER PROXIMATE BLUE 75MM

## (undated) DEVICE — WRAP SHLDR HIP ACCU THRM PACK

## (undated) DEVICE — CANNULA TWIST IN 7MM X 7CM

## (undated) DEVICE — SUT PDS II 0 CT-1 VIL MONO

## (undated) DEVICE — GLOVE SENSICARE PI GRN 7

## (undated) DEVICE — RELOAD PROXIMATE CUT BLUE 75MM

## (undated) DEVICE — ELECTRODE BLD EXT 6.50 ST DISP

## (undated) DEVICE — SLING SHLDR IMMOBILIZER LG

## (undated) DEVICE — SLEEVE SCD EXPRESS KNEE MEDIUM

## (undated) DEVICE — SEALER LIGASURE IMPACT 18CM

## (undated) DEVICE — DRESSING N ADH OIL EMUL 3X3

## (undated) DEVICE — TRAY CATH 1-LYR URIMTR 16FR